# Patient Record
Sex: MALE | Race: WHITE | Employment: OTHER | URBAN - NONMETROPOLITAN AREA
[De-identification: names, ages, dates, MRNs, and addresses within clinical notes are randomized per-mention and may not be internally consistent; named-entity substitution may affect disease eponyms.]

---

## 2017-01-03 ENCOUNTER — OFFICE VISIT (OUTPATIENT)
Dept: CARDIOLOGY | Age: 67
End: 2017-01-03
Payer: MEDICARE

## 2017-01-03 VITALS
SYSTOLIC BLOOD PRESSURE: 154 MMHG | WEIGHT: 266 LBS | HEART RATE: 76 BPM | HEIGHT: 70 IN | BODY MASS INDEX: 38.08 KG/M2 | DIASTOLIC BLOOD PRESSURE: 74 MMHG

## 2017-01-03 DIAGNOSIS — I10 ESSENTIAL HYPERTENSION: ICD-10-CM

## 2017-01-03 DIAGNOSIS — E78.00 HYPERCHOLESTEREMIA: ICD-10-CM

## 2017-01-03 DIAGNOSIS — R07.89 OTHER CHEST PAIN: ICD-10-CM

## 2017-01-03 DIAGNOSIS — I25.10 CORONARY ARTERY DISEASE INVOLVING NATIVE CORONARY ARTERY OF NATIVE HEART WITHOUT ANGINA PECTORIS: Primary | ICD-10-CM

## 2017-01-03 PROCEDURE — 99212 OFFICE O/P EST SF 10 MIN: CPT | Performed by: INTERNAL MEDICINE

## 2017-01-03 RX ORDER — ATORVASTATIN CALCIUM 20 MG/1
20 TABLET, FILM COATED ORAL DAILY
COMMUNITY
Start: 2016-11-10 | End: 2018-03-08 | Stop reason: SDUPTHER

## 2017-01-12 ENCOUNTER — HOSPITAL ENCOUNTER (OUTPATIENT)
Dept: NUCLEAR MEDICINE | Age: 67
Discharge: HOME | End: 2017-01-12
Payer: MEDICARE

## 2017-01-12 ENCOUNTER — HOSPITAL ENCOUNTER (OUTPATIENT)
Dept: NON INVASIVE DIAGNOSTICS | Age: 67
Discharge: HOME | End: 2017-01-12
Payer: MEDICARE

## 2017-01-12 DIAGNOSIS — I25.10 CORONARY ARTERY DISEASE INVOLVING NATIVE CORONARY ARTERY OF NATIVE HEART WITHOUT ANGINA PECTORIS: ICD-10-CM

## 2017-01-12 DIAGNOSIS — I10 ESSENTIAL HYPERTENSION: ICD-10-CM

## 2017-01-12 DIAGNOSIS — R07.89 OTHER CHEST PAIN: ICD-10-CM

## 2017-01-12 DIAGNOSIS — E78.00 HYPERCHOLESTEREMIA: ICD-10-CM

## 2017-01-12 DIAGNOSIS — I25.10 ATHEROSCLEROSIS OF NATIVE CORONARY ARTERY OF NATIVE HEART WITHOUT ANGINA PECTORIS: ICD-10-CM

## 2017-01-12 PROCEDURE — 78452 HT MUSCLE IMAGE SPECT MULT: CPT

## 2017-01-12 PROCEDURE — 93017 CV STRESS TEST TRACING ONLY: CPT

## 2017-01-12 PROCEDURE — 93018 CV STRESS TEST I&R ONLY: CPT | Performed by: INTERNAL MEDICINE

## 2017-01-12 PROCEDURE — 78452 HT MUSCLE IMAGE SPECT MULT: CPT | Performed by: INTERNAL MEDICINE

## 2017-01-12 PROCEDURE — 6360000002 HC RX W HCPCS: Performed by: INTERNAL MEDICINE

## 2017-01-12 PROCEDURE — 93016 CV STRESS TEST SUPVJ ONLY: CPT | Performed by: INTERNAL MEDICINE

## 2017-01-12 PROCEDURE — A9500 TC99M SESTAMIBI: HCPCS | Performed by: INTERNAL MEDICINE

## 2017-01-12 PROCEDURE — 3430000000 HC RX DIAGNOSTIC RADIOPHARMACEUTICAL: Performed by: INTERNAL MEDICINE

## 2017-01-12 RX ADMIN — TETRAKIS(2-METHOXYISOBUTYLISOCYANIDE)COPPER(I) TETRAFLUOROBORATE 10 MILLICURIE: 1 INJECTION, POWDER, LYOPHILIZED, FOR SOLUTION INTRAVENOUS at 12:18

## 2017-01-12 RX ADMIN — TETRAKIS(2-METHOXYISOBUTYLISOCYANIDE)COPPER(I) TETRAFLUOROBORATE 30 MILLICURIE: 1 INJECTION, POWDER, LYOPHILIZED, FOR SOLUTION INTRAVENOUS at 12:18

## 2017-01-12 RX ADMIN — REGADENOSON 0.4 MG: 0.08 INJECTION, SOLUTION INTRAVENOUS at 12:18

## 2017-01-17 ENCOUNTER — OFFICE VISIT (OUTPATIENT)
Dept: CARDIOLOGY | Age: 67
End: 2017-01-17
Payer: MEDICARE

## 2017-01-17 VITALS
HEIGHT: 70 IN | DIASTOLIC BLOOD PRESSURE: 66 MMHG | WEIGHT: 262 LBS | BODY MASS INDEX: 37.51 KG/M2 | RESPIRATION RATE: 18 BRPM | SYSTOLIC BLOOD PRESSURE: 124 MMHG | HEART RATE: 80 BPM

## 2017-01-17 DIAGNOSIS — I10 ESSENTIAL HYPERTENSION: Primary | ICD-10-CM

## 2017-01-17 DIAGNOSIS — E78.00 HYPERCHOLESTEREMIA: ICD-10-CM

## 2017-01-17 DIAGNOSIS — I25.10 CORONARY ARTERY DISEASE INVOLVING NATIVE CORONARY ARTERY OF NATIVE HEART WITHOUT ANGINA PECTORIS: ICD-10-CM

## 2017-01-17 PROCEDURE — 3017F COLORECTAL CA SCREEN DOC REV: CPT | Performed by: INTERNAL MEDICINE

## 2017-01-17 PROCEDURE — G8484 FLU IMMUNIZE NO ADMIN: HCPCS | Performed by: INTERNAL MEDICINE

## 2017-01-17 PROCEDURE — G8419 CALC BMI OUT NRM PARAM NOF/U: HCPCS | Performed by: INTERNAL MEDICINE

## 2017-01-17 PROCEDURE — 99212 OFFICE O/P EST SF 10 MIN: CPT | Performed by: INTERNAL MEDICINE

## 2017-01-17 PROCEDURE — 1123F ACP DISCUSS/DSCN MKR DOCD: CPT | Performed by: INTERNAL MEDICINE

## 2017-01-17 PROCEDURE — 4040F PNEUMOC VAC/ADMIN/RCVD: CPT | Performed by: INTERNAL MEDICINE

## 2017-01-17 PROCEDURE — 1036F TOBACCO NON-USER: CPT | Performed by: INTERNAL MEDICINE

## 2017-01-17 PROCEDURE — G8427 DOCREV CUR MEDS BY ELIG CLIN: HCPCS | Performed by: INTERNAL MEDICINE

## 2017-01-17 PROCEDURE — G8598 ASA/ANTIPLAT THER USED: HCPCS | Performed by: INTERNAL MEDICINE

## 2017-01-17 RX ORDER — HYDROCODONE BITARTRATE AND ACETAMINOPHEN 7.5; 325 MG/1; MG/1
TABLET ORAL
COMMUNITY
Start: 2016-12-09 | End: 2017-08-01

## 2017-08-01 ENCOUNTER — OFFICE VISIT (OUTPATIENT)
Dept: CARDIOLOGY | Age: 67
End: 2017-08-01
Payer: MEDICARE

## 2017-08-01 VITALS
HEIGHT: 70 IN | SYSTOLIC BLOOD PRESSURE: 120 MMHG | DIASTOLIC BLOOD PRESSURE: 62 MMHG | HEART RATE: 72 BPM | BODY MASS INDEX: 37.65 KG/M2 | WEIGHT: 263 LBS

## 2017-08-01 DIAGNOSIS — I10 ESSENTIAL HYPERTENSION: Primary | ICD-10-CM

## 2017-08-01 DIAGNOSIS — I25.10 CORONARY ARTERY DISEASE INVOLVING NATIVE CORONARY ARTERY OF NATIVE HEART WITHOUT ANGINA PECTORIS: ICD-10-CM

## 2017-08-01 DIAGNOSIS — E78.00 HYPERCHOLESTEREMIA: ICD-10-CM

## 2017-08-01 PROCEDURE — 1036F TOBACCO NON-USER: CPT | Performed by: INTERNAL MEDICINE

## 2017-08-01 PROCEDURE — 3017F COLORECTAL CA SCREEN DOC REV: CPT | Performed by: INTERNAL MEDICINE

## 2017-08-01 PROCEDURE — 99212 OFFICE O/P EST SF 10 MIN: CPT | Performed by: INTERNAL MEDICINE

## 2017-08-01 PROCEDURE — G8598 ASA/ANTIPLAT THER USED: HCPCS | Performed by: INTERNAL MEDICINE

## 2017-08-01 PROCEDURE — G8427 DOCREV CUR MEDS BY ELIG CLIN: HCPCS | Performed by: INTERNAL MEDICINE

## 2017-08-01 PROCEDURE — 1123F ACP DISCUSS/DSCN MKR DOCD: CPT | Performed by: INTERNAL MEDICINE

## 2017-08-01 PROCEDURE — 4040F PNEUMOC VAC/ADMIN/RCVD: CPT | Performed by: INTERNAL MEDICINE

## 2017-08-01 PROCEDURE — G8417 CALC BMI ABV UP PARAM F/U: HCPCS | Performed by: INTERNAL MEDICINE

## 2018-02-01 ENCOUNTER — OFFICE VISIT (OUTPATIENT)
Dept: CARDIOLOGY | Age: 68
End: 2018-02-01
Payer: MEDICARE

## 2018-02-01 VITALS
DIASTOLIC BLOOD PRESSURE: 60 MMHG | WEIGHT: 263 LBS | BODY MASS INDEX: 37.65 KG/M2 | HEART RATE: 60 BPM | HEIGHT: 70 IN | SYSTOLIC BLOOD PRESSURE: 134 MMHG

## 2018-02-01 DIAGNOSIS — I10 ESSENTIAL HYPERTENSION: ICD-10-CM

## 2018-02-01 DIAGNOSIS — E66.09 CLASS 2 OBESITY DUE TO EXCESS CALORIES WITHOUT SERIOUS COMORBIDITY WITH BODY MASS INDEX (BMI) OF 37.0 TO 37.9 IN ADULT: ICD-10-CM

## 2018-02-01 DIAGNOSIS — I25.10 CORONARY ARTERY DISEASE INVOLVING NATIVE CORONARY ARTERY OF NATIVE HEART WITHOUT ANGINA PECTORIS: Primary | ICD-10-CM

## 2018-02-01 DIAGNOSIS — E78.00 HYPERCHOLESTEREMIA: ICD-10-CM

## 2018-02-01 PROBLEM — E66.812 CLASS 2 OBESITY DUE TO EXCESS CALORIES IN ADULT: Status: ACTIVE | Noted: 2018-02-01

## 2018-02-01 PROCEDURE — G8417 CALC BMI ABV UP PARAM F/U: HCPCS | Performed by: CLINICAL NURSE SPECIALIST

## 2018-02-01 PROCEDURE — 93000 ELECTROCARDIOGRAM COMPLETE: CPT | Performed by: CLINICAL NURSE SPECIALIST

## 2018-02-01 PROCEDURE — G8484 FLU IMMUNIZE NO ADMIN: HCPCS | Performed by: CLINICAL NURSE SPECIALIST

## 2018-02-01 PROCEDURE — G8427 DOCREV CUR MEDS BY ELIG CLIN: HCPCS | Performed by: CLINICAL NURSE SPECIALIST

## 2018-02-01 PROCEDURE — 4040F PNEUMOC VAC/ADMIN/RCVD: CPT | Performed by: CLINICAL NURSE SPECIALIST

## 2018-02-01 PROCEDURE — G8598 ASA/ANTIPLAT THER USED: HCPCS | Performed by: CLINICAL NURSE SPECIALIST

## 2018-02-01 PROCEDURE — 3017F COLORECTAL CA SCREEN DOC REV: CPT | Performed by: CLINICAL NURSE SPECIALIST

## 2018-02-01 PROCEDURE — 99213 OFFICE O/P EST LOW 20 MIN: CPT | Performed by: CLINICAL NURSE SPECIALIST

## 2018-02-01 PROCEDURE — 1036F TOBACCO NON-USER: CPT | Performed by: CLINICAL NURSE SPECIALIST

## 2018-02-01 PROCEDURE — 1123F ACP DISCUSS/DSCN MKR DOCD: CPT | Performed by: CLINICAL NURSE SPECIALIST

## 2018-02-01 ASSESSMENT — ENCOUNTER SYMPTOMS
VOMITING: 0
COUGH: 0
SHORTNESS OF BREATH: 0
NAUSEA: 0
HEARTBURN: 0
BLOOD IN STOOL: 0
BLURRED VISION: 0
ORTHOPNEA: 0

## 2018-03-08 ENCOUNTER — OFFICE VISIT (OUTPATIENT)
Dept: PRIMARY CARE CLINIC | Age: 68
End: 2018-03-08
Payer: MEDICARE

## 2018-03-08 VITALS
HEIGHT: 70 IN | OXYGEN SATURATION: 93 % | BODY MASS INDEX: 38.08 KG/M2 | DIASTOLIC BLOOD PRESSURE: 64 MMHG | RESPIRATION RATE: 18 BRPM | TEMPERATURE: 98.4 F | SYSTOLIC BLOOD PRESSURE: 138 MMHG | WEIGHT: 266 LBS | HEART RATE: 65 BPM

## 2018-03-08 DIAGNOSIS — R73.9 ELEVATED BLOOD SUGAR LEVEL: ICD-10-CM

## 2018-03-08 DIAGNOSIS — Z11.59 ENCOUNTER FOR HEPATITIS C SCREENING TEST FOR LOW RISK PATIENT: ICD-10-CM

## 2018-03-08 DIAGNOSIS — K21.9 GASTROESOPHAGEAL REFLUX DISEASE WITHOUT ESOPHAGITIS: ICD-10-CM

## 2018-03-08 DIAGNOSIS — E66.09 CLASS 2 OBESITY DUE TO EXCESS CALORIES WITHOUT SERIOUS COMORBIDITY WITH BODY MASS INDEX (BMI) OF 37.0 TO 37.9 IN ADULT: ICD-10-CM

## 2018-03-08 DIAGNOSIS — R00.1 BRADYCARDIA: ICD-10-CM

## 2018-03-08 DIAGNOSIS — E78.00 HYPERCHOLESTEREMIA: Primary | ICD-10-CM

## 2018-03-08 DIAGNOSIS — E79.0 ELEVATED BLOOD URIC ACID LEVEL: ICD-10-CM

## 2018-03-08 DIAGNOSIS — R74.8 ELEVATED CREATINE KINASE LEVEL: ICD-10-CM

## 2018-03-08 DIAGNOSIS — R79.9 ABNORMAL FINDING OF BLOOD CHEMISTRY: ICD-10-CM

## 2018-03-08 DIAGNOSIS — I25.10 CORONARY ARTERY DISEASE INVOLVING NATIVE CORONARY ARTERY OF NATIVE HEART WITHOUT ANGINA PECTORIS: ICD-10-CM

## 2018-03-08 DIAGNOSIS — I10 ESSENTIAL HYPERTENSION: ICD-10-CM

## 2018-03-08 PROCEDURE — G8484 FLU IMMUNIZE NO ADMIN: HCPCS | Performed by: NURSE PRACTITIONER

## 2018-03-08 PROCEDURE — 1036F TOBACCO NON-USER: CPT | Performed by: NURSE PRACTITIONER

## 2018-03-08 PROCEDURE — 4040F PNEUMOC VAC/ADMIN/RCVD: CPT | Performed by: NURSE PRACTITIONER

## 2018-03-08 PROCEDURE — G8598 ASA/ANTIPLAT THER USED: HCPCS | Performed by: NURSE PRACTITIONER

## 2018-03-08 PROCEDURE — G8427 DOCREV CUR MEDS BY ELIG CLIN: HCPCS | Performed by: NURSE PRACTITIONER

## 2018-03-08 PROCEDURE — 3017F COLORECTAL CA SCREEN DOC REV: CPT | Performed by: NURSE PRACTITIONER

## 2018-03-08 PROCEDURE — G8417 CALC BMI ABV UP PARAM F/U: HCPCS | Performed by: NURSE PRACTITIONER

## 2018-03-08 PROCEDURE — 99215 OFFICE O/P EST HI 40 MIN: CPT | Performed by: NURSE PRACTITIONER

## 2018-03-08 PROCEDURE — 99402 PREV MED CNSL INDIV APPRX 30: CPT | Performed by: NURSE PRACTITIONER

## 2018-03-08 PROCEDURE — 1123F ACP DISCUSS/DSCN MKR DOCD: CPT | Performed by: NURSE PRACTITIONER

## 2018-03-08 RX ORDER — GLUCOSAMINE HCL 500 MG
4000 TABLET ORAL
COMMUNITY
End: 2019-04-23

## 2018-03-08 RX ORDER — NEBIVOLOL HYDROCHLORIDE 10 MG/1
10 TABLET ORAL DAILY
Qty: 30 TABLET | Refills: 2 | Status: SHIPPED | OUTPATIENT
Start: 2018-03-08 | End: 2018-07-11 | Stop reason: SDUPTHER

## 2018-03-08 RX ORDER — ATORVASTATIN CALCIUM 20 MG/1
20 TABLET, FILM COATED ORAL DAILY
Qty: 30 TABLET | Refills: 2 | Status: SHIPPED | OUTPATIENT
Start: 2018-03-08 | End: 2018-07-11 | Stop reason: SDUPTHER

## 2018-03-08 RX ORDER — LOSARTAN POTASSIUM AND HYDROCHLOROTHIAZIDE 25; 100 MG/1; MG/1
1 TABLET ORAL DAILY
Qty: 30 TABLET | Refills: 2 | Status: SHIPPED | OUTPATIENT
Start: 2018-03-08 | End: 2018-07-11 | Stop reason: SDUPTHER

## 2018-03-08 RX ORDER — LOSARTAN POTASSIUM AND HYDROCHLOROTHIAZIDE 25; 100 MG/1; MG/1
TABLET ORAL
COMMUNITY
Start: 2018-02-14 | End: 2018-03-08 | Stop reason: SDUPTHER

## 2018-03-08 ASSESSMENT — ENCOUNTER SYMPTOMS
NAUSEA: 0
SORE THROAT: 0
WHEEZING: 0
VOICE CHANGE: 0
VOMITING: 0
ABDOMINAL PAIN: 0
BLOOD IN STOOL: 0
TROUBLE SWALLOWING: 0
SHORTNESS OF BREATH: 0
CHEST TIGHTNESS: 0
COUGH: 0
DIARRHEA: 0
EYE REDNESS: 0
CONSTIPATION: 0
RHINORRHEA: 0

## 2018-03-08 NOTE — PROGRESS NOTES
[facility]  [address]  [address] [facility] [address]  Dept: [phone]  Dept Fax: [phone]  Loc: [phone]        Ramandeep Morton is a 76 y.o. male who presents today for his medical conditions/ complaints as noted below. Ramandeep Morton is c/o No chief complaint on file. No chief complaint on file. HPI:     HPI    Pt here to establish care. Pt had Bypass per Dr. Nai Andrews 10 years ago. He is currently on Cozar, bystolic, lipitor and ASA 81mg bid. Pt also takes Fish oil OTC 1200mg daily. Pt labs reviewed which shows elevated creat and declining GFR. Pt uric acid level is elevated and his glucose has also been elevated. Pt has labs with him today. Pt states that went off of meloxicam which improved his kidney function. He states that he has updated labs which show improvement in his kidneys. Patient reports that they have been compliant with taking medications as directed. Past Medical History:   Diagnosis Date    CAD (coronary artery disease), native coronary artery     Class 2 obesity due to excess calories in adult 2/1/2018    GERD (gastroesophageal reflux disease)     Hypercholesteremia     Hypertension        Past Surgical History:   Procedure Laterality Date    CARDIAC CATHETERIZATION  12/31/07, JDT    Left heart cath    CARDIAC CATHETERIZATION  12/31/07, JDT    Left heart cath    CARDIOVASCULAR STRESS TEST  06/11/2009, JDT    Stress Echo    CARDIOVASCULAR STRESS TEST  12/31/07, JDT    Stress Echo    CORONARY ARTERY BYPASS GRAFT      X4 utilizing the left MOLINA to the LAD, right MOLINA to the second diagonal, and vein grafts to the obtuse marinal and PLOM.  CORONARY ARTERY BYPASS GRAFT  01/02/08, Sundeep Stevenson CABG placing the LIMA to the LAD, MIGUELANGEL to the second diagonal branch and SVBG to the OMB and PMB.        Social History     Social History    Marital status:      Spouse name: N/A    Number of children: N/A    Years of education: N/A     Social History Main Topics    Smoking status: Former Smoker    Smokeless tobacco: Former User    Alcohol use Yes      Comment: Moderate use.  Drug use: Unknown    Sexual activity: Not Asked     Other Topics Concern    None     Social History Narrative    None       Family History   Problem Relation Age of Onset   Frida Allen Cancer Mother      lung    Coronary Art Dis Father     Stroke Father     High Blood Pressure Brother     Coronary Art Dis Paternal Grandmother     Coronary Art Dis Paternal Grandfather        Current Outpatient Prescriptions   Medication Sig Dispense Refill    Cholecalciferol (VITAMIN D3) 3000 units TABS Take by mouth      zoster vaccine live, PF, (ZOSTAVAX) 55066 UNT/0.65ML injection Inject 0.65 mLs into the skin once for 1 dose 0.65 mL 0    losartan-hydrochlorothiazide (HYZAAR) 100-25 MG per tablet Take 1 tablet by mouth daily 30 tablet 2    atorvastatin (LIPITOR) 20 MG tablet Take 1 tablet by mouth daily 30 tablet 2    BYSTOLIC 10 MG tablet Take 1 tablet by mouth daily 30 tablet 2    aspirin 81 MG tablet Take 81 mg by mouth 2 times daily.  fish oil-omega-3 fatty acids 1000 MG capsule Take 2 g by mouth daily. No current facility-administered medications for this visit. No Known Allergies    Lab Review   No visits with results within 2 Month(s) from this visit. Latest known visit with results is:   No results found for any previous visit. Subjective:   Review of Systems   Constitutional: Negative for activity change, appetite change, fatigue, fever and unexpected weight change. HENT: Negative for congestion, ear pain, nosebleeds, rhinorrhea, sore throat, trouble swallowing and voice change. Eyes: Negative for redness and visual disturbance. Respiratory: Negative for cough, chest tightness, shortness of breath and wheezing. Cardiovascular: Negative for chest pain, palpitations and leg swelling. We will PA bystolic for you. We will call you with labs. Sign up for my chart today. Patient/family given educational materials - see patient instructions. Discussed use, benefit, and side effects of prescribed medications. All patient/family questions answered and voiced understanding. Instructed to continue current medications, diet and exercise. Pt/family agreed with treatment plan. Follow up as directed and sooner if needed. Patient/ family instructed that is symptoms worsen or persist they are to contact office or report to nearest ER. They voice understanding and agreement with this plan.      Electronically signed by MISSY Rodas on 3/8/2018 at 3:40 PM

## 2018-03-08 NOTE — PATIENT INSTRUCTIONS
Have labs drawn fasting. We will PA bystolic for you. We will call you with labs. Sign up for my chart today.

## 2018-03-09 DIAGNOSIS — R79.9 ABNORMAL FINDING OF BLOOD CHEMISTRY: ICD-10-CM

## 2018-03-09 DIAGNOSIS — E79.0 ELEVATED BLOOD URIC ACID LEVEL: ICD-10-CM

## 2018-03-09 DIAGNOSIS — I10 ESSENTIAL HYPERTENSION: ICD-10-CM

## 2018-03-09 DIAGNOSIS — Z11.59 ENCOUNTER FOR HEPATITIS C SCREENING TEST FOR LOW RISK PATIENT: ICD-10-CM

## 2018-03-09 DIAGNOSIS — E78.00 HYPERCHOLESTEREMIA: ICD-10-CM

## 2018-03-09 LAB
ALBUMIN SERPL-MCNC: 4.5 G/DL (ref 3.5–5.2)
ALP BLD-CCNC: 69 U/L (ref 40–130)
ALT SERPL-CCNC: 19 U/L (ref 5–41)
ANION GAP SERPL CALCULATED.3IONS-SCNC: 13 MMOL/L (ref 7–19)
AST SERPL-CCNC: 19 U/L (ref 5–40)
BASOPHILS ABSOLUTE: 0.1 K/UL (ref 0–0.2)
BASOPHILS RELATIVE PERCENT: 0.8 % (ref 0–1)
BILIRUB SERPL-MCNC: 0.7 MG/DL (ref 0.2–1.2)
BUN BLDV-MCNC: 23 MG/DL (ref 8–23)
CALCIUM SERPL-MCNC: 10.7 MG/DL (ref 8.8–10.2)
CHLORIDE BLD-SCNC: 100 MMOL/L (ref 98–111)
CHOLESTEROL, TOTAL: 141 MG/DL (ref 160–199)
CO2: 31 MMOL/L (ref 22–29)
CREAT SERPL-MCNC: 1.3 MG/DL (ref 0.5–1.2)
EOSINOPHILS ABSOLUTE: 0.4 K/UL (ref 0–0.6)
EOSINOPHILS RELATIVE PERCENT: 4.9 % (ref 0–5)
GFR NON-AFRICAN AMERICAN: 55
GLUCOSE BLD-MCNC: 126 MG/DL (ref 74–109)
HAV IGM SER IA-ACNC: NORMAL
HBA1C MFR BLD: 7.1 %
HCT VFR BLD CALC: 45.4 % (ref 42–52)
HDLC SERPL-MCNC: 46 MG/DL (ref 55–121)
HEMOGLOBIN: 14.4 G/DL (ref 14–18)
HEPATITIS B CORE IGM ANTIBODY: NORMAL
HEPATITIS B SURFACE ANTIGEN INTERPRETATION: NORMAL
HEPATITIS C ANTIBODY INTERPRETATION: NORMAL
LDL CHOLESTEROL CALCULATED: 77 MG/DL
LYMPHOCYTES ABSOLUTE: 1.6 K/UL (ref 1.1–4.5)
LYMPHOCYTES RELATIVE PERCENT: 21 % (ref 20–40)
MCH RBC QN AUTO: 28.1 PG (ref 27–31)
MCHC RBC AUTO-ENTMCNC: 31.7 G/DL (ref 33–37)
MCV RBC AUTO: 88.7 FL (ref 80–94)
MONOCYTES ABSOLUTE: 0.7 K/UL (ref 0–0.9)
MONOCYTES RELATIVE PERCENT: 10 % (ref 0–10)
NEUTROPHILS ABSOLUTE: 4.7 K/UL (ref 1.5–7.5)
NEUTROPHILS RELATIVE PERCENT: 63 % (ref 50–65)
PDW BLD-RTO: 12.6 % (ref 11.5–14.5)
PLATELET # BLD: 196 K/UL (ref 130–400)
PMV BLD AUTO: 11.3 FL (ref 9.4–12.4)
POTASSIUM SERPL-SCNC: 4 MMOL/L (ref 3.5–5)
RBC # BLD: 5.12 M/UL (ref 4.7–6.1)
SODIUM BLD-SCNC: 144 MMOL/L (ref 136–145)
TOTAL PROTEIN: 8.1 G/DL (ref 6.6–8.7)
TRIGL SERPL-MCNC: 92 MG/DL (ref 0–149)
URIC ACID, SERUM: 9.5 MG/DL (ref 3.4–7)
WBC # BLD: 7.4 K/UL (ref 4.8–10.8)

## 2018-03-20 ENCOUNTER — TELEPHONE (OUTPATIENT)
Dept: PRIMARY CARE CLINIC | Age: 68
End: 2018-03-20

## 2018-04-11 ENCOUNTER — OFFICE VISIT (OUTPATIENT)
Dept: PRIMARY CARE CLINIC | Age: 68
End: 2018-04-11
Payer: MEDICARE

## 2018-04-11 VITALS
TEMPERATURE: 97.8 F | BODY MASS INDEX: 37.51 KG/M2 | WEIGHT: 262 LBS | HEART RATE: 56 BPM | SYSTOLIC BLOOD PRESSURE: 144 MMHG | RESPIRATION RATE: 18 BRPM | DIASTOLIC BLOOD PRESSURE: 78 MMHG | HEIGHT: 70 IN | OXYGEN SATURATION: 95 %

## 2018-04-11 DIAGNOSIS — R09.89 DECREASED PEDAL PULSES: ICD-10-CM

## 2018-04-11 DIAGNOSIS — R94.4 DECREASED GFR: ICD-10-CM

## 2018-04-11 DIAGNOSIS — R74.8 INCREASED CREATINE KINASE LEVEL: ICD-10-CM

## 2018-04-11 DIAGNOSIS — E83.52 CALCIUM BLOOD INCREASED: ICD-10-CM

## 2018-04-11 DIAGNOSIS — M10.9 GOUT, UNSPECIFIED CAUSE, UNSPECIFIED CHRONICITY, UNSPECIFIED SITE: ICD-10-CM

## 2018-04-11 DIAGNOSIS — E79.0 ELEVATED BLOOD URIC ACID LEVEL: ICD-10-CM

## 2018-04-11 DIAGNOSIS — E11.8 TYPE 2 DIABETES MELLITUS WITH COMPLICATION, WITHOUT LONG-TERM CURRENT USE OF INSULIN (HCC): Primary | ICD-10-CM

## 2018-04-11 PROCEDURE — G8598 ASA/ANTIPLAT THER USED: HCPCS | Performed by: NURSE PRACTITIONER

## 2018-04-11 PROCEDURE — 3017F COLORECTAL CA SCREEN DOC REV: CPT | Performed by: NURSE PRACTITIONER

## 2018-04-11 PROCEDURE — 3045F PR MOST RECENT HEMOGLOBIN A1C LEVEL 7.0-9.0%: CPT | Performed by: NURSE PRACTITIONER

## 2018-04-11 PROCEDURE — 1123F ACP DISCUSS/DSCN MKR DOCD: CPT | Performed by: NURSE PRACTITIONER

## 2018-04-11 PROCEDURE — G8417 CALC BMI ABV UP PARAM F/U: HCPCS | Performed by: NURSE PRACTITIONER

## 2018-04-11 PROCEDURE — 1036F TOBACCO NON-USER: CPT | Performed by: NURSE PRACTITIONER

## 2018-04-11 PROCEDURE — 99214 OFFICE O/P EST MOD 30 MIN: CPT | Performed by: NURSE PRACTITIONER

## 2018-04-11 PROCEDURE — G8427 DOCREV CUR MEDS BY ELIG CLIN: HCPCS | Performed by: NURSE PRACTITIONER

## 2018-04-11 PROCEDURE — 4040F PNEUMOC VAC/ADMIN/RCVD: CPT | Performed by: NURSE PRACTITIONER

## 2018-04-11 PROCEDURE — 99497 ADVNCD CARE PLAN 30 MIN: CPT | Performed by: NURSE PRACTITIONER

## 2018-04-11 PROCEDURE — 2022F DILAT RTA XM EVC RTNOPTHY: CPT | Performed by: NURSE PRACTITIONER

## 2018-04-11 RX ORDER — METFORMIN HYDROCHLORIDE 500 MG/1
TABLET, EXTENDED RELEASE ORAL
Qty: 30 TABLET | Refills: 2 | Status: SHIPPED | OUTPATIENT
Start: 2018-04-11 | End: 2018-07-11 | Stop reason: SDUPTHER

## 2018-04-23 PROBLEM — R94.4 DECREASED GFR: Status: ACTIVE | Noted: 2018-04-23

## 2018-04-23 PROBLEM — M10.9 GOUT: Status: ACTIVE | Noted: 2018-04-23

## 2018-04-23 PROBLEM — R09.89 DECREASED PEDAL PULSES: Status: ACTIVE | Noted: 2018-04-23

## 2018-04-23 PROBLEM — R74.8 INCREASED CREATINE KINASE LEVEL: Status: ACTIVE | Noted: 2018-04-23

## 2018-04-23 PROBLEM — E79.0 ELEVATED BLOOD URIC ACID LEVEL: Status: ACTIVE | Noted: 2018-04-23

## 2018-04-23 ASSESSMENT — ENCOUNTER SYMPTOMS
NAUSEA: 0
ABDOMINAL PAIN: 0
EYE REDNESS: 0
SHORTNESS OF BREATH: 0
CONSTIPATION: 0
VOMITING: 0
VOICE CHANGE: 0
RHINORRHEA: 0
BLOOD IN STOOL: 0
DIARRHEA: 0
COUGH: 0
TROUBLE SWALLOWING: 0
WHEEZING: 0
SORE THROAT: 0
CHEST TIGHTNESS: 0

## 2018-05-11 ENCOUNTER — OFFICE VISIT (OUTPATIENT)
Dept: PRIMARY CARE CLINIC | Age: 68
End: 2018-05-11
Payer: MEDICARE

## 2018-05-11 VITALS
SYSTOLIC BLOOD PRESSURE: 128 MMHG | DIASTOLIC BLOOD PRESSURE: 66 MMHG | OXYGEN SATURATION: 98 % | HEIGHT: 70 IN | TEMPERATURE: 97.8 F | RESPIRATION RATE: 18 BRPM | BODY MASS INDEX: 35.79 KG/M2 | WEIGHT: 250 LBS | HEART RATE: 78 BPM

## 2018-05-11 DIAGNOSIS — E83.52 HYPERCALCEMIA: ICD-10-CM

## 2018-05-11 DIAGNOSIS — N18.9 CHRONIC KIDNEY DISEASE, UNSPECIFIED CKD STAGE: ICD-10-CM

## 2018-05-11 DIAGNOSIS — E11.8 TYPE 2 DIABETES MELLITUS WITH COMPLICATION, WITHOUT LONG-TERM CURRENT USE OF INSULIN (HCC): Primary | ICD-10-CM

## 2018-05-11 DIAGNOSIS — E79.0 ELEVATED BLOOD URIC ACID LEVEL: ICD-10-CM

## 2018-05-11 DIAGNOSIS — Z23 NEED FOR VACCINATION: ICD-10-CM

## 2018-05-11 DIAGNOSIS — Z71.9 COUNSELING, UNSPECIFIED: ICD-10-CM

## 2018-05-11 LAB
ALBUMIN SERPL-MCNC: 4.8 G/DL (ref 3.5–5.2)
ALP BLD-CCNC: 57 U/L (ref 40–130)
ALT SERPL-CCNC: 23 U/L (ref 5–41)
ANION GAP SERPL CALCULATED.3IONS-SCNC: 18 MMOL/L (ref 7–19)
AST SERPL-CCNC: 26 U/L (ref 5–40)
BILIRUB SERPL-MCNC: 0.9 MG/DL (ref 0.2–1.2)
BUN BLDV-MCNC: 26 MG/DL (ref 8–23)
CALCIUM SERPL-MCNC: 10.8 MG/DL (ref 8.8–10.2)
CHLORIDE BLD-SCNC: 101 MMOL/L (ref 98–111)
CO2: 25 MMOL/L (ref 22–29)
CREAT SERPL-MCNC: 1.3 MG/DL (ref 0.5–1.2)
GFR NON-AFRICAN AMERICAN: 55
GLUCOSE BLD-MCNC: 103 MG/DL (ref 74–109)
HBA1C MFR BLD: 6.4 %
PARATHYROID HORMONE INTACT: 23.3 PG/ML (ref 15–65)
POTASSIUM SERPL-SCNC: 4.1 MMOL/L (ref 3.5–5)
SODIUM BLD-SCNC: 144 MMOL/L (ref 136–145)
TOTAL PROTEIN: 8.1 G/DL (ref 6.6–8.7)

## 2018-05-11 PROCEDURE — G8417 CALC BMI ABV UP PARAM F/U: HCPCS | Performed by: NURSE PRACTITIONER

## 2018-05-11 PROCEDURE — 2022F DILAT RTA XM EVC RTNOPTHY: CPT | Performed by: NURSE PRACTITIONER

## 2018-05-11 PROCEDURE — 83036 HEMOGLOBIN GLYCOSYLATED A1C: CPT | Performed by: NURSE PRACTITIONER

## 2018-05-11 PROCEDURE — 3017F COLORECTAL CA SCREEN DOC REV: CPT | Performed by: NURSE PRACTITIONER

## 2018-05-11 PROCEDURE — 99214 OFFICE O/P EST MOD 30 MIN: CPT | Performed by: NURSE PRACTITIONER

## 2018-05-11 PROCEDURE — 4040F PNEUMOC VAC/ADMIN/RCVD: CPT | Performed by: NURSE PRACTITIONER

## 2018-05-11 PROCEDURE — 90670 PCV13 VACCINE IM: CPT | Performed by: NURSE PRACTITIONER

## 2018-05-11 PROCEDURE — 99401 PREV MED CNSL INDIV APPRX 15: CPT | Performed by: NURSE PRACTITIONER

## 2018-05-11 PROCEDURE — G8598 ASA/ANTIPLAT THER USED: HCPCS | Performed by: NURSE PRACTITIONER

## 2018-05-11 PROCEDURE — G8427 DOCREV CUR MEDS BY ELIG CLIN: HCPCS | Performed by: NURSE PRACTITIONER

## 2018-05-11 PROCEDURE — 3044F HG A1C LEVEL LT 7.0%: CPT | Performed by: NURSE PRACTITIONER

## 2018-05-11 PROCEDURE — 1123F ACP DISCUSS/DSCN MKR DOCD: CPT | Performed by: NURSE PRACTITIONER

## 2018-05-11 PROCEDURE — 1036F TOBACCO NON-USER: CPT | Performed by: NURSE PRACTITIONER

## 2018-05-11 PROCEDURE — G0009 ADMIN PNEUMOCOCCAL VACCINE: HCPCS | Performed by: NURSE PRACTITIONER

## 2018-05-11 RX ORDER — ALLOPURINOL 100 MG/1
100 TABLET ORAL DAILY
Qty: 30 TABLET | Refills: 2 | Status: SHIPPED | OUTPATIENT
Start: 2018-05-11 | End: 2018-07-11 | Stop reason: SDUPTHER

## 2018-05-11 ASSESSMENT — ENCOUNTER SYMPTOMS
VOICE CHANGE: 0
CHEST TIGHTNESS: 0
BLOOD IN STOOL: 0
SHORTNESS OF BREATH: 0
NAUSEA: 0
TROUBLE SWALLOWING: 0
WHEEZING: 0
DIARRHEA: 0
EYE REDNESS: 0
ABDOMINAL PAIN: 0
RHINORRHEA: 0
CONSTIPATION: 0
COUGH: 0
SORE THROAT: 0
VOMITING: 0

## 2018-05-16 DIAGNOSIS — R09.89 DECREASED PULSES IN FEET: Primary | ICD-10-CM

## 2018-05-16 DIAGNOSIS — I73.9 CLAUDICATION (HCC): ICD-10-CM

## 2018-05-17 ENCOUNTER — TELEPHONE (OUTPATIENT)
Dept: PRIMARY CARE CLINIC | Age: 68
End: 2018-05-17

## 2018-05-17 LAB
VITAMIN D2 AND D3, TOTAL: 59.5 NG/ML (ref 30–80)
VITAMIN D2, 25 HYDROXY: <1 NG/ML
VITAMIN D3,25 HYDROXY: 59.5 NG/ML

## 2018-05-24 ENCOUNTER — APPOINTMENT (OUTPATIENT)
Dept: NON INVASIVE DIAGNOSTICS | Age: 68
End: 2018-05-24
Payer: MEDICARE

## 2018-05-24 ENCOUNTER — HOSPITAL ENCOUNTER (OUTPATIENT)
Dept: NON INVASIVE DIAGNOSTICS | Age: 68
Discharge: HOME | End: 2018-05-24
Payer: MEDICARE

## 2018-05-24 DIAGNOSIS — I73.9 CLAUDICATION (HCC): ICD-10-CM

## 2018-05-24 DIAGNOSIS — R09.89 DECREASED PULSES IN FEET: ICD-10-CM

## 2018-05-24 PROCEDURE — 93923 UPR/LXTR ART STDY 3+ LVLS: CPT

## 2018-06-05 ENCOUNTER — TELEPHONE (OUTPATIENT)
Dept: PRIMARY CARE CLINIC | Age: 68
End: 2018-06-05

## 2018-07-11 ENCOUNTER — OFFICE VISIT (OUTPATIENT)
Dept: PRIMARY CARE CLINIC | Age: 68
End: 2018-07-11
Payer: MEDICARE

## 2018-07-11 VITALS
HEART RATE: 78 BPM | SYSTOLIC BLOOD PRESSURE: 104 MMHG | HEIGHT: 70 IN | TEMPERATURE: 97.8 F | BODY MASS INDEX: 34.22 KG/M2 | DIASTOLIC BLOOD PRESSURE: 64 MMHG | OXYGEN SATURATION: 98 % | WEIGHT: 239 LBS | RESPIRATION RATE: 18 BRPM

## 2018-07-11 DIAGNOSIS — E79.0 ELEVATED BLOOD URIC ACID LEVEL: ICD-10-CM

## 2018-07-11 DIAGNOSIS — I10 ESSENTIAL HYPERTENSION: ICD-10-CM

## 2018-07-11 DIAGNOSIS — Z12.11 ENCOUNTER FOR SCREENING COLONOSCOPY FOR NON-HIGH-RISK PATIENT: ICD-10-CM

## 2018-07-11 DIAGNOSIS — E78.00 HYPERCHOLESTEREMIA: ICD-10-CM

## 2018-07-11 DIAGNOSIS — E11.8 TYPE 2 DIABETES MELLITUS WITH COMPLICATION, WITHOUT LONG-TERM CURRENT USE OF INSULIN (HCC): Primary | ICD-10-CM

## 2018-07-11 PROCEDURE — G8427 DOCREV CUR MEDS BY ELIG CLIN: HCPCS | Performed by: NURSE PRACTITIONER

## 2018-07-11 PROCEDURE — G8417 CALC BMI ABV UP PARAM F/U: HCPCS | Performed by: NURSE PRACTITIONER

## 2018-07-11 PROCEDURE — 99214 OFFICE O/P EST MOD 30 MIN: CPT | Performed by: NURSE PRACTITIONER

## 2018-07-11 PROCEDURE — 3017F COLORECTAL CA SCREEN DOC REV: CPT | Performed by: NURSE PRACTITIONER

## 2018-07-11 PROCEDURE — G8598 ASA/ANTIPLAT THER USED: HCPCS | Performed by: NURSE PRACTITIONER

## 2018-07-11 PROCEDURE — 2022F DILAT RTA XM EVC RTNOPTHY: CPT | Performed by: NURSE PRACTITIONER

## 2018-07-11 PROCEDURE — 1101F PT FALLS ASSESS-DOCD LE1/YR: CPT | Performed by: NURSE PRACTITIONER

## 2018-07-11 PROCEDURE — 3044F HG A1C LEVEL LT 7.0%: CPT | Performed by: NURSE PRACTITIONER

## 2018-07-11 PROCEDURE — 1036F TOBACCO NON-USER: CPT | Performed by: NURSE PRACTITIONER

## 2018-07-11 PROCEDURE — 4040F PNEUMOC VAC/ADMIN/RCVD: CPT | Performed by: NURSE PRACTITIONER

## 2018-07-11 PROCEDURE — 1123F ACP DISCUSS/DSCN MKR DOCD: CPT | Performed by: NURSE PRACTITIONER

## 2018-07-11 RX ORDER — ATORVASTATIN CALCIUM 20 MG/1
20 TABLET, FILM COATED ORAL DAILY
Qty: 90 TABLET | Refills: 2 | Status: SHIPPED | OUTPATIENT
Start: 2018-07-11 | End: 2019-03-05 | Stop reason: SDUPTHER

## 2018-07-11 RX ORDER — NEBIVOLOL HYDROCHLORIDE 10 MG/1
10 TABLET ORAL DAILY
Qty: 30 TABLET | Refills: 2 | Status: SHIPPED | OUTPATIENT
Start: 2018-07-11 | End: 2018-11-30 | Stop reason: ALTCHOICE

## 2018-07-11 RX ORDER — ALLOPURINOL 100 MG/1
100 TABLET ORAL DAILY
Qty: 30 TABLET | Refills: 2 | Status: SHIPPED | OUTPATIENT
Start: 2018-07-11 | End: 2018-11-30

## 2018-07-11 RX ORDER — LOSARTAN POTASSIUM AND HYDROCHLOROTHIAZIDE 25; 100 MG/1; MG/1
1 TABLET ORAL DAILY
Qty: 90 TABLET | Refills: 2 | Status: SHIPPED | OUTPATIENT
Start: 2018-07-11 | End: 2018-10-05 | Stop reason: DRUGHIGH

## 2018-07-11 RX ORDER — METFORMIN HYDROCHLORIDE 500 MG/1
TABLET, EXTENDED RELEASE ORAL
Qty: 30 TABLET | Refills: 2 | Status: SHIPPED | OUTPATIENT
Start: 2018-07-11 | End: 2018-10-05 | Stop reason: SDUPTHER

## 2018-07-11 ASSESSMENT — ENCOUNTER SYMPTOMS
VOICE CHANGE: 0
CHEST TIGHTNESS: 0
ABDOMINAL PAIN: 0
VOMITING: 0
CONSTIPATION: 0
RHINORRHEA: 0
TROUBLE SWALLOWING: 0
NAUSEA: 0
SORE THROAT: 0
EYE REDNESS: 0
WHEEZING: 0
SHORTNESS OF BREATH: 0
DIARRHEA: 0
BLOOD IN STOOL: 0
COUGH: 0

## 2018-07-11 NOTE — PROGRESS NOTES
[facility]  [address] [facility]  [address] [facility] [address]  Dept: [phone]  Dept Fax: [phone]  Loc: [phone]        Zana Miller is a 76 y.o. male who presents today for his medical conditions/ complaints as noted below. Zana Miller is c/o 1 Month Follow-Up (medication refills )        Chief Complaint   Patient presents with    1 Month Follow-Up     medication refills        HPI:     HPI    Patient here for one-month follow-up for diabetes, hypertension, hyperlipidemia, and gout. Patient states that he is feeling better than he has a long time. Patient's weight has reduced from 266-239. Patient's blood pressure is also well controlled at this visit. Patient states that he is tolerating all of his medications and wishes to continue same. Patient reports that they have been compliant with taking medications as directed. Past Medical History:   Diagnosis Date    CAD (coronary artery disease), native coronary artery     Class 2 obesity due to excess calories in adult 2/1/2018    GERD (gastroesophageal reflux disease)     Hypercholesteremia     Hypertension        Past Surgical History:   Procedure Laterality Date    CARDIAC CATHETERIZATION  12/31/07, JDT    Left heart cath    CARDIAC CATHETERIZATION  12/31/07, JDT    Left heart cath    CARDIOVASCULAR STRESS TEST  06/11/2009, JDT    Stress Echo    CARDIOVASCULAR STRESS TEST  12/31/07, JDT    Stress Echo    CORONARY ARTERY BYPASS GRAFT      X4 utilizing the left MOLINA to the LAD, right MOLINA to the second diagonal, and vein grafts to the obtuse marinal and PLOM.  CORONARY ARTERY BYPASS GRAFT  01/02/08, Colt Gallagher CABG placing the LIMA to the LAD, MIGUELANGEL to the second diagonal branch and SVBG to the OMB and PMB.        Social History     Social History    Marital status:      Spouse name: N/A    Number of children: N/A    Years of education: N/A     Social History Main Topics    Smoking status: Former Smoker    Smokeless tobacco: Former User    Alcohol use Yes      Comment: Moderate use.  Drug use: Unknown    Sexual activity: Not Asked     Other Topics Concern    None     Social History Narrative    None       Family History   Problem Relation Age of Onset   [address] [facility] Cancer Mother         lung    Coronary Art Dis Father     Stroke Father     High Blood Pressure Brother     Coronary Art Dis Paternal Grandmother     Coronary Art Dis Paternal Grandfather        Current Outpatient Prescriptions   Medication Sig Dispense Refill    losartan-hydrochlorothiazide (HYZAAR) 100-25 MG per tablet Take 1 tablet by mouth daily 90 tablet 2    BYSTOLIC 10 MG tablet Take 1 tablet by mouth daily 30 tablet 2    atorvastatin (LIPITOR) 20 MG tablet Take 1 tablet by mouth daily 90 tablet 2    metFORMIN (GLUCOPHAGE XR) 500 MG extended release tablet Take with evening meal. 30 tablet 2    allopurinol (ZYLOPRIM) 100 MG tablet Take 1 tablet by mouth daily 30 tablet 2    Cholecalciferol (VITAMIN D3) 3000 units TABS Take by mouth      aspirin 81 MG tablet Take 81 mg by mouth 2 times daily.  fish oil-omega-3 fatty acids 1000 MG capsule Take 2 g by mouth daily. No current facility-administered medications for this visit. No Known Allergies    Lab Review not applicable    Subjective:   Review of Systems   Constitutional: Negative for activity change, appetite change, fatigue, fever and unexpected weight change. HENT: Negative for congestion, ear pain, nosebleeds, rhinorrhea, sore throat, trouble swallowing and voice change. Eyes: Negative for redness and visual disturbance. Respiratory: Negative for cough, chest tightness, shortness of breath and wheezing. Cardiovascular: Negative for chest pain, palpitations and leg swelling. Gastrointestinal: Negative for abdominal pain, blood in stool, constipation, diarrhea, nausea and vomiting.    Endocrine: Negative for polydipsia, polyphagia and polyuria. Genitourinary: Negative for dysuria, frequency and urgency. Musculoskeletal: Negative for myalgias. Skin: Negative for rash and wound. Neurological: Negative for dizziness, speech difficulty, light-headedness and headaches. Psychiatric/Behavioral: Negative for agitation, confusion, self-injury and suicidal ideas. The patient is not nervous/anxious. Objective:     Physical Exam   Constitutional: He is oriented to person, place, and time. He appears well-developed and well-nourished. No distress. HENT:   Head: Normocephalic and atraumatic. Right Ear: External ear normal.   Left Ear: External ear normal.   Nose: Nose normal.   Mouth/Throat: Oropharynx is clear and moist. No oropharyngeal exudate. Eyes: Conjunctivae are normal. Pupils are equal, round, and reactive to light. Right eye exhibits no discharge. Left eye exhibits no discharge. Neck: Normal range of motion. Neck supple. Cardiovascular: Normal rate, regular rhythm, normal heart sounds and intact distal pulses. No murmur heard. Pulmonary/Chest: Effort normal and breath sounds normal. No stridor. No respiratory distress. He has no wheezes. He has no rales. He exhibits no tenderness. Right breast exhibits no inverted nipple, no mass, no nipple discharge, no skin change and no tenderness. Left breast exhibits no inverted nipple, no mass, no nipple discharge, no skin change and no tenderness. Abdominal: Soft. Bowel sounds are normal. He exhibits no distension. There is no tenderness. Musculoskeletal: Normal range of motion. He exhibits no edema, tenderness or deformity. Neurological: He is alert and oriented to person, place, and time. He has normal reflexes. No cranial nerve deficit. Coordination normal.   Skin: Skin is warm and dry. No rash noted. He is not diaphoretic. No erythema. Psychiatric: He has a normal mood and affect.  His behavior is normal. Thought content normal. Nursing note and vitals reviewed. /64   Pulse 78   Temp 97.8 °F (36.6 °C) (Temporal)   Resp 18   Ht 5' 10\" (1.778 m)   Wt 239 lb (108.4 kg)   SpO2 98%   BMI 34.29 kg/m²      Vitals 7/11/2018 5/11/2018 4/11/2018 1/0/8610   SYSTOLIC 169 728 021 084   DIASTOLIC 64 66 78 64   Site       Position       Cuff Size       Pulse 78 78 56 65   Temp 97.8 97.8 97.8 98.4   Resp 18 18 18 18   Weight 239 lb 250 lb 262 lb 266 lb   Height 5' 10\" 5' 10\" 5' 10\" 5' 10\"   BMI (wt*703/ht~2) 34.29 kg/m2 35.87 kg/m2 37.59 kg/m2 38.16 kg/m2       Assessment:      Diagnosis Orders   1. Type 2 diabetes mellitus with complication, without long-term current use of insulin ([facility])  metFORMIN (GLUCOPHAGE XR) 500 MG extended release tablet    Hemoglobin A1C    Microalbumin / Creatinine Urine Ratio    TSH without Reflex    Urinalysis   2. Essential hypertension  losartan-hydrochlorothiazide (HYZAAR) 100-25 MG per tablet    BYSTOLIC 10 MG tablet    CBC Auto Differential    Comprehensive Metabolic Panel   3. Hypercholesteremia  atorvastatin (LIPITOR) 20 MG tablet   4. Elevated blood uric acid level  allopurinol (ZYLOPRIM) 100 MG tablet   5. Encounter for screening colonoscopy for non-high-risk patient  [facility]- MELODY Hart     No results found for this visit on 07/11/18. Plan:     1. Type 2 diabetes mellitus with complication, without long-term current use of insulin ([address]    2. Essential hypertension    3. Hypercholesteremia    4. Elevated blood uric acid level    5. Encounter for screening colonoscopy for non-high-risk patient      Return in about 3 months (around 10/11/2018) for 30 min appointment, medication recheck.   Orders Placed This Encounter   Procedures    CBC Auto Differential     Standing Status:   Future     Standing Expiration Date:   1/11/2019    Comprehensive Metabolic Panel     Standing Status:   Future     Standing Expiration Date:   1/11/2019    Hemoglobin A1C     Standing Status: Future     Standing Expiration Date:   1/11/2019    Microalbumin / Creatinine Urine Ratio     Standing Status:   Future     Standing Expiration Date:   1/11/2019    TSH without Reflex     Standing Status:   Future     Standing Expiration Date:   1/11/2019    Urinalysis     Standing Status:   Future     Standing Expiration Date:   1/11/2019   [facility]- MELODY Ceja     Referral Priority:   Routine     Referral Type:   Eval and Treat     Referral Reason:   Specialty Services Required     Referred to Provider:   MISSY Arenas     Requested Specialty:   Gastroenterology     Number of Visits Requested:   1     Orders Placed This Encounter   Medications    losartan-hydrochlorothiazide (HYZAAR) 100-25 MG per tablet     Sig: Take 1 tablet by mouth daily     Dispense:  90 tablet     Refill:  2    BYSTOLIC 10 MG tablet     Sig: Take 1 tablet by mouth daily     Dispense:  30 tablet     Refill:  2    atorvastatin (LIPITOR) 20 MG tablet     Sig: Take 1 tablet by mouth daily     Dispense:  90 tablet     Refill:  2    metFORMIN (GLUCOPHAGE XR) 500 MG extended release tablet     Sig: Take with evening meal.     Dispense:  30 tablet     Refill:  2    allopurinol (ZYLOPRIM) 100 MG tablet     Sig: Take 1 tablet by mouth daily     Dispense:  30 tablet     Refill:  2       Patient Instructions   Continue same meds. Follow up in 3 months. Have labs before next appointment. At least one week before. Patient/family given educational materials - see patient instructions. Discussed use, benefit, and side effects of prescribed medications. All patient/family questions answered and voiced understanding. Instructed to continue current medications, diet and exercise. Pt/family agreed with treatment plan. Follow up as directed and sooner if needed. Patient/ family instructed that is symptoms worsen or persist they are to contact office or report to nearest ER.   They voice

## 2018-07-30 ENCOUNTER — OFFICE VISIT (OUTPATIENT)
Dept: URGENT CARE | Age: 68
End: 2018-07-30
Payer: MEDICARE

## 2018-07-30 VITALS
HEIGHT: 71 IN | HEART RATE: 54 BPM | TEMPERATURE: 98 F | SYSTOLIC BLOOD PRESSURE: 138 MMHG | DIASTOLIC BLOOD PRESSURE: 71 MMHG | RESPIRATION RATE: 18 BRPM | BODY MASS INDEX: 33.32 KG/M2 | WEIGHT: 238 LBS | OXYGEN SATURATION: 97 %
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
## 2018-07-30 PROCEDURE — 1036F TOBACCO NON-USER: CPT | Performed by: PHYSICIAN ASSISTANT

## 2018-07-30 PROCEDURE — 1123F ACP DISCUSS/DSCN MKR DOCD: CPT | Performed by: PHYSICIAN ASSISTANT

## 2018-07-30 PROCEDURE — G8598 ASA/ANTIPLAT THER USED: HCPCS | Performed by: PHYSICIAN ASSISTANT

## 2018-07-30 PROCEDURE — 99213 OFFICE O/P EST LOW 20 MIN: CPT | Performed by: PHYSICIAN ASSISTANT

## 2018-07-30 PROCEDURE — 4040F PNEUMOC VAC/ADMIN/RCVD: CPT | Performed by: PHYSICIAN ASSISTANT

## 2018-07-30 PROCEDURE — 1101F PT FALLS ASSESS-DOCD LE1/YR: CPT | Performed by: PHYSICIAN ASSISTANT

## 2018-07-30 PROCEDURE — G8427 DOCREV CUR MEDS BY ELIG CLIN: HCPCS | Performed by: PHYSICIAN ASSISTANT

## 2018-07-30 PROCEDURE — 3017F COLORECTAL CA SCREEN DOC REV: CPT | Performed by: PHYSICIAN ASSISTANT

## 2018-07-30 PROCEDURE — G8417 CALC BMI ABV UP PARAM F/U: HCPCS | Performed by: PHYSICIAN ASSISTANT

## 2018-07-30 RX ORDER — SULFAMETHOXAZOLE AND TRIMETHOPRIM 800; 160 MG/1; MG/1
1 TABLET ORAL 2 TIMES DAILY
Qty: 20 TABLET | Refills: 0 | Status: SHIPPED | OUTPATIENT
Start: 2018-07-30 | End: 2018-08-09

## 2018-07-30 NOTE — PATIENT INSTRUCTIONS
yourself epinephrine and are feeling better. Symptoms can come back. When should you call for help? Call 911 anytime you think you may need emergency care. For example, call if:    · You have symptoms of a severe allergic reaction. These may include:  ¨ Sudden raised, red areas (hives) all over your body. ¨ Swelling of the throat, mouth, lips, or tongue. ¨ Trouble breathing. ¨ Passing out (losing consciousness). Or you may feel very lightheaded or suddenly feel weak, confused, or restless.    Call your doctor now or seek immediate medical care if:    · You have symptoms of an allergic reaction not right at the sting or bite, such as:  ¨ A rash or small area of hives (raised, red areas on the skin). ¨ Itching. ¨ Swelling. ¨ Belly pain, nausea, or vomiting.     · You have a lot of swelling around the site (such as your entire arm or leg is swollen).     · You have signs of infection, such as:  ¨ Increased pain, swelling, redness, or warmth around the sting. ¨ Red streaks leading from the area. ¨ Pus draining from the sting. ¨ A fever.    Watch closely for changes in your health, and be sure to contact your doctor if:    · You do not get better as expected. Where can you learn more? Go to https://[email or web address].SYLOB. org and sign in to your [employer] account. Enter P390 in the [employer] box to learn more about \"Insect Stings and Bites: Care Instructions. \"     If you do not have an account, please click on the \"Sign Up Now\" link. Current as of: November 20, 2017  Content Version: 11.6  © 9542-1451 Healthwise, Incorporated. Care instructions adapted under license by [facility] ([facility]). If you have questions about a medical condition or this instruction, always ask your healthcare professional. [address] any warranty or liability for your use of this information.

## 2018-08-11 ENCOUNTER — HOSPITAL ENCOUNTER (EMERGENCY)
Age: 68
Discharge: HOME | End: 2018-08-11
Payer: MEDICARE

## 2018-08-11 ENCOUNTER — APPOINTMENT (OUTPATIENT)
Dept: CT IMAGING | Age: 68
End: 2018-08-11
Payer: MEDICARE

## 2018-08-11 VITALS
RESPIRATION RATE: 18 BRPM | TEMPERATURE: 98.7 F | WEIGHT: 220 LBS | BODY MASS INDEX: 28.23 KG/M2 | SYSTOLIC BLOOD PRESSURE: 130 MMHG | HEIGHT: 74 IN | OXYGEN SATURATION: 94 % | DIASTOLIC BLOOD PRESSURE: 77 MMHG | HEART RATE: 58 BPM

## 2018-08-11 DIAGNOSIS — K43.9 VENTRAL HERNIA WITHOUT OBSTRUCTION OR GANGRENE: ICD-10-CM

## 2018-08-11 DIAGNOSIS — N28.9 RENAL INSUFFICIENCY: ICD-10-CM

## 2018-08-11 DIAGNOSIS — N28.9 RENAL LESION: ICD-10-CM

## 2018-08-11 DIAGNOSIS — R10.9 FLANK PAIN: Primary | ICD-10-CM

## 2018-08-11 DIAGNOSIS — N20.0 KIDNEY STONE: ICD-10-CM

## 2018-08-11 LAB
ALBUMIN SERPL-MCNC: 4.1 G/DL (ref 3.5–5.2)
ALP BLD-CCNC: 73 U/L (ref 40–130)
ALT SERPL-CCNC: 16 U/L (ref 5–41)
ANION GAP SERPL CALCULATED.3IONS-SCNC: 10 MMOL/L (ref 7–19)
AST SERPL-CCNC: 18 U/L (ref 5–40)
BASOPHILS ABSOLUTE: 0.1 K/UL (ref 0–0.2)
BASOPHILS RELATIVE PERCENT: 1 % (ref 0–1)
BILIRUB SERPL-MCNC: 0.6 MG/DL (ref 0.2–1.2)
BILIRUBIN URINE: NEGATIVE
BLOOD, URINE: NEGATIVE
BUN BLDV-MCNC: 27 MG/DL (ref 8–23)
CALCIUM SERPL-MCNC: 9.5 MG/DL (ref 8.8–10.2)
CHLORIDE BLD-SCNC: 103 MMOL/L (ref 98–111)
CLARITY: CLEAR
CO2: 28 MMOL/L (ref 22–29)
COLOR: YELLOW
CREAT SERPL-MCNC: 1.5 MG/DL (ref 0.5–1.2)
EOSINOPHILS ABSOLUTE: 0.4 K/UL (ref 0–0.6)
EOSINOPHILS RELATIVE PERCENT: 6 % (ref 0–5)
GFR NON-AFRICAN AMERICAN: 46
GLUCOSE BLD-MCNC: 120 MG/DL (ref 74–109)
GLUCOSE URINE: NEGATIVE MG/DL
HCT VFR BLD CALC: 40.9 % (ref 42–52)
HEMOGLOBIN: 13.1 G/DL (ref 14–18)
KETONES, URINE: NEGATIVE MG/DL
LEUKOCYTE ESTERASE, URINE: NEGATIVE
LYMPHOCYTES ABSOLUTE: 1.3 K/UL (ref 1.1–4.5)
LYMPHOCYTES RELATIVE PERCENT: 17.6 % (ref 20–40)
MCH RBC QN AUTO: 28.4 PG (ref 27–31)
MCHC RBC AUTO-ENTMCNC: 32 G/DL (ref 33–37)
MCV RBC AUTO: 88.5 FL (ref 80–94)
MONOCYTES ABSOLUTE: 0.7 K/UL (ref 0–0.9)
MONOCYTES RELATIVE PERCENT: 10.1 % (ref 0–10)
NEUTROPHILS ABSOLUTE: 4.6 K/UL (ref 1.5–7.5)
NEUTROPHILS RELATIVE PERCENT: 64.9 % (ref 50–65)
NITRITE, URINE: NEGATIVE
PDW BLD-RTO: 13.5 % (ref 11.5–14.5)
PH UA: 7
PLATELET # BLD: 169 K/UL (ref 130–400)
PMV BLD AUTO: 10.7 FL (ref 9.4–12.4)
POTASSIUM SERPL-SCNC: 4 MMOL/L (ref 3.5–5)
PROTEIN UA: NEGATIVE MG/DL
RBC # BLD: 4.62 M/UL (ref 4.7–6.1)
SODIUM BLD-SCNC: 141 MMOL/L (ref 136–145)
SPECIFIC GRAVITY UA: 1.01
TOTAL PROTEIN: 7.3 G/DL (ref 6.6–8.7)
URINE REFLEX TO CULTURE: NORMAL
UROBILINOGEN, URINE: 1 E.U./DL
WBC # BLD: 7.1 K/UL (ref 4.8–10.8)

## 2018-08-11 PROCEDURE — 2580000003 HC RX 258: Performed by: NURSE PRACTITIONER

## 2018-08-11 PROCEDURE — 80053 COMPREHEN METABOLIC PANEL: CPT

## 2018-08-11 PROCEDURE — 81003 URINALYSIS AUTO W/O SCOPE: CPT

## 2018-08-11 PROCEDURE — 74150 CT ABDOMEN W/O CONTRAST: CPT

## 2018-08-11 PROCEDURE — 99285 EMERGENCY DEPT VISIT HI MDM: CPT | Performed by: NURSE PRACTITIONER

## 2018-08-11 PROCEDURE — 36415 COLL VENOUS BLD VENIPUNCTURE: CPT

## 2018-08-11 PROCEDURE — 85025 COMPLETE CBC W/AUTO DIFF WBC: CPT

## 2018-08-11 PROCEDURE — 99284 EMERGENCY DEPT VISIT MOD MDM: CPT

## 2018-08-11 RX ORDER — HYDROCODONE BITARTRATE AND ACETAMINOPHEN 5; 325 MG/1; MG/1
1 TABLET ORAL EVERY 6 HOURS PRN
Qty: 10 TABLET | Refills: 0 | Status: SHIPPED | OUTPATIENT
Start: 2018-08-11 | End: 2018-08-14

## 2018-08-11 RX ORDER — 0.9 % SODIUM CHLORIDE 0.9 %
1000 INTRAVENOUS SOLUTION INTRAVENOUS ONCE
Status: COMPLETED | OUTPATIENT
Start: 2018-08-11 | End: 2018-08-11

## 2018-08-11 RX ORDER — MORPHINE SULFATE 1 MG/ML
4 INJECTION, SOLUTION EPIDURAL; INTRATHECAL; INTRAVENOUS ONCE
Status: DISCONTINUED | OUTPATIENT
Start: 2018-08-11 | End: 2018-08-11 | Stop reason: HOSPADM

## 2018-08-11 RX ADMIN — SODIUM CHLORIDE 1000 ML: 9 INJECTION, SOLUTION INTRAVENOUS at 11:00

## 2018-08-11 ASSESSMENT — ENCOUNTER SYMPTOMS
RESPIRATORY NEGATIVE: 1
ABDOMINAL PAIN: 0
BACK PAIN: 1

## 2018-08-11 ASSESSMENT — PAIN DESCRIPTION - LOCATION: LOCATION: FLANK

## 2018-08-11 ASSESSMENT — PAIN SCALES - GENERAL: PAINLEVEL_OUTOF10: 6

## 2018-08-11 NOTE — ED PROVIDER NOTES
[facility]  eMERGENCY dEPARTMENT eNCOUnter      Pt Name: Rip Clark  MRN: [id number]  Birthdate 1950  Date of evaluation: 8/11/2018  Provider: Ghulam Parikh, [address]       Chief Complaint   Patient presents with    Flank Pain         HISTORY OF PRESENT ILLNESS   (Location/Symptom, Timing/Onset, Context/Setting, Quality, Duration, Modifying Factors, Severity)  Note limiting factors. Rip Clark is a 76 y.o. male who presents to the emergency department for evaluation of flank pain. Pt tells me that he has had right flank/back pain over past week. He gives history of previous pain experienced with urinary tract stone. He denies fever as well as vomiting. He has had no cough or shortness of breath. He denies previous abdominal surgery. [facility]    Nursing Notes were reviewed. REVIEW OF SYSTEMS    (2-9 systems for level 4, 10 or more for level 5)     Review of Systems   Constitutional: Negative. Respiratory: Negative. Cardiovascular: Negative. Gastrointestinal: Negative for abdominal pain. Genitourinary: Positive for flank pain. Negative for dysuria. Musculoskeletal: Positive for back pain. A complete review of systems was performed and is negative except as noted above in the HPI.        PAST MEDICAL HISTORY     Past Medical History:   Diagnosis Date    CAD (coronary artery disease), native coronary artery     Class 2 obesity due to excess calories in adult 2/1/2018    GERD (gastroesophageal reflux disease)     Hypercholesteremia     Hypertension          SURGICAL HISTORY       Past Surgical History:   Procedure Laterality Date    CARDIAC CATHETERIZATION  12/31/07, JDT    Left heart cath    CARDIAC CATHETERIZATION  12/31/07, JDT    Left heart cath    CARDIOVASCULAR STRESS TEST  06/11/2009, JDT    Stress Echo    CARDIOVASCULAR STRESS TEST  12/31/07, JDT    Stress Echo    CORONARY ARTERY BYPASS GRAFT      X4 utilizing the left MOLINA to the LAD, right MOLINA to the second diagonal, and vein grafts to the obtuse marinal and PLOM.  CORONARY ARTERY BYPASS GRAFT  01/02/08, Bebo Mc CABG placing the LIMA to the LAD, MIGUELANGEL to the second diagonal branch and SVBG to the OMB and PMB. CURRENT MEDICATIONS       Previous Medications    ALLOPURINOL (ZYLOPRIM) 100 MG TABLET    Take 1 tablet by mouth daily    ASPIRIN 81 MG TABLET    Take 81 mg by mouth 2 times daily. ATORVASTATIN (LIPITOR) 20 MG TABLET    Take 1 tablet by mouth daily    BYSTOLIC 10 MG TABLET    Take 1 tablet by mouth daily    CHOLECALCIFEROL (VITAMIN D3) 3000 UNITS TABS    Take by mouth    FISH OIL-OMEGA-3 FATTY ACIDS 1000 MG CAPSULE    Take 2 g by mouth daily. LOSARTAN-HYDROCHLOROTHIAZIDE (HYZAAR) 100-25 MG PER TABLET    Take 1 tablet by mouth daily    METFORMIN (GLUCOPHAGE XR) 500 MG EXTENDED RELEASE TABLET    Take with evening meal.       ALLERGIES     Patient has no known allergies. FAMILY HISTORY       Family History   Problem Relation Age of Onset    Cancer Mother         lung    Coronary Art Dis Father     Stroke Father     High Blood Pressure Brother     Coronary Art Dis Paternal Grandmother     Coronary Art Dis Paternal Grandfather           SOCIAL HISTORY       Social History     Social History    Marital status:      Spouse name: N/A    Number of children: N/A    Years of education: N/A     Social History Main Topics    Smoking status: Former Smoker    Smokeless tobacco: Former User    Alcohol use Yes      Comment: Moderate use.     Drug use: Unknown    Sexual activity: Not on file     Other Topics Concern    Not on file     Social History Narrative    No narrative on file       SCREENINGS             PHYSICAL EXAM    (up to 7 for level 4, 8 or more for level 5)     ED Triage Vitals   BP Temp Temp src Pulse Resp SpO2 Height Weight   -- -- -- -- -- -- -- --     Vitals:    08/11/18 1004   BP: 134/79   Pulse: 56   Resp: 18   Temp: 98.7 °F (37.1 °C)   SpO2: 94% Weight: 220 lb (99.8 kg)   Height: 6' 2\" (1.88 m)         Physical Exam   Constitutional: He is oriented to person, place, and time. He appears well-nourished. HENT:   Head: Normocephalic. Right Ear: External ear normal.   Left Ear: External ear normal.   Mouth/Throat: Oropharynx is clear and moist.   Eyes: Conjunctivae and EOM are normal. Pupils are equal, round, and reactive to light. Neck: Normal range of motion. Cardiovascular: Normal rate, regular rhythm, normal heart sounds and intact distal pulses. Pulmonary/Chest: Effort normal and breath sounds normal.   Abdominal: Soft. Bowel sounds are normal.   Musculoskeletal: Normal range of motion. No direct ttp thoracic/lumbar spine   Neurological: He is alert and oriented to person, place, and time. Skin: Skin is warm and dry. DIAGNOSTIC RESULTS     EKG: All EKG's are interpreted by the Emergency Department Physician who either signs or Co-signs this chart in the absence of a cardiologist.        RADIOLOGY:   Non-plain film images such as CT, Ultrasound and MRI are read by the radiologist. Plain radiographic images are visualized and preliminarily interpreted by the emergency physician with the below findings:        Interpretation per the Radiologist below, if available at the time of this note:    CT KIDNEY WO CONTRAST   Final Result   Impression:   1. Nonobstructive punctate right renal stone measuring 1-2 mm in size. 2. Mild stranding of the central mesenteric root, which is nonspecific   but can be seen with mesenteritis/mesenteric panniculitis. 3. Atherosclerotic disease. 4. Bilateral renal lesions, incompletely characterized on this exam,   for which nonemergent renal ultrasound is recommended for further   characterization. 5. Several ventral abdominal wall defects containing fat. Small   fat-containing umbilical hernia. 6. Colonic diverticulosis without evidence of diverticulitis.    Signed by Dr Radha Muhammad on 8/11/2018 daily     aspirin 81 MG tablet     atorvastatin 20 MG tablet  Commonly known as:  LIPITOR  Take 1 tablet by mouth daily     BYSTOLIC 10 MG tablet  Generic drug:  nebivolol  Take 1 tablet by mouth daily     fish oil-omega-3 fatty acids 1000 MG capsule     losartan-hydrochlorothiazide 100-25 MG per tablet  Commonly known as:  HYZAAR  Take 1 tablet by mouth daily     metFORMIN 500 MG extended release tablet  Commonly known as:  GLUCOPHAGE XR  Take with evening meal.     Vitamin D3 3000 units Tabs           Where to Get Your Medications      You can get these medications from any pharmacy    Bring a paper prescription for each of these medications  · HYDROcodone-acetaminophen 5-325 MG per tablet         (Please note that portions of this note were completed with a voice recognition program.  Efforts were made to edit the dictations but occasionally words are mis-transcribed.)              Ghulam Parikh, APRN  08/11/18 8735

## 2018-08-14 ENCOUNTER — TELEPHONE (OUTPATIENT)
Dept: GASTROENTEROLOGY | Age: 68
End: 2018-08-14

## 2018-08-14 NOTE — TELEPHONE ENCOUNTER
This NP has been referred here by ETIENNE Rivers for open access Colonoscopy/Hx polyps, he is now scheduled with Sherin Newman at [place] on 10-4-18 @ 8:30 am arrival time.  Thank you for the referral. / [email or web address]

## 2018-09-13 ENCOUNTER — OFFICE VISIT (OUTPATIENT)
Dept: CARDIOLOGY | Age: 68
End: 2018-09-13
Payer: MEDICARE

## 2018-09-13 VITALS
BODY MASS INDEX: 33.04 KG/M2 | WEIGHT: 236 LBS | HEIGHT: 71 IN | HEART RATE: 62 BPM | SYSTOLIC BLOOD PRESSURE: 126 MMHG | DIASTOLIC BLOOD PRESSURE: 76 MMHG

## 2018-09-13 DIAGNOSIS — E11.8 TYPE 2 DIABETES MELLITUS WITH COMPLICATION, WITHOUT LONG-TERM CURRENT USE OF INSULIN (HCC): ICD-10-CM

## 2018-09-13 DIAGNOSIS — I25.10 CORONARY ARTERY DISEASE INVOLVING NATIVE CORONARY ARTERY OF NATIVE HEART WITHOUT ANGINA PECTORIS: Primary | ICD-10-CM

## 2018-09-13 DIAGNOSIS — I10 ESSENTIAL HYPERTENSION: ICD-10-CM

## 2018-09-13 DIAGNOSIS — E78.00 HYPERCHOLESTEREMIA: ICD-10-CM

## 2018-09-13 PROCEDURE — 4040F PNEUMOC VAC/ADMIN/RCVD: CPT | Performed by: CLINICAL NURSE SPECIALIST

## 2018-09-13 PROCEDURE — 99213 OFFICE O/P EST LOW 20 MIN: CPT | Performed by: CLINICAL NURSE SPECIALIST

## 2018-09-13 PROCEDURE — 1123F ACP DISCUSS/DSCN MKR DOCD: CPT | Performed by: CLINICAL NURSE SPECIALIST

## 2018-09-13 PROCEDURE — G8598 ASA/ANTIPLAT THER USED: HCPCS | Performed by: CLINICAL NURSE SPECIALIST

## 2018-09-13 PROCEDURE — 1101F PT FALLS ASSESS-DOCD LE1/YR: CPT | Performed by: CLINICAL NURSE SPECIALIST

## 2018-09-13 PROCEDURE — 1036F TOBACCO NON-USER: CPT | Performed by: CLINICAL NURSE SPECIALIST

## 2018-09-13 PROCEDURE — 3017F COLORECTAL CA SCREEN DOC REV: CPT | Performed by: CLINICAL NURSE SPECIALIST

## 2018-09-13 PROCEDURE — G8427 DOCREV CUR MEDS BY ELIG CLIN: HCPCS | Performed by: CLINICAL NURSE SPECIALIST

## 2018-09-13 PROCEDURE — 2022F DILAT RTA XM EVC RTNOPTHY: CPT | Performed by: CLINICAL NURSE SPECIALIST

## 2018-09-13 PROCEDURE — 3044F HG A1C LEVEL LT 7.0%: CPT | Performed by: CLINICAL NURSE SPECIALIST

## 2018-09-13 PROCEDURE — G8417 CALC BMI ABV UP PARAM F/U: HCPCS | Performed by: CLINICAL NURSE SPECIALIST

## 2018-09-13 RX ORDER — LOSARTAN POTASSIUM 100 MG/1
100 TABLET ORAL DAILY
COMMUNITY
End: 2018-10-05

## 2018-09-13 ASSESSMENT — ENCOUNTER SYMPTOMS
BLOOD IN STOOL: 0
NAUSEA: 0
COUGH: 0
ORTHOPNEA: 0
VOMITING: 0
SHORTNESS OF BREATH: 0
HEARTBURN: 0

## 2018-09-13 NOTE — PATIENT INSTRUCTIONS
Followup With Dr. Scooter May in 6mo  Refer to Dr. Cedric Howard for PCP  Weight loss encouraged  Keep diabetes under control to help prevent further heart disease. Keep Hemoglobin A1C less than 7%. Call with any questions or concerns  Follow up with MISSY Obrien for non cardiac problems  Report any new problems  Cardiovascular Fitness-Exercise as tolerated. Strive for 15 minutes of exercise most days of the week. Cardiac / Healthy Diet  Continue current medications as directed  Continue plan of treatment  It is always recommended that you bring your medications bottles with you to each visit - this is for your safety!

## 2018-09-13 NOTE — PROGRESS NOTES
[facility], [address] [place] [address], [address] [address]  Phone: [phone]  Fax: [phone]    OFFICE VISIT:  2018    Cuba Connell - : 1950    Reason For Visit:  Kb Delong is a 76 y.o. male who is here for follow-up for CAD    HPI  Patient is here for follow-up for CAD. He denies any cardiac symptoms such as chest pain, unusual dyspnea, orthopnea, PND, edema, or palpitations. He continues to work and is very active on his job with lots of walking and tolerates this well     MISSY Hodges is PCP.   Cuba Connell [facility] the [facility]    Patient Active Problem List    Diagnosis Date Noted    Chronic kidney disease 2018    Type 2 diabetes mellitus with complication, without long-term current use of insulin ([address] 2018    Hypercalcemia 2018    Gout 2018    Increased creatine kinase level 2018    Decreased GFR 2018    Decreased pedal pulses 2018    Elevated blood uric acid level 2018    Class 2 obesity due to excess calories in adult 2018    Hypercholesteremia     Hypertension     CAD (coronary artery disease), native coronary artery     GERD (gastroesophageal reflux disease)      Past Medical History:   Diagnosis Date    CAD (coronary artery disease), native coronary artery     Class 2 obesity due to excess calories in adult 2018    GERD (gastroesophageal reflux disease)     Hypercholesteremia     Hypertension      Past Surgical History:   Procedure Laterality Date    CARDIAC CATHETERIZATION  07, JDT    Left heart cath    CARDIAC CATHETERIZATION  07, JDT    Left heart cath    CARDIOVASCULAR STRESS TEST  2009, JDT    Stress Echo    CARDIOVASCULAR STRESS TEST  07, JDT    Stress Echo    CORONARY ARTERY BYPASS GRAFT      X4 utilizing the left MOLINA to the LAD, right MOLINA to the second diagonal, and vein grafts to the obtuse marinal and PLOM.    CORONARY ARTERY BYPASS GRAFT  01/02/08, Alisha Villalba CABG placing the LIMA to the LAD, MIGUELANGEL to the second diagonal branch and SVBG to the OMB and PMB. Family History   Problem Relation Age of Onset    Cancer Mother         lung    Coronary Art Dis Father     Stroke Father     High Blood Pressure Brother     Coronary Art Dis Paternal Grandmother     Coronary Art Dis Paternal Grandfather      Social History   Substance Use Topics    Smoking status: Former Smoker    Smokeless tobacco: Former User    Alcohol use Yes      Comment: Moderate use. Current Outpatient Prescriptions   Medication Sig Dispense Refill    losartan (COZAAR) 100 MG tablet Take 100 mg by mouth daily      losartan-hydrochlorothiazide (HYZAAR) 100-25 MG per tablet Take 1 tablet by mouth daily 90 tablet 2    BYSTOLIC 10 MG tablet Take 1 tablet by mouth daily 30 tablet 2    atorvastatin (LIPITOR) 20 MG tablet Take 1 tablet by mouth daily 90 tablet 2    metFORMIN (GLUCOPHAGE XR) 500 MG extended release tablet Take with evening meal. 30 tablet 2    allopurinol (ZYLOPRIM) 100 MG tablet Take 1 tablet by mouth daily 30 tablet 2    Cholecalciferol (VITAMIN D3) 3000 units TABS Take by mouth      aspirin 81 MG tablet Take 81 mg by mouth 2 times daily.  fish oil-omega-3 fatty acids 1000 MG capsule Take 2 g by mouth daily. No current facility-administered medications for this visit. Allergies: Patient has no known allergies. Review of Systems  Review of Systems   Constitutional: Positive for malaise/fatigue. Negative for chills and fever. HENT: Negative for nosebleeds. Respiratory: Negative for cough and shortness of breath. Cardiovascular: Negative for chest pain, palpitations, orthopnea, leg swelling and PND. Gastrointestinal: Negative for blood in stool, heartburn, nausea and vomiting. Musculoskeletal: Negative for falls and myalgias. Skin: Negative for rash.    Neurological: Negative for sensory change, speech change and focal weakness. Endo/Heme/Allergies: Does not bruise/bleed easily. Psychiatric/Behavioral: Negative for depression. The patient is not nervous/anxious. Objective  Vital Signs - /76   Pulse 62   Ht 5' 11\" (1.803 m)   Wt 236 lb (107 kg)   BMI 32.92 kg/m²      Cholesterol, Total   Date Value Ref Range Status   03/09/2018 141 (L) 160 - 199 mg/dL Final     Comment:     <160 MG/DL=OPTIMAL    160-199 MG/DL= DESIRABLE    200-239 MG/DL=BORDERLINE-INCREASED RISK OF ATHEROSCLEROTIC  CARDIOVASCULAR DISEASE    > OR = 240 MG/DL-ASSOCIATED WITH AN INCREASED RISK OF  ATHROSCLEROTIC CARDIOVASCULAR DISEASE       Triglycerides   Date Value Ref Range Status   03/09/2018 92 0 - 149 mg/dL Final     HDL   Date Value Ref Range Status   03/09/2018 46 (L) 55 - 121 mg/dL Final     Comment:     VALUES>60 MG/DL ARE ASSOCIATED WITH A DECREASED RISK OF  ATHEROSCLEROTIC CARDIOVASCULAR DISEASE       LDL Calculated   Date Value Ref Range Status   03/09/2018 77 <100 mg/dL Final     Comment:     <100 MG/DL=OPITIMAL    100-129 MG/DL=DESIRABLE    130-159 MG/DL BORDERLINE=INCREASED RISK OF ATHEROSCLEROTIC  CARDIOVASCULAR DISEASE    > OR = 160 MG/DL=ASSOCIATED WITH AN INCREASE RISK OF  ATHEROSCLEROTIC CARDIOVASCULAR DISEASE       AST   Date Value Ref Range Status   08/11/2018 18 5 - 40 U/L Final     ALT   Date Value Ref Range Status   08/11/2018 16 5 - 41 U/L Final     Physical Exam   Constitutional: He is oriented to person, place, and time. He appears well-developed and well-nourished. No distress. HENT:   Head: Normocephalic and atraumatic. Eyes: Pupils are equal, round, and reactive to light. Right eye exhibits no discharge. Left eye exhibits no discharge. Neck: No JVD present. No tracheal deviation present. Cardiovascular: Normal rate, regular rhythm, normal heart sounds and intact distal pulses. Exam reveals no gallop and no friction rub. No murmur heard.   No carotid bruit

## 2018-10-02 ENCOUNTER — ANESTHESIA EVENT (OUTPATIENT)
Dept: OPERATING ROOM | Age: 68
End: 2018-10-02

## 2018-10-04 ENCOUNTER — HOSPITAL ENCOUNTER (OUTPATIENT)
Age: 68
Setting detail: OUTPATIENT SURGERY
Discharge: HOME | End: 2018-10-04
Attending: INTERNAL MEDICINE | Admitting: INTERNAL MEDICINE
Payer: MEDICARE

## 2018-10-04 ENCOUNTER — ANESTHESIA (OUTPATIENT)
Dept: OPERATING ROOM | Age: 68
End: 2018-10-04

## 2018-10-04 VITALS
RESPIRATION RATE: 16 BRPM | BODY MASS INDEX: 32.64 KG/M2 | HEART RATE: 52 BPM | WEIGHT: 228 LBS | OXYGEN SATURATION: 96 % | DIASTOLIC BLOOD PRESSURE: 61 MMHG | HEIGHT: 70 IN | SYSTOLIC BLOOD PRESSURE: 106 MMHG

## 2018-10-04 VITALS — OXYGEN SATURATION: 96 % | SYSTOLIC BLOOD PRESSURE: 115 MMHG | DIASTOLIC BLOOD PRESSURE: 70 MMHG

## 2018-10-04 PROCEDURE — G8907 PT DOC NO EVENTS ON DISCHARG: HCPCS

## 2018-10-04 PROCEDURE — G0121 COLON CA SCRN NOT HI RSK IND: HCPCS | Performed by: INTERNAL MEDICINE

## 2018-10-04 PROCEDURE — G0121 COLON CA SCRN NOT HI RSK IND: HCPCS

## 2018-10-04 PROCEDURE — G8918 PT W/O PREOP ORDER IV AB PRO: HCPCS

## 2018-10-04 RX ORDER — SODIUM CHLORIDE, SODIUM LACTATE, POTASSIUM CHLORIDE, CALCIUM CHLORIDE 600; 310; 30; 20 MG/100ML; MG/100ML; MG/100ML; MG/100ML
INJECTION, SOLUTION INTRAVENOUS CONTINUOUS
Status: DISCONTINUED | OUTPATIENT
Start: 2018-10-04 | End: 2018-10-04 | Stop reason: HOSPADM

## 2018-10-04 RX ORDER — LIDOCAINE HYDROCHLORIDE 10 MG/ML
INJECTION, SOLUTION INFILTRATION; PERINEURAL PRN
Status: DISCONTINUED | OUTPATIENT
Start: 2018-10-04 | End: 2018-10-04 | Stop reason: SDUPTHER

## 2018-10-04 RX ORDER — SODIUM CHLORIDE 9 MG/ML
INJECTION, SOLUTION INTRAVENOUS CONTINUOUS
Status: DISCONTINUED | OUTPATIENT
Start: 2018-10-04 | End: 2018-10-04 | Stop reason: HOSPADM

## 2018-10-04 RX ORDER — PROPOFOL 10 MG/ML
INJECTION, EMULSION INTRAVENOUS PRN
Status: DISCONTINUED | OUTPATIENT
Start: 2018-10-04 | End: 2018-10-04 | Stop reason: SDUPTHER

## 2018-10-04 RX ORDER — LIDOCAINE HYDROCHLORIDE 10 MG/ML
1 INJECTION, SOLUTION EPIDURAL; INFILTRATION; INTRACAUDAL; PERINEURAL
Status: DISCONTINUED | OUTPATIENT
Start: 2018-10-04 | End: 2018-10-04 | Stop reason: HOSPADM

## 2018-10-04 RX ORDER — SODIUM CHLORIDE 9 MG/ML
INJECTION, SOLUTION INTRAVENOUS CONTINUOUS PRN
Status: DISCONTINUED | OUTPATIENT
Start: 2018-10-04 | End: 2018-10-04 | Stop reason: SDUPTHER

## 2018-10-04 RX ADMIN — PROPOFOL 200 MG: 10 INJECTION, EMULSION INTRAVENOUS at 09:31

## 2018-10-04 RX ADMIN — SODIUM CHLORIDE: 9 INJECTION, SOLUTION INTRAVENOUS at 09:27

## 2018-10-04 RX ADMIN — SODIUM CHLORIDE: 9 INJECTION, SOLUTION INTRAVENOUS at 09:11

## 2018-10-04 RX ADMIN — LIDOCAINE HYDROCHLORIDE 50 MG: 10 INJECTION, SOLUTION INFILTRATION; PERINEURAL at 09:31

## 2018-10-04 NOTE — OP NOTE
Patient: Carol Ann Fill: 1950  Med Rec#: [id number] Acc#: [id number]   Primary Care Provider MISSY Gold    Date of Procedure:  10/4/2018    Endoscopist: Leighann Euceda MD    Referring Provider: MISSY Gold,     Operation Performed: Colonoscopy     Indications: screening    Anesthesia:  Sedation was administered by anesthesia who monitored the patient during the procedure. I met with Karl Pollock prior to procedure. We discussed the procedure itself, and I have discussed the risks of endoscopy (including-- but not limited to-- pain, discomfort, bleeding potentially requiring second endoscopic procedure and/or blood transfusion, organ perforation requiring operative repair, damage to organs near the colon, infection, aspiration, cardiopulmonary/allergic reaction), benefits, indications to endoscopy. Additionally, we discussed options other than colonoscopy. The patient expressed understanding. All questions answered. The patient decided to proceed with the procedure. Signed informed consent was placed on the chart. Blood Loss: minimal    Withdrawal time: 6 minutes  Bowel Prep: adequate     Complications: no immediate complications    DESCRIPTION OF PROCEDURE:     A time out was performed. After written informed consent was obtained, the patient was placed in the left lateral position. The perianal area was inspected, and a digital rectal exam was performed. A rectal exam was performed: normal tone, no palpable lesions. At this point, a forward viewing Olympus colonoscope was inserted into the anus and carefully advanced to the cecum. The cecum was identified by the ileocecal valve and the appendiceal orifice. The colonoscope was then slowly withdrawn with careful inspection of the mucosa in a linear and circumferential fashion. The scope was retroflexed in the rectum. Suction was utilized during the procedure to remove as much air as possible from the bowel.  The

## 2018-10-04 NOTE — ANESTHESIA PRE PROCEDURE
Department of Anesthesiology  Preprocedure Note       Name:  Karl Pollock   Age:  76 y.o.  :  1950                                          MRN:  [id number]         Date:  10/4/2018      Surgeon: Hilton Murphy):  Rasta Weaver MD    Procedure: Procedure(s):  COLONOSCOPY    Medications prior to admission:   Prior to Admission medications    Medication Sig Start Date End Date Taking? Authorizing Provider   losartan (COZAAR) 100 MG tablet Take 100 mg by mouth daily   Yes Historical Provider, MD   losartan-hydrochlorothiazide (HYZAAR) 100-25 MG per tablet Take 1 tablet by mouth daily 18  MISSY Gold   BYSTOLIC 10 MG tablet Take 1 tablet by mouth daily 18  MISSY Gold   atorvastatin (LIPITOR) 20 MG tablet Take 1 tablet by mouth daily 18  MISSY Gold   metFORMIN (GLUCOPHAGE XR) 500 MG extended release tablet Take with evening meal. 18   MISSY Gold   allopurinol (ZYLOPRIM) 100 MG tablet Take 1 tablet by mouth daily 18  MISSY Gold   Cholecalciferol (VITAMIN D3) 3000 units TABS Take by mouth    Historical Provider, MD   aspirin 81 MG tablet Take 81 mg by mouth 2 times daily. Historical Provider, MD   fish oil-omega-3 fatty acids 1000 MG capsule Take 2 g by mouth daily. Historical Provider, MD       Current medications:    No current facility-administered medications for this encounter.         Allergies:  No Known Allergies    Problem List:    Patient Active Problem List   Diagnosis Code    Hypercholesteremia E78.00    Hypertension I5    CAD (coronary artery disease), native coronary artery I25.10    GERD (gastroesophageal reflux disease) K21.9    Class 2 obesity due to excess calories in adult E66.09    Gout M10.9    Increased creatine kinase level R74.8    Decreased GFR R94.4    Decreased pedal pulses R09.89    Elevated blood uric acid level E79.0    Chronic kidney disease N18.9    Type 2 diabetes mellitus with complication, without long-term current use of insulin (HCC) E11.8    Hypercalcemia E83.52       Past Medical History:        Diagnosis Date    CAD (coronary artery disease), native coronary artery     Class 2 obesity due to excess calories in adult 2/1/2018    GERD (gastroesophageal reflux disease)     Hypercholesteremia     Hypertension        Past Surgical History:        Procedure Laterality Date    CARDIAC CATHETERIZATION  12/31/07, JDT    Left heart cath    CARDIAC CATHETERIZATION  12/31/07, JDT    Left heart cath    CARDIOVASCULAR STRESS TEST  06/11/2009, JDT    Stress Echo    CARDIOVASCULAR STRESS TEST  12/31/07, JDT    Stress Echo    CORONARY ARTERY BYPASS GRAFT      X4 utilizing the left MOLINA to the LAD, right MOLINA to the second diagonal, and vein grafts to the obtuse marinal and PLOM.  CORONARY ARTERY BYPASS GRAFT  01/02/08, Samir Dys CABG placing the LIMA to the LAD, MIGUELANGEL to the second diagonal branch and SVBG to the OMB and PMB. Social History:    Social History   Substance Use Topics    Smoking status: Former Smoker    Smokeless tobacco: Former User    Alcohol use Yes      Comment: Moderate use.                                 Counseling given: Not Answered      Vital Signs (Current):   Vitals:    10/04/18 0845 10/04/18 0846   BP:  136/75   Pulse: 59    Resp: 16    SpO2: 95%    Weight:  228 lb (103.4 kg)   Height:  5' 10\" (1.778 m)                                              BP Readings from Last 3 Encounters:   10/04/18 136/75   09/13/18 126/76   08/11/18 130/77       NPO Status: Time of last liquid consumption: 2300                        Time of last solid consumption: 2300                        Date of last liquid consumption: 10/03/18                        Date of last solid food consumption: 10/02/18    BMI:   Wt Readings from Last 3 Encounters:   10/04/18 228 lb (103.4 kg)   09/13/18 236 lb (107 kg)   08/11/18 220 lb

## 2018-10-05 ENCOUNTER — OFFICE VISIT (OUTPATIENT)
Dept: PRIMARY CARE CLINIC | Age: 68
End: 2018-10-05
Payer: MEDICARE

## 2018-10-05 VITALS
TEMPERATURE: 98.1 F | WEIGHT: 226.4 LBS | SYSTOLIC BLOOD PRESSURE: 142 MMHG | OXYGEN SATURATION: 98 % | BODY MASS INDEX: 32.41 KG/M2 | HEART RATE: 51 BPM | HEIGHT: 70 IN | DIASTOLIC BLOOD PRESSURE: 78 MMHG

## 2018-10-05 DIAGNOSIS — I10 ESSENTIAL HYPERTENSION: ICD-10-CM

## 2018-10-05 DIAGNOSIS — Z23 NEED FOR PROPHYLACTIC VACCINATION AND INOCULATION AGAINST VARICELLA: ICD-10-CM

## 2018-10-05 DIAGNOSIS — R94.4 DECREASED GFR: ICD-10-CM

## 2018-10-05 DIAGNOSIS — E11.8 TYPE 2 DIABETES MELLITUS WITH COMPLICATION, WITHOUT LONG-TERM CURRENT USE OF INSULIN (HCC): ICD-10-CM

## 2018-10-05 DIAGNOSIS — E79.0 ELEVATED BLOOD URIC ACID LEVEL: ICD-10-CM

## 2018-10-05 DIAGNOSIS — R74.8 INCREASED CREATINE KINASE LEVEL: ICD-10-CM

## 2018-10-05 DIAGNOSIS — Z23 NEED FOR PROPHYLACTIC VACCINATION AGAINST DIPHTHERIA-TETANUS-PERTUSSIS (DTP): ICD-10-CM

## 2018-10-05 DIAGNOSIS — E83.52 HYPERCALCEMIA: ICD-10-CM

## 2018-10-05 DIAGNOSIS — M10.9 GOUT, UNSPECIFIED CAUSE, UNSPECIFIED CHRONICITY, UNSPECIFIED SITE: ICD-10-CM

## 2018-10-05 DIAGNOSIS — N28.9 KIDNEY LESION, NATIVE, BILATERAL: ICD-10-CM

## 2018-10-05 DIAGNOSIS — N18.30 CHRONIC KIDNEY DISEASE, STAGE III (MODERATE) (HCC): ICD-10-CM

## 2018-10-05 DIAGNOSIS — E78.00 HYPERCHOLESTEREMIA: ICD-10-CM

## 2018-10-05 DIAGNOSIS — N20.0 RENAL STONE: Primary | ICD-10-CM

## 2018-10-05 LAB
ALBUMIN SERPL-MCNC: 4.7 G/DL (ref 3.5–5.2)
ALP BLD-CCNC: 78 U/L (ref 40–130)
ALT SERPL-CCNC: 13 U/L (ref 5–41)
ANION GAP SERPL CALCULATED.3IONS-SCNC: 10 MMOL/L (ref 7–19)
AST SERPL-CCNC: 17 U/L (ref 5–40)
BASOPHILS ABSOLUTE: 0.1 K/UL (ref 0–0.2)
BASOPHILS RELATIVE PERCENT: 0.9 % (ref 0–1)
BILIRUB SERPL-MCNC: 0.5 MG/DL (ref 0.2–1.2)
BUN BLDV-MCNC: 26 MG/DL (ref 8–23)
CALCIUM SERPL-MCNC: 9.7 MG/DL (ref 8.8–10.2)
CHLORIDE BLD-SCNC: 101 MMOL/L (ref 98–111)
CHOLESTEROL, TOTAL: 128 MG/DL (ref 160–199)
CO2: 30 MMOL/L (ref 22–29)
CREAT SERPL-MCNC: 1.2 MG/DL (ref 0.5–1.2)
CREATININE URINE: 105 MG/DL (ref 4.2–622)
EOSINOPHILS ABSOLUTE: 0.3 K/UL (ref 0–0.6)
EOSINOPHILS RELATIVE PERCENT: 4.4 % (ref 0–5)
GFR NON-AFRICAN AMERICAN: >60
GLUCOSE BLD-MCNC: 106 MG/DL (ref 74–109)
HBA1C MFR BLD: 6 % (ref 4–6)
HCT VFR BLD CALC: 44.1 % (ref 42–52)
HDLC SERPL-MCNC: 46 MG/DL (ref 55–121)
HEMOGLOBIN: 14.1 G/DL (ref 14–18)
LDL CHOLESTEROL CALCULATED: 72 MG/DL
LYMPHOCYTES ABSOLUTE: 1.2 K/UL (ref 1.1–4.5)
LYMPHOCYTES RELATIVE PERCENT: 17.6 % (ref 20–40)
MCH RBC QN AUTO: 28.5 PG (ref 27–31)
MCHC RBC AUTO-ENTMCNC: 32 G/DL (ref 33–37)
MCV RBC AUTO: 89.3 FL (ref 80–94)
MICROALBUMIN UR-MCNC: <1.2 MG/DL (ref 0–19)
MICROALBUMIN/CREAT UR-RTO: NORMAL MG/G
MONOCYTES ABSOLUTE: 0.7 K/UL (ref 0–0.9)
MONOCYTES RELATIVE PERCENT: 11.2 % (ref 0–10)
NEUTROPHILS ABSOLUTE: 4.3 K/UL (ref 1.5–7.5)
NEUTROPHILS RELATIVE PERCENT: 65.6 % (ref 50–65)
PDW BLD-RTO: 12.9 % (ref 11.5–14.5)
PLATELET # BLD: 199 K/UL (ref 130–400)
PMV BLD AUTO: 11.3 FL (ref 9.4–12.4)
POTASSIUM SERPL-SCNC: 4 MMOL/L (ref 3.5–5)
RBC # BLD: 4.94 M/UL (ref 4.7–6.1)
SODIUM BLD-SCNC: 141 MMOL/L (ref 136–145)
TOTAL PROTEIN: 8 G/DL (ref 6.6–8.7)
TRIGL SERPL-MCNC: 51 MG/DL (ref 0–149)
TSH SERPL DL<=0.05 MIU/L-ACNC: 0.82 UIU/ML (ref 0.27–4.2)
URIC ACID, SERUM: 7.5 MG/DL (ref 3.4–7)
VITAMIN B-12: 767 PG/ML (ref 211–946)
WBC # BLD: 6.5 K/UL (ref 4.8–10.8)

## 2018-10-05 PROCEDURE — G8484 FLU IMMUNIZE NO ADMIN: HCPCS | Performed by: NURSE PRACTITIONER

## 2018-10-05 PROCEDURE — 1036F TOBACCO NON-USER: CPT | Performed by: NURSE PRACTITIONER

## 2018-10-05 PROCEDURE — 2022F DILAT RTA XM EVC RTNOPTHY: CPT | Performed by: NURSE PRACTITIONER

## 2018-10-05 PROCEDURE — 3044F HG A1C LEVEL LT 7.0%: CPT | Performed by: NURSE PRACTITIONER

## 2018-10-05 PROCEDURE — 99214 OFFICE O/P EST MOD 30 MIN: CPT | Performed by: NURSE PRACTITIONER

## 2018-10-05 PROCEDURE — G8598 ASA/ANTIPLAT THER USED: HCPCS | Performed by: NURSE PRACTITIONER

## 2018-10-05 PROCEDURE — 1123F ACP DISCUSS/DSCN MKR DOCD: CPT | Performed by: NURSE PRACTITIONER

## 2018-10-05 PROCEDURE — 1101F PT FALLS ASSESS-DOCD LE1/YR: CPT | Performed by: NURSE PRACTITIONER

## 2018-10-05 PROCEDURE — 3017F COLORECTAL CA SCREEN DOC REV: CPT | Performed by: NURSE PRACTITIONER

## 2018-10-05 PROCEDURE — 4040F PNEUMOC VAC/ADMIN/RCVD: CPT | Performed by: NURSE PRACTITIONER

## 2018-10-05 PROCEDURE — 99497 ADVNCD CARE PLAN 30 MIN: CPT | Performed by: NURSE PRACTITIONER

## 2018-10-05 PROCEDURE — G8427 DOCREV CUR MEDS BY ELIG CLIN: HCPCS | Performed by: NURSE PRACTITIONER

## 2018-10-05 PROCEDURE — G8417 CALC BMI ABV UP PARAM F/U: HCPCS | Performed by: NURSE PRACTITIONER

## 2018-10-05 RX ORDER — LOSARTAN POTASSIUM 50 MG/1
50 TABLET ORAL EVERY EVENING
Qty: 30 TABLET | Refills: 1 | Status: SHIPPED | OUTPATIENT
Start: 2018-10-05 | End: 2018-11-30 | Stop reason: ALTCHOICE

## 2018-10-05 RX ORDER — LOSARTAN POTASSIUM AND HYDROCHLOROTHIAZIDE 25; 100 MG/1; MG/1
0.5 TABLET ORAL DAILY
Qty: 45 TABLET | Refills: 0 | Status: SHIPPED
Start: 2018-10-05 | End: 2018-11-30 | Stop reason: ALTCHOICE

## 2018-10-05 RX ORDER — METFORMIN HYDROCHLORIDE 500 MG/1
TABLET, EXTENDED RELEASE ORAL
Qty: 30 TABLET | Refills: 2 | Status: SHIPPED | OUTPATIENT
Start: 2018-10-05 | End: 2019-01-12 | Stop reason: SDUPTHER

## 2018-10-05 ASSESSMENT — PATIENT HEALTH QUESTIONNAIRE - PHQ9
2. FEELING DOWN, DEPRESSED OR HOPELESS: 0
1. LITTLE INTEREST OR PLEASURE IN DOING THINGS: 0
SUM OF ALL RESPONSES TO PHQ QUESTIONS 1-9: 0
SUM OF ALL RESPONSES TO PHQ QUESTIONS 1-9: 0
SUM OF ALL RESPONSES TO PHQ9 QUESTIONS 1 & 2: 0

## 2018-10-08 ASSESSMENT — ENCOUNTER SYMPTOMS
TROUBLE SWALLOWING: 0
ABDOMINAL PAIN: 0
RHINORRHEA: 0
CHEST TIGHTNESS: 0
CONSTIPATION: 0
BLOOD IN STOOL: 0
WHEEZING: 0
EYE REDNESS: 0
VOICE CHANGE: 0
VOMITING: 0
NAUSEA: 0
COUGH: 0
SORE THROAT: 0
SHORTNESS OF BREATH: 0
DIARRHEA: 0

## 2018-10-09 ENCOUNTER — HOSPITAL ENCOUNTER (OUTPATIENT)
Dept: ULTRASOUND IMAGING | Age: 68
Discharge: HOME | End: 2018-10-09
Payer: MEDICARE

## 2018-10-09 DIAGNOSIS — N28.9 KIDNEY LESION, NATIVE, BILATERAL: ICD-10-CM

## 2018-10-09 PROCEDURE — 76770 US EXAM ABDO BACK WALL COMP: CPT

## 2018-10-10 LAB
VITAMIN D2 AND D3, TOTAL: 42.1 NG/ML (ref 30–80)
VITAMIN D2, 25 HYDROXY: <1 NG/ML
VITAMIN D3,25 HYDROXY: 42.1 NG/ML

## 2018-10-15 DIAGNOSIS — N28.89 RENAL MASS: Primary | ICD-10-CM

## 2018-10-17 ENCOUNTER — HOSPITAL ENCOUNTER (OUTPATIENT)
Dept: CT IMAGING | Age: 68
Discharge: HOME | End: 2018-10-17
Payer: MEDICARE

## 2018-10-17 ENCOUNTER — TELEPHONE (OUTPATIENT)
Dept: PRIMARY CARE CLINIC | Age: 68
End: 2018-10-17

## 2018-10-17 DIAGNOSIS — N28.89 RENAL MASS: ICD-10-CM

## 2018-10-17 PROCEDURE — 6360000004 HC RX CONTRAST MEDICATION: Performed by: NURSE PRACTITIONER

## 2018-10-17 PROCEDURE — 74178 CT ABD&PLV WO CNTR FLWD CNTR: CPT

## 2018-10-17 RX ADMIN — IOPAMIDOL 75 ML: 755 INJECTION, SOLUTION INTRAVENOUS at 13:44

## 2018-10-18 DIAGNOSIS — N28.89 MASS OF RIGHT KIDNEY: Primary | ICD-10-CM

## 2018-10-19 ENCOUNTER — OFFICE VISIT (OUTPATIENT)
Dept: UROLOGY | Age: 68
End: 2018-10-19
Payer: MEDICARE

## 2018-10-19 VITALS — WEIGHT: 237 LBS | HEIGHT: 71 IN | TEMPERATURE: 97.4 F | BODY MASS INDEX: 33.18 KG/M2

## 2018-10-19 DIAGNOSIS — N28.89 RIGHT RENAL MASS: Primary | ICD-10-CM

## 2018-10-19 LAB
BILIRUBIN, POC: 0
BLOOD URINE, POC: NORMAL
CLARITY, POC: CLEAR
COLOR, POC: YELLOW
GLUCOSE URINE, POC: NORMAL
KETONES, POC: NORMAL
LEUKOCYTE EST, POC: NORMAL
NITRITE, POC: NORMAL
PH, POC: 6
PROTEIN, POC: NORMAL
SPECIFIC GRAVITY, POC: 1.01
UROBILINOGEN, POC: 0.2

## 2018-10-19 PROCEDURE — 3017F COLORECTAL CA SCREEN DOC REV: CPT | Performed by: UROLOGY

## 2018-10-19 PROCEDURE — G8598 ASA/ANTIPLAT THER USED: HCPCS | Performed by: UROLOGY

## 2018-10-19 PROCEDURE — G8427 DOCREV CUR MEDS BY ELIG CLIN: HCPCS | Performed by: UROLOGY

## 2018-10-19 PROCEDURE — 1101F PT FALLS ASSESS-DOCD LE1/YR: CPT | Performed by: UROLOGY

## 2018-10-19 PROCEDURE — 1036F TOBACCO NON-USER: CPT | Performed by: UROLOGY

## 2018-10-19 PROCEDURE — 99204 OFFICE O/P NEW MOD 45 MIN: CPT | Performed by: UROLOGY

## 2018-10-19 PROCEDURE — 1123F ACP DISCUSS/DSCN MKR DOCD: CPT | Performed by: UROLOGY

## 2018-10-19 PROCEDURE — 4040F PNEUMOC VAC/ADMIN/RCVD: CPT | Performed by: UROLOGY

## 2018-10-19 PROCEDURE — G8484 FLU IMMUNIZE NO ADMIN: HCPCS | Performed by: UROLOGY

## 2018-10-19 PROCEDURE — 81003 URINALYSIS AUTO W/O SCOPE: CPT | Performed by: UROLOGY

## 2018-10-19 PROCEDURE — G8417 CALC BMI ABV UP PARAM F/U: HCPCS | Performed by: UROLOGY

## 2018-10-19 ASSESSMENT — ENCOUNTER SYMPTOMS
SORE THROAT: 0
NAUSEA: 0
SHORTNESS OF BREATH: 0
EYE DISCHARGE: 0
EYE ITCHING: 0
WHEEZING: 0
BACK PAIN: 1

## 2018-10-19 NOTE — PROGRESS NOTES
Farrah Vital is a 76 y.o. male who presents today   Chief Complaint   Patient presents with    New Patient     I was referred here by Nato Daniel for a right renal mass with ct done     Renal mass:  The patient is here for evaluation of Right Renal Mass(es) that was diagnosed 2  month(s). The mass is not solid. The mass is  cystic. The mass is not fat containing. The mass is not simple. The mass is  complex,  6.6 cm in size appears slightly hyperdense compared to a simple cyst without internal enhancement but there is mural nodularity that may be enhancing. This is based on CT scan. This was done 10/17/18. A renal ultrasound on 10/09/18 suggest a 6cm heterogeneous and solid-appearing right lower pole mass. . The mass does enhance with contrast on imaging studies of the mural nodularity. The mass can be described as Bosniak Class 3. The Patient has has not had a previous biopsy showing this mass is   Malignant . The patient does not have  associated symptoms of flank pain,  The patient  does not have gross hematuria. The patient  does not have weight loss, and  does not have  bone pain. The  Patient has not any other symptoms. Past Medical History:   Diagnosis Date    CAD (coronary artery disease), native coronary artery     Class 2 obesity due to excess calories in adult 2/1/2018    GERD (gastroesophageal reflux disease)     Hypercholesteremia     Hypertension        Past Surgical History:   Procedure Laterality Date    CARDIAC CATHETERIZATION  12/31/07, JDT    Left heart cath    CARDIAC CATHETERIZATION  12/31/07, JDT    Left heart cath    CARDIOVASCULAR STRESS TEST  06/11/2009, JDT    Stress Echo    CARDIOVASCULAR STRESS TEST  12/31/07, JDT    Stress Echo    COLONOSCOPY  10/04/2018    Dr KASSY Solorio-Diverticular disease, 10 yr recall    CORONARY ARTERY BYPASS GRAFT      X4 utilizing the left MOLINA to the LAD, right MOLINA to the second diagonal, and vein grafts to the obtuse marinal and PLOM.

## 2018-10-23 DIAGNOSIS — N28.89 RIGHT RENAL MASS: Primary | ICD-10-CM

## 2018-10-24 ENCOUNTER — TELEPHONE (OUTPATIENT)
Dept: UROLOGY | Age: 68
End: 2018-10-24

## 2018-11-18 ENCOUNTER — APPOINTMENT (OUTPATIENT)
Dept: GENERAL RADIOLOGY | Age: 68
End: 2018-11-18
Payer: MEDICARE

## 2018-11-18 ENCOUNTER — HOSPITAL ENCOUNTER (EMERGENCY)
Age: 68
Discharge: HOME | End: 2018-11-18
Payer: MEDICARE

## 2018-11-18 VITALS
TEMPERATURE: 97.8 F | RESPIRATION RATE: 20 BRPM | SYSTOLIC BLOOD PRESSURE: 160 MMHG | HEART RATE: 59 BPM | OXYGEN SATURATION: 99 % | DIASTOLIC BLOOD PRESSURE: 77 MMHG

## 2018-11-18 DIAGNOSIS — S63.501A SPRAIN OF RIGHT WRIST, INITIAL ENCOUNTER: Primary | ICD-10-CM

## 2018-11-18 PROCEDURE — 73110 X-RAY EXAM OF WRIST: CPT

## 2018-11-18 PROCEDURE — 99283 EMERGENCY DEPT VISIT LOW MDM: CPT | Performed by: NURSE PRACTITIONER

## 2018-11-18 PROCEDURE — 99283 EMERGENCY DEPT VISIT LOW MDM: CPT

## 2018-11-18 ASSESSMENT — PAIN SCALES - GENERAL: PAINLEVEL_OUTOF10: 2

## 2018-11-18 ASSESSMENT — PAIN DESCRIPTION - PAIN TYPE: TYPE: ACUTE PAIN

## 2018-11-19 NOTE — ED PROVIDER NOTES
[facility]  eMERGENCY dEPARTMENT eNCOUnter      Pt Name: Hunter Tran  MRN: [id number]  Birthdate 1950  Date of evaluation: 11/18/2018  Provider: MISSY David    CHIEF COMPLAINT       Chief Complaint   Patient presents with    Hand Injury     right hand, tripped and fell this afternoon         HISTORY OF PRESENT ILLNESS   (Location/Symptom, Timing/Onset,Context/Setting, Quality, Duration, Modifying Factors, Severity)  Note limiting factors. Hunter Tran is a 76 y.o. male who presents to the emergency department with r wrist pain after he fell and he reached out his hand behind him to catch himself    The history is provided by the patient. Wrist Problem   Location:  Wrist  Wrist location:  R wrist  Injury: yes    Time since incident:  3 hours  Mechanism of injury: fall    Fall:     Fall occurred:  Walking    Height of fall:  3    Impact surface:  Hard floor    Point of impact:  Outstretched arms    Entrapped after fall: no    Pain details:     Quality:  Aching    Severity:  Mild    Onset quality:  Sudden    Duration:  3 hours    Timing:  Constant  Handedness:  Right-handed  Ineffective treatments:  Ice      NursingNotes were reviewed. REVIEW OF SYSTEMS    (2-9 systems for level 4, 10 or more for level 5)     Review of Systems   Musculoskeletal: Positive for arthralgias. Except as noted above the remainder of the review of systems was reviewed and negative.        PAST MEDICAL HISTORY     Past Medical History:   Diagnosis Date    CAD (coronary artery disease), native coronary artery     Class 2 obesity due to excess calories in adult 2/1/2018    GERD (gastroesophageal reflux disease)     Hypercholesteremia     Hypertension          SURGICALHISTORY       Past Surgical History:   Procedure Laterality Date    CARDIAC CATHETERIZATION  12/31/07, JDT    Left heart cath    CARDIAC CATHETERIZATION  12/31/07, JDT    Left heart cath    CARDIOVASCULAR STRESS TEST  06/11/2009, JDT Father     High Blood Pressure Brother     Coronary Art Dis Paternal Grandmother     Coronary Art Dis Paternal Grandfather           SOCIAL HISTORY       Social History     Social History    Marital status:      Spouse name: N/A    Number of children: N/A    Years of education: N/A     Social History Main Topics    Smoking status: Former Smoker     Packs/day: 2.00     Years: 32.00     Start date: 1966     Quit date: 10/5/1998    Smokeless tobacco: Former User    Alcohol use Yes      Comment: Moderate use.  Drug use: Unknown    Sexual activity: Not Asked     Other Topics Concern    None     Social History Narrative    None       SCREENINGS      @FLOW([id number])@      PHYSICAL EXAM    (up to 7 for level 4, 8 or more for level 5)     ED Triage Vitals [11/18/18 1832]   BP Temp Temp src Pulse Resp SpO2 Height Weight   (!) 160/77 97.8 °F (36.6 °C) -- 59 20 99 % -- --       Physical Exam   Constitutional: He appears well-developed and well-nourished. HENT:   Head: Normocephalic and atraumatic. Eyes: Right eye exhibits no discharge. Left eye exhibits no discharge. No scleral icterus. Neck: Normal range of motion. Neck supple. Cardiovascular: Normal rate. Pulmonary/Chest: No respiratory distress. Musculoskeletal:        Right wrist: He exhibits tenderness and swelling. He exhibits normal range of motion, no effusion, no crepitus, no deformity and no laceration. Neurological: He is alert. Psychiatric: He has a normal mood and affect. His behavior is normal.   Nursing note and vitals reviewed.       DIAGNOSTIC RESULTS     EKG: All EKG's are interpreted by the Emergency Department Physician who either signs or Co-signsthis chart in the absence of a cardiologist.        RADIOLOGY:   Non-plain filmimages such as CT, Ultrasound and MRI are read by the radiologist. Plain radiographic images are visualized and preliminarily interpreted by the emergency physician with the below findings:      Interpretation per the Radiologist below, if available at the time of this note:    XR WRIST RIGHT (MIN 3 VIEWS)   Final Result   . 1. No evidence of acute displaced fracture. 2. Soft tissue swelling over the dorsum of the wrist.   3. Arthritic changes of the wrist.   Signed by Dr Venice Billings on 11/18/2018 7:07 PM            ED BEDSIDEULTRASOUND:   Performed by ED Physician -none    LABS:  Labs Reviewed - No data to display    All other labs were within normal range or not returned as of this dictation. EMERGENCY DEPARTMENT COURSE and DIFFERENTIALDIAGNOSIS/MDM:   Vitals:    Vitals:    11/18/18 1832   BP: (!) 160/77   Pulse: 59   Resp: 20   Temp: 97.8 °F (36.6 °C)   SpO2: 99%           MDM      CONSULTS:  None    PROCEDURES:  Unless otherwise noted below, none     Procedures    FINAL IMPRESSION      1.  Sprain of right wrist, initial encounter        DISPOSITION/PLAN   DISPOSITION Decision To Discharge 11/18/2018 07:19:02 PM      PATIENT REFERRED TO:  [address]  [place] [place] [phone]      As needed      DISCHARGE MEDICATIONS:  Discharge Medication List as of 11/18/2018  7:19 PM             (Please note that portions of this note were completed with a voice recognitionprogram.  Efforts were made to edit the dictations but occasionally words are mis-transcribed.)    MISSY David (electronically signed)         MISSY David  11/18/18 7837

## 2018-11-30 ENCOUNTER — OFFICE VISIT (OUTPATIENT)
Dept: PRIMARY CARE CLINIC | Age: 68
End: 2018-11-30
Payer: MEDICARE

## 2018-11-30 ENCOUNTER — HOSPITAL ENCOUNTER (OUTPATIENT)
Dept: GENERAL RADIOLOGY | Age: 68
Discharge: HOME | End: 2018-11-30
Payer: MEDICARE

## 2018-11-30 VITALS
TEMPERATURE: 97.8 F | BODY MASS INDEX: 32.06 KG/M2 | DIASTOLIC BLOOD PRESSURE: 63 MMHG | SYSTOLIC BLOOD PRESSURE: 131 MMHG | OXYGEN SATURATION: 98 % | HEART RATE: 73 BPM | WEIGHT: 229 LBS | HEIGHT: 71 IN

## 2018-11-30 DIAGNOSIS — M25.531 RIGHT WRIST PAIN: ICD-10-CM

## 2018-11-30 DIAGNOSIS — N28.89 RENAL MASS: ICD-10-CM

## 2018-11-30 DIAGNOSIS — M25.531 RIGHT WRIST PAIN: Primary | ICD-10-CM

## 2018-11-30 DIAGNOSIS — I10 HYPERTENSION, ESSENTIAL, BENIGN: ICD-10-CM

## 2018-11-30 PROCEDURE — 73110 X-RAY EXAM OF WRIST: CPT

## 2018-11-30 PROCEDURE — 1036F TOBACCO NON-USER: CPT | Performed by: NURSE PRACTITIONER

## 2018-11-30 PROCEDURE — 4040F PNEUMOC VAC/ADMIN/RCVD: CPT | Performed by: NURSE PRACTITIONER

## 2018-11-30 PROCEDURE — 99214 OFFICE O/P EST MOD 30 MIN: CPT | Performed by: NURSE PRACTITIONER

## 2018-11-30 PROCEDURE — G8598 ASA/ANTIPLAT THER USED: HCPCS | Performed by: NURSE PRACTITIONER

## 2018-11-30 PROCEDURE — 3017F COLORECTAL CA SCREEN DOC REV: CPT | Performed by: NURSE PRACTITIONER

## 2018-11-30 PROCEDURE — G8427 DOCREV CUR MEDS BY ELIG CLIN: HCPCS | Performed by: NURSE PRACTITIONER

## 2018-11-30 PROCEDURE — 1123F ACP DISCUSS/DSCN MKR DOCD: CPT | Performed by: NURSE PRACTITIONER

## 2018-11-30 PROCEDURE — G8417 CALC BMI ABV UP PARAM F/U: HCPCS | Performed by: NURSE PRACTITIONER

## 2018-11-30 PROCEDURE — G8484 FLU IMMUNIZE NO ADMIN: HCPCS | Performed by: NURSE PRACTITIONER

## 2018-11-30 PROCEDURE — 1101F PT FALLS ASSESS-DOCD LE1/YR: CPT | Performed by: NURSE PRACTITIONER

## 2018-11-30 RX ORDER — NEBIVOLOL HYDROCHLORIDE 20 MG/1
20 TABLET ORAL EVERY EVENING
Qty: 30 TABLET | Refills: 2 | Status: SHIPPED | OUTPATIENT
Start: 2018-11-30 | End: 2019-01-09 | Stop reason: ALTCHOICE

## 2018-11-30 RX ORDER — NEBIVOLOL HYDROCHLORIDE 20 MG/1
20 TABLET ORAL DAILY
COMMUNITY
Start: 2018-11-19 | End: 2018-11-30 | Stop reason: SDUPTHER

## 2018-11-30 RX ORDER — NIFEDIPINE 30 MG/1
30 TABLET, EXTENDED RELEASE ORAL EVERY EVENING
Qty: 30 TABLET | Refills: 2 | Status: SHIPPED | OUTPATIENT
Start: 2018-11-30 | End: 2019-03-05 | Stop reason: SDUPTHER

## 2018-11-30 NOTE — PROGRESS NOTES
[facility] PRIMARY CARE  [facility] [facility]  [id number]  [place] [address]  Dept: [phone]  Dept Fax: [phone]  Loc: [phone]        Amy Núñez is a 76 y.o. male who presents today for his medical conditions/ complaints as noted below. Amy Núñez is c/o Hypertension        Chief Complaint   Patient presents with    Hypertension       HPI:     HPI      Pt blood pressure elevated recently. Wife states that pt has been stressed. 150/80. Pt has been to [facility]. Will have MRI on 18th in [address] [place] [address] per Dr. Danielle Rodriguez. Pt states that he is starting to get pain in his kidney in his back. Pt has not decided if he will proceed with the partial nephrectomy or if he will proceed with the full nephrectomy. Patient reports that they have been compliant with taking medications as directed. Past Medical History:   Diagnosis Date    CAD (coronary artery disease), native coronary artery     Class 2 obesity due to excess calories in adult 2/1/2018    GERD (gastroesophageal reflux disease)     Hypercholesteremia     Hypertension        Past Surgical History:   Procedure Laterality Date    CARDIAC CATHETERIZATION  12/31/07, JDT    Left heart cath    CARDIAC CATHETERIZATION  12/31/07, JDT    Left heart cath    CARDIOVASCULAR STRESS TEST  06/11/2009, JDT    Stress Echo    CARDIOVASCULAR STRESS TEST  12/31/07, JDT    Stress Echo    COLONOSCOPY  10/04/2018    Dr KASSY Solorio-Diverticular disease, 10 yr recall    CORONARY ARTERY BYPASS GRAFT      X4 utilizing the left MOLINA to the LAD, right MOLINA to the second diagonal, and vein grafts to the obtuse marinal and PLOM.  CORONARY ARTERY BYPASS GRAFT  01/02/08, Kade Daugherty CABG placing the LIMA to the LAD, MIGUELANGEL to the second diagonal branch and SVBG to the OMB and PMB.     MD COLONOSCOPY FLX DX W/COLLJ SPEC WHEN PFRMD N/A 10/4/2018    COLONOSCOPY performed by Billy Luna MD at [address] [place] [address] Social History    Marital status:      Spouse name: N/A    Number of children: N/A    Years of education: N/A     Social History Main Topics    Smoking status: Former Smoker     Packs/day: 2.00     Years: 32.00     Start date: 1966     Quit date: 10/5/1998    Smokeless tobacco: Former User    Alcohol use Yes      Comment: Moderate use.  Drug use: Unknown    Sexual activity: Not Asked     Other Topics Concern    None     Social History Narrative    None       Family History   Problem Relation Age of Onset   Mara Bio Cancer Mother         lung    Coronary Art Dis Father     Stroke Father     High Blood Pressure Brother     Coronary Art Dis Paternal Grandmother     Coronary Art Dis Paternal Grandfather        Current Outpatient Prescriptions   Medication Sig Dispense Refill    Cyanocobalamin (VITAMIN B 12 PO) Take 5,000 mcg by mouth daily      BYSTOLIC 20 MG TABS tablet Take 1 tablet by mouth every evening 30 tablet 2    NIFEdipine (PROCARDIA XL) 30 MG extended release tablet Take 1 tablet by mouth every evening 30 tablet 2    allopurinol (ZYLOPRIM) 100 MG tablet TAKE 1 TABLET BY MOUTH DAILY 30 tablet 11    metFORMIN (GLUCOPHAGE-XR) 500 MG extended release tablet TAKE ONE TABLET BY MOUTH WITH EVENING MEAL 30 tablet 2    atorvastatin (LIPITOR) 20 MG tablet Take 1 tablet by mouth daily 90 tablet 2    Cholecalciferol (VITAMIN D3) 3000 units TABS Take by mouth      aspirin 81 MG tablet Take 81 mg by mouth every evening       fish oil-omega-3 fatty acids 1000 MG capsule Take 2 g by mouth daily.  zoster recombinant adjuvanted vaccine (SHINGRIX) 50 MCG SUSR injection 50 MCG IM then repeat 2-6 months. 0.5 mL 1     No current facility-administered medications for this visit.         No Known Allergies    Lab Review   Office Visit on 10/19/2018   Component Date Value    Color, UA 10/19/2018 yellow     Clarity, UA 10/19/2018 clear     Glucose, UA POC 10/19/2018 neg     Bilirubin, UA

## 2018-12-02 DIAGNOSIS — I99.8 VASCULAR CALCIFICATION: ICD-10-CM

## 2018-12-02 DIAGNOSIS — S63.509A SPRAIN OF WRIST, UNSPECIFIED LATERALITY, INITIAL ENCOUNTER: ICD-10-CM

## 2018-12-02 DIAGNOSIS — M19.90 ARTHRITIS: Primary | ICD-10-CM

## 2018-12-03 NOTE — PROGRESS NOTES
Results are abnormal.There is no fracture dislocation of the right wrist. Carpal arcs are  maintained. Radiocarpal joint is intact. Extensive vascular  calcifications are seen and this is sometimes associated with RA. You need labs to rule out rheumatory arthritis. I will order a wrist brace and I will also order for you to have additional labs.

## 2018-12-09 ASSESSMENT — ENCOUNTER SYMPTOMS
CHEST TIGHTNESS: 0
CONSTIPATION: 0
COUGH: 0
ABDOMINAL PAIN: 0
SHORTNESS OF BREATH: 0
VOICE CHANGE: 0
BLOOD IN STOOL: 0
WHEEZING: 0
SORE THROAT: 0
VOMITING: 0
NAUSEA: 0
DIARRHEA: 0
TROUBLE SWALLOWING: 0
EYE REDNESS: 0
RHINORRHEA: 0

## 2018-12-11 ENCOUNTER — TELEPHONE (OUTPATIENT)
Dept: PRIMARY CARE CLINIC | Age: 68
End: 2018-12-11

## 2018-12-13 DIAGNOSIS — R58 BLEEDING: Primary | ICD-10-CM
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
## 2018-12-14 ENCOUNTER — HOSPITAL ENCOUNTER (OUTPATIENT)
Dept: GENERAL RADIOLOGY | Age: 68
Discharge: HOME | End: 2018-12-14
Payer: MEDICARE

## 2018-12-14 ENCOUNTER — TELEPHONE (OUTPATIENT)
Dept: PRIMARY CARE CLINIC | Age: 68
End: 2018-12-14

## 2018-12-14 DIAGNOSIS — R58 BLEEDING: ICD-10-CM
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
## 2018-12-14 DIAGNOSIS — M19.90 ARTHRITIS: ICD-10-CM

## 2018-12-14 DIAGNOSIS — I99.8 VASCULAR CALCIFICATION: ICD-10-CM

## 2018-12-14 LAB
RHEUMATOID FACTOR: <10 IU/ML
SEDIMENTATION RATE, ERYTHROCYTE: 20 MM/HR (ref 0–15)

## 2018-12-14 PROCEDURE — 72220 X-RAY EXAM SACRUM TAILBONE: CPT

## 2018-12-14 NOTE — TELEPHONE ENCOUNTER
XR SACRUM COCCYX (MIN 2 VIEWS)     Notes recorded by MISSY Ham on 12/14/2018 at 2:22 PM CST  Normal Results. Contacted pt and informed of the above results. Pt verbalized understanding of all.  PP, LPN

## 2018-12-16 LAB — ANA IGG, ELISA: NORMAL

## 2018-12-18 ENCOUNTER — TELEPHONE (OUTPATIENT)
Dept: PRIMARY CARE CLINIC | Age: 68
End: 2018-12-18

## 2019-01-09 ENCOUNTER — OFFICE VISIT (OUTPATIENT)
Dept: PRIMARY CARE CLINIC | Age: 69
End: 2019-01-09
Payer: MEDICARE

## 2019-01-09 VITALS
WEIGHT: 235 LBS | HEART RATE: 48 BPM | SYSTOLIC BLOOD PRESSURE: 149 MMHG | BODY MASS INDEX: 32.9 KG/M2 | HEIGHT: 71 IN | TEMPERATURE: 97.2 F | DIASTOLIC BLOOD PRESSURE: 78 MMHG | OXYGEN SATURATION: 97 %

## 2019-01-09 DIAGNOSIS — I10 HYPERTENSION, ESSENTIAL, BENIGN: ICD-10-CM

## 2019-01-09 DIAGNOSIS — K21.9 GASTROESOPHAGEAL REFLUX DISEASE WITHOUT ESOPHAGITIS: ICD-10-CM

## 2019-01-09 DIAGNOSIS — E66.09 CLASS 2 OBESITY DUE TO EXCESS CALORIES WITHOUT SERIOUS COMORBIDITY WITH BODY MASS INDEX (BMI) OF 37.0 TO 37.9 IN ADULT: ICD-10-CM

## 2019-01-09 DIAGNOSIS — M10.9 GOUT, UNSPECIFIED CAUSE, UNSPECIFIED CHRONICITY, UNSPECIFIED SITE: ICD-10-CM

## 2019-01-09 DIAGNOSIS — N28.89 RENAL MASS: Primary | ICD-10-CM

## 2019-01-09 DIAGNOSIS — E78.00 HYPERCHOLESTEREMIA: ICD-10-CM

## 2019-01-09 DIAGNOSIS — R00.1 BRADYCARDIA: ICD-10-CM

## 2019-01-09 DIAGNOSIS — M19.90 ARTHRITIS: ICD-10-CM

## 2019-01-09 DIAGNOSIS — E11.8 TYPE 2 DIABETES MELLITUS WITH COMPLICATION, WITHOUT LONG-TERM CURRENT USE OF INSULIN (HCC): ICD-10-CM

## 2019-01-09 PROCEDURE — 99214 OFFICE O/P EST MOD 30 MIN: CPT | Performed by: NURSE PRACTITIONER

## 2019-01-09 PROCEDURE — 4040F PNEUMOC VAC/ADMIN/RCVD: CPT | Performed by: NURSE PRACTITIONER

## 2019-01-09 PROCEDURE — G8598 ASA/ANTIPLAT THER USED: HCPCS | Performed by: NURSE PRACTITIONER

## 2019-01-09 PROCEDURE — 1036F TOBACCO NON-USER: CPT | Performed by: NURSE PRACTITIONER

## 2019-01-09 PROCEDURE — G8427 DOCREV CUR MEDS BY ELIG CLIN: HCPCS | Performed by: NURSE PRACTITIONER

## 2019-01-09 PROCEDURE — 99497 ADVNCD CARE PLAN 30 MIN: CPT | Performed by: NURSE PRACTITIONER

## 2019-01-09 PROCEDURE — G8417 CALC BMI ABV UP PARAM F/U: HCPCS | Performed by: NURSE PRACTITIONER

## 2019-01-09 PROCEDURE — 2022F DILAT RTA XM EVC RTNOPTHY: CPT | Performed by: NURSE PRACTITIONER

## 2019-01-09 PROCEDURE — 3017F COLORECTAL CA SCREEN DOC REV: CPT | Performed by: NURSE PRACTITIONER

## 2019-01-09 PROCEDURE — 3046F HEMOGLOBIN A1C LEVEL >9.0%: CPT | Performed by: NURSE PRACTITIONER

## 2019-01-09 PROCEDURE — G8484 FLU IMMUNIZE NO ADMIN: HCPCS | Performed by: NURSE PRACTITIONER

## 2019-01-09 PROCEDURE — 1123F ACP DISCUSS/DSCN MKR DOCD: CPT | Performed by: NURSE PRACTITIONER

## 2019-01-09 PROCEDURE — 1101F PT FALLS ASSESS-DOCD LE1/YR: CPT | Performed by: NURSE PRACTITIONER

## 2019-01-09 RX ORDER — NEBIVOLOL 10 MG/1
10 TABLET ORAL EVERY EVENING
Qty: 30 TABLET | Refills: 2 | Status: SHIPPED | OUTPATIENT
Start: 2019-01-09 | End: 2019-03-05 | Stop reason: SDUPTHER

## 2019-01-09 ASSESSMENT — ENCOUNTER SYMPTOMS
WHEEZING: 0
DIARRHEA: 0
ABDOMINAL PAIN: 0
VOMITING: 0
CONSTIPATION: 0
VOICE CHANGE: 0
SHORTNESS OF BREATH: 0
EYE REDNESS: 0
BLOOD IN STOOL: 0
COUGH: 0
CHEST TIGHTNESS: 0
SORE THROAT: 0
NAUSEA: 0
RHINORRHEA: 0
TROUBLE SWALLOWING: 0

## 2019-01-12 DIAGNOSIS — E11.8 TYPE 2 DIABETES MELLITUS WITH COMPLICATION, WITHOUT LONG-TERM CURRENT USE OF INSULIN (HCC): ICD-10-CM

## 2019-01-13 PROBLEM — N28.89 RENAL MASS: Status: RESOLVED | Noted: 2018-10-15 | Resolved: 2019-01-13

## 2019-01-13 PROBLEM — I10 HYPERTENSION, ESSENTIAL, BENIGN: Status: ACTIVE | Noted: 2019-01-13

## 2019-01-13 PROBLEM — N28.9 KIDNEY LESION, NATIVE, BILATERAL: Status: RESOLVED | Noted: 2018-10-05 | Resolved: 2019-01-13

## 2019-01-13 PROBLEM — R00.1 BRADYCARDIA: Status: ACTIVE | Noted: 2019-01-13

## 2019-01-13 PROBLEM — S63.509A SPRAIN OF WRIST: Status: RESOLVED | Noted: 2018-12-02 | Resolved: 2019-01-13

## 2019-01-13 RX ORDER — METFORMIN HYDROCHLORIDE 500 MG/1
TABLET, EXTENDED RELEASE ORAL
Qty: 30 TABLET | Refills: 2 | Status: SHIPPED | OUTPATIENT
Start: 2019-01-13 | End: 2019-03-05 | Stop reason: SDUPTHER

## 2019-01-25 RX ORDER — LOSARTAN POTASSIUM 50 MG/1
50 TABLET ORAL EVERY EVENING
Qty: 90 TABLET | Refills: 3 | Status: SHIPPED | OUTPATIENT
Start: 2019-01-25 | End: 2019-03-05

## 2019-02-06 ENCOUNTER — HOSPITAL ENCOUNTER (OUTPATIENT)
Dept: CT IMAGING | Age: 69
Discharge: HOME | End: 2019-02-06
Payer: MEDICARE

## 2019-02-06 DIAGNOSIS — C64.9 RENAL CELL CARCINOMA, UNSPECIFIED LATERALITY (HCC): ICD-10-CM

## 2019-02-06 PROCEDURE — 71250 CT THORAX DX C-: CPT

## 2019-03-05 ENCOUNTER — OFFICE VISIT (OUTPATIENT)
Dept: PRIMARY CARE CLINIC | Age: 69
End: 2019-03-05
Payer: MEDICARE

## 2019-03-05 VITALS
OXYGEN SATURATION: 98 % | WEIGHT: 232.2 LBS | HEART RATE: 65 BPM | BODY MASS INDEX: 32.51 KG/M2 | HEIGHT: 71 IN | RESPIRATION RATE: 14 BRPM | SYSTOLIC BLOOD PRESSURE: 146 MMHG | TEMPERATURE: 97.7 F | DIASTOLIC BLOOD PRESSURE: 82 MMHG

## 2019-03-05 DIAGNOSIS — Z90.5 S/P NEPHRECTOMY: Primary | ICD-10-CM

## 2019-03-05 DIAGNOSIS — M10.9 GOUT, UNSPECIFIED CAUSE, UNSPECIFIED CHRONICITY, UNSPECIFIED SITE: ICD-10-CM

## 2019-03-05 DIAGNOSIS — N28.89 RENAL MASS: ICD-10-CM

## 2019-03-05 DIAGNOSIS — I10 HYPERTENSION, ESSENTIAL, BENIGN: ICD-10-CM

## 2019-03-05 DIAGNOSIS — E79.0 ELEVATED BLOOD URIC ACID LEVEL: ICD-10-CM

## 2019-03-05 DIAGNOSIS — E11.8 TYPE 2 DIABETES MELLITUS WITH COMPLICATION, WITHOUT LONG-TERM CURRENT USE OF INSULIN (HCC): ICD-10-CM

## 2019-03-05 DIAGNOSIS — E78.00 HYPERCHOLESTEREMIA: ICD-10-CM

## 2019-03-05 PROCEDURE — 1123F ACP DISCUSS/DSCN MKR DOCD: CPT | Performed by: NURSE PRACTITIONER

## 2019-03-05 PROCEDURE — 3017F COLORECTAL CA SCREEN DOC REV: CPT | Performed by: NURSE PRACTITIONER

## 2019-03-05 PROCEDURE — 1036F TOBACCO NON-USER: CPT | Performed by: NURSE PRACTITIONER

## 2019-03-05 PROCEDURE — 99214 OFFICE O/P EST MOD 30 MIN: CPT | Performed by: NURSE PRACTITIONER

## 2019-03-05 PROCEDURE — 4040F PNEUMOC VAC/ADMIN/RCVD: CPT | Performed by: NURSE PRACTITIONER

## 2019-03-05 PROCEDURE — G8598 ASA/ANTIPLAT THER USED: HCPCS | Performed by: NURSE PRACTITIONER

## 2019-03-05 PROCEDURE — G8427 DOCREV CUR MEDS BY ELIG CLIN: HCPCS | Performed by: NURSE PRACTITIONER

## 2019-03-05 PROCEDURE — 2022F DILAT RTA XM EVC RTNOPTHY: CPT | Performed by: NURSE PRACTITIONER

## 2019-03-05 PROCEDURE — 1111F DSCHRG MED/CURRENT MED MERGE: CPT | Performed by: NURSE PRACTITIONER

## 2019-03-05 PROCEDURE — 1101F PT FALLS ASSESS-DOCD LE1/YR: CPT | Performed by: NURSE PRACTITIONER

## 2019-03-05 PROCEDURE — G8484 FLU IMMUNIZE NO ADMIN: HCPCS | Performed by: NURSE PRACTITIONER

## 2019-03-05 PROCEDURE — G8417 CALC BMI ABV UP PARAM F/U: HCPCS | Performed by: NURSE PRACTITIONER

## 2019-03-05 PROCEDURE — 3046F HEMOGLOBIN A1C LEVEL >9.0%: CPT | Performed by: NURSE PRACTITIONER

## 2019-03-05 RX ORDER — ATORVASTATIN CALCIUM 20 MG/1
20 TABLET, FILM COATED ORAL DAILY
Qty: 90 TABLET | Refills: 2 | Status: SHIPPED | OUTPATIENT
Start: 2019-03-05 | End: 2019-05-16 | Stop reason: SDUPTHER

## 2019-03-05 RX ORDER — METFORMIN HYDROCHLORIDE 500 MG/1
500 TABLET, EXTENDED RELEASE ORAL
Qty: 30 TABLET | Refills: 2 | Status: SHIPPED | OUTPATIENT
Start: 2019-03-05 | End: 2019-05-16 | Stop reason: SDUPTHER

## 2019-03-05 RX ORDER — NEBIVOLOL 10 MG/1
10 TABLET ORAL EVERY EVENING
Qty: 30 TABLET | Refills: 2 | Status: SHIPPED | OUTPATIENT
Start: 2019-03-05 | End: 2019-05-16 | Stop reason: SDUPTHER

## 2019-03-05 RX ORDER — NIFEDIPINE 60 MG/1
60 TABLET, EXTENDED RELEASE ORAL EVERY EVENING
Qty: 30 TABLET | Refills: 2 | Status: SHIPPED | OUTPATIENT
Start: 2019-03-05 | End: 2019-05-16 | Stop reason: SDUPTHER

## 2019-03-05 RX ORDER — ALLOPURINOL 100 MG/1
100 TABLET ORAL DAILY
Qty: 30 TABLET | Refills: 11 | Status: SHIPPED | OUTPATIENT
Start: 2019-03-05 | End: 2019-05-16 | Stop reason: SDUPTHER

## 2019-03-05 ASSESSMENT — PATIENT HEALTH QUESTIONNAIRE - PHQ9
1. LITTLE INTEREST OR PLEASURE IN DOING THINGS: 0
SUM OF ALL RESPONSES TO PHQ QUESTIONS 1-9: 0
SUM OF ALL RESPONSES TO PHQ9 QUESTIONS 1 & 2: 0
SUM OF ALL RESPONSES TO PHQ QUESTIONS 1-9: 0
2. FEELING DOWN, DEPRESSED OR HOPELESS: 0

## 2019-03-15 DIAGNOSIS — I10 HYPERTENSION, ESSENTIAL, BENIGN: ICD-10-CM

## 2019-03-15 DIAGNOSIS — Z90.5 S/P NEPHRECTOMY: ICD-10-CM

## 2019-03-15 DIAGNOSIS — E79.0 ELEVATED BLOOD URIC ACID LEVEL: ICD-10-CM

## 2019-03-15 DIAGNOSIS — E11.8 TYPE 2 DIABETES MELLITUS WITH COMPLICATION, WITHOUT LONG-TERM CURRENT USE OF INSULIN (HCC): ICD-10-CM

## 2019-03-15 LAB
ALBUMIN SERPL-MCNC: 4.5 G/DL (ref 3.5–5.2)
ALP BLD-CCNC: 78 U/L (ref 40–130)
ALT SERPL-CCNC: 14 U/L (ref 5–41)
ANION GAP SERPL CALCULATED.3IONS-SCNC: 14 MMOL/L (ref 7–19)
AST SERPL-CCNC: 14 U/L (ref 5–40)
BASOPHILS ABSOLUTE: 0.1 K/UL (ref 0–0.2)
BASOPHILS RELATIVE PERCENT: 1.3 % (ref 0–1)
BILIRUB SERPL-MCNC: 0.3 MG/DL (ref 0.2–1.2)
BILIRUBIN URINE: NEGATIVE
BLOOD, URINE: NEGATIVE
BUN BLDV-MCNC: 32 MG/DL (ref 8–23)
CALCIUM SERPL-MCNC: 9.5 MG/DL (ref 8.8–10.2)
CHLORIDE BLD-SCNC: 106 MMOL/L (ref 98–111)
CHOLESTEROL, TOTAL: 127 MG/DL (ref 160–199)
CLARITY: CLEAR
CO2: 25 MMOL/L (ref 22–29)
COLOR: YELLOW
CREAT SERPL-MCNC: 2.1 MG/DL (ref 0.5–1.2)
EOSINOPHILS ABSOLUTE: 0.6 K/UL (ref 0–0.6)
EOSINOPHILS RELATIVE PERCENT: 9 % (ref 0–5)
GFR NON-AFRICAN AMERICAN: 31
GLUCOSE BLD-MCNC: 123 MG/DL (ref 74–109)
GLUCOSE URINE: NEGATIVE MG/DL
HBA1C MFR BLD: 6.1 % (ref 4–6)
HCT VFR BLD CALC: 41.9 % (ref 42–52)
HDLC SERPL-MCNC: 50 MG/DL (ref 55–121)
HEMOGLOBIN: 12.9 G/DL (ref 14–18)
KETONES, URINE: NEGATIVE MG/DL
LDL CHOLESTEROL CALCULATED: 67 MG/DL
LEUKOCYTE ESTERASE, URINE: NEGATIVE
LYMPHOCYTES ABSOLUTE: 1.1 K/UL (ref 1.1–4.5)
LYMPHOCYTES RELATIVE PERCENT: 18.1 % (ref 20–40)
MCH RBC QN AUTO: 27.7 PG (ref 27–31)
MCHC RBC AUTO-ENTMCNC: 30.8 G/DL (ref 33–37)
MCV RBC AUTO: 89.9 FL (ref 80–94)
MONOCYTES ABSOLUTE: 0.6 K/UL (ref 0–0.9)
MONOCYTES RELATIVE PERCENT: 10.5 % (ref 0–10)
NEUTROPHILS ABSOLUTE: 3.7 K/UL (ref 1.5–7.5)
NEUTROPHILS RELATIVE PERCENT: 60.8 % (ref 50–65)
NITRITE, URINE: NEGATIVE
PDW BLD-RTO: 13.2 % (ref 11.5–14.5)
PH UA: 5.5 (ref 5–8)
PLATELET # BLD: 198 K/UL (ref 130–400)
PMV BLD AUTO: 11 FL (ref 9.4–12.4)
POTASSIUM SERPL-SCNC: 4.8 MMOL/L (ref 3.5–5)
PROTEIN UA: NEGATIVE MG/DL
RBC # BLD: 4.66 M/UL (ref 4.7–6.1)
SODIUM BLD-SCNC: 145 MMOL/L (ref 136–145)
SPECIFIC GRAVITY UA: 1.02 (ref 1–1.03)
TOTAL PROTEIN: 7.8 G/DL (ref 6.6–8.7)
TRIGL SERPL-MCNC: 52 MG/DL (ref 0–149)
URIC ACID, SERUM: 6.8 MG/DL (ref 3.4–7)
UROBILINOGEN, URINE: 0.2 E.U./DL
WBC # BLD: 6.1 K/UL (ref 4.8–10.8)

## 2019-04-16 ENCOUNTER — OFFICE VISIT (OUTPATIENT)
Dept: PRIMARY CARE CLINIC | Age: 69
End: 2019-04-16
Payer: MEDICARE

## 2019-04-16 VITALS
BODY MASS INDEX: 32.48 KG/M2 | HEIGHT: 71 IN | SYSTOLIC BLOOD PRESSURE: 132 MMHG | OXYGEN SATURATION: 99 % | TEMPERATURE: 98.2 F | HEART RATE: 54 BPM | DIASTOLIC BLOOD PRESSURE: 76 MMHG | WEIGHT: 232 LBS

## 2019-04-16 DIAGNOSIS — R94.4 DECREASED GFR: ICD-10-CM

## 2019-04-16 DIAGNOSIS — E11.8 TYPE 2 DIABETES MELLITUS WITH COMPLICATION, WITHOUT LONG-TERM CURRENT USE OF INSULIN (HCC): ICD-10-CM

## 2019-04-16 DIAGNOSIS — M10.9 GOUT, UNSPECIFIED CAUSE, UNSPECIFIED CHRONICITY, UNSPECIFIED SITE: ICD-10-CM

## 2019-04-16 DIAGNOSIS — I10 HYPERTENSION, ESSENTIAL, BENIGN: Primary | ICD-10-CM

## 2019-04-16 DIAGNOSIS — I25.10 CORONARY ARTERY DISEASE INVOLVING NATIVE CORONARY ARTERY OF NATIVE HEART WITHOUT ANGINA PECTORIS: ICD-10-CM

## 2019-04-16 DIAGNOSIS — E78.00 HYPERCHOLESTEREMIA: ICD-10-CM

## 2019-04-16 PROCEDURE — 3044F HG A1C LEVEL LT 7.0%: CPT | Performed by: NURSE PRACTITIONER

## 2019-04-16 PROCEDURE — 3017F COLORECTAL CA SCREEN DOC REV: CPT | Performed by: NURSE PRACTITIONER

## 2019-04-16 PROCEDURE — 99214 OFFICE O/P EST MOD 30 MIN: CPT | Performed by: NURSE PRACTITIONER

## 2019-04-16 PROCEDURE — 2022F DILAT RTA XM EVC RTNOPTHY: CPT | Performed by: NURSE PRACTITIONER

## 2019-04-16 PROCEDURE — G8417 CALC BMI ABV UP PARAM F/U: HCPCS | Performed by: NURSE PRACTITIONER

## 2019-04-16 PROCEDURE — G8427 DOCREV CUR MEDS BY ELIG CLIN: HCPCS | Performed by: NURSE PRACTITIONER

## 2019-04-16 PROCEDURE — G8598 ASA/ANTIPLAT THER USED: HCPCS | Performed by: NURSE PRACTITIONER

## 2019-04-16 PROCEDURE — 4040F PNEUMOC VAC/ADMIN/RCVD: CPT | Performed by: NURSE PRACTITIONER

## 2019-04-16 PROCEDURE — 1123F ACP DISCUSS/DSCN MKR DOCD: CPT | Performed by: NURSE PRACTITIONER

## 2019-04-16 PROCEDURE — 1036F TOBACCO NON-USER: CPT | Performed by: NURSE PRACTITIONER

## 2019-04-16 RX ORDER — LISINOPRIL 10 MG/1
10 TABLET ORAL 2 TIMES DAILY
Qty: 60 TABLET | Refills: 1 | Status: SHIPPED | OUTPATIENT
Start: 2019-04-16 | End: 2019-05-16 | Stop reason: SDUPTHER

## 2019-04-16 ASSESSMENT — ENCOUNTER SYMPTOMS
EYE REDNESS: 0
RHINORRHEA: 0
NAUSEA: 0
WHEEZING: 0
VOICE CHANGE: 0
DIARRHEA: 0
COUGH: 0
ABDOMINAL PAIN: 0
VOMITING: 0
CONSTIPATION: 0
BLOOD IN STOOL: 0
CHEST TIGHTNESS: 0
TROUBLE SWALLOWING: 0
SHORTNESS OF BREATH: 0
SORE THROAT: 0

## 2019-04-16 NOTE — PROGRESS NOTES
[facility]  [address] [place] [address]  [id number]  [place] [id number]  Dept: [phone]  Dept Fax: [phone]  Loc: [phone]        Emilia Gutierrez is a 71 y.o. male who presents today for his medical conditions/ complaints as noted below. Emilia Gutierrez is c/o 1 Month Follow-Up (nephrectomy, renal mass, htn, hypercholesterolemia, gout); Medication Check (cont metformin, begin procardia, bystolic qhs); and Discuss Labs        Chief Complaint   Patient presents with    1 Month Follow-Up     nephrectomy, renal mass, htn, hypercholesterolemia, gout    Medication Check     cont metformin, begin procardia, bystolic qhs    Discuss Labs       HPI:     HPI    1 Month Follow-Up     HTN:Pt states that his blood pressure has been elevated outside of the office. Pt currently on  Procardia 25UK QHS, and bystolic 18XD. Pt will follow up with Dr. Gabrielle Vallejo May 2nd for s/p nephrectomy r/t Nephrectomy due to renal mass, htn. 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 Will follow up with Dr. Gabrielle Vallejo May 2nd. Dr. Vinh Slater follow up 3 months. Impression   Unremarkable CT scan of the chest. No evidence of a   thoracic neoplastic process/metastatic disease. Old healed granulomas and scarring. Incompletely evaluated low-density mass in the lower pole of the right   kidney and smaller low density nodules in the left kidney. A 2 mm nonobstructing calculus right kidney. Signed by Dr Jose C Mcclure on 2/6/2019 9:27 AM       Hypercholesterolemia: Stable on lipitor. Wishes to continue same. Gout: Pt stable on allopurinol. Pt wishes to continue same. Discuss Labs : Creat improving per labs. Patient reports that they have been compliant with taking medications as directed.      Past Medical History:   Diagnosis Date    CAD (coronary artery disease), native coronary artery     Class 2 obesity due to excess calories in adult 2/1/2018    GERD (gastroesophageal reflux disease)     Hypercholesteremia     Hypertension        Past Surgical History:   Procedure Laterality Date    CARDIAC CATHETERIZATION  07, JDT    Left heart cath    CARDIAC CATHETERIZATION  07, JDT    Left heart cath    CARDIOVASCULAR STRESS TEST  2009, JDT    Stress Echo    CARDIOVASCULAR STRESS TEST  07, JDT    Stress Echo    COLONOSCOPY  10/04/2018    Dr KASSY Solorio-Diverticular disease, 10 yr recall    CORONARY ARTERY BYPASS GRAFT      X4 utilizing the left MOLINA to the LAD, right MOLINA to the second diagonal, and vein grafts to the obtuse marinal and PLOM.  CORONARY ARTERY BYPASS GRAFT  08, Arya Rivera CABG placing the LIMA to the LAD, MIGUELANGEL to the second diagonal branch and SVBG to the OMB and PMB.     KIDNEY SURGERY      Right Kidney removed    VT COLONOSCOPY FLX DX W/COLLJ SPEC WHEN PFRMD N/A 10/4/2018    COLONOSCOPY performed by Yenny Edmondson MD at [place] [address] History     Socioeconomic History    Marital status:      Spouse name: None    Number of children: None    Years of education: None    Highest education level: None   Occupational History    None   Social Needs    Financial resource strain: None    Food insecurity:     Worry: None     Inability: None    Transportation needs:     Medical: None     Non-medical: None   Tobacco Use    Smoking status: Former Smoker     Packs/day: 2.00     Years: 32.00     Pack years: 64.00     Start date:      Last attempt to quit: 10/5/1998     Years since quittin.5    Smokeless tobacco: Former User   Substance and Sexual Activity    Alcohol use: Yes     Comment: Very Little    Drug use: Never    Sexual activity: None   Lifestyle    Physical activity:     Days per week: None     Minutes per session: None    Stress: None   Relationships    Social connections:     Talks on phone: None     Gets together: None     Attends Tenriism service: None     Active member of club or organization: None     Attends meetings of clubs or organizations: None     Relationship status: None    Intimate partner violence:     Fear of current or ex partner: None     Emotionally abused: None     Physically abused: None     Forced sexual activity: None   Other Topics Concern    None   Social History Narrative    None       Family History   Problem Relation Age of Onset    Cancer Mother         lung    Coronary Art Dis Father     Stroke Father     High Blood Pressure Brother     Coronary Art Dis Paternal Grandmother     Coronary Art Dis Paternal Grandfather        Current Outpatient Medications   Medication Sig Dispense Refill    lisinopril (PRINIVIL;ZESTRIL) 10 MG tablet Take 1 tablet by mouth 2 times daily 60 tablet 1    NIFEdipine (PROCARDIA XL) 60 MG extended release tablet Take 1 tablet by mouth every evening 30 tablet 2    nebivolol (BYSTOLIC) 10 MG tablet Take 1 tablet by mouth every evening 30 tablet 2    metFORMIN (GLUCOPHAGE-XR) 500 MG extended release tablet Take 1 tablet by mouth daily (with breakfast) 30 tablet 2    atorvastatin (LIPITOR) 20 MG tablet Take 1 tablet by mouth daily 90 tablet 2    allopurinol (ZYLOPRIM) 100 MG tablet Take 1 tablet by mouth daily 30 tablet 11    Cyanocobalamin (VITAMIN B 12 PO) Take 5,000 mcg by mouth daily      Cholecalciferol (VITAMIN D3) 3000 units TABS Take 4,000 Units by mouth       fish oil-omega-3 fatty acids 1000 MG capsule Take 2 g by mouth daily. No current facility-administered medications for this visit.         No Known Allergies    Lab Review   Orders Only on 03/15/2019   Component Date Value    Uric Acid, Serum 03/15/2019 6.8     Color, UA 03/15/2019 YELLOW     Clarity, UA 03/15/2019 Clear     Glucose, Ur 03/15/2019 Negative     Bilirubin Urine 03/15/2019 Negative     Ketones, Urine 03/15/2019 Negative     Specific Gravity, UA 03/15/2019 1.018     Blood, Urine 03/15/2019 Negative     pH, UA 03/15/2019 5.5     Protein, UA 03/15/2019 Negative     Urobilinogen, Urine 03/15/2019 0.2     Nitrite, Urine 03/15/2019 Negative     Leukocyte Esterase, Urine 03/15/2019 Negative     Cholesterol, Total 03/15/2019 127*    Triglycerides 03/15/2019 52     HDL 03/15/2019 50*    LDL Calculated 03/15/2019 67     Hemoglobin A1C 03/15/2019 6.1*    Sodium 03/15/2019 145     Potassium 03/15/2019 4.8     Chloride 03/15/2019 106     CO2 03/15/2019 25     Anion Gap 03/15/2019 14     Glucose 03/15/2019 123*    BUN 03/15/2019 32*    CREATININE 03/15/2019 2.1*    GFR Non- 03/15/2019 31*    Calcium 03/15/2019 9.5     Total Protein 03/15/2019 7.8     Alb 03/15/2019 4.5     Total Bilirubin 03/15/2019 0.3     Alkaline Phosphatase 03/15/2019 78     ALT 03/15/2019 14     AST 03/15/2019 14     WBC 03/15/2019 6.1     RBC 03/15/2019 4.66*    Hemoglobin 03/15/2019 12.9*    Hematocrit 03/15/2019 41.9*    MCV 03/15/2019 89.9     MCH 03/15/2019 27.7     MCHC 03/15/2019 30.8*    RDW 03/15/2019 13.2     Platelets 39/26/8752 198     MPV 03/15/2019 11.0     Neutrophils % 03/15/2019 60.8     Lymphocytes % 03/15/2019 18.1*    Monocytes % 03/15/2019 10.5*    Eosinophils % 03/15/2019 9.0*    Basophils % 03/15/2019 1.3*    Neutrophils # 03/15/2019 3.7     Lymphocytes # 03/15/2019 1.1     Monocytes # 03/15/2019 0.60     Eosinophils # 03/15/2019 0.60     Basophils # 03/15/2019 9.17      notapplicable    Subjective:   Review of Systems   Constitutional: Negative for activity change, appetite change, fatigue, fever and unexpected weight change. HENT: Negative for congestion, ear pain, nosebleeds, rhinorrhea, sore throat, trouble swallowing and voice change. Eyes: Negative for redness and visual disturbance. Respiratory: Negative for cough, chest tightness, shortness of breath and wheezing. Cardiovascular: Negative for chest pain, palpitations and leg swelling.    Gastrointestinal: Negative for abdominal pain, blood in stool, constipation, diarrhea, nausea and vomiting. Endocrine: Negative for polydipsia, polyphagia and polyuria. Genitourinary: Negative for dysuria, frequency and urgency. Musculoskeletal: Negative for arthralgias and myalgias. Skin: Negative for rash and wound. Neurological: Negative for dizziness, speech difficulty, light-headedness and headaches. Psychiatric/Behavioral: Negative for agitation, confusion, self-injury and suicidal ideas. The patient is not nervous/anxious. Objective:     Physical Exam   Constitutional: He is oriented to person, place, and time. He appears well-developed and well-nourished. No distress. HENT:   Head: Normocephalic and atraumatic. Right Ear: External ear normal.   Left Ear: External ear normal.   Nose: Nose normal.   Mouth/Throat: Oropharynx is clear and moist. No oropharyngeal exudate. Eyes: Pupils are equal, round, and reactive to light. Conjunctivae are normal. Right eye exhibits no discharge. Left eye exhibits no discharge. Neck: Normal range of motion. Neck supple. Cardiovascular: Regular rhythm, normal heart sounds and intact distal pulses. Bradycardia present. No murmur heard. Pulmonary/Chest: Effort normal and breath sounds normal. No stridor. No respiratory distress. He has no wheezes. He has no rales. He exhibits no tenderness. Right breast exhibits no inverted nipple, no mass, no nipple discharge, no skin change and no tenderness. Left breast exhibits no inverted nipple, no mass, no nipple discharge, no skin change and no tenderness. Abdominal: Soft. Bowel sounds are normal. He exhibits no distension. There is no tenderness. Musculoskeletal: Normal range of motion. He exhibits no edema, tenderness or deformity. Neurological: He is alert and oriented to person, place, and time. He has normal reflexes. No cranial nerve deficit. Coordination normal.   Skin: Skin is warm and dry. No rash noted. He is not diaphoretic. No erythema.    Psychiatric: understanding. Instructed to continue current medications, diet and exercise. Pt/family agreed with treatment plan. Follow up as directed and sooner if needed. Patient/ family instructed that is symptoms worsen or persist they are to contact office or report to nearest ER. They voice understanding and agreement with this plan.      Electronically signed by MISSY Ruiz on 4/16/2019 at 9:37 AM

## 2019-04-23 ENCOUNTER — OFFICE VISIT (OUTPATIENT)
Dept: CARDIOLOGY | Age: 69
End: 2019-04-23
Payer: MEDICARE

## 2019-04-23 VITALS
WEIGHT: 234 LBS | HEART RATE: 64 BPM | BODY MASS INDEX: 32.76 KG/M2 | DIASTOLIC BLOOD PRESSURE: 70 MMHG | HEIGHT: 71 IN | SYSTOLIC BLOOD PRESSURE: 124 MMHG

## 2019-04-23 DIAGNOSIS — N18.9 CHRONIC KIDNEY DISEASE, UNSPECIFIED CKD STAGE: ICD-10-CM

## 2019-04-23 DIAGNOSIS — E11.8 TYPE 2 DIABETES MELLITUS WITH COMPLICATION, WITHOUT LONG-TERM CURRENT USE OF INSULIN (HCC): ICD-10-CM

## 2019-04-23 DIAGNOSIS — I25.10 CORONARY ARTERY DISEASE INVOLVING NATIVE CORONARY ARTERY OF NATIVE HEART WITHOUT ANGINA PECTORIS: Primary | ICD-10-CM

## 2019-04-23 DIAGNOSIS — E78.00 HYPERCHOLESTEREMIA: ICD-10-CM

## 2019-04-23 DIAGNOSIS — I10 HYPERTENSION, ESSENTIAL, BENIGN: ICD-10-CM

## 2019-04-23 PROCEDURE — 3044F HG A1C LEVEL LT 7.0%: CPT | Performed by: CLINICAL NURSE SPECIALIST

## 2019-04-23 PROCEDURE — 1036F TOBACCO NON-USER: CPT | Performed by: CLINICAL NURSE SPECIALIST

## 2019-04-23 PROCEDURE — 4040F PNEUMOC VAC/ADMIN/RCVD: CPT | Performed by: CLINICAL NURSE SPECIALIST

## 2019-04-23 PROCEDURE — G8417 CALC BMI ABV UP PARAM F/U: HCPCS | Performed by: CLINICAL NURSE SPECIALIST

## 2019-04-23 PROCEDURE — 1123F ACP DISCUSS/DSCN MKR DOCD: CPT | Performed by: CLINICAL NURSE SPECIALIST

## 2019-04-23 PROCEDURE — 99213 OFFICE O/P EST LOW 20 MIN: CPT | Performed by: CLINICAL NURSE SPECIALIST

## 2019-04-23 PROCEDURE — 3017F COLORECTAL CA SCREEN DOC REV: CPT | Performed by: CLINICAL NURSE SPECIALIST

## 2019-04-23 PROCEDURE — 2022F DILAT RTA XM EVC RTNOPTHY: CPT | Performed by: CLINICAL NURSE SPECIALIST

## 2019-04-23 PROCEDURE — G8427 DOCREV CUR MEDS BY ELIG CLIN: HCPCS | Performed by: CLINICAL NURSE SPECIALIST

## 2019-04-23 PROCEDURE — G8598 ASA/ANTIPLAT THER USED: HCPCS | Performed by: CLINICAL NURSE SPECIALIST

## 2019-04-23 ASSESSMENT — ENCOUNTER SYMPTOMS
BLOOD IN STOOL: 0
BLURRED VISION: 0
ORTHOPNEA: 0
HEARTBURN: 0
VOMITING: 0
SHORTNESS OF BREATH: 0
COUGH: 0
NAUSEA: 0

## 2019-04-23 NOTE — PROGRESS NOTES
[facility], [address], [address] [address]  Phone: [phone]  Fax: [phone]    OFFICE VISIT:  2019    Robjanuary Darya - : 1950    Reason For Visit:  Anika Cabrera is a 71 y.o. male who is here for follow-up for CAD    HPI  Patient is here for follow-up for CAD, hypertension, hyperlipidemia, diabetes, CKD. Patient had a nephrectomy due to a renal mass in November at [place]. He denies any cardiac symptoms such as chest pain, unusual dyspnea, orthopnea, PND, edema, or palpitations. He continues to work and is very active on his job with lots of walking and tolerates this well. Is PCP recently added lisinopril for elevated blood pressure which seems to be improving     MISSY Vieira is PCP.   Jada Lackey [facility] the [facility]:    Patient Active Problem List    Diagnosis Date Noted    Hypertension, essential, benign 2019    Bradycardia 2019    Vascular calcification 2018    Arthritis 2018    Chronic kidney disease 2018    Type 2 diabetes mellitus with complication, without long-term current use of insulin (HCC) 2018    Hypercalcemia 2018    Gout 2018    Increased creatine kinase level 2018    Decreased GFR 2018    Decreased pedal pulses 2018    Elevated blood uric acid level 2018    Class 2 obesity due to excess calories in adult 2018    Hypercholesteremia     Hypertension     CAD (coronary artery disease), native coronary artery     GERD (gastroesophageal reflux disease)      Past Medical History:   Diagnosis Date    CAD (coronary artery disease), native coronary artery     Class 2 obesity due to excess calories in adult 2018    GERD (gastroesophageal reflux disease)     Hypercholesteremia     Hypertension      Past Surgical History:   Procedure Laterality Date    CARDIAC CATHETERIZATION  07, MICHAEL    Left cranial nerve deficit. Skin: Skin is warm and dry. No rash noted. Psychiatric: He has a normal mood and affect. His behavior is normal. Judgment normal.   Nursing note and vitals reviewed. Data:    Lab Results   Component Value Date    CHOL 127 (L) 03/15/2019    TRIG 52 03/15/2019    HDL 50 (L) 03/15/2019    LDLCALC 67 03/15/2019     Lab Results   Component Value Date    ALT 14 03/15/2019    AST 14 03/15/2019       Assessment:     Diagnosis Orders   1. Coronary artery disease involving native coronary artery of native heart without angina pectoris     2. Hypertension, essential, benign     3. Hypercholesteremia     4. Type 2 diabetes mellitus with complication, without long-term current use of insulin ([facility] [address]     5. Chronic kidney disease, unspecified CKD stage       Patient is taking medications as prescribed    CAD-stable without angina    Hypertension-well controlled    Hypercholesterolemia-on statin therapy. Last LDL 67    Diabetes-last A1c 6.1    CKD-status post nephrectomy last year. Patient will be seeing Dr. Demetra Ramirez next week. His last creatinine was 2.1    Stable cardiovascular status. No evidence of overt heart failure, angina or dysrhythmia. Plan    Followup With MISSY 6mo  Stay well hydrated for your kidneys  Keep diabetes under control to help prevent further heart disease. Keep Hemoglobin A1C less than 7%. Call with any questions or concerns  Follow up with MISSY Hernandez for non cardiac problems  Report any new problems  Cardiovascular Fitness-Exercise as tolerated. Strive for 15 minutes of exercise most days of the week. Cardiac / Healthy Diet  Continue current medications as directed  Continue plan of treatment  It is always recommended that you bring your medications bottles with you to each visit - this is for your safety!        MISSY Goodwin

## 2019-04-30 DIAGNOSIS — I10 HYPERTENSION, ESSENTIAL, BENIGN: ICD-10-CM

## 2019-04-30 DIAGNOSIS — E11.8 TYPE 2 DIABETES MELLITUS WITH COMPLICATION, WITHOUT LONG-TERM CURRENT USE OF INSULIN (HCC): ICD-10-CM

## 2019-04-30 LAB
ALBUMIN SERPL-MCNC: 4.4 G/DL (ref 3.5–5.2)
ALBUMIN SERPL-MCNC: 4.5 G/DL (ref 3.5–5.2)
ALP BLD-CCNC: 73 U/L (ref 40–130)
ALT SERPL-CCNC: 10 U/L (ref 5–41)
ANION GAP SERPL CALCULATED.3IONS-SCNC: 18 MMOL/L (ref 7–19)
ANION GAP SERPL CALCULATED.3IONS-SCNC: 19 MMOL/L (ref 7–19)
AST SERPL-CCNC: 14 U/L (ref 5–40)
BASOPHILS ABSOLUTE: 0.1 K/UL (ref 0–0.2)
BASOPHILS RELATIVE PERCENT: 1.1 % (ref 0–1)
BILIRUB SERPL-MCNC: 0.6 MG/DL (ref 0.2–1.2)
BILIRUBIN URINE: NEGATIVE
BLOOD, URINE: NEGATIVE
BUN BLDV-MCNC: 27 MG/DL (ref 8–23)
BUN BLDV-MCNC: 28 MG/DL (ref 8–23)
CALCIUM SERPL-MCNC: 9.7 MG/DL (ref 8.8–10.2)
CALCIUM SERPL-MCNC: 9.9 MG/DL (ref 8.8–10.2)
CHLORIDE BLD-SCNC: 105 MMOL/L (ref 98–111)
CHLORIDE BLD-SCNC: 106 MMOL/L (ref 98–111)
CLARITY: CLEAR
CO2: 22 MMOL/L (ref 22–29)
CO2: 23 MMOL/L (ref 22–29)
COLOR: YELLOW
CREAT SERPL-MCNC: 1.9 MG/DL (ref 0.5–1.2)
CREAT SERPL-MCNC: 1.9 MG/DL (ref 0.5–1.2)
CREATININE URINE: 23.3 MG/DL (ref 4.2–622)
EOSINOPHILS ABSOLUTE: 0.3 K/UL (ref 0–0.6)
EOSINOPHILS RELATIVE PERCENT: 6.1 % (ref 0–5)
GFR NON-AFRICAN AMERICAN: 35
GFR NON-AFRICAN AMERICAN: 35
GLUCOSE BLD-MCNC: 100 MG/DL (ref 74–109)
GLUCOSE BLD-MCNC: 99 MG/DL (ref 74–109)
GLUCOSE URINE: NEGATIVE MG/DL
HCT VFR BLD CALC: 39.1 % (ref 42–52)
HEMOGLOBIN: 12.3 G/DL (ref 14–18)
KETONES, URINE: NEGATIVE MG/DL
LEUKOCYTE ESTERASE, URINE: NEGATIVE
LYMPHOCYTES ABSOLUTE: 1.1 K/UL (ref 1.1–4.5)
LYMPHOCYTES RELATIVE PERCENT: 19.5 % (ref 20–40)
MAGNESIUM: 2.2 MG/DL (ref 1.6–2.4)
MCH RBC QN AUTO: 28.4 PG (ref 27–31)
MCHC RBC AUTO-ENTMCNC: 31.5 G/DL (ref 33–37)
MCV RBC AUTO: 90.3 FL (ref 80–94)
MONOCYTES ABSOLUTE: 0.6 K/UL (ref 0–0.9)
MONOCYTES RELATIVE PERCENT: 11 % (ref 0–10)
NEUTROPHILS ABSOLUTE: 3.4 K/UL (ref 1.5–7.5)
NEUTROPHILS RELATIVE PERCENT: 62.1 % (ref 50–65)
NITRITE, URINE: NEGATIVE
PARATHYROID HORMONE INTACT: 49.3 PG/ML (ref 15–65)
PDW BLD-RTO: 13.8 % (ref 11.5–14.5)
PH UA: 6.5 (ref 5–8)
PHOSPHORUS: 3.2 MG/DL (ref 2.5–4.5)
PLATELET # BLD: 150 K/UL (ref 130–400)
PMV BLD AUTO: 11.8 FL (ref 9.4–12.4)
POTASSIUM SERPL-SCNC: 4.8 MMOL/L (ref 3.5–5)
POTASSIUM SERPL-SCNC: 4.8 MMOL/L (ref 3.5–5)
PROTEIN PROTEIN: <4 MG/DL (ref 15–45)
PROTEIN UA: NEGATIVE MG/DL
RBC # BLD: 4.33 M/UL (ref 4.7–6.1)
SODIUM BLD-SCNC: 146 MMOL/L (ref 136–145)
SODIUM BLD-SCNC: 147 MMOL/L (ref 136–145)
SPECIFIC GRAVITY UA: 1 (ref 1–1.03)
TOTAL PROTEIN: 7.7 G/DL (ref 6.6–8.7)
URIC ACID, SERUM: 7.5 MG/DL (ref 3.4–7)
UROBILINOGEN, URINE: 0.2 E.U./DL
WBC # BLD: 5.5 K/UL (ref 4.8–10.8)

## 2019-05-02 ENCOUNTER — TELEPHONE (OUTPATIENT)
Dept: PRIMARY CARE CLINIC | Age: 69
End: 2019-05-02

## 2019-05-02 NOTE — TELEPHONE ENCOUNTER
Result Notes for Comprehensive Metabolic Panel     Notes recorded by MISSY Vieira on 5/1/2019 at 6:35 PM CDT  Results are abnormal. Creat is improving!  GFR is improving! [employer] Contacted and informed pt of results above. Pt verbalized understanding.  PP, LPN

## 2019-05-16 ENCOUNTER — OFFICE VISIT (OUTPATIENT)
Dept: PRIMARY CARE CLINIC | Age: 69
End: 2019-05-16
Payer: MEDICARE

## 2019-05-16 VITALS
HEART RATE: 61 BPM | BODY MASS INDEX: 33.46 KG/M2 | DIASTOLIC BLOOD PRESSURE: 82 MMHG | TEMPERATURE: 97.1 F | WEIGHT: 239 LBS | SYSTOLIC BLOOD PRESSURE: 126 MMHG | OXYGEN SATURATION: 98 % | HEIGHT: 71 IN

## 2019-05-16 DIAGNOSIS — E11.8 TYPE 2 DIABETES MELLITUS WITH COMPLICATION, WITHOUT LONG-TERM CURRENT USE OF INSULIN (HCC): ICD-10-CM

## 2019-05-16 DIAGNOSIS — E79.0 ELEVATED BLOOD URIC ACID LEVEL: ICD-10-CM

## 2019-05-16 DIAGNOSIS — N28.89 RENAL MASS: ICD-10-CM

## 2019-05-16 DIAGNOSIS — Z90.5 S/P NEPHRECTOMY: ICD-10-CM

## 2019-05-16 DIAGNOSIS — Z12.5 ENCOUNTER FOR SCREENING FOR MALIGNANT NEOPLASM OF PROSTATE: ICD-10-CM

## 2019-05-16 DIAGNOSIS — R53.83 OTHER FATIGUE: ICD-10-CM

## 2019-05-16 DIAGNOSIS — M19.90 ARTHRITIS: ICD-10-CM

## 2019-05-16 DIAGNOSIS — I10 HYPERTENSION, ESSENTIAL, BENIGN: Primary | ICD-10-CM

## 2019-05-16 DIAGNOSIS — R94.4 DECREASED GFR: ICD-10-CM

## 2019-05-16 DIAGNOSIS — M10.9 GOUT, UNSPECIFIED CAUSE, UNSPECIFIED CHRONICITY, UNSPECIFIED SITE: ICD-10-CM

## 2019-05-16 DIAGNOSIS — I25.10 CORONARY ARTERY DISEASE INVOLVING NATIVE CORONARY ARTERY OF NATIVE HEART WITHOUT ANGINA PECTORIS: ICD-10-CM

## 2019-05-16 DIAGNOSIS — N18.30 CHRONIC KIDNEY DISEASE, STAGE III (MODERATE) (HCC): ICD-10-CM

## 2019-05-16 DIAGNOSIS — E78.00 HYPERCHOLESTEREMIA: ICD-10-CM

## 2019-05-16 PROCEDURE — 3044F HG A1C LEVEL LT 7.0%: CPT | Performed by: NURSE PRACTITIONER

## 2019-05-16 PROCEDURE — 3017F COLORECTAL CA SCREEN DOC REV: CPT | Performed by: NURSE PRACTITIONER

## 2019-05-16 PROCEDURE — 1123F ACP DISCUSS/DSCN MKR DOCD: CPT | Performed by: NURSE PRACTITIONER

## 2019-05-16 PROCEDURE — 99214 OFFICE O/P EST MOD 30 MIN: CPT | Performed by: NURSE PRACTITIONER

## 2019-05-16 PROCEDURE — 1036F TOBACCO NON-USER: CPT | Performed by: NURSE PRACTITIONER

## 2019-05-16 PROCEDURE — G8598 ASA/ANTIPLAT THER USED: HCPCS | Performed by: NURSE PRACTITIONER

## 2019-05-16 PROCEDURE — 2022F DILAT RTA XM EVC RTNOPTHY: CPT | Performed by: NURSE PRACTITIONER

## 2019-05-16 PROCEDURE — G8417 CALC BMI ABV UP PARAM F/U: HCPCS | Performed by: NURSE PRACTITIONER

## 2019-05-16 PROCEDURE — 4040F PNEUMOC VAC/ADMIN/RCVD: CPT | Performed by: NURSE PRACTITIONER

## 2019-05-16 PROCEDURE — G8427 DOCREV CUR MEDS BY ELIG CLIN: HCPCS | Performed by: NURSE PRACTITIONER

## 2019-05-16 RX ORDER — ALLOPURINOL 100 MG/1
100 TABLET ORAL 2 TIMES DAILY
Qty: 30 TABLET | Refills: 2 | Status: SHIPPED | OUTPATIENT
Start: 2019-05-16 | End: 2020-05-20

## 2019-05-16 RX ORDER — ATORVASTATIN CALCIUM 20 MG/1
20 TABLET, FILM COATED ORAL DAILY
Qty: 90 TABLET | Refills: 2 | Status: SHIPPED | OUTPATIENT
Start: 2019-05-16 | End: 2020-01-22 | Stop reason: SDUPTHER

## 2019-05-16 RX ORDER — METFORMIN HYDROCHLORIDE 500 MG/1
500 TABLET, EXTENDED RELEASE ORAL
Qty: 30 TABLET | Refills: 2 | Status: SHIPPED | OUTPATIENT
Start: 2019-05-16 | End: 2019-10-22 | Stop reason: ALTCHOICE

## 2019-05-16 RX ORDER — LISINOPRIL 10 MG/1
10 TABLET ORAL 2 TIMES DAILY
Qty: 60 TABLET | Refills: 2 | Status: SHIPPED | OUTPATIENT
Start: 2019-05-16 | End: 2019-08-22 | Stop reason: SDUPTHER

## 2019-05-16 RX ORDER — NEBIVOLOL 10 MG/1
10 TABLET ORAL EVERY EVENING
Qty: 30 TABLET | Refills: 2 | Status: SHIPPED | OUTPATIENT
Start: 2019-05-16 | End: 2020-01-22 | Stop reason: SDUPTHER

## 2019-05-16 RX ORDER — NIFEDIPINE 60 MG/1
60 TABLET, EXTENDED RELEASE ORAL EVERY EVENING
Qty: 30 TABLET | Refills: 2 | Status: SHIPPED | OUTPATIENT
Start: 2019-05-16 | End: 2019-08-22 | Stop reason: SDUPTHER

## 2019-05-16 NOTE — PROGRESS NOTES
[facility] PRIMARY CARE  [address] [place] [address]  [id number]  [id number]  Dept: [phone]  Dept Fax: [phone]  Loc: [phone]        Luis Arrieta is a 71 y.o. male who presents today for his medical conditions/ complaints as noted below. Luis Arrieta is c/o Hypertension (1 month follow up medication recheck)        Chief Complaint   Patient presents with    Hypertension     1 month follow up medication recheck       HPI:     HPI    Vitals 5/16/2019 1/61/3623   SYSTOLIC 571 090   DIASTOLIC 82 70   Pulse 61 64   Temp 97.1    Resp     SpO2 98    Weight 239 lb 234 lb       Uric acid level elevated per Dr. Abraham López check and increased allopurinol to twice per day. Component      Latest Ref Rng & Units 4/30/2019 3/15/2019          10:24 AM  8:04 AM   Sodium      136 - 145 mmol/L 146 (H) 145   Potassium      3.5 - 5.0 mmol/L 4.8 4.8   Chloride      98 - 111 mmol/L 105 106   CO2      22 - 29 mmol/L 22 25   Anion Gap      7 - 19 mmol/L 19 14   Glucose      74 - 109 mg/dL 100 123 (H)   BUN      8 - 23 mg/dL 28 (H) 32 (H)   Creatinine      0.5 - 1.2 mg/dL 1.9 (H) 2.1 (H)   GFR Non-      >60 35 (A) 31 (A)   Calcium      8.8 - 10.2 mg/dL 9.7 9.5   Total Protein      6.6 - 8.7 g/dL 7.7 7.8   Albumin      3.5 - 5.2 g/dL 4.5 4.5   Bilirubin      0.2 - 1.2 mg/dL 0.6 0.3   Alk Phos      40 - 130 U/L 73 78   ALT      5 - 41 U/L 10 14   AST      5 - 40 U/L 14 14       Component      Latest Ref Rng & Units 3/15/2019 10/5/2018 5/11/2018           8:04 AM  9:55 AM 10:30 AM   Hemoglobin A1C      4.0 - 6.0 % 6.1 (H) 6.0 6.4       Patient reports that they have been compliant with taking medications as directed.      Past Medical History:   Diagnosis Date    CAD (coronary artery disease), native coronary artery     Class 2 obesity due to excess calories in adult 2/1/2018    GERD (gastroesophageal reflux disease)     Hypercholesteremia     Hypertension        Past Surgical History:   Procedure Laterality Date    CARDIAC CATHETERIZATION  07, [facility]    Left heart cath    CARDIAC CATHETERIZATION  07, [facility]    Left heart cath    CARDIOVASCULAR STRESS TEST  2009, [facility]    Stress Echo    CARDIOVASCULAR STRESS TEST  07, [facility]    Stress Echo    COLONOSCOPY  10/04/2018    Dr KASSY Solorio-Diverticular disease, 10 yr recall    CORONARY ARTERY BYPASS GRAFT      X4 utilizing the left MOLINA to the LAD, right MOLINA to the second diagonal, and vein grafts to the obtuse marinal and PLOM.  CORONARY ARTERY BYPASS GRAFT  08, [facility] CABG placing the LIMA to the LAD, MIGUELANGEL to the second diagonal branch and SVBG to the OMB and PMB.     KIDNEY REMOVAL Right     KIDNEY SURGERY      Right Kidney removed    [place] COLONOSCOPY FLX DX W/COLLJ SPEC WHEN PFRMD N/A 10/4/2018    COLONOSCOPY performed by Jovanni Jones MD at [place] [address] History     Socioeconomic History    Marital status:      Spouse name: None    Number of children: None    Years of education: None    Highest education level: None   Occupational History    None   Social Needs    Financial resource strain: None    Food insecurity:     Worry: None     Inability: None    Transportation needs:     Medical: None     Non-medical: None   Tobacco Use    Smoking status: Former Smoker     Packs/day: 2.00     Years: 32.00     Pack years: 64.00     Start date:      Last attempt to quit: 10/5/1998     Years since quittin.6    Smokeless tobacco: Former User   Substance and Sexual Activity    Alcohol use: Yes     Comment: Very Little    Drug use: Never    Sexual activity: None   Lifestyle    Physical activity:     Days per week: None     Minutes per session: None    Stress: None   Relationships    Social connections:     Talks on phone: None     Gets together: None     Attends Taoist service: None     Active member of club or organization: None     Attends meetings of clubs or 04/30/2019 Negative    Orders Only on 04/30/2019   Component Date Value    Uric Acid, Serum 04/30/2019 7.5*   Orders Only on 04/30/2019   Component Date Value    Magnesium 04/30/2019 2.2    Orders Only on 04/30/2019   Component Date Value    Sodium 04/30/2019 147*    Potassium 04/30/2019 4.8     Chloride 04/30/2019 106     CO2 04/30/2019 23     Anion Gap 04/30/2019 18     Glucose 04/30/2019 99     BUN 04/30/2019 27*    CREATININE 04/30/2019 1.9*    GFR Non- 04/30/2019 35*    Calcium 04/30/2019 9.9     Phosphorus 04/30/2019 3.2     Alb 04/30/2019 4.4    Orders Only on 04/30/2019   Component Date Value    PTH 04/30/2019 49.3    Orders Only on 04/30/2019   Component Date Value    WBC 04/30/2019 5.5     RBC 04/30/2019 4.33*    Hemoglobin 04/30/2019 12.3*    Hematocrit 04/30/2019 39.1*    MCV 04/30/2019 90.3     MCH 04/30/2019 28.4     MCHC 04/30/2019 31.5*    RDW 04/30/2019 13.8     Platelets 55/42/7165 150     MPV 04/30/2019 11.8     Neutrophils % 04/30/2019 62.1     Lymphocytes % 04/30/2019 19.5*    Monocytes % 04/30/2019 11.0*    Eosinophils % 04/30/2019 6.1*    Basophils % 04/30/2019 1.1*    Neutrophils # 04/30/2019 3.4     Lymphocytes # 04/30/2019 1.1     Monocytes # 04/30/2019 0.60     Eosinophils # 04/30/2019 0.30     Basophils # 04/30/2019 0.10    Orders Only on 04/30/2019   Component Date Value    Sodium 04/30/2019 146*    Potassium 04/30/2019 4.8     Chloride 04/30/2019 105     CO2 04/30/2019 22     Anion Gap 04/30/2019 19     Glucose 04/30/2019 100     BUN 04/30/2019 28*    CREATININE 04/30/2019 1.9*    GFR Non- 04/30/2019 35*    Calcium 04/30/2019 9.7     Total Protein 04/30/2019 7.7     Alb 04/30/2019 4.5     Total Bilirubin 04/30/2019 0.6     Alkaline Phosphatase 04/30/2019 73     ALT 04/30/2019 10     AST 42/41/0358 14      notapplicable    Subjective:   Review of Systems   Constitutional: Negative for activity change, appetite change, fatigue, fever and unexpected weight change. HENT: Negative for congestion, ear pain, nosebleeds, rhinorrhea, sore throat, trouble swallowing and voice change. Eyes: Negative for redness and visual disturbance. Respiratory: Negative for cough, chest tightness, shortness of breath and wheezing. Cardiovascular: Negative for chest pain, palpitations and leg swelling. Gastrointestinal: Negative for abdominal pain, blood in stool, constipation, diarrhea, nausea and vomiting. Endocrine: Negative for polydipsia, polyphagia and polyuria. Genitourinary: Negative for dysuria, frequency and urgency. Decreased sex drive   Musculoskeletal: Negative for arthralgias and myalgias. Skin: Negative for rash and wound. Neurological: Negative for dizziness, speech difficulty, light-headedness and headaches. Psychiatric/Behavioral: Negative for agitation, confusion, self-injury and suicidal ideas. The patient is not nervous/anxious. Objective:     Physical Exam   Constitutional: He is oriented to person, place, and time. He appears well-developed and well-nourished. No distress. HENT:   Head: Normocephalic and atraumatic. Right Ear: External ear normal.   Left Ear: External ear normal.   Nose: Nose normal.   Mouth/Throat: Oropharynx is clear and moist. No oropharyngeal exudate. Eyes: Pupils are equal, round, and reactive to light. Conjunctivae are normal. Right eye exhibits no discharge. Left eye exhibits no discharge. Neck: Normal range of motion. Neck supple. Cardiovascular: Regular rhythm, normal heart sounds and intact distal pulses. Bradycardia present. No murmur heard. Pulmonary/Chest: Effort normal and breath sounds normal. No stridor. No respiratory distress. He has no wheezes. He has no rales. He exhibits no tenderness. Right breast exhibits no inverted nipple, no mass, no nipple discharge, no skin change and no tenderness.  Left breast exhibits no inverted nipple, no mass, no nipple discharge, no skin change and no tenderness. Abdominal: Soft. Bowel sounds are normal. He exhibits no distension. There is no tenderness. Musculoskeletal: Normal range of motion. He exhibits no edema, tenderness or deformity. Neurological: He is alert and oriented to person, place, and time. He has normal reflexes. No cranial nerve deficit. Coordination normal.   Skin: Skin is warm and dry. No rash noted. He is not diaphoretic. No erythema. Psychiatric: He has a normal mood and affect. His behavior is normal. Thought content normal.   Nursing note and vitals reviewed. /82   Pulse 61   Temp 97.1 °F (36.2 °C) (Temporal)   Ht 5' 11\" (1.803 m)   Wt 239 lb (108.4 kg)   SpO2 98%   BMI 33.33 kg/m²     Assessment:      Diagnosis Orders   1. Hypertension, essential, benign  NIFEdipine (PROCARDIA XL) 60 MG extended release tablet    nebivolol (BYSTOLIC) 10 MG tablet    atorvastatin (LIPITOR) 20 MG tablet    lisinopril (PRINIVIL;ZESTRIL) 10 MG tablet    CBC Auto Differential    Comprehensive Metabolic Panel    Urinalysis   2. Type 2 diabetes mellitus with complication, without long-term current use of insulin (HCC)  metFORMIN (GLUCOPHAGE-XR) 500 MG extended release tablet    lisinopril (PRINIVIL;ZESTRIL) 10 MG tablet    Hemoglobin A1C    Lipid Panel    Urinalysis   3. Gout, unspecified cause, unspecified chronicity, unspecified site  allopurinol (ZYLOPRIM) 100 MG tablet   4. S/p nephrectomy     5. Decreased GFR  lisinopril (PRINIVIL;ZESTRIL) 10 MG tablet   6. Hypercholesteremia  atorvastatin (LIPITOR) 20 MG tablet   7. Coronary artery disease involving native coronary artery of native heart without angina pectoris  atorvastatin (LIPITOR) 20 MG tablet   8. Renal mass  NIFEdipine (PROCARDIA XL) 60 MG extended release tablet   9. Elevated blood uric acid level  allopurinol (ZYLOPRIM) 100 MG tablet   10.  Other fatigue  Vitamin B12    Testosterone    Psa screening    TSH without Reflex    Vitamin D 25 Hydroxy   11. Arthritis  C-Reactive Protein    Vitamin D 25 Hydroxy   12. Encounter for screening for malignant neoplasm of prostate   Psa screening   13. Chronic kidney disease, stage III (moderate) (HCC)   Vitamin D 25 Hydroxy     No results found for this visit on 05/16/19. Plan:     1. Hypertension, essential, benign    2. Type 2 diabetes mellitus with complication, without long-term current use of insulin ([facility] [address]    3. Gout, unspecified cause, unspecified chronicity, unspecified site    4. S/p nephrectomy    5. Decreased GFR    6. Hypercholesteremia    7. Coronary artery disease involving native coronary artery of native heart without angina pectoris    8. Renal mass    9. Elevated blood uric acid level    10. Other fatigue    11. Arthritis    12. Encounter for screening for malignant neoplasm of prostate     13. Chronic kidney disease, stage III (moderate) (HCC)       Return in about 3 months (around 8/16/2019).   Orders Placed This Encounter   Procedures    CBC Auto Differential     Standing Status:   Future     Standing Expiration Date:   5/15/2020    Comprehensive Metabolic Panel     Standing Status:   Future     Standing Expiration Date:   5/15/2020    Hemoglobin A1C     Standing Status:   Future     Standing Expiration Date:   5/15/2020    Lipid Panel     Standing Status:   Future     Standing Expiration Date:   5/15/2020     Order Specific Question:   Is Patient Fasting?/# of Hours     Answer:   fasting    Urinalysis     Standing Status:   Future     Standing Expiration Date:   5/15/2020    Vitamin B12     Standing Status:   Future     Standing Expiration Date:   5/15/2020    Testosterone     Standing Status:   Future     Standing Expiration Date:   6/19/2020    Psa screening     Standing Status:   Future     Standing Expiration Date:   6/19/2020    C-Reactive Protein     Standing Status:   Future     Standing Expiration Date:   5/15/2020    TSH without Reflex Standing Status:   Future     Standing Expiration Date:   5/15/2020    Vitamin D 25 Hydroxy     Standing Status:   Future     Standing Expiration Date:   5/16/2020     Orders Placed This Encounter   Medications    NIFEdipine (PROCARDIA XL) 60 MG extended release tablet     Sig: Take 1 tablet by mouth every evening     Dispense:  30 tablet     Refill:  2    nebivolol (BYSTOLIC) 10 MG tablet     Sig: Take 1 tablet by mouth every evening     Dispense:  30 tablet     Refill:  2    metFORMIN (GLUCOPHAGE-XR) 500 MG extended release tablet     Sig: Take 1 tablet by mouth daily (with breakfast)     Dispense:  30 tablet     Refill:  2    atorvastatin (LIPITOR) 20 MG tablet     Sig: Take 1 tablet by mouth daily     Dispense:  90 tablet     Refill:  2    allopurinol (ZYLOPRIM) 100 MG tablet     Sig: Take 1 tablet by mouth 2 times daily     Dispense:  30 tablet     Refill:  2    lisinopril (PRINIVIL;ZESTRIL) 10 MG tablet     Sig: Take 1 tablet by mouth 2 times daily     Dispense:  60 tablet     Refill:  2       Patient Instructions     Have testosterone checked at your convince    Have labs checked for DM and kidney before return in 3 months. ASA 81 EC at bedtime. Please consider SNRI therapy for chronic aches and pains, and fatigue. Patient Education        Gout: Care Instructions  Your Care Instructions    Gout is a form of arthritis caused by a buildup of uric acid crystals in a joint. It causes sudden attacks of pain, swelling, redness, and stiffness, usually in one joint, especially the big toe. Gout usually comes on without a cause. But it can be brought on by drinking alcohol (especially beer) or eating seafood and red meat. Taking certain medicines, such as diuretics or aspirin, also can bring on an attack of gout. Taking your medicines as prescribed and following up with your doctor regularly can help you avoid gout attacks in the future. Follow-up care is a key part of your treatment and safety. Be sure to make and go to all appointments, and call your doctor if you are having problems. It's also a good idea to know your test results and keep a list of the medicines you take. How can you care for yourself at home? · If the joint is swollen, put ice or a cold pack on the area for 10 to 20 minutes at a time. Put a thin cloth between the ice and your skin. · Prop up the sore limb on a pillow when you ice it or anytime you sit or lie down during the next 3 days. Try to keep it above the level of your heart. This will help reduce swelling. · Rest sore joints. Avoid activities that put weight or strain on the joints for a few days. Take short rest breaks from your regular activities during the day. · Take your medicines exactly as prescribed. Call your doctor if you think you are having a problem with your medicine. · Take pain medicines exactly as directed. ? If the doctor gave you a prescription medicine for pain, take it as prescribed. ? If you are not taking a prescription pain medicine, ask your doctor if you can take an over-the-counter medicine. · Eat less seafood and red meat. · Check with your doctor before drinking alcohol. · Losing weight, if you are overweight, may help reduce attacks of gout. But do not go on a [employer]. \" Losing a lot of weight in a short amount of time can cause a gout attack. When should you call for help? Call your doctor now or seek immediate medical care if:    · You have a fever.     · The joint is so painful you cannot use it.     · You have sudden, unexplained swelling, redness, warmth, or severe pain in one or more joints.    Watch closely for changes in your health, and be sure to contact your doctor if:    · You have joint pain.     · Your symptoms get worse or are not improving after 2 or 3 days. Where can you learn more? Go to https://chclaudiaeb.[employer]. org and sign in to your [employer] account.  Enter I575 in the [employer] box to learn more about \"Gout: Care Instructions. \"     If you do not have an account, please click on the \"Sign Up Now\" link. Current as of: Maisha 10, 2018  Content Version: 12.0  © 9289-1121 [employer]. Care instructions adapted under license by [facility] ([facility]). If you have questions about a medical condition or this instruction, always ask your healthcare professional. [address] any warranty or liability for your use of this information. Patient Education        Purine-Restricted Diet: Care Instructions  Your Care Instructions    Purines are substances that are found in some foods. Your body turns purines into uric acid. High levels of uric acid can cause gout, which is a form of arthritis that causes pain and inflammation in joints. You may be able to help control the amount of uric acid in your body by limiting high-purine foods in your diet. Follow-up care is a key part of your treatment and safety. Be sure to make and go to all appointments, and call your doctor if you are having problems. It's also a good idea to know your test results and keep a list of the medicines you take. How can you care for yourself at home? · Plan your meals and snacks around foods that are low in purines and are safe for you to eat. These foods include:  ? Green vegetables and tomatoes. ? Fruits. ? Whole-grain breads, rice, and cereals. ? Eggs, peanut butter, and nuts. ? Low-fat milk, cheese, and other milk products. ? Popcorn. ? Gelatin desserts, chocolate, cocoa, and cakes and sweets, in small amounts. · You can eat certain foods that are medium-high in purines, but eat them only once in a while. These foods include:  ? Legumes, such as dried beans and dried peas. You can have 1 cup cooked legumes each day. ? Asparagus, cauliflower, spinach, mushrooms, and green peas. ? Fish and seafood (other than very high-purine seafood). ? Oatmeal, wheat bran, and wheat germ.   · Limit very high-purine foods, including:  ? Organ meats, such as liver, kidneys, sweetbreads, and brains. ? Meats, including maxwell, beef, pork, and lamb. ? Game meats and any other meats in large amounts. ? Anchovies, sardines, herring, mackerel, and scallops. ? Gravy. ? Beer. Where can you learn more? Go to https://NanoradiopeAlgEvolveeb.[employer]. org and sign in to your [employer] account. Enter F448 in the [facility] box to learn more about \"Purine-Restricted Diet: Care Instructions. \"     If you do not have an account, please click on the \"Sign Up Now\" link. Current as of: November 7, 2018  Content Version: 12.0  © 9357-1409 [employer], [employer]. Care instructions adapted under license by [facility] ([facility]). If you have questions about a medical condition or this instruction, always ask your healthcare professional. [place] [address] any warranty or liability for your use of this information. Patient/family given educational materials - see patient instructions. Discussed use, benefit, and side effects of prescribed medications. All patient/family questions answered and voiced understanding. Instructed to continue current medications, diet and exercise. Pt/family agreed with treatment plan. Follow up as directed and sooner if needed. Patient/ family instructed that is symptoms worsen or persist they are to contact office or report to nearest ER. They voice understanding and agreement with this plan.      Electronically signed by MISSY Alvarez on 5/20/2019 at 9:03 AM

## 2019-05-16 NOTE — PATIENT INSTRUCTIONS
Have testosterone checked at your convince    Have labs checked for DM and kidney before return in 3 months. ASA 81 EC at bedtime. Please consider SNRI therapy for chronic aches and pains, and fatigue. Patient Education        Gout: Care Instructions  Your Care Instructions    Gout is a form of arthritis caused by a buildup of uric acid crystals in a joint. It causes sudden attacks of pain, swelling, redness, and stiffness, usually in one joint, especially the big toe. Gout usually comes on without a cause. But it can be brought on by drinking alcohol (especially beer) or eating seafood and red meat. Taking certain medicines, such as diuretics or aspirin, also can bring on an attack of gout. Taking your medicines as prescribed and following up with your doctor regularly can help you avoid gout attacks in the future. Follow-up care is a key part of your treatment and safety. Be sure to make and go to all appointments, and call your doctor if you are having problems. It's also a good idea to know your test results and keep a list of the medicines you take. How can you care for yourself at home? · If the joint is swollen, put ice or a cold pack on the area for 10 to 20 minutes at a time. Put a thin cloth between the ice and your skin. · Prop up the sore limb on a pillow when you ice it or anytime you sit or lie down during the next 3 days. Try to keep it above the level of your heart. This will help reduce swelling. · Rest sore joints. Avoid activities that put weight or strain on the joints for a few days. Take short rest breaks from your regular activities during the day. · Take your medicines exactly as prescribed. Call your doctor if you think you are having a problem with your medicine. · Take pain medicines exactly as directed. ? If the doctor gave you a prescription medicine for pain, take it as prescribed.   ? If you are not taking a prescription pain medicine, ask your doctor if you can take an over-the-counter medicine. · Eat less seafood and red meat. · Check with your doctor before drinking alcohol. · Losing weight, if you are overweight, may help reduce attacks of gout. But do not go on a [employer]. \" Losing a lot of weight in a short amount of time can cause a gout attack. When should you call for help? Call your doctor now or seek immediate medical care if:    · You have a fever.     · The joint is so painful you cannot use it.     · You have sudden, unexplained swelling, redness, warmth, or severe pain in one or more joints.    Watch closely for changes in your health, and be sure to contact your doctor if:    · You have joint pain.     · Your symptoms get worse or are not improving after 2 or 3 days. Where can you learn more? Go to https://General BloodpeReadWorksdominiceb.Skigit. org and sign in to your [employer] account. Enter Q148 in the Aqu[employer] Access box to learn more about \"Gout: Care Instructions. \"     If you do not have an account, please click on the \"Sign Up Now\" link. Current as of: Maisha 10, 2018  Content Version: 12.0  © 8046-3967 [employer]. Care instructions adapted under license by [facility] ([facility]). If you have questions about a medical condition or this instruction, always ask your healthcare professional. [address] any warranty or liability for your use of this information. Patient Education        Purine-Restricted Diet: Care Instructions  Your Care Instructions    Purines are substances that are found in some foods. Your body turns purines into uric acid. High levels of uric acid can cause gout, which is a form of arthritis that causes pain and inflammation in joints. You may be able to help control the amount of uric acid in your body by limiting high-purine foods in your diet. Follow-up care is a key part of your treatment and safety.  Be sure to make and go to all appointments, and call your doctor if you are having problems. It's also a good idea to know your test results and keep a list of the medicines you take. How can you care for yourself at home? · Plan your meals and snacks around foods that are low in purines and are safe for you to eat. These foods include:  ? Green vegetables and tomatoes. ? Fruits. ? Whole-grain breads, rice, and cereals. ? Eggs, peanut butter, and nuts. ? Low-fat milk, cheese, and other milk products. ? Popcorn. ? Gelatin desserts, chocolate, cocoa, and cakes and sweets, in small amounts. · You can eat certain foods that are medium-high in purines, but eat them only once in a while. These foods include:  ? Legumes, such as dried beans and dried peas. You can have 1 cup cooked legumes each day. ? Asparagus, cauliflower, spinach, mushrooms, and green peas. ? Fish and seafood (other than very high-purine seafood). ? Oatmeal, wheat bran, and wheat germ. · Limit very high-purine foods, including:  ? Organ meats, such as liver, kidneys, sweetbreads, and brains. ? Meats, including maxwell, beef, pork, and lamb. ? Game meats and any other meats in large amounts. ? Anchovies, sardines, herring, mackerel, and scallops. ? Gravy. ? Beer. Where can you learn more? Go to https://Practice Ignition.Exavio. org and sign in to your [employer] account. Enter F448 in the [facility] box to learn more about \"Purine-Restricted Diet: Care Instructions. \"     If you do not have an account, please click on the \"Sign Up Now\" link. Current as of: November 7, 2018  Content Version: 12.0  © 6098-0853 [employer]. Care instructions adapted under license by [facility] ([facility]). If you have questions about a medical condition or this instruction, always ask your healthcare professional. [place] [address] any warranty or liability for your use of this information.

## 2019-05-20 PROBLEM — N18.30 CHRONIC KIDNEY DISEASE, STAGE III (MODERATE) (HCC): Status: ACTIVE | Noted: 2018-05-11

## 2019-05-20 ASSESSMENT — ENCOUNTER SYMPTOMS
SHORTNESS OF BREATH: 0
EYE REDNESS: 0
CHEST TIGHTNESS: 0
ABDOMINAL PAIN: 0
TROUBLE SWALLOWING: 0
NAUSEA: 0
WHEEZING: 0
VOMITING: 0
VOICE CHANGE: 0
CONSTIPATION: 0
COUGH: 0
RHINORRHEA: 0
BLOOD IN STOOL: 0
SORE THROAT: 0
DIARRHEA: 0

## 2019-05-29 DIAGNOSIS — Z12.5 ENCOUNTER FOR SCREENING FOR MALIGNANT NEOPLASM OF PROSTATE: ICD-10-CM

## 2019-05-29 DIAGNOSIS — R53.83 OTHER FATIGUE: ICD-10-CM

## 2019-05-29 DIAGNOSIS — N18.30 CHRONIC KIDNEY DISEASE, STAGE III (MODERATE) (HCC): ICD-10-CM

## 2019-05-29 DIAGNOSIS — M19.90 ARTHRITIS: ICD-10-CM

## 2019-05-29 LAB
PROSTATE SPECIFIC ANTIGEN: 1.17 NG/ML (ref 0–4)
TESTOSTERONE TOTAL: 328.7 NG/DL (ref 193–740)
VITAMIN D 25-HYDROXY: 48.5 NG/ML

## 2019-06-03 ENCOUNTER — HOSPITAL ENCOUNTER (OUTPATIENT)
Dept: CT IMAGING | Age: 69
Discharge: HOME | End: 2019-06-03
Payer: MEDICARE

## 2019-06-03 DIAGNOSIS — C64.9 MALIGNANT NEOPLASM OF KIDNEY EXCLUDING RENAL PELVIS, UNSPECIFIED LATERALITY (HCC): ICD-10-CM

## 2019-06-03 DIAGNOSIS — C64.9 MALIGNANT NEOPLASM OF KIDNEY, UNSPECIFIED LATERALITY (HCC): ICD-10-CM

## 2019-06-03 PROCEDURE — 71250 CT THORAX DX C-: CPT

## 2019-06-10 ENCOUNTER — HOSPITAL ENCOUNTER (OUTPATIENT)
Dept: INFUSION THERAPY | Age: 69
Discharge: HOME | End: 2019-06-10
Payer: MEDICARE

## 2019-06-10 PROCEDURE — 85025 COMPLETE CBC W/AUTO DIFF WBC: CPT

## 2019-06-13 ENCOUNTER — HOSPITAL ENCOUNTER (OUTPATIENT)
Dept: CT IMAGING | Age: 69
Discharge: HOME | End: 2019-06-13
Payer: MEDICARE

## 2019-06-13 DIAGNOSIS — C64.9 MALIGNANT NEOPLASM OF KIDNEY, UNSPECIFIED LATERALITY (HCC): ICD-10-CM

## 2019-06-13 PROCEDURE — 74176 CT ABD & PELVIS W/O CONTRAST: CPT

## 2019-08-22 ENCOUNTER — OFFICE VISIT (OUTPATIENT)
Dept: PRIMARY CARE CLINIC | Age: 69
End: 2019-08-22
Payer: MEDICARE

## 2019-08-22 VITALS
HEIGHT: 70 IN | HEART RATE: 51 BPM | BODY MASS INDEX: 34.07 KG/M2 | TEMPERATURE: 97.4 F | SYSTOLIC BLOOD PRESSURE: 128 MMHG | DIASTOLIC BLOOD PRESSURE: 78 MMHG | OXYGEN SATURATION: 98 % | WEIGHT: 238 LBS

## 2019-08-22 DIAGNOSIS — M19.90 ARTHRITIS: ICD-10-CM

## 2019-08-22 DIAGNOSIS — E78.00 HYPERCHOLESTEREMIA: ICD-10-CM

## 2019-08-22 DIAGNOSIS — I10 HYPERTENSION, ESSENTIAL, BENIGN: ICD-10-CM

## 2019-08-22 DIAGNOSIS — M10.9 GOUT, UNSPECIFIED CAUSE, UNSPECIFIED CHRONICITY, UNSPECIFIED SITE: ICD-10-CM

## 2019-08-22 DIAGNOSIS — E66.09 CLASS 2 OBESITY DUE TO EXCESS CALORIES WITHOUT SERIOUS COMORBIDITY WITH BODY MASS INDEX (BMI) OF 37.0 TO 37.9 IN ADULT: ICD-10-CM

## 2019-08-22 DIAGNOSIS — Z23 NEED FOR PROPHYLACTIC VACCINATION AGAINST STREPTOCOCCUS PNEUMONIAE (PNEUMOCOCCUS): ICD-10-CM

## 2019-08-22 DIAGNOSIS — I25.10 CORONARY ARTERY DISEASE INVOLVING NATIVE CORONARY ARTERY OF NATIVE HEART WITHOUT ANGINA PECTORIS: ICD-10-CM

## 2019-08-22 DIAGNOSIS — R53.83 OTHER FATIGUE: ICD-10-CM

## 2019-08-22 DIAGNOSIS — K21.9 GASTROESOPHAGEAL REFLUX DISEASE WITHOUT ESOPHAGITIS: ICD-10-CM

## 2019-08-22 DIAGNOSIS — R94.4 DECREASED GFR: ICD-10-CM

## 2019-08-22 DIAGNOSIS — E11.8 TYPE 2 DIABETES MELLITUS WITH COMPLICATION, WITHOUT LONG-TERM CURRENT USE OF INSULIN (HCC): ICD-10-CM

## 2019-08-22 DIAGNOSIS — R74.8 INCREASED CREATINE KINASE LEVEL: ICD-10-CM

## 2019-08-22 DIAGNOSIS — E11.8 TYPE 2 DIABETES MELLITUS WITH COMPLICATION, WITHOUT LONG-TERM CURRENT USE OF INSULIN (HCC): Primary | ICD-10-CM

## 2019-08-22 DIAGNOSIS — I10 ESSENTIAL HYPERTENSION: ICD-10-CM

## 2019-08-22 DIAGNOSIS — N28.89 RENAL MASS: ICD-10-CM

## 2019-08-22 LAB
ALBUMIN SERPL-MCNC: 5.1 G/DL (ref 3.5–5.2)
ALP BLD-CCNC: 85 U/L (ref 40–130)
ALT SERPL-CCNC: 12 U/L (ref 5–41)
ANION GAP SERPL CALCULATED.3IONS-SCNC: 16 MMOL/L (ref 7–19)
AST SERPL-CCNC: 16 U/L (ref 5–40)
BASOPHILS ABSOLUTE: 0.1 K/UL (ref 0–0.2)
BASOPHILS RELATIVE PERCENT: 0.9 % (ref 0–1)
BILIRUB SERPL-MCNC: 0.3 MG/DL (ref 0.2–1.2)
BILIRUBIN URINE: NEGATIVE
BLOOD, URINE: NEGATIVE
BUN BLDV-MCNC: 38 MG/DL (ref 8–23)
C-REACTIVE PROTEIN: 0.12 MG/DL (ref 0–0.5)
CALCIUM SERPL-MCNC: 9.9 MG/DL (ref 8.8–10.2)
CHLORIDE BLD-SCNC: 107 MMOL/L (ref 98–111)
CHOLESTEROL, TOTAL: 118 MG/DL (ref 160–199)
CLARITY: CLEAR
CO2: 22 MMOL/L (ref 22–29)
COLOR: YELLOW
CREAT SERPL-MCNC: 2 MG/DL (ref 0.5–1.2)
EOSINOPHILS ABSOLUTE: 0.4 K/UL (ref 0–0.6)
EOSINOPHILS RELATIVE PERCENT: 6.5 % (ref 0–5)
GFR NON-AFRICAN AMERICAN: 33
GLUCOSE BLD-MCNC: 118 MG/DL (ref 74–109)
GLUCOSE URINE: NEGATIVE MG/DL
HBA1C MFR BLD: 5.7 % (ref 4–6)
HCT VFR BLD CALC: 43.1 % (ref 42–52)
HDLC SERPL-MCNC: 47 MG/DL (ref 55–121)
HEMOGLOBIN: 14 G/DL (ref 14–18)
IMMATURE GRANULOCYTES #: 0 K/UL
KETONES, URINE: NEGATIVE MG/DL
LDL CHOLESTEROL CALCULATED: 58 MG/DL
LEUKOCYTE ESTERASE, URINE: NEGATIVE
LYMPHOCYTES ABSOLUTE: 1.2 K/UL (ref 1.1–4.5)
LYMPHOCYTES RELATIVE PERCENT: 18.2 % (ref 20–40)
MCH RBC QN AUTO: 29.6 PG (ref 27–31)
MCHC RBC AUTO-ENTMCNC: 32.5 G/DL (ref 33–37)
MCV RBC AUTO: 91.1 FL (ref 80–94)
MONOCYTES ABSOLUTE: 0.6 K/UL (ref 0–0.9)
MONOCYTES RELATIVE PERCENT: 9 % (ref 0–10)
NEUTROPHILS ABSOLUTE: 4.4 K/UL (ref 1.5–7.5)
NEUTROPHILS RELATIVE PERCENT: 65.1 % (ref 50–65)
NITRITE, URINE: NEGATIVE
PDW BLD-RTO: 13.2 % (ref 11.5–14.5)
PH UA: 6 (ref 5–8)
PLATELET # BLD: 163 K/UL (ref 130–400)
PMV BLD AUTO: 11.2 FL (ref 9.4–12.4)
POTASSIUM SERPL-SCNC: 4.5 MMOL/L (ref 3.5–5)
PROTEIN UA: NEGATIVE MG/DL
RBC # BLD: 4.73 M/UL (ref 4.7–6.1)
SODIUM BLD-SCNC: 145 MMOL/L (ref 136–145)
SPECIFIC GRAVITY UA: 1.01 (ref 1–1.03)
TOTAL PROTEIN: 8.7 G/DL (ref 6.6–8.7)
TRIGL SERPL-MCNC: 65 MG/DL (ref 0–149)
TSH SERPL DL<=0.05 MIU/L-ACNC: 1.04 UIU/ML (ref 0.27–4.2)
UROBILINOGEN, URINE: 0.2 E.U./DL
VITAMIN B-12: 764 PG/ML (ref 211–946)
WBC # BLD: 6.8 K/UL (ref 4.8–10.8)

## 2019-08-22 PROCEDURE — 1123F ACP DISCUSS/DSCN MKR DOCD: CPT | Performed by: NURSE PRACTITIONER

## 2019-08-22 PROCEDURE — 4040F PNEUMOC VAC/ADMIN/RCVD: CPT | Performed by: NURSE PRACTITIONER

## 2019-08-22 PROCEDURE — 1036F TOBACCO NON-USER: CPT | Performed by: NURSE PRACTITIONER

## 2019-08-22 PROCEDURE — G8417 CALC BMI ABV UP PARAM F/U: HCPCS | Performed by: NURSE PRACTITIONER

## 2019-08-22 PROCEDURE — 3017F COLORECTAL CA SCREEN DOC REV: CPT | Performed by: NURSE PRACTITIONER

## 2019-08-22 PROCEDURE — 3044F HG A1C LEVEL LT 7.0%: CPT | Performed by: NURSE PRACTITIONER

## 2019-08-22 PROCEDURE — G8427 DOCREV CUR MEDS BY ELIG CLIN: HCPCS | Performed by: NURSE PRACTITIONER

## 2019-08-22 PROCEDURE — 2022F DILAT RTA XM EVC RTNOPTHY: CPT | Performed by: NURSE PRACTITIONER

## 2019-08-22 PROCEDURE — 99214 OFFICE O/P EST MOD 30 MIN: CPT | Performed by: NURSE PRACTITIONER

## 2019-08-22 PROCEDURE — G8598 ASA/ANTIPLAT THER USED: HCPCS | Performed by: NURSE PRACTITIONER

## 2019-08-22 RX ORDER — LISINOPRIL 10 MG/1
10 TABLET ORAL 2 TIMES DAILY
Qty: 60 TABLET | Refills: 5 | Status: SHIPPED | OUTPATIENT
Start: 2019-08-22 | End: 2020-02-28

## 2019-08-22 RX ORDER — CHOLECALCIFEROL (VITAMIN D3) 125 MCG
2000 CAPSULE ORAL 2 TIMES DAILY
COMMUNITY
End: 2021-10-06

## 2019-08-22 RX ORDER — NIFEDIPINE 60 MG/1
60 TABLET, EXTENDED RELEASE ORAL DAILY
Qty: 30 TABLET | Refills: 5 | Status: SHIPPED | OUTPATIENT
Start: 2019-08-22 | End: 2020-02-28

## 2019-08-22 ASSESSMENT — ENCOUNTER SYMPTOMS
TROUBLE SWALLOWING: 0
BLOOD IN STOOL: 0
CHEST TIGHTNESS: 0
NAUSEA: 0
SORE THROAT: 0
COUGH: 0
SHORTNESS OF BREATH: 0
RHINORRHEA: 0
WHEEZING: 0
ABDOMINAL PAIN: 0
VOMITING: 0
DIARRHEA: 0
VOICE CHANGE: 0
EYE REDNESS: 0
CONSTIPATION: 0

## 2019-08-22 NOTE — PROGRESS NOTES
reflux disease)     Hypercholesteremia     Hypertension        Past Surgical History:   Procedure Laterality Date    CARDIAC CATHETERIZATION  07, JDT    Left heart cath    CARDIAC CATHETERIZATION  07, JDT    Left heart cath    CARDIOVASCULAR STRESS TEST  2009, JDT    Stress Echo    CARDIOVASCULAR STRESS TEST  07, JDT    Stress Echo    COLONOSCOPY  10/04/2018    Dr KASSY Solorio-Diverticular disease, 10 yr recall    CORONARY ARTERY BYPASS GRAFT      X4 utilizing the left MOLINA to the LAD, right MOLINA to the second diagonal, and vein grafts to the obtuse marinal and PLOM.  CORONARY ARTERY BYPASS GRAFT  08, Nicole Halon CABG placing the LIMA to the LAD, MIGUELANGEL to the second diagonal branch and SVBG to the OMB and PMB.     KIDNEY REMOVAL Right     KIDNEY SURGERY      Right Kidney removed    [place] COLONOSCOPY FLX DX W/COLLJ SPEC WHEN PFRMD N/A 10/4/2018    COLONOSCOPY performed by Jason Castro MD at [place] [address] History     Socioeconomic History    Marital status:      Spouse name: None    Number of children: None    Years of education: None    Highest education level: None   Occupational History    None   Social Needs    Financial resource strain: None    Food insecurity:     Worry: None     Inability: None    Transportation needs:     Medical: None     Non-medical: None   Tobacco Use    Smoking status: Former Smoker     Packs/day: 2.00     Years: 32.00     Pack years: 64.00     Start date:      Last attempt to quit: 10/5/1998     Years since quittin.8    Smokeless tobacco: Former User   Substance and Sexual Activity    Alcohol use: Yes     Comment: Very Little    Drug use: Never    Sexual activity: None   Lifestyle    Physical activity:     Days per week: None     Minutes per session: None    Stress: None   Relationships    Social connections:     Talks on phone: None     Gets together: None     Attends Rastafari service: None     Active member of club or organization: None     Attends meetings of clubs or organizations: None     Relationship status: None    Intimate partner violence:     Fear of current or ex partner: None     Emotionally abused: None     Physically abused: None     Forced sexual activity: None   Other Topics Concern    None   Social History Narrative    None       Family History   Problem Relation Age of Onset    Cancer Mother         lung    Coronary Art Dis Father     Stroke Father     High Blood Pressure Brother     Coronary Art Dis Paternal Grandmother     Coronary Art Dis Paternal Grandfather        Current Outpatient Medications   Medication Sig Dispense Refill    Cholecalciferol (VITAMIN D3) 2000 units TABS Take 4,000 Units by mouth daily       lisinopril (PRINIVIL;ZESTRIL) 10 MG tablet Take 1 tablet by mouth 2 times daily 60 tablet 5    NIFEdipine (PROCARDIA XL) 60 MG extended release tablet Take 1 tablet by mouth daily 30 tablet 5    nebivolol (BYSTOLIC) 10 MG tablet Take 1 tablet by mouth every evening 30 tablet 2    metFORMIN (GLUCOPHAGE-XR) 500 MG extended release tablet Take 1 tablet by mouth daily (with breakfast) 30 tablet 2    atorvastatin (LIPITOR) 20 MG tablet Take 1 tablet by mouth daily 90 tablet 2    allopurinol (ZYLOPRIM) 100 MG tablet Take 1 tablet by mouth 2 times daily 30 tablet 2     No current facility-administered medications for this visit. No Known Allergies    Lab Review   No visits with results within 2 Month(s) from this visit. Latest known visit with results is:   Orders Only on 05/29/2019   Component Date Value    Vit D, 25-Hydroxy 05/29/2019 48.5     PSA 05/29/2019 1.17     Testosterone 05/29/2019 078.0      notapplicable    Subjective:   Review of Systems   Constitutional: Negative for activity change, appetite change, fatigue, fever and unexpected weight change.    HENT: Negative for congestion, ear pain, nosebleeds, rhinorrhea, sore throat, trouble swallowing and

## 2019-10-22 ENCOUNTER — OFFICE VISIT (OUTPATIENT)
Dept: CARDIOLOGY | Age: 69
End: 2019-10-22
Payer: MEDICARE

## 2019-10-22 VITALS
DIASTOLIC BLOOD PRESSURE: 76 MMHG | HEIGHT: 70 IN | WEIGHT: 241 LBS | BODY MASS INDEX: 34.5 KG/M2 | SYSTOLIC BLOOD PRESSURE: 138 MMHG | HEART RATE: 64 BPM

## 2019-10-22 DIAGNOSIS — E78.00 HYPERCHOLESTEREMIA: ICD-10-CM

## 2019-10-22 DIAGNOSIS — N18.30 CHRONIC KIDNEY DISEASE, STAGE III (MODERATE) (HCC): ICD-10-CM

## 2019-10-22 DIAGNOSIS — E11.8 TYPE 2 DIABETES MELLITUS WITH COMPLICATION, WITHOUT LONG-TERM CURRENT USE OF INSULIN (HCC): ICD-10-CM

## 2019-10-22 DIAGNOSIS — I10 ESSENTIAL HYPERTENSION: ICD-10-CM

## 2019-10-22 DIAGNOSIS — I25.10 CORONARY ARTERY DISEASE INVOLVING NATIVE CORONARY ARTERY OF NATIVE HEART WITHOUT ANGINA PECTORIS: Primary | ICD-10-CM

## 2019-10-22 PROCEDURE — 3044F HG A1C LEVEL LT 7.0%: CPT | Performed by: CLINICAL NURSE SPECIALIST

## 2019-10-22 PROCEDURE — G8598 ASA/ANTIPLAT THER USED: HCPCS | Performed by: CLINICAL NURSE SPECIALIST

## 2019-10-22 PROCEDURE — G8427 DOCREV CUR MEDS BY ELIG CLIN: HCPCS | Performed by: CLINICAL NURSE SPECIALIST

## 2019-10-22 PROCEDURE — 99213 OFFICE O/P EST LOW 20 MIN: CPT | Performed by: CLINICAL NURSE SPECIALIST

## 2019-10-22 PROCEDURE — G8417 CALC BMI ABV UP PARAM F/U: HCPCS | Performed by: CLINICAL NURSE SPECIALIST

## 2019-10-22 PROCEDURE — 93000 ELECTROCARDIOGRAM COMPLETE: CPT | Performed by: CLINICAL NURSE SPECIALIST

## 2019-10-22 PROCEDURE — 1036F TOBACCO NON-USER: CPT | Performed by: CLINICAL NURSE SPECIALIST

## 2019-10-22 PROCEDURE — G8484 FLU IMMUNIZE NO ADMIN: HCPCS | Performed by: CLINICAL NURSE SPECIALIST

## 2019-10-22 PROCEDURE — 1123F ACP DISCUSS/DSCN MKR DOCD: CPT | Performed by: CLINICAL NURSE SPECIALIST

## 2019-10-22 PROCEDURE — 2022F DILAT RTA XM EVC RTNOPTHY: CPT | Performed by: CLINICAL NURSE SPECIALIST

## 2019-10-22 PROCEDURE — 3017F COLORECTAL CA SCREEN DOC REV: CPT | Performed by: CLINICAL NURSE SPECIALIST

## 2019-10-22 PROCEDURE — 4040F PNEUMOC VAC/ADMIN/RCVD: CPT | Performed by: CLINICAL NURSE SPECIALIST

## 2019-10-22 RX ORDER — NITROGLYCERIN 0.4 MG/1
0.4 TABLET SUBLINGUAL EVERY 5 MIN PRN
Qty: 25 TABLET | Refills: 3 | Status: SHIPPED | OUTPATIENT
Start: 2019-10-22 | End: 2020-01-22 | Stop reason: SDUPTHER

## 2019-10-22 ASSESSMENT — ENCOUNTER SYMPTOMS
VOMITING: 0
HEARTBURN: 0
COUGH: 0
NAUSEA: 0
BLOOD IN STOOL: 0

## 2019-10-30 LAB
ALBUMIN SERPL-MCNC: 4.5 G/DL (ref 3.5–5.2)
ALP BLD-CCNC: 90 U/L (ref 40–130)
ALT SERPL-CCNC: 15 U/L (ref 5–41)
ANION GAP SERPL CALCULATED.3IONS-SCNC: 14 MMOL/L (ref 7–19)
AST SERPL-CCNC: 17 U/L (ref 5–40)
BASOPHILS ABSOLUTE: 0.1 K/UL (ref 0–0.2)
BASOPHILS RELATIVE PERCENT: 0.9 % (ref 0–1)
BILIRUB SERPL-MCNC: 0.3 MG/DL (ref 0.2–1.2)
BILIRUBIN URINE: NEGATIVE
BLOOD, URINE: NEGATIVE
BUN BLDV-MCNC: 28 MG/DL (ref 8–23)
CALCIUM SERPL-MCNC: 9.6 MG/DL (ref 8.8–10.2)
CHLORIDE BLD-SCNC: 106 MMOL/L (ref 98–111)
CLARITY: CLEAR
CO2: 26 MMOL/L (ref 22–29)
COLOR: YELLOW
CREAT SERPL-MCNC: 2 MG/DL (ref 0.5–1.2)
CREATININE URINE: 22.4 MG/DL (ref 4.2–622)
EOSINOPHILS ABSOLUTE: 0.6 K/UL (ref 0–0.6)
EOSINOPHILS RELATIVE PERCENT: 8.6 % (ref 0–5)
GFR NON-AFRICAN AMERICAN: 33
GLUCOSE BLD-MCNC: 133 MG/DL (ref 74–109)
GLUCOSE URINE: NEGATIVE MG/DL
HCT VFR BLD CALC: 41 % (ref 42–52)
HEMOGLOBIN: 13.1 G/DL (ref 14–18)
IMMATURE GRANULOCYTES #: 0 K/UL
KETONES, URINE: NEGATIVE MG/DL
LEUKOCYTE ESTERASE, URINE: NEGATIVE
LYMPHOCYTES ABSOLUTE: 1.2 K/UL (ref 1.1–4.5)
LYMPHOCYTES RELATIVE PERCENT: 19 % (ref 20–40)
MAGNESIUM: 2 MG/DL (ref 1.6–2.4)
MCH RBC QN AUTO: 29.6 PG (ref 27–31)
MCHC RBC AUTO-ENTMCNC: 32 G/DL (ref 33–37)
MCV RBC AUTO: 92.6 FL (ref 80–94)
MONOCYTES ABSOLUTE: 0.6 K/UL (ref 0–0.9)
MONOCYTES RELATIVE PERCENT: 9.4 % (ref 0–10)
NEUTROPHILS ABSOLUTE: 4 K/UL (ref 1.5–7.5)
NEUTROPHILS RELATIVE PERCENT: 61.8 % (ref 50–65)
NITRITE, URINE: NEGATIVE
PARATHYROID HORMONE INTACT: 68 PG/ML (ref 15–65)
PDW BLD-RTO: 12.6 % (ref 11.5–14.5)
PH UA: 6.5 (ref 5–8)
PHOSPHORUS: 3.8 MG/DL (ref 2.5–4.5)
PLATELET # BLD: 167 K/UL (ref 130–400)
PMV BLD AUTO: 11.5 FL (ref 9.4–12.4)
POTASSIUM SERPL-SCNC: 4.1 MMOL/L (ref 3.5–5)
PROTEIN PROTEIN: <4 MG/DL (ref 15–45)
PROTEIN UA: NEGATIVE MG/DL
RBC # BLD: 4.43 M/UL (ref 4.7–6.1)
SODIUM BLD-SCNC: 146 MMOL/L (ref 136–145)
SPECIFIC GRAVITY UA: 1.01 (ref 1–1.03)
TOTAL PROTEIN: 8 G/DL (ref 6.6–8.7)
URIC ACID, SERUM: 5.6 MG/DL (ref 3.4–7)
UROBILINOGEN, URINE: 0.2 E.U./DL
VITAMIN D 25-HYDROXY: 66.1 NG/ML
WBC # BLD: 6.5 K/UL (ref 4.8–10.8)

## 2019-11-22 ENCOUNTER — OFFICE VISIT (OUTPATIENT)
Dept: PRIMARY CARE CLINIC | Age: 69
End: 2019-11-22
Payer: MEDICARE

## 2019-11-22 ENCOUNTER — HOSPITAL ENCOUNTER (OUTPATIENT)
Dept: GENERAL RADIOLOGY | Age: 69
Discharge: HOME | End: 2019-11-22
Payer: MEDICARE

## 2019-11-22 VITALS
BODY MASS INDEX: 33.93 KG/M2 | SYSTOLIC BLOOD PRESSURE: 128 MMHG | HEIGHT: 70 IN | TEMPERATURE: 98.1 F | HEART RATE: 68 BPM | OXYGEN SATURATION: 100 % | DIASTOLIC BLOOD PRESSURE: 76 MMHG | WEIGHT: 237 LBS

## 2019-11-22 VITALS
OXYGEN SATURATION: 100 % | HEIGHT: 70 IN | SYSTOLIC BLOOD PRESSURE: 128 MMHG | BODY MASS INDEX: 33.93 KG/M2 | WEIGHT: 237 LBS | HEART RATE: 68 BPM | DIASTOLIC BLOOD PRESSURE: 76 MMHG | TEMPERATURE: 98.1 F

## 2019-11-22 DIAGNOSIS — Z13.6 SCREENING FOR CARDIOVASCULAR CONDITION: ICD-10-CM

## 2019-11-22 DIAGNOSIS — Z00.00 ROUTINE GENERAL MEDICAL EXAMINATION AT A HEALTH CARE FACILITY: Primary | ICD-10-CM

## 2019-11-22 DIAGNOSIS — Z23 NEED FOR IMMUNIZATION AGAINST INFLUENZA: ICD-10-CM

## 2019-11-22 DIAGNOSIS — E66.9 CLASS 1 OBESITY WITH SERIOUS COMORBIDITY AND BODY MASS INDEX (BMI) OF 34.0 TO 34.9 IN ADULT, UNSPECIFIED OBESITY TYPE: ICD-10-CM

## 2019-11-22 DIAGNOSIS — K59.00 CONSTIPATION, UNSPECIFIED CONSTIPATION TYPE: ICD-10-CM

## 2019-11-22 DIAGNOSIS — M54.42 ACUTE BILATERAL LOW BACK PAIN WITH BILATERAL SCIATICA: ICD-10-CM

## 2019-11-22 DIAGNOSIS — M54.41 ACUTE BILATERAL LOW BACK PAIN WITH BILATERAL SCIATICA: ICD-10-CM

## 2019-11-22 DIAGNOSIS — Z90.5 S/P NEPHRECTOMY: ICD-10-CM

## 2019-11-22 DIAGNOSIS — N18.30 CHRONIC KIDNEY DISEASE, STAGE III (MODERATE) (HCC): ICD-10-CM

## 2019-11-22 DIAGNOSIS — I10 HYPERTENSION, ESSENTIAL, BENIGN: ICD-10-CM

## 2019-11-22 DIAGNOSIS — E11.8 TYPE 2 DIABETES MELLITUS WITH COMPLICATION, WITHOUT LONG-TERM CURRENT USE OF INSULIN (HCC): Primary | ICD-10-CM

## 2019-11-22 DIAGNOSIS — R13.10 DYSPHAGIA, UNSPECIFIED TYPE: ICD-10-CM

## 2019-11-22 DIAGNOSIS — Z23 NEED FOR PROPHYLACTIC VACCINATION AGAINST STREPTOCOCCUS PNEUMONIAE (PNEUMOCOCCUS): ICD-10-CM

## 2019-11-22 LAB — HBA1C MFR BLD: 6.1 %

## 2019-11-22 PROCEDURE — G0447 BEHAVIOR COUNSEL OBESITY 15M: HCPCS | Performed by: NURSE PRACTITIONER

## 2019-11-22 PROCEDURE — 4040F PNEUMOC VAC/ADMIN/RCVD: CPT | Performed by: NURSE PRACTITIONER

## 2019-11-22 PROCEDURE — G8427 DOCREV CUR MEDS BY ELIG CLIN: HCPCS | Performed by: NURSE PRACTITIONER

## 2019-11-22 PROCEDURE — G0009 ADMIN PNEUMOCOCCAL VACCINE: HCPCS | Performed by: NURSE PRACTITIONER

## 2019-11-22 PROCEDURE — G0439 PPPS, SUBSEQ VISIT: HCPCS | Performed by: NURSE PRACTITIONER

## 2019-11-22 PROCEDURE — G8598 ASA/ANTIPLAT THER USED: HCPCS | Performed by: NURSE PRACTITIONER

## 2019-11-22 PROCEDURE — 90653 IIV ADJUVANT VACCINE IM: CPT | Performed by: NURSE PRACTITIONER

## 2019-11-22 PROCEDURE — 3044F HG A1C LEVEL LT 7.0%: CPT | Performed by: NURSE PRACTITIONER

## 2019-11-22 PROCEDURE — 83036 HEMOGLOBIN GLYCOSYLATED A1C: CPT | Performed by: NURSE PRACTITIONER

## 2019-11-22 PROCEDURE — 1123F ACP DISCUSS/DSCN MKR DOCD: CPT | Performed by: NURSE PRACTITIONER

## 2019-11-22 PROCEDURE — 72100 X-RAY EXAM L-S SPINE 2/3 VWS: CPT

## 2019-11-22 PROCEDURE — G0446 INTENS BEHAVE THER CARDIO DX: HCPCS | Performed by: NURSE PRACTITIONER

## 2019-11-22 PROCEDURE — G0008 ADMIN INFLUENZA VIRUS VAC: HCPCS | Performed by: NURSE PRACTITIONER

## 2019-11-22 PROCEDURE — G8482 FLU IMMUNIZE ORDER/ADMIN: HCPCS | Performed by: NURSE PRACTITIONER

## 2019-11-22 PROCEDURE — 1036F TOBACCO NON-USER: CPT | Performed by: NURSE PRACTITIONER

## 2019-11-22 PROCEDURE — 99214 OFFICE O/P EST MOD 30 MIN: CPT | Performed by: NURSE PRACTITIONER

## 2019-11-22 PROCEDURE — 2022F DILAT RTA XM EVC RTNOPTHY: CPT | Performed by: NURSE PRACTITIONER

## 2019-11-22 PROCEDURE — 90732 PPSV23 VACC 2 YRS+ SUBQ/IM: CPT | Performed by: NURSE PRACTITIONER

## 2019-11-22 PROCEDURE — G8417 CALC BMI ABV UP PARAM F/U: HCPCS | Performed by: NURSE PRACTITIONER

## 2019-11-22 PROCEDURE — 3017F COLORECTAL CA SCREEN DOC REV: CPT | Performed by: NURSE PRACTITIONER

## 2019-11-22 RX ORDER — DOCUSATE SODIUM 100 MG/1
100 CAPSULE, LIQUID FILLED ORAL 2 TIMES DAILY
Qty: 60 CAPSULE | Refills: 0 | Status: SHIPPED | OUTPATIENT
Start: 2019-11-22 | End: 2020-08-18 | Stop reason: SDUPTHER

## 2019-11-22 ASSESSMENT — LIFESTYLE VARIABLES
HOW OFTEN DURING THE LAST YEAR HAVE YOU FAILED TO DO WHAT WAS NORMALLY EXPECTED FROM YOU BECAUSE OF DRINKING: 0
HOW OFTEN DO YOU HAVE A DRINK CONTAINING ALCOHOL: 2
HOW OFTEN DURING THE LAST YEAR HAVE YOU NEEDED AN ALCOHOLIC DRINK FIRST THING IN THE MORNING TO GET YOURSELF GOING AFTER A NIGHT OF HEAVY DRINKING: 0
AUDIT-C TOTAL SCORE: 2
HOW OFTEN DO YOU HAVE SIX OR MORE DRINKS ON ONE OCCASION: 0
HOW OFTEN DURING THE LAST YEAR HAVE YOU HAD A FEELING OF GUILT OR REMORSE AFTER DRINKING: 0
HAS A RELATIVE, FRIEND, DOCTOR, OR ANOTHER HEALTH PROFESSIONAL EXPRESSED CONCERN ABOUT YOUR DRINKING OR SUGGESTED YOU CUT DOWN: 0
AUDIT TOTAL SCORE: 2
HOW MANY STANDARD DRINKS CONTAINING ALCOHOL DO YOU HAVE ON A TYPICAL DAY: 0
HOW OFTEN DURING THE LAST YEAR HAVE YOU BEEN UNABLE TO REMEMBER WHAT HAPPENED THE NIGHT BEFORE BECAUSE YOU HAD BEEN DRINKING: 0
HAVE YOU OR SOMEONE ELSE BEEN INJURED AS A RESULT OF YOUR DRINKING: 0
HOW OFTEN DURING THE LAST YEAR HAVE YOU FOUND THAT YOU WERE NOT ABLE TO STOP DRINKING ONCE YOU HAD STARTED: 0

## 2019-11-22 ASSESSMENT — ENCOUNTER SYMPTOMS
EYE REDNESS: 0
ABDOMINAL PAIN: 0
SHORTNESS OF BREATH: 0
TROUBLE SWALLOWING: 0
BLOOD IN STOOL: 0
SORE THROAT: 0
WHEEZING: 0
VOICE CHANGE: 0
RHINORRHEA: 0
VOMITING: 0
DIARRHEA: 0
CHEST TIGHTNESS: 0
COUGH: 0
NAUSEA: 0

## 2019-11-22 ASSESSMENT — PATIENT HEALTH QUESTIONNAIRE - PHQ9
SUM OF ALL RESPONSES TO PHQ QUESTIONS 1-9: 0
SUM OF ALL RESPONSES TO PHQ QUESTIONS 1-9: 0

## 2019-11-26 PROBLEM — Z23 NEED FOR IMMUNIZATION AGAINST INFLUENZA: Status: ACTIVE | Noted: 2019-11-26

## 2019-11-26 PROBLEM — M54.42 ACUTE BILATERAL LOW BACK PAIN WITH BILATERAL SCIATICA: Status: ACTIVE | Noted: 2019-11-26

## 2019-11-26 PROBLEM — K59.00 CONSTIPATION: Status: ACTIVE | Noted: 2019-11-26

## 2019-11-26 PROBLEM — Z90.5 S/P NEPHRECTOMY: Status: ACTIVE | Noted: 2019-11-26

## 2019-11-26 PROBLEM — Z00.00 ROUTINE GENERAL MEDICAL EXAMINATION AT A HEALTH CARE FACILITY: Status: ACTIVE | Noted: 2019-11-26

## 2019-11-26 PROBLEM — Z23 NEED FOR PROPHYLACTIC VACCINATION AGAINST STREPTOCOCCUS PNEUMONIAE (PNEUMOCOCCUS): Status: ACTIVE | Noted: 2019-11-26

## 2019-11-26 PROBLEM — M54.41 ACUTE BILATERAL LOW BACK PAIN WITH BILATERAL SCIATICA: Status: ACTIVE | Noted: 2019-11-26

## 2019-11-26 PROBLEM — E66.9 CLASS 1 OBESITY WITH SERIOUS COMORBIDITY AND BODY MASS INDEX (BMI) OF 34.0 TO 34.9 IN ADULT: Status: ACTIVE | Noted: 2018-02-01

## 2019-11-26 PROBLEM — E66.811 CLASS 1 OBESITY WITH SERIOUS COMORBIDITY AND BODY MASS INDEX (BMI) OF 34.0 TO 34.9 IN ADULT: Status: ACTIVE | Noted: 2018-02-01

## 2019-11-26 PROBLEM — R13.10 DYSPHAGIA: Status: ACTIVE | Noted: 2019-11-26

## 2019-11-26 PROBLEM — Z13.6 SCREENING FOR CARDIOVASCULAR CONDITION: Status: ACTIVE | Noted: 2019-11-26

## 2019-11-26 ASSESSMENT — ENCOUNTER SYMPTOMS
BACK PAIN: 1
CONSTIPATION: 1

## 2019-11-27 ENCOUNTER — TELEPHONE (OUTPATIENT)
Dept: PRIMARY CARE CLINIC | Age: 69
End: 2019-11-27

## 2019-12-09 VITALS
HEART RATE: 63 BPM | BODY MASS INDEX: 33.32 KG/M2 | WEIGHT: 238 LBS | DIASTOLIC BLOOD PRESSURE: 70 MMHG | SYSTOLIC BLOOD PRESSURE: 118 MMHG | HEIGHT: 71 IN

## 2019-12-09 DIAGNOSIS — C64.1 MALIGNANT NEOPLASM OF RIGHT KIDNEY, EXCEPT RENAL PELVIS (HCC): ICD-10-CM

## 2019-12-11 ENCOUNTER — HOSPITAL ENCOUNTER (OUTPATIENT)
Dept: INFUSION THERAPY | Age: 69
Discharge: HOME | End: 2019-12-11
Payer: MEDICARE

## 2019-12-11 ENCOUNTER — OFFICE VISIT (OUTPATIENT)
Dept: HEMATOLOGY | Age: 69
End: 2019-12-11
Payer: MEDICARE

## 2019-12-11 VITALS
SYSTOLIC BLOOD PRESSURE: 158 MMHG | WEIGHT: 229.2 LBS | HEART RATE: 75 BPM | HEIGHT: 70 IN | OXYGEN SATURATION: 96 % | BODY MASS INDEX: 32.81 KG/M2 | DIASTOLIC BLOOD PRESSURE: 82 MMHG

## 2019-12-11 DIAGNOSIS — Z71.89 CARE PLAN DISCUSSED WITH PATIENT: ICD-10-CM

## 2019-12-11 DIAGNOSIS — Z00.00 HEALTHCARE MAINTENANCE: ICD-10-CM

## 2019-12-11 DIAGNOSIS — C64.1 MALIGNANT NEOPLASM OF RIGHT KIDNEY, EXCEPT RENAL PELVIS (HCC): Primary | ICD-10-CM

## 2019-12-11 DIAGNOSIS — C64.1 MALIGNANT NEOPLASM OF RIGHT KIDNEY, EXCEPT RENAL PELVIS (HCC): ICD-10-CM

## 2019-12-11 DIAGNOSIS — N18.30 CHRONIC KIDNEY DISEASE (CKD), STAGE III (MODERATE) (HCC): ICD-10-CM

## 2019-12-11 DIAGNOSIS — Z08 ENCOUNTER FOR FOLLOW-UP SURVEILLANCE OF KIDNEY CANCER: ICD-10-CM

## 2019-12-11 DIAGNOSIS — Z85.528 ENCOUNTER FOR FOLLOW-UP SURVEILLANCE OF KIDNEY CANCER: ICD-10-CM

## 2019-12-11 PROCEDURE — 1036F TOBACCO NON-USER: CPT | Performed by: INTERNAL MEDICINE

## 2019-12-11 PROCEDURE — 4040F PNEUMOC VAC/ADMIN/RCVD: CPT | Performed by: INTERNAL MEDICINE

## 2019-12-11 PROCEDURE — 99211 OFF/OP EST MAY X REQ PHY/QHP: CPT

## 2019-12-11 PROCEDURE — 1123F ACP DISCUSS/DSCN MKR DOCD: CPT | Performed by: INTERNAL MEDICINE

## 2019-12-11 PROCEDURE — G8598 ASA/ANTIPLAT THER USED: HCPCS | Performed by: INTERNAL MEDICINE

## 2019-12-11 PROCEDURE — G8482 FLU IMMUNIZE ORDER/ADMIN: HCPCS | Performed by: INTERNAL MEDICINE

## 2019-12-11 PROCEDURE — G8427 DOCREV CUR MEDS BY ELIG CLIN: HCPCS | Performed by: INTERNAL MEDICINE

## 2019-12-11 PROCEDURE — 99213 OFFICE O/P EST LOW 20 MIN: CPT | Performed by: INTERNAL MEDICINE

## 2019-12-11 PROCEDURE — G8417 CALC BMI ABV UP PARAM F/U: HCPCS | Performed by: INTERNAL MEDICINE

## 2019-12-11 PROCEDURE — 3017F COLORECTAL CA SCREEN DOC REV: CPT | Performed by: INTERNAL MEDICINE

## 2019-12-11 PROCEDURE — 85025 COMPLETE CBC W/AUTO DIFF WBC: CPT

## 2019-12-11 RX ORDER — AMOXICILLIN 500 MG
1000 CAPSULE ORAL 2 TIMES DAILY
COMMUNITY
End: 2022-09-23

## 2019-12-12 ENCOUNTER — HOSPITAL ENCOUNTER (OUTPATIENT)
Dept: CT IMAGING | Age: 69
Discharge: HOME | End: 2019-12-12
Payer: MEDICARE

## 2019-12-12 DIAGNOSIS — C64.1 MALIGNANT NEOPLASM OF RIGHT KIDNEY, EXCEPT RENAL PELVIS (HCC): ICD-10-CM

## 2019-12-12 PROCEDURE — 74176 CT ABD & PELVIS W/O CONTRAST: CPT

## 2019-12-26 PROBLEM — Z13.6 SCREENING FOR CARDIOVASCULAR CONDITION: Status: RESOLVED | Noted: 2019-11-26 | Resolved: 2019-12-26

## 2020-01-02 ENCOUNTER — TELEPHONE (OUTPATIENT)
Dept: PRIMARY CARE CLINIC | Age: 70
End: 2020-01-02

## 2020-01-21 DIAGNOSIS — E11.8 TYPE 2 DIABETES MELLITUS WITH COMPLICATION, WITHOUT LONG-TERM CURRENT USE OF INSULIN (HCC): ICD-10-CM

## 2020-01-21 DIAGNOSIS — I10 HYPERTENSION, ESSENTIAL, BENIGN: ICD-10-CM

## 2020-01-21 DIAGNOSIS — Z90.5 S/P NEPHRECTOMY: ICD-10-CM

## 2020-01-21 DIAGNOSIS — N18.30 CHRONIC KIDNEY DISEASE, STAGE III (MODERATE) (HCC): ICD-10-CM

## 2020-01-21 LAB
ALBUMIN SERPL-MCNC: 4.6 G/DL (ref 3.5–5.2)
ALP BLD-CCNC: 79 U/L (ref 40–130)
ALT SERPL-CCNC: 14 U/L (ref 5–41)
ANION GAP SERPL CALCULATED.3IONS-SCNC: 15 MMOL/L (ref 7–19)
AST SERPL-CCNC: 17 U/L (ref 5–40)
BASOPHILS ABSOLUTE: 0.1 K/UL (ref 0–0.2)
BASOPHILS RELATIVE PERCENT: 0.8 % (ref 0–1)
BILIRUB SERPL-MCNC: 0.5 MG/DL (ref 0.2–1.2)
BILIRUBIN URINE: NEGATIVE
BLOOD, URINE: NEGATIVE
BUN BLDV-MCNC: 21 MG/DL (ref 8–23)
CALCIUM SERPL-MCNC: 9.7 MG/DL (ref 8.8–10.2)
CHLORIDE BLD-SCNC: 106 MMOL/L (ref 98–111)
CHOLESTEROL, TOTAL: 111 MG/DL (ref 160–199)
CLARITY: CLEAR
CO2: 24 MMOL/L (ref 22–29)
COLOR: YELLOW
CREAT SERPL-MCNC: 1.8 MG/DL (ref 0.5–1.2)
CREATININE URINE: 132.5 MG/DL (ref 4.2–622)
EOSINOPHILS ABSOLUTE: 0.5 K/UL (ref 0–0.6)
EOSINOPHILS RELATIVE PERCENT: 6.3 % (ref 0–5)
GFR NON-AFRICAN AMERICAN: 37
GLUCOSE BLD-MCNC: 102 MG/DL (ref 74–109)
GLUCOSE URINE: NEGATIVE MG/DL
HBA1C MFR BLD: 5.6 % (ref 4–6)
HCT VFR BLD CALC: 42.8 % (ref 42–52)
HDLC SERPL-MCNC: 45 MG/DL (ref 55–121)
HEMOGLOBIN: 13.4 G/DL (ref 14–18)
IMMATURE GRANULOCYTES #: 0 K/UL
KETONES, URINE: NEGATIVE MG/DL
LDL CHOLESTEROL CALCULATED: 49 MG/DL
LEUKOCYTE ESTERASE, URINE: NEGATIVE
LYMPHOCYTES ABSOLUTE: 1.3 K/UL (ref 1.1–4.5)
LYMPHOCYTES RELATIVE PERCENT: 15.1 % (ref 20–40)
MCH RBC QN AUTO: 29.6 PG (ref 27–31)
MCHC RBC AUTO-ENTMCNC: 31.3 G/DL (ref 33–37)
MCV RBC AUTO: 94.5 FL (ref 80–94)
MICROALBUMIN UR-MCNC: 1.4 MG/DL (ref 0–19)
MICROALBUMIN/CREAT UR-RTO: 10.6 MG/G
MONOCYTES ABSOLUTE: 0.9 K/UL (ref 0–0.9)
MONOCYTES RELATIVE PERCENT: 10.3 % (ref 0–10)
NEUTROPHILS ABSOLUTE: 5.6 K/UL (ref 1.5–7.5)
NEUTROPHILS RELATIVE PERCENT: 67.1 % (ref 50–65)
NITRITE, URINE: NEGATIVE
PDW BLD-RTO: 13.1 % (ref 11.5–14.5)
PH UA: 6 (ref 5–8)
PLATELET # BLD: 174 K/UL (ref 130–400)
PMV BLD AUTO: 11.4 FL (ref 9.4–12.4)
POTASSIUM SERPL-SCNC: 4.6 MMOL/L (ref 3.5–5)
PROTEIN UA: NEGATIVE MG/DL
RBC # BLD: 4.53 M/UL (ref 4.7–6.1)
SODIUM BLD-SCNC: 145 MMOL/L (ref 136–145)
SPECIFIC GRAVITY UA: 1.02 (ref 1–1.03)
TOTAL PROTEIN: 7.8 G/DL (ref 6.6–8.7)
TRIGL SERPL-MCNC: 86 MG/DL (ref 0–149)
TSH SERPL DL<=0.05 MIU/L-ACNC: 1.26 UIU/ML (ref 0.27–4.2)
UROBILINOGEN, URINE: 0.2 E.U./DL
VITAMIN D 25-HYDROXY: 77.4 NG/ML
WBC # BLD: 8.3 K/UL (ref 4.8–10.8)

## 2020-01-22 ENCOUNTER — OFFICE VISIT (OUTPATIENT)
Dept: PRIMARY CARE CLINIC | Age: 70
End: 2020-01-22
Payer: MEDICARE

## 2020-01-22 VITALS
OXYGEN SATURATION: 98 % | HEIGHT: 70 IN | DIASTOLIC BLOOD PRESSURE: 82 MMHG | SYSTOLIC BLOOD PRESSURE: 134 MMHG | WEIGHT: 229 LBS | BODY MASS INDEX: 32.78 KG/M2 | TEMPERATURE: 97.3 F | HEART RATE: 83 BPM

## 2020-01-22 PROCEDURE — 3044F HG A1C LEVEL LT 7.0%: CPT | Performed by: NURSE PRACTITIONER

## 2020-01-22 PROCEDURE — 1036F TOBACCO NON-USER: CPT | Performed by: NURSE PRACTITIONER

## 2020-01-22 PROCEDURE — 3017F COLORECTAL CA SCREEN DOC REV: CPT | Performed by: NURSE PRACTITIONER

## 2020-01-22 PROCEDURE — G8427 DOCREV CUR MEDS BY ELIG CLIN: HCPCS | Performed by: NURSE PRACTITIONER

## 2020-01-22 PROCEDURE — 2022F DILAT RTA XM EVC RTNOPTHY: CPT | Performed by: NURSE PRACTITIONER

## 2020-01-22 PROCEDURE — 99214 OFFICE O/P EST MOD 30 MIN: CPT | Performed by: NURSE PRACTITIONER

## 2020-01-22 PROCEDURE — G8482 FLU IMMUNIZE ORDER/ADMIN: HCPCS | Performed by: NURSE PRACTITIONER

## 2020-01-22 PROCEDURE — G8417 CALC BMI ABV UP PARAM F/U: HCPCS | Performed by: NURSE PRACTITIONER

## 2020-01-22 PROCEDURE — 4040F PNEUMOC VAC/ADMIN/RCVD: CPT | Performed by: NURSE PRACTITIONER

## 2020-01-22 PROCEDURE — 1123F ACP DISCUSS/DSCN MKR DOCD: CPT | Performed by: NURSE PRACTITIONER

## 2020-01-22 RX ORDER — NITROGLYCERIN 0.4 MG/1
0.4 TABLET SUBLINGUAL EVERY 5 MIN PRN
Qty: 25 TABLET | Refills: 3 | Status: SHIPPED | OUTPATIENT
Start: 2020-01-22 | End: 2022-02-02 | Stop reason: SDUPTHER

## 2020-01-22 RX ORDER — NEBIVOLOL 10 MG/1
10 TABLET ORAL EVERY EVENING
Qty: 30 TABLET | Refills: 2 | Status: SHIPPED | OUTPATIENT
Start: 2020-01-22 | End: 2020-04-19

## 2020-01-22 RX ORDER — ATORVASTATIN CALCIUM 20 MG/1
20 TABLET, FILM COATED ORAL DAILY
Qty: 90 TABLET | Refills: 2 | Status: SHIPPED | OUTPATIENT
Start: 2020-01-22 | End: 2020-11-25

## 2020-01-22 RX ORDER — FAMOTIDINE 10 MG
10 TABLET ORAL 2 TIMES DAILY
Qty: 60 TABLET | Refills: 1 | Status: SHIPPED | OUTPATIENT
Start: 2020-01-22 | End: 2020-08-18 | Stop reason: SDUPTHER

## 2020-01-22 ASSESSMENT — ENCOUNTER SYMPTOMS
EYE REDNESS: 0
CHEST TIGHTNESS: 0
WHEEZING: 0
SHORTNESS OF BREATH: 0
VOMITING: 0
BLOOD IN STOOL: 0
RHINORRHEA: 0
ABDOMINAL PAIN: 0
TROUBLE SWALLOWING: 0
COUGH: 0
SORE THROAT: 0
VOICE CHANGE: 0
BACK PAIN: 1

## 2020-01-22 ASSESSMENT — PATIENT HEALTH QUESTIONNAIRE - PHQ9
1. LITTLE INTEREST OR PLEASURE IN DOING THINGS: 0
SUM OF ALL RESPONSES TO PHQ9 QUESTIONS 1 & 2: 0
SUM OF ALL RESPONSES TO PHQ QUESTIONS 1-9: 0
2. FEELING DOWN, DEPRESSED OR HOPELESS: 0
SUM OF ALL RESPONSES TO PHQ QUESTIONS 1-9: 0

## 2020-01-22 NOTE — PROGRESS NOTES
not exercise. Dr. Boom Elizalde 3/11/2019:   · 3/11/2019-recommended surveillance follow-up as per NCCN guidelines. Recommended CT chest annually and CT abdomen every 6 months x3 years. Ct chest WO Contrast 6/13/19   4 mm nodule left upper lobe on image 15 is   completely stable compared to February 6, 2019. Otherwise this exam is   unremarkable.        Patient reports that they have been compliant with taking medications as directed. Past Medical History:   Diagnosis Date    Acute bilateral low back pain with bilateral sciatica 11/26/2019    Body mass index (bmi) 33.0-33.9, adult     CAD (coronary artery disease), native coronary artery     Class 2 obesity due to excess calories in adult 2/1/2018    GERD (gastroesophageal reflux disease)     Hypercholesteremia     Hypertension     Malignant neoplasm of right kidney, except renal pelvis (HCC)     Malignant neoplasm of right kidney, except renal pelvis ([address]        Past Surgical History:   Procedure Laterality Date    CARDIAC CATHETERIZATION  12/31/07, JDT    Left heart cath    CARDIAC CATHETERIZATION  12/31/07, JDT    Left heart cath    CARDIOVASCULAR STRESS TEST  06/11/2009, JDT    Stress Echo    CARDIOVASCULAR STRESS TEST  12/31/07, JDT    Stress Echo    CORONARY ARTERY BYPASS GRAFT      X4 utilizing the left MOLINA to the LAD, right MOLINA to the second diagonal, and vein grafts to the obtuse marinal and PLOM.  CORONARY ARTERY BYPASS GRAFT  01/02/08, Jesus Model CABG placing the LIMA to the LAD, MIGUELANGEL to the second diagonal branch and SVBG to the OMB and PMB.     KIDNEY REMOVAL Right 02/2019    KIDNEY SURGERY      Right Kidney removed    [place] COLONOSCOPY FLX DX W/COLLJ SPEC WHEN PFRMD N/A 10/4/2018    Dr KASSY Solorio-Diverticular disease, 10 yr recall       Social History     Socioeconomic History    Marital status:      Spouse name: None    Number of children: None    Years of education: None    Highest education level: None mouth 2 times daily       Cyanocobalamin 5000 MCG CAPS Take 5,000 mcg by mouth every morning       aspirin 81 MG tablet Take 81 mg by mouth nightly       Cholecalciferol (VITAMIN D3) 2000 units TABS Take 2,000 Units by mouth 2 times daily       lisinopril (PRINIVIL;ZESTRIL) 10 MG tablet Take 1 tablet by mouth 2 times daily 60 tablet 5    NIFEdipine (PROCARDIA XL) 60 MG extended release tablet Take 1 tablet by mouth daily (Patient taking differently: Take 60 mg by mouth every morning ) 30 tablet 5    allopurinol (ZYLOPRIM) 100 MG tablet Take 1 tablet by mouth 2 times daily 30 tablet 2     No current facility-administered medications for this visit. No Known Allergies    Lab Review not applicable  notapplicable    Subjective:   Review of Systems   Constitutional: Negative for activity change, appetite change, fatigue, fever and unexpected weight change. HENT: Negative for congestion, ear pain, nosebleeds, rhinorrhea, sore throat, trouble swallowing and voice change. Eyes: Negative for redness and visual disturbance. Respiratory: Negative for cough, chest tightness, shortness of breath and wheezing. Cardiovascular: Negative for chest pain, palpitations and leg swelling. Gastrointestinal: Positive for diarrhea (on and off) and nausea. Negative for abdominal pain, blood in stool, constipation and vomiting. Endocrine: Negative for polydipsia, polyphagia and polyuria. Genitourinary: Negative for dysuria, frequency and urgency. Decreased sex drive   Musculoskeletal: Positive for arthralgias and back pain (right hip pain). Negative for myalgias. Skin: Negative for rash and wound. Neurological: Negative for dizziness, speech difficulty, light-headedness and headaches. Psychiatric/Behavioral: Negative for agitation, confusion, self-injury and suicidal ideas. The patient is not nervous/anxious. Objective:         Physical Exam  Vitals signs and nursing note reviewed. MISSY Ibarra on 1/23/2020 at 2:46 PM    This dictation was generated by voice recognition computer software. Although all attempts are made to edit the dictation for accuracy, there may be errors in the transcription that are not intended.

## 2020-01-22 NOTE — PATIENT INSTRUCTIONS
Component      Latest Ref Rng & Units 1/21/2020 10/30/2019 8/22/2019           8:53 AM  1:16 PM  9:32 AM   Sodium      136 - 145 mmol/L 145 146 (H) 145   Potassium      3.5 - 5.0 mmol/L 4.6 4.1 4.5   Chloride      98 - 111 mmol/L 106 106 107   CO2      22 - 29 mmol/L 24 26 22   Anion Gap      7 - 19 mmol/L 15 14 16   Glucose      74 - 109 mg/dL 102 133 (H) 118 (H)   BUN      8 - 23 mg/dL 21 28 (H) 38 (H)   Creatinine      0.5 - 1.2 mg/dL 1.8 (H) 2.0 (H) 2.0 (H)   GFR Non-      >60 37 (A) 33 (A) 33 (A)   Calcium      8.8 - 10.2 mg/dL 9.7 9.6 9.9   Total Protein      6.6 - 8.7 g/dL 7.8 8.0 8.7   Albumin      3.5 - 5.2 g/dL 4.6 4.5 5.1   Bilirubin      0.2 - 1.2 mg/dL 0.5 0.3 0.3   Alk Phos      40 - 130 U/L 79 90 85   ALT      5 - 41 U/L 14 15 12   AST      5 - 40 U/L 17 17 16     Component      Latest Ref Rng & Units 1/21/2020 11/22/2019           8:53 AM  9:14 AM   Hemoglobin A1C      4.0 - 6.0 % 5.6 6.1     Begin pepcid 10 mg twice daily.

## 2020-01-23 PROBLEM — R91.1 PULMONARY NODULE: Status: ACTIVE | Noted: 2020-01-23

## 2020-01-23 ASSESSMENT — ENCOUNTER SYMPTOMS
CONSTIPATION: 0
DIARRHEA: 1
NAUSEA: 1

## 2020-02-14 ENCOUNTER — OFFICE VISIT (OUTPATIENT)
Dept: CARDIOLOGY | Age: 70
End: 2020-02-14
Payer: MEDICARE

## 2020-02-14 VITALS
WEIGHT: 237 LBS | HEART RATE: 60 BPM | DIASTOLIC BLOOD PRESSURE: 64 MMHG | SYSTOLIC BLOOD PRESSURE: 126 MMHG | OXYGEN SATURATION: 98 % | BODY MASS INDEX: 34.01 KG/M2

## 2020-02-14 PROCEDURE — G8427 DOCREV CUR MEDS BY ELIG CLIN: HCPCS | Performed by: NURSE PRACTITIONER

## 2020-02-14 PROCEDURE — G8482 FLU IMMUNIZE ORDER/ADMIN: HCPCS | Performed by: NURSE PRACTITIONER

## 2020-02-14 PROCEDURE — G8417 CALC BMI ABV UP PARAM F/U: HCPCS | Performed by: NURSE PRACTITIONER

## 2020-02-14 PROCEDURE — 1036F TOBACCO NON-USER: CPT | Performed by: NURSE PRACTITIONER

## 2020-02-14 PROCEDURE — 1123F ACP DISCUSS/DSCN MKR DOCD: CPT | Performed by: NURSE PRACTITIONER

## 2020-02-14 PROCEDURE — 3017F COLORECTAL CA SCREEN DOC REV: CPT | Performed by: NURSE PRACTITIONER

## 2020-02-14 PROCEDURE — 4040F PNEUMOC VAC/ADMIN/RCVD: CPT | Performed by: NURSE PRACTITIONER

## 2020-02-14 PROCEDURE — 93000 ELECTROCARDIOGRAM COMPLETE: CPT | Performed by: NURSE PRACTITIONER

## 2020-02-14 PROCEDURE — 99214 OFFICE O/P EST MOD 30 MIN: CPT | Performed by: NURSE PRACTITIONER

## 2020-02-14 NOTE — PROGRESS NOTES
[facility] Cardiology   Established Patient Office Visit   [facility]. [address]  [phone]        OFFICE VISIT:  2020    Kira Godwin - : 1950    Reason For Visit:  Moe Daley is a 79 y.o. male who is here for Follow-up; Hypertension; and Hyperlipidemia    1. Essential hypertension    2. Coronary artery disease involving native coronary artery of native heart without angina pectoris    3. Hyperlipidemia, unspecified hyperlipidemia type      Patient with a history of hypertension, coronary artery disease, hyperlipidemia, diabetes, chronic kidney disease, and history of nephrectomy. He is a patient of Dr. Cecily Oliveira. Patient presents to clinic today with complaint of chest discomfort. Patient states this started about 6 weeks ago. He is noting chest discomfort after he begins to walk within 2 minutes of the walk. As well as with going up steps. Pain is located in the center of his chest with occasional radiation to his arms. Chest pain is relieved with rest.  He has noted lately shortness of breath with activity as well. There is no diaphoresis or nausea. Bypass surgery about 12 years ago.     Subjective    Kira Godwin is a 79 y.o. male with the following history as recorded in [facility]:    Patient Active Problem List    Diagnosis Date Noted    Pulmonary nodule 2020    Body mass index (bmi) 33.0-33.9, adult     Body mass index (bmi) 34.0-34.9, adult  2019    Dysphagia 2019    Constipation 2019    S/p nephrectomy 2019    Acute bilateral low back pain with bilateral sciatica 2019    Need for prophylactic vaccination against Streptococcus pneumoniae (pneumococcus) 2019    Need for immunization against influenza 2019    Hypertension, essential, benign 2019    Bradycardia 2019    Vascular calcification 2018    Arthritis 2018    Chronic kidney disease, stage III (moderate) ([address] 05/11/2018    Type 2 diabetes mellitus with complication, without long-term current use of insulin (HCC) 05/11/2018    Hypercalcemia 05/11/2018    Gout 04/23/2018    Increased creatine kinase level 04/23/2018    Decreased GFR 04/23/2018    Decreased pedal pulses 04/23/2018    Elevated blood uric acid level 04/23/2018    Class 1 obesity with serious comorbidity and body mass index (BMI) of 34.0 to 34.9 in adult 02/01/2018    Hypercholesteremia     Hypertension     CAD (coronary artery disease), native coronary artery     GERD (gastroesophageal reflux disease)      Current Outpatient Medications   Medication Sig Dispense Refill    nebivolol (BYSTOLIC) 10 MG tablet Take 1 tablet by mouth every evening 30 tablet 2    atorvastatin (LIPITOR) 20 MG tablet Take 1 tablet by mouth daily 90 tablet 2    nitroGLYCERIN (NITROSTAT) 0.4 MG SL tablet Place 1 tablet under the tongue every 5 minutes as needed for Chest pain 25 tablet 3    famotidine (PEPCID) 10 MG tablet Take 1 tablet by mouth 2 times daily 60 tablet 1    Omega-3 Fatty Acids (FISH OIL) 1200 MG CAPS Take 1,200 mg by mouth 2 times daily       Cyanocobalamin 5000 MCG CAPS Take 5,000 mcg by mouth every morning       aspirin 81 MG tablet Take 81 mg by mouth nightly       Cholecalciferol (VITAMIN D3) 2000 units TABS Take 2,000 Units by mouth 2 times daily       lisinopril (PRINIVIL;ZESTRIL) 10 MG tablet Take 1 tablet by mouth 2 times daily 60 tablet 5    NIFEdipine (PROCARDIA XL) 60 MG extended release tablet Take 1 tablet by mouth daily (Patient taking differently: Take 60 mg by mouth every morning ) 30 tablet 5    allopurinol (ZYLOPRIM) 100 MG tablet Take 1 tablet by mouth 2 times daily 30 tablet 2     No current facility-administered medications for this visit. Allergies: Patient has no known allergies.   Past Medical History:   Diagnosis Date    Acute bilateral low back pain with bilateral sciatica 11/26/2019    Body mass index (bmi) 33.0-33.9, adult     CAD (coronary artery disease), native coronary artery     Class 2 obesity due to excess calories in adult 2018    GERD (gastroesophageal reflux disease)     Hypercholesteremia     Hypertension     Malignant neoplasm of right kidney, except renal pelvis (HCC)     Malignant neoplasm of right kidney, except renal pelvis [facility])      Past Surgical History:   Procedure Laterality Date    CARDIAC CATHETERIZATION  07, JDT    Left heart cath    CARDIAC CATHETERIZATION  07, JDT    Left heart cath    CARDIOVASCULAR STRESS TEST  2009, JDT    Stress Echo    CARDIOVASCULAR STRESS TEST  07, JDT    Stress Echo    CORONARY ARTERY BYPASS GRAFT      X4 utilizing the left MOLINA to the LAD, right MOLINA to the second diagonal, and vein grafts to the obtuse marinal and PLOM.  CORONARY ARTERY BYPASS GRAFT  08, Kaylah Freeman CABG placing the LIMA to the LAD, MIGUELANGEL to the second diagonal branch and SVBG to the OMB and PMB.     KIDNEY REMOVAL Right 2019    KIDNEY SURGERY      Right Kidney removed    [place] COLONOSCOPY FLX DX W/COLLJ SPEC WHEN PFRMD N/A 10/4/2018    Dr KASSY Solorio-Diverticular disease, 8 yr recall     Family History   Problem Relation Age of Onset   Sera [employer] Cancer Mother         lung    Coronary Art Dis Father     Stroke Father     Hypertension Father     High Blood Pressure Brother     Coronary Art Dis Paternal Grandmother     Coronary Art Dis Paternal Grandfather      Social History     Tobacco Use    Smoking status: Former Smoker     Packs/day: 2.00     Years: 32.00     Pack years: 64.00     Start date:      Last attempt to quit: 10/5/1998     Years since quittin.3    Smokeless tobacco: Former User   Substance Use Topics    Alcohol use: Yes     Comment: Very Little          Review of Systems:    General:      Complaint / Symptom Yes / No / Description if Yes       Fatigue No   Weight gain N/A   Insomnia N/A       Respiratory:        Complaint / Symptom Yes / No / Description if Yes       Cough No   Horseness N/A       Cardiovascular:    Complaint / Symptom Yes / No / Description if Yes       Chest Pain Yes: Exertional   Shortness of Air / Orthopnea Yes: Exertional   Presyncope / Syncope No   Palpitations No         Objective:    /64   Pulse 60   Wt 237 lb (107.5 kg)   SpO2 98%   BMI 34.01 kg/m²     GENERAL - well developed and well nourished, conversant  HEENT -  PERRLA, Hearing appears normal, gentleman lids are normal.  External inspection of ears and nose appear normal.  NECK - no thyromegaly, no JVD, trachea is in the midline  CARDIOVASCULAR - PMI is in the mid line clavicular position, Normal S1 and S2 with nho systolic murmur. No S3 or S4    PULMONARY - no respiratory distress. No wheezes or rales. Lungs are clear to ausculation, normal respiratory effort. ABDOMEN  - soft, non tender, no rebound  MUSCULOSKELETAL  - range of motion of the upper and lower extermites appears normal and equal and is without pain   EXTREMITIES - no significant edema   NEUROLOGIC - gait and station are normal  SKIN - turgor is normal, can warm and dry. PSYCHIATRIC - normal mood and affect, alert and orientated x 3,      ASSESSMENT:    ALL THE CARDIOLOGY PROBLEMS ARE LISTED ABOVE; HOWEVER, THE FOLLOWING SPECIFIC CARDIAC PROBLEMS / CONDITIONS WERE ADDRESSED AND TREATED DURING THE OFFICE VISIT TODAY:                                                                                            MEDICAL DECISION MAKING             Cardiac Specific Problem / Diagnosis  Discussion and Data Reviewed Diagnostic Procedures Ordered Management Options Selected           1. Chest pain  Initial Encounter   Review and summation of old records:    EKG in the office showing sinus bradycardia with a right bundle branch block. This is unchanged from previous EKG. Will order Chasidy Dobson.  Yes Continue current medications:     Yes           2.   CAD/CABG  Shows no change   Bypass surgery approximate 12 years ago. Patient is on statin, ACE, beta-blocker, aspirin. Will order Sandria Aus. Yes Continue current medications:    Yes           3. Hypertension Well Controlled  lead pressure in the office today 126/64. O2 sat 98%. No Continue current medications:       Yes           4. Hyperlipidemia Stable  managed by primary care provider. Patient is on Lipitor 20 mg daily. Fish oil twice daily. No Continue current medications:       Yes     Orders Placed This Encounter   Procedures    NM MYOCARDIAL SPECT REST EXERCISE OR RX     Standing Status:   Future     Standing Expiration Date:   2/14/2021     Order Specific Question:   Reason for Exam?     Answer:   Chest pain     Order Specific Question:   Procedure Type     Answer:   Rx     Order Specific Question:   Reason for exam:     Answer:   chest pain    EKG 12 lead     Order Specific Question:   Reason for Exam?     Answer: Other     No orders of the defined types were placed in this encounter. Discussed with patient. Return in about 2 weeks (around 2/28/2020) for results. I greatly appreciate the opportunity to meet Reinaldo Dickey and your confidence in allowing me to participate in his cardiovascular care. MISSY Peterson NP  2/14/2020 9:10 AM                    This dictation was generated by voice recognition computer software. Although all attempts are made to edit dictation for accuracy, there may be errors in the transcription that are not intended.

## 2020-02-19 DIAGNOSIS — I25.10 CORONARY ARTERY DISEASE INVOLVING NATIVE CORONARY ARTERY OF NATIVE HEART WITHOUT ANGINA PECTORIS: ICD-10-CM

## 2020-02-19 DIAGNOSIS — E11.8 TYPE 2 DIABETES MELLITUS WITH COMPLICATION, WITHOUT LONG-TERM CURRENT USE OF INSULIN (HCC): ICD-10-CM

## 2020-02-19 LAB
ALBUMIN SERPL-MCNC: 4.5 G/DL (ref 3.5–5.2)
ALP BLD-CCNC: 71 U/L (ref 40–130)
ALT SERPL-CCNC: 16 U/L (ref 5–41)
ANION GAP SERPL CALCULATED.3IONS-SCNC: 11 MMOL/L (ref 7–19)
AST SERPL-CCNC: 16 U/L (ref 5–40)
BILIRUB SERPL-MCNC: 0.5 MG/DL (ref 0.2–1.2)
BUN BLDV-MCNC: 28 MG/DL (ref 8–23)
CALCIUM SERPL-MCNC: 9.4 MG/DL (ref 8.8–10.2)
CHLORIDE BLD-SCNC: 105 MMOL/L (ref 98–111)
CO2: 27 MMOL/L (ref 22–29)
CREAT SERPL-MCNC: 1.8 MG/DL (ref 0.5–1.2)
GFR NON-AFRICAN AMERICAN: 37
GLUCOSE BLD-MCNC: 117 MG/DL (ref 74–109)
POTASSIUM SERPL-SCNC: 4.7 MMOL/L (ref 3.5–5)
SODIUM BLD-SCNC: 143 MMOL/L (ref 136–145)
TOTAL PROTEIN: 7.7 G/DL (ref 6.6–8.7)

## 2020-02-24 ENCOUNTER — HOSPITAL ENCOUNTER (OUTPATIENT)
Dept: NUCLEAR MEDICINE | Age: 70
Discharge: HOME | End: 2020-02-26
Payer: MEDICARE

## 2020-02-24 PROCEDURE — 3430000000 HC RX DIAGNOSTIC RADIOPHARMACEUTICAL: Performed by: NURSE PRACTITIONER

## 2020-02-24 PROCEDURE — 6360000002 HC RX W HCPCS: Performed by: INTERNAL MEDICINE

## 2020-02-24 PROCEDURE — A9500 TC99M SESTAMIBI: HCPCS | Performed by: NURSE PRACTITIONER

## 2020-02-24 PROCEDURE — 93017 CV STRESS TEST TRACING ONLY: CPT

## 2020-02-24 RX ADMIN — TETRAKIS(2-METHOXYISOBUTYLISOCYANIDE)COPPER(I) TETRAFLUOROBORATE 10 MILLICURIE: 1 INJECTION, POWDER, LYOPHILIZED, FOR SOLUTION INTRAVENOUS at 12:22

## 2020-02-24 RX ADMIN — REGADENOSON 0.4 MG: 0.08 INJECTION, SOLUTION INTRAVENOUS at 10:57

## 2020-02-24 RX ADMIN — TETRAKIS(2-METHOXYISOBUTYLISOCYANIDE)COPPER(I) TETRAFLUOROBORATE 30 MILLICURIE: 1 INJECTION, POWDER, LYOPHILIZED, FOR SOLUTION INTRAVENOUS at 12:22

## 2020-02-25 ENCOUNTER — OFFICE VISIT (OUTPATIENT)
Dept: PRIMARY CARE CLINIC | Age: 70
End: 2020-02-25
Payer: MEDICARE

## 2020-02-25 VITALS
DIASTOLIC BLOOD PRESSURE: 74 MMHG | BODY MASS INDEX: 34.07 KG/M2 | HEART RATE: 57 BPM | OXYGEN SATURATION: 99 % | HEIGHT: 70 IN | WEIGHT: 238 LBS | TEMPERATURE: 98.6 F | SYSTOLIC BLOOD PRESSURE: 126 MMHG

## 2020-02-25 PROCEDURE — 3044F HG A1C LEVEL LT 7.0%: CPT | Performed by: NURSE PRACTITIONER

## 2020-02-25 PROCEDURE — G8417 CALC BMI ABV UP PARAM F/U: HCPCS | Performed by: NURSE PRACTITIONER

## 2020-02-25 PROCEDURE — 99214 OFFICE O/P EST MOD 30 MIN: CPT | Performed by: NURSE PRACTITIONER

## 2020-02-25 PROCEDURE — 3017F COLORECTAL CA SCREEN DOC REV: CPT | Performed by: NURSE PRACTITIONER

## 2020-02-25 PROCEDURE — 4040F PNEUMOC VAC/ADMIN/RCVD: CPT | Performed by: NURSE PRACTITIONER

## 2020-02-25 PROCEDURE — 2022F DILAT RTA XM EVC RTNOPTHY: CPT | Performed by: NURSE PRACTITIONER

## 2020-02-25 PROCEDURE — G8427 DOCREV CUR MEDS BY ELIG CLIN: HCPCS | Performed by: NURSE PRACTITIONER

## 2020-02-25 PROCEDURE — G8482 FLU IMMUNIZE ORDER/ADMIN: HCPCS | Performed by: NURSE PRACTITIONER

## 2020-02-25 PROCEDURE — 20610 DRAIN/INJ JOINT/BURSA W/O US: CPT | Performed by: NURSE PRACTITIONER

## 2020-02-25 PROCEDURE — 1123F ACP DISCUSS/DSCN MKR DOCD: CPT | Performed by: NURSE PRACTITIONER

## 2020-02-25 PROCEDURE — 1036F TOBACCO NON-USER: CPT | Performed by: NURSE PRACTITIONER

## 2020-02-25 RX ORDER — PERMETHRIN 50 MG/G
CREAM TOPICAL
Qty: 1 G | Refills: 1 | Status: SHIPPED | OUTPATIENT
Start: 2020-02-25 | End: 2020-05-28 | Stop reason: ALTCHOICE

## 2020-02-25 RX ORDER — METHYLPREDNISOLONE ACETATE 40 MG/ML
40 INJECTION, SUSPENSION INTRA-ARTICULAR; INTRALESIONAL; INTRAMUSCULAR; SOFT TISSUE ONCE
Status: COMPLETED | OUTPATIENT
Start: 2020-02-25 | End: 2020-02-25

## 2020-02-25 RX ORDER — DEXAMETHASONE SODIUM PHOSPHATE 10 MG/ML
4 INJECTION INTRAMUSCULAR; INTRAVENOUS ONCE
Status: COMPLETED | OUTPATIENT
Start: 2020-02-25 | End: 2020-02-25

## 2020-02-25 RX ORDER — LIDOCAINE HYDROCHLORIDE 10 MG/ML
1 INJECTION, SOLUTION INFILTRATION; PERINEURAL ONCE
Status: COMPLETED | OUTPATIENT
Start: 2020-02-25 | End: 2020-02-26

## 2020-02-25 RX ORDER — BUPIVACAINE HYDROCHLORIDE 5 MG/ML
2 INJECTION, SOLUTION PERINEURAL ONCE
Status: COMPLETED | OUTPATIENT
Start: 2020-02-25 | End: 2020-02-25

## 2020-02-25 RX ADMIN — METHYLPREDNISOLONE ACETATE 40 MG: 40 INJECTION, SUSPENSION INTRA-ARTICULAR; INTRALESIONAL; INTRAMUSCULAR; SOFT TISSUE at 09:51

## 2020-02-25 RX ADMIN — LIDOCAINE HYDROCHLORIDE 1 ML: 10 INJECTION, SOLUTION INFILTRATION; PERINEURAL at 09:50

## 2020-02-25 RX ADMIN — DEXAMETHASONE SODIUM PHOSPHATE 4 MG: 10 INJECTION INTRAMUSCULAR; INTRAVENOUS at 09:51

## 2020-02-25 RX ADMIN — BUPIVACAINE HYDROCHLORIDE 10 MG: 5 INJECTION, SOLUTION PERINEURAL at 09:49

## 2020-02-25 ASSESSMENT — ENCOUNTER SYMPTOMS
DIARRHEA: 1
BLOOD IN STOOL: 0
RHINORRHEA: 0
COUGH: 0
TROUBLE SWALLOWING: 0
ABDOMINAL PAIN: 0
VOICE CHANGE: 0
BACK PAIN: 1
CONSTIPATION: 0
CHEST TIGHTNESS: 0
VOMITING: 0
SHORTNESS OF BREATH: 0
NAUSEA: 1
WHEEZING: 0
EYE REDNESS: 0
SORE THROAT: 0

## 2020-02-25 NOTE — PATIENT INSTRUCTIONS
Apply permetherin cream from head to toe at bedtime, Wash off in the morning. Repeat in one week. If you are still struggling with sciatic I will call in medrol dose pack. Kidney and lisinopril ? Improvement in Creat and GFR. I will let you know about Saba Scan     Patient Education        Sciatica: Exercises  Introduction  Here are some examples of typical rehabilitation exercises for your condition. Start each exercise slowly. Ease off the exercise if you start to have pain. Your doctor or physical therapist will tell you when you can start these exercises and which ones will work best for you. When you are not being active, find a comfortable position for rest. Some people are comfortable on the floor or a medium-firm bed with a small pillow under their head and another under their knees. Some people prefer to lie on their side with a pillow between their knees. Don't stay in one position for too long. Take short walks (10 to 20 minutes) every 2 to 3 hours. Avoid slopes, hills, and stairs until you feel better. Walk only distances you can manage without pain, especially leg pain. How to do the exercises  Back stretches   1. Get down on your hands and knees on the floor. 2. Relax your head and allow it to droop. Round your back up toward the ceiling until you feel a nice stretch in your upper, middle, and lower back. Hold this stretch for as long as it feels comfortable, or about 15 to 30 seconds. 3. Return to the starting position with a flat back while you are on your hands and knees. 4. Let your back sway by pressing your stomach toward the floor. Lift your buttocks toward the ceiling. 5. Hold this position for 15 to 30 seconds. 6. Repeat 2 to 4 times. Follow-up care is a key part of your treatment and safety. Be sure to make and go to all appointments, and call your doctor if you are having problems.  It's also a good idea to know your test results and keep a list of the medicines you

## 2020-02-25 NOTE — PROGRESS NOTES
to the LAD, MIGUELANGEL to the second diagonal branch and SVBG to the OMB and PMB.     KIDNEY REMOVAL Right 2019    KIDNEY SURGERY      Right Kidney removed    [place] COLONOSCOPY FLX DX W/COLLJ SPEC WHEN PFRMD N/A 10/4/2018    Dr KASSY Solorio-Diverticular disease, 10 yr recall       Social History     Socioeconomic History    Marital status:      Spouse name: None    Number of children: None    Years of education: None    Highest education level: None   Occupational History    None   Social Needs    Financial resource strain: None    Food insecurity:     Worry: None     Inability: None    Transportation needs:     Medical: None     Non-medical: None   Tobacco Use    Smoking status: Former Smoker     Packs/day: 2.00     Years: 32.00     Pack years: 64.00     Start date:      Last attempt to quit: 10/5/1998     Years since quittin.4    Smokeless tobacco: Former User   Substance and Sexual Activity    Alcohol use: Yes     Comment: Very Little    Drug use: Never    Sexual activity: None   Lifestyle    Physical activity:     Days per week: None     Minutes per session: None    Stress: None   Relationships    Social connections:     Talks on phone: None     Gets together: None     Attends [employer] service: None     Active member of club or organization: None     Attends meetings of clubs or organizations: None     Relationship status: None    Intimate partner violence:     Fear of current or ex partner: None     Emotionally abused: None     Physically abused: None     Forced sexual activity: None   Other Topics Concern    None   Social History Narrative    None       Family History   Problem Relation Age of Onset    Cancer Mother         lung    Coronary Art Dis Father     Stroke Father     Hypertension Father     High Blood Pressure Brother     Coronary Art Dis Paternal Grandmother     Coronary Art Dis Paternal Grandfather        Current Outpatient Medications   Medication Sig Dispense chest tightness, shortness of breath and wheezing. Cardiovascular: Negative for chest pain, palpitations and leg swelling. Gastrointestinal: Positive for diarrhea (on and off) and nausea. Negative for abdominal pain, blood in stool, constipation and vomiting. Endocrine: Negative for polydipsia, polyphagia and polyuria. Genitourinary: Negative for dysuria, frequency and urgency. Decreased sex drive   Musculoskeletal: Positive for arthralgias and back pain (right hip pain). Negative for myalgias. Skin: Positive for rash. Negative for wound. Neurological: Negative for dizziness, speech difficulty, light-headedness and headaches. Psychiatric/Behavioral: Negative for agitation, confusion, self-injury and suicidal ideas. The patient is not nervous/anxious. Objective:         Physical Exam  Vitals signs and nursing note reviewed. Constitutional:       General: He is not in acute distress. Appearance: He is well-developed. He is not diaphoretic. HENT:      Head: Normocephalic and atraumatic. Right Ear: External ear normal.      Left Ear: External ear normal.      Nose: Nose normal.      Mouth/Throat:      Pharynx: No oropharyngeal exudate. Eyes:      General:         Right eye: No discharge. Left eye: No discharge. Conjunctiva/sclera: Conjunctivae normal.      Pupils: Pupils are equal, round, and reactive to light. Neck:      Musculoskeletal: Normal range of motion and neck supple. Cardiovascular:      Rate and Rhythm: Normal rate and regular rhythm. Heart sounds: Normal heart sounds. No murmur. Pulmonary:      Effort: Pulmonary effort is normal. No respiratory distress. Breath sounds: Normal breath sounds. No stridor. No wheezing or rales. Chest:      Chest wall: No tenderness. Breasts:         Right: No inverted nipple, mass, nipple discharge, skin change or tenderness.          Left: No inverted nipple, mass, nipple discharge, skin change or tenderness. Abdominal:      General: Bowel sounds are normal. There is no distension. Palpations: Abdomen is soft. Tenderness: There is no abdominal tenderness. Musculoskeletal: Normal range of motion. General: No tenderness or deformity. Skin:     General: Skin is warm and dry. Findings: No erythema or rash. Neurological:      Mental Status: He is alert and oriented to person, place, and time. Cranial Nerves: No cranial nerve deficit. Coordination: Coordination normal.      Deep Tendon Reflexes: Reflexes are normal and symmetric. Psychiatric:         Behavior: Behavior normal.         Thought Content: Thought content normal.       /74   Pulse 57   Temp 98.6 °F (37 °C)   Ht 5' 10\" (1.778 m)   Wt 238 lb (108 kg)   SpO2 99%   BMI 34.15 kg/m²     SI JOINT INJECTION  Patient was given verbal informed consent and agreed verbally and with signed consent to the risks and benefits of procedure. Risks discussed included bleeding, infection, damage to structures, and potentially other yet unlikely unforeseen consequences of those risks. An impression was made with a pen for injection site right sacroilac joint. The area was cleaned w/ betadine and alcohol swab. It was then sprayed w/ ethyl chloride and injected w/ a 25 gauge 1.5\" needle into the the tissue between the sacrum and ilium  It was aspirated and no bloody return into syringe. The medicine was administered w/o issue. 3.0 cc of 1% lidocaine w/o epinephrine, marcaine 3ml, and 1.0 cc of 40 mg/mL depo, 4 dexamethasone was administered. A bandage was applied directly thereafter. All bleeding stopped. EBL - 0 cc. Pt was instructed on basic cleansing and rest of affected area. Pt noted some relief prior to leaving the office. Assessment:      Diagnosis Orders   1. Scabies  permethrin (ELIMITE) 5 % cream   2.  Acute right-sided low back pain with right-sided sciatica  lidocaine 1 % injection 1 mL you take. Where can you learn more? Go to [email or web address] org and sign in to your [facility] account. Enter F101 in the [employer] box to learn more about \"Sciatica: Exercises. \"     If you do not have an account, please click on the \"Sign Up Now\" link. Current as of: June 26, 2019  Content Version: 12.3  © 8413-8548 [employer]. Care instructions adapted under license by [facility] ([facility]). If you have questions about a medical condition or this instruction, always ask your healthcare professional. [place] [address] any warranty or liability for your use of this information. Patient Education        Sciatica: Care Instructions  Your Care Instructions    Sciatica (say \"jmq-AU-hg-kuh\") is an irritation of one of the sciatic nerves, which come from the spinal cord in the lower back. The sciatic nerves and their branches extend down through the buttock to the foot. Sciatica can develop when an injured disc in the back presses against a spinal nerve root. Its main symptom is pain, numbness, or weakness that is often worse in the leg or foot than in the back. Sciatica often will improve and go away with time. Early treatment usually includes medicines and exercises to relieve pain. Follow-up care is a key part of your treatment and safety. Be sure to make and go to all appointments, and call your doctor if you are having problems. It's also a good idea to know your test results and keep a list of the medicines you take. How can you care for yourself at home? · Take pain medicines exactly as directed. ? If the doctor gave you a prescription medicine for pain, take it as prescribed. ? If you are not taking a prescription pain medicine, ask your doctor if you can take an over-the-counter medicine. · Use heat or ice to relieve pain. ? To apply heat, put a warm water bottle, heating pad set on low, or warm cloth on your back.  Do not go to sleep with a that is symptoms worsen or persist they are to contact office or report to nearest ER. They voice understanding and agreement with this plan.   signed by MISSY Clements on 2/25/2020 at 2:46 PM    This dictation was generated by voice recognition computer software. Although all attempts are made to edit the dictation for accuracy, there may be errors in the transcription that are not intended.

## 2020-02-26 LAB
LV EF: 44 %
LVEF MODALITY: NORMAL

## 2020-03-06 ENCOUNTER — APPOINTMENT (OUTPATIENT)
Dept: GENERAL RADIOLOGY | Age: 70
End: 2020-03-06
Attending: INTERNAL MEDICINE
Payer: MEDICARE

## 2020-03-06 ENCOUNTER — HOSPITAL ENCOUNTER (OUTPATIENT)
Dept: CARDIAC CATH/INVASIVE PROCEDURES | Age: 70
Discharge: HOME | End: 2020-03-06
Attending: INTERNAL MEDICINE | Admitting: INTERNAL MEDICINE
Payer: MEDICARE

## 2020-03-06 VITALS
SYSTOLIC BLOOD PRESSURE: 156 MMHG | HEIGHT: 70 IN | HEART RATE: 60 BPM | BODY MASS INDEX: 34.07 KG/M2 | RESPIRATION RATE: 17 BRPM | DIASTOLIC BLOOD PRESSURE: 58 MMHG | WEIGHT: 238 LBS | TEMPERATURE: 98.6 F | OXYGEN SATURATION: 98 %

## 2020-03-06 LAB
ANION GAP SERPL CALCULATED.3IONS-SCNC: 11 MMOL/L (ref 7–19)
BUN BLDV-MCNC: 28 MG/DL (ref 8–23)
CALCIUM SERPL-MCNC: 9 MG/DL (ref 8.8–10.2)
CHLORIDE BLD-SCNC: 109 MMOL/L (ref 98–111)
CO2: 26 MMOL/L (ref 22–29)
CREAT SERPL-MCNC: 1.9 MG/DL (ref 0.5–1.2)
GFR NON-AFRICAN AMERICAN: 35
GLUCOSE BLD-MCNC: 111 MG/DL (ref 74–109)
HCT VFR BLD CALC: 39.7 % (ref 42–52)
HEMOGLOBIN: 12.6 G/DL (ref 14–18)
MCH RBC QN AUTO: 30.1 PG (ref 27–31)
MCHC RBC AUTO-ENTMCNC: 31.7 G/DL (ref 33–37)
MCV RBC AUTO: 95 FL (ref 80–94)
PDW BLD-RTO: 13.2 % (ref 11.5–14.5)
PLATELET # BLD: 173 K/UL (ref 130–400)
PMV BLD AUTO: 11.2 FL (ref 9.4–12.4)
POTASSIUM SERPL-SCNC: 4.3 MMOL/L (ref 3.5–5)
RBC # BLD: 4.18 M/UL (ref 4.7–6.1)
SODIUM BLD-SCNC: 146 MMOL/L (ref 136–145)
WBC # BLD: 8.6 K/UL (ref 4.8–10.8)

## 2020-03-06 PROCEDURE — 36415 COLL VENOUS BLD VENIPUNCTURE: CPT

## 2020-03-06 PROCEDURE — 2580000003 HC RX 258: Performed by: INTERNAL MEDICINE

## 2020-03-06 PROCEDURE — 80048 BASIC METABOLIC PNL TOTAL CA: CPT

## 2020-03-06 PROCEDURE — C1760 CLOSURE DEV, VASC: HCPCS

## 2020-03-06 PROCEDURE — 99152 MOD SED SAME PHYS/QHP 5/>YRS: CPT | Performed by: INTERNAL MEDICINE

## 2020-03-06 PROCEDURE — 85027 COMPLETE CBC AUTOMATED: CPT

## 2020-03-06 PROCEDURE — 93459 L HRT ART/GRFT ANGIO: CPT | Performed by: INTERNAL MEDICINE

## 2020-03-06 PROCEDURE — 6360000002 HC RX W HCPCS

## 2020-03-06 PROCEDURE — 99024 POSTOP FOLLOW-UP VISIT: CPT | Performed by: INTERNAL MEDICINE

## 2020-03-06 PROCEDURE — 6360000004 HC RX CONTRAST MEDICATION: Performed by: INTERNAL MEDICINE

## 2020-03-06 PROCEDURE — C1894 INTRO/SHEATH, NON-LASER: HCPCS

## 2020-03-06 PROCEDURE — 71046 X-RAY EXAM CHEST 2 VIEWS: CPT

## 2020-03-06 PROCEDURE — 2709999900 HC NON-CHARGEABLE SUPPLY

## 2020-03-06 RX ORDER — SODIUM CHLORIDE 0.9 % (FLUSH) 0.9 %
10 SYRINGE (ML) INJECTION PRN
Status: CANCELLED | OUTPATIENT
Start: 2020-03-06

## 2020-03-06 RX ORDER — SODIUM CHLORIDE 0.9 % (FLUSH) 0.9 %
10 SYRINGE (ML) INJECTION EVERY 12 HOURS SCHEDULED
Status: CANCELLED | OUTPATIENT
Start: 2020-03-06

## 2020-03-06 RX ORDER — IODIXANOL 320 MG/ML
250 INJECTION, SOLUTION INTRAVASCULAR
Status: COMPLETED | OUTPATIENT
Start: 2020-03-06 | End: 2020-03-06

## 2020-03-06 RX ORDER — SODIUM CHLORIDE 9 MG/ML
INJECTION, SOLUTION INTRAVENOUS CONTINUOUS
Status: DISCONTINUED | OUTPATIENT
Start: 2020-03-06 | End: 2020-03-06 | Stop reason: HOSPADM

## 2020-03-06 RX ADMIN — IODIXANOL 156 ML: 320 INJECTION, SOLUTION INTRAVASCULAR at 11:34

## 2020-03-06 RX ADMIN — SODIUM CHLORIDE: 9 INJECTION, SOLUTION INTRAVENOUS at 09:21

## 2020-03-06 NOTE — DISCHARGE SUMMARY
Hypertension; and Hyperlipidemia     1. Essential hypertension    2. Coronary artery disease involving native coronary artery of native heart without angina pectoris    3. Hyperlipidemia, unspecified hyperlipidemia type       Patient with a history of hypertension, coronary artery disease, hyperlipidemia, diabetes, chronic kidney disease, and history of nephrectomy.     He is a patient of Dr. Greta Santacruz.     Patient presents to clinic today with complaint of chest discomfort.  Patient states this started about 6 weeks ago. [facility] is noting chest discomfort after he begins to walk within 2 minutes of the walk.  As well as with going up steps. Jonathan Altamirano is located in the center of his chest with occasional radiation to his arms.  Chest pain is relieved with rest. [facility] has noted lately shortness of breath with activity as well.  There is no diaphoresis or nausea.  Bypass surgery about 12 years ago. \"     Additionally,  It was noted by the same observer at that time:    \"  1.   Chest pain  Initial Encounter    Review and summation of old records:     EKG in the office showing sinus bradycardia with a right bundle branch block.  This is unchanged from previous EKG.     Will order Abernathy Kalata. Yes Continue current medications:      Yes                 2.   CAD/CABG  Shows no change    Bypass surgery approximate 12 years ago. Ruben Hernandez is on statin, ACE, beta-blocker, aspirin.     Will order Abernathy Kalata. Yes Continue current medications:     Yes     \"    With these symptoms, the patient underwent the following diagnostic endevors with the following results:    2/26/2020  lexiscan Positive for inferior lateral and apical myocardial ischemia, EF 44%, 6% ischemic myocardium on stress, intermediate risk findings, AUC indication 16, AUC score 7, (MD Chantell)     He was electively admitted for cardiac catheterization.        Hospital Course:     After admission, the patient underwent cardiac catheterization according to the following criteria: achieved, the patient is hemodynamically stable and comfortable. Please remind the patient that driving is absolutely prohibited for the next day (24 hours) after this procedure. Additionally, caution should be exercised to avoid heights, swimming, tub baths, open flames, or heavy machinery.         Sarah Gaines MD

## 2020-03-06 NOTE — H&P
Acute bilateral low back pain with bilateral sciatica 11/26/2019    Need for prophylactic vaccination against Streptococcus pneumoniae (pneumococcus) 11/26/2019    Need for immunization against influenza 11/26/2019    Hypertension, essential, benign 01/13/2019    Bradycardia 01/13/2019    Vascular calcification 12/02/2018    Arthritis 12/02/2018    Chronic kidney disease, stage III (moderate) ([facility] [address] 05/11/2018    Type 2 diabetes mellitus with complication, without long-term current use of insulin (HCC) 05/11/2018    Hypercalcemia 05/11/2018    Gout 04/23/2018    Increased creatine kinase level 04/23/2018    Decreased GFR 04/23/2018    Decreased pedal pulses 04/23/2018    Elevated blood uric acid level 04/23/2018    Class 1 obesity with serious comorbidity and body mass index (BMI) of 34.0 to 34.9 in adult 02/01/2018    Hypercholesteremia      Hypertension      CAD (coronary artery disease), native coronary artery      GERD (gastroesophageal reflux disease)        Current Facility-Administered Medications          Current Outpatient Medications   Medication Sig Dispense Refill    nebivolol (BYSTOLIC) 10 MG tablet Take 1 tablet by mouth every evening 30 tablet 2    atorvastatin (LIPITOR) 20 MG tablet Take 1 tablet by mouth daily 90 tablet 2    nitroGLYCERIN (NITROSTAT) 0.4 MG SL tablet Place 1 tablet under the tongue every 5 minutes as needed for Chest pain 25 tablet 3    famotidine (PEPCID) 10 MG tablet Take 1 tablet by mouth 2 times daily 60 tablet 1    Omega-3 Fatty Acids (FISH OIL) 1200 MG CAPS Take 1,200 mg by mouth 2 times daily         Cyanocobalamin 5000 MCG CAPS Take 5,000 mcg by mouth every morning         aspirin 81 MG tablet Take 81 mg by mouth nightly         Cholecalciferol (VITAMIN D3) 2000 units TABS Take 2,000 Units by mouth 2 times daily         lisinopril (PRINIVIL;ZESTRIL) 10 MG tablet Take 1 tablet by mouth 2 times daily 60 tablet 5    NIFEdipine (PROCARDIA XL) 60 MG  Coronary Art Dis Paternal Grandmother      Coronary Art Dis Paternal Grandfather           Social History            Tobacco Use    Smoking status: Former Smoker       Packs/day: 2.00       Years: 32.00       Pack years: 64.00       Start date:        Last attempt to quit: 10/5/1998       Years since quittin.3    Smokeless tobacco: Former User   Substance Use Topics    Alcohol use: Yes       Comment: Very Little            Review of Systems:     General:      Complaint / Symptom Yes / No / Description if Yes         Fatigue No   Weight gain N/A   Insomnia N/A         Respiratory:         Complaint / Symptom Yes / No / Description if Yes         Cough No   Horseness N/A         Cardiovascular:     Complaint / Symptom Yes / No / Description if Yes         Chest Pain Yes: Exertional   Shortness of Air / Orthopnea Yes: Exertional   Presyncope / Syncope No   Palpitations No            Objective:     /64   Pulse 60   Wt 237 lb (107.5 kg)   SpO2 98%   BMI 34.01 kg/m²      GENERAL - well developed and well nourished, conversant  HEENT -  PERRLA, Hearing appears normal, gentleman lids are normal.  External inspection of ears and nose appear normal.  NECK - no thyromegaly, no JVD, trachea is in the midline  CARDIOVASCULAR - PMI is in the mid line clavicular position, Normal S1 and S2 with nho systolic murmur. No S3 or S4    PULMONARY - no respiratory distress. No wheezes or rales. Lungs are clear to ausculation, normal respiratory effort. ABDOMEN  - soft, non tender, no rebound  MUSCULOSKELETAL  - range of motion of the upper and lower extermites appears normal and equal and is without pain   EXTREMITIES - no significant edema   NEUROLOGIC - gait and station are normal  SKIN - turgor is normal, can warm and dry.   PSYCHIATRIC - normal mood and affect, alert and orientated x 3,        ASSESSMENT:     ALL THE CARDIOLOGY PROBLEMS ARE LISTED ABOVE; HOWEVER, THE FOLLOWING SPECIFIC CARDIAC PROBLEMS / CONDITIONS WERE ADDRESSED AND TREATED DURING THE OFFICE VISIT TODAY:                                                                                              MEDICAL DECISION MAKING                     Cardiac Specific Problem / Diagnosis   Discussion and Data Reviewed Diagnostic Procedures Ordered Management Options Selected                 1. Chest pain  Initial Encounter    Review and summation of old records:     EKG in the office showing sinus bradycardia with a right bundle branch block. This is unchanged from previous EKG.     Will order [place]. Yes Continue current medications:      Yes                 2. CAD/CABG  Shows no change    Bypass surgery approximate 12 years ago. Patient is on statin, ACE, beta-blocker, aspirin.     Will order [place]. Yes Continue current medications:     Yes                 3. Hypertension Well Controlled  lead pressure in the office today 126/64. O2 sat 98%. No Continue current medications:        Yes                 4. Hyperlipidemia Stable  managed by primary care provider. Patient is on Lipitor 20 mg daily. Fish oil twice daily. No Continue current medications:        Yes            Orders Placed This Encounter   Procedures    NM MYOCARDIAL SPECT REST EXERCISE OR RX       Standing Status:   Future       Standing Expiration Date:   2/14/2021       Order Specific Question:   Reason for Exam?       Answer:   Chest pain       Order Specific Question:   Procedure Type       Answer:   Rx       Order Specific Question:   Reason for exam:       Answer:   chest pain    EKG 12 lead       Order Specific Question:   Reason for Exam?       Answer:    Other        Encounter Medications    No orders of the defined types were placed in this encounter.         Discussed with patient.     Return in about 2 weeks (around 2/28/2020) for results.     I greatly appreciate the opportunity to meet Linda Sepulveda and your confidence in allowing me to participate in his cardiovascular care.     MISSY Young - NP  2/14/2020 9:10 AM                      This dictation was generated by voice recognition computer software. Although all attempts are made to edit dictation for accuracy, there may be errors in the transcription that are not intended. Date of the Proposed Procedure:  03/06/20     Proposed Procedure:  Selective left heart and coronary arteriography with selective engagement and injection of the bypass conduits with possible percutaneous coronary interventon, (femoral approach), 03/06/20      :  KILO Soni MD    Indications:  2/26/2020  lexiscan Positive for inferior lateral and apical myocardial ischemia, EF 44%, 6% ischemic myocardium on stress, intermediate risk findings, AUC indication 16, AUC score 7, (MD Chantell)    American Society of Anesthesiologists (ASA) Classification:  III    Plan of Sedation:  Moderate Sedation    Mallampati Classification:  II    I have examined this patient on 03/06/20  in CVI Holding Area # 8 in the presence of Nathaniel Lo RN and find no interval changes since the original History and Physical / Consult as noted written by Nicole LOUIS on 2/14/2020    With the constellation of symptoms and these findings, I recommend cardiac catheterization and possibility of percutaneous coronary intervention. I discussed with him  in detail  the risks, benefits and alternatives to this procedure. The risks mentioned to him include but are not limited to:  vascular complications in ~ 3%, stroke <1%, renal dysfunction <5%, myocardial infarction <1%, coronary dissection <1%, need for emergency open heart surgery <1, and death <1% . He appeared to understand, had no questions, and agreed to proceed with this plan. Additionally, there are risks associated with moderate sedation which includes transient drop in blood pressure and need for assisted ventilation.     This procedure is scheduled for 03/06/20

## 2020-03-06 NOTE — PROCEDURES
Anesthesia: Moderate   Sedation: 1 mg Midazolam (Versed)  0 mcg Fentanyl   Start time: 11:09   Stop time: 11:27   ASA Class: III   Estimated blood loss < 5 ml           Additionally, please review the \"Michelle Hemodynamic Procedure Report\", which is generated electronically through the [employer]. This report includes additional details regarding this procedure including, but not limited to:     1. Patient Data,   2. Admission,   3. Procedure,   4. Hemodynamics,   5. Vital Signs,   6. Medications, including conscious sedation medications given during the procedure (as noted above),   7. Procedure Log,   8. Device Usage,   9. Signature Audit [place], and,   10. Signatures. It should be noted, that I sign the \"Signatures\" line electronically through the \"HPF Def Portals\" tab on my computer. 2% lidocaine was injected above the common femoral artery. Utilizing ultrasound assistance and the Seldinger technique, the common femoral artery was cannulated and bright red pulsatile blood returned. Using fluoroscopy guidance, the J-tip wire was placed in the common femoral artery. A 6-Fr sheath was inserted. Utilizing 6-[place] Makenna catheters, selective coronary arteriography was performed followed by left ventriculography. The bypass conduits were selectively engaged and injected. At this stage, the equipment was removed. A right femoral arteriogram was performed to ensure patency of the vessel so that a vascular access closure device could be deployed. A 6-[place] Mynx vascular access closure device was then inserted. Hemostasis was achieved. A sterile dressing was applied. He was taken to his room in stable and satisfactory condition. The results of the procedure were discussed with the patient's family in there CVI holding room.      Complications:  none      Results:    Hemodynamics:      Site Pressure mmHg        Left ventricular pressure 201/12 mmHg   Left ventricular end-diastolic pressure (LVEDP) 22 mmHg Aortic pressure 188/75 mmHg   Mean aortic pressure 113 mmHg       Angiography:    Coronary Arteries:    Left Main Coronary Artery:  A large vessel which arises from the left sinus of Valsalva. It divides into the left anterior descending coronary artery and the left circumflex. There is a 99% stenosis in the distal portion of this vessel. Left Anterior Coronary Artery:  The LAD is a moderate sized vessel with several diagonal branches. There is mild diffuse disease throughout the entire length of the vessel. The left MOLINA is patent to the LAD and the right MOLINA is patent to the diagonal    Left Circumflex Coronary Artery:  The LCx is a moderate sized vessel with several marginal branches. There is mild diffuse disease throughout the entire length of the vessel. The vein graft to the obtuse marginal is closed at the aorta. Right Coronary Artery:  The RCA is a moderate sized dominant vessel which arises from the right sinus of Valsalva. There is severe diffuse disease, between greater than 80%, throughout the entire length of the vessel. The vein graft to the [facility] is patent. Internal Mammary Artery Angiography    Left internal mammary artery angiography:  The left MOLINA is grafted to the LAD. While mild luminal irregularities are identified, focal stenoses are not seen. Right internal mammary artery angiography:  The right MOLINA is grafted to the diagonal.  While mild luminal irregularities are identified, focal stenoses are not seen. Saphenous vein graft angiography    The patient has two saphenous vein grafts:    1. The saphenous vein graft is grafted to the obtuse marginal.  This vein graft is totally occluded (100%) at its origin from the aorta. 2.  The saphenous vein graft is grafted to the PLOM. While mild diffuse luminal irregularities are identified, focal stenoses are not seen. Left Ventriculogram:  The left ventriculogram is obtained in the right oblique projection. All regional wall segments move appropriately. The estimated visual ejection fraction is > 60%. There is no mitral regurgitation nor is there a pull back gradient seen across the aortic valve. Summary:      1. Successful femoral artery ultrasound  2. Successful femoral artery arteriogram  3. Supervision of the administration of moderate conscious sedation  4. Severe left main coronary artery disease with severe disease in the native RCA  5. Left ventricular function is normal  6. Patent left MOLINA to the LAD with mild distal disease  7. Patent right MOLINA to the diagonal with mild distal disease  8. Closed vein graft to the obtuse marginal  9. Patent vein graft to the PLOM      RECOMMENDATIONS:    1. Risk Factor Modification  2.   On-going Medical Therapy    Electronically signed by Zara Cleveland MD on 3/6/20

## 2020-04-30 LAB
ALBUMIN SERPL-MCNC: 4.5 G/DL (ref 3.5–5.2)
ALP BLD-CCNC: 76 U/L (ref 40–130)
ALT SERPL-CCNC: 16 U/L (ref 5–41)
ANION GAP SERPL CALCULATED.3IONS-SCNC: 16 MMOL/L (ref 7–19)
AST SERPL-CCNC: 16 U/L (ref 5–40)
BASOPHILS ABSOLUTE: 0.1 K/UL (ref 0–0.2)
BASOPHILS RELATIVE PERCENT: 0.8 % (ref 0–1)
BILIRUB SERPL-MCNC: 0.5 MG/DL (ref 0.2–1.2)
BILIRUBIN URINE: NEGATIVE
BLOOD, URINE: NEGATIVE
BUN BLDV-MCNC: 31 MG/DL (ref 8–23)
CALCIUM SERPL-MCNC: 9.5 MG/DL (ref 8.8–10.2)
CHLORIDE BLD-SCNC: 104 MMOL/L (ref 98–111)
CLARITY: CLEAR
CO2: 24 MMOL/L (ref 22–29)
COLOR: YELLOW
CREAT SERPL-MCNC: 2 MG/DL (ref 0.5–1.2)
CREATININE URINE: 138 MG/DL (ref 4.2–622)
EOSINOPHILS ABSOLUTE: 0.7 K/UL (ref 0–0.6)
EOSINOPHILS RELATIVE PERCENT: 9 % (ref 0–5)
GFR NON-AFRICAN AMERICAN: 33
GLUCOSE BLD-MCNC: 135 MG/DL (ref 74–109)
GLUCOSE URINE: NEGATIVE MG/DL
HCT VFR BLD CALC: 41 % (ref 42–52)
HEMOGLOBIN: 13 G/DL (ref 14–18)
IMMATURE GRANULOCYTES #: 0 K/UL
KETONES, URINE: NEGATIVE MG/DL
LEUKOCYTE ESTERASE, URINE: NEGATIVE
LYMPHOCYTES ABSOLUTE: 1.2 K/UL (ref 1.1–4.5)
LYMPHOCYTES RELATIVE PERCENT: 15.5 % (ref 20–40)
MAGNESIUM: 2 MG/DL (ref 1.6–2.4)
MCH RBC QN AUTO: 29.6 PG (ref 27–31)
MCHC RBC AUTO-ENTMCNC: 31.7 G/DL (ref 33–37)
MCV RBC AUTO: 93.4 FL (ref 80–94)
MONOCYTES ABSOLUTE: 0.9 K/UL (ref 0–0.9)
MONOCYTES RELATIVE PERCENT: 11.3 % (ref 0–10)
NEUTROPHILS ABSOLUTE: 4.8 K/UL (ref 1.5–7.5)
NEUTROPHILS RELATIVE PERCENT: 63.1 % (ref 50–65)
NITRITE, URINE: NEGATIVE
PARATHYROID HORMONE INTACT: 50.3 PG/ML (ref 15–65)
PDW BLD-RTO: 13 % (ref 11.5–14.5)
PH UA: 5.5 (ref 5–8)
PHOSPHORUS: 3.8 MG/DL (ref 2.5–4.5)
PLATELET # BLD: 165 K/UL (ref 130–400)
PMV BLD AUTO: 11.9 FL (ref 9.4–12.4)
POTASSIUM SERPL-SCNC: 4.3 MMOL/L (ref 3.5–5)
PROTEIN PROTEIN: 12 MG/DL (ref 15–45)
PROTEIN UA: NEGATIVE MG/DL
RBC # BLD: 4.39 M/UL (ref 4.7–6.1)
SODIUM BLD-SCNC: 144 MMOL/L (ref 136–145)
SPECIFIC GRAVITY UA: 1.02 (ref 1–1.03)
TOTAL PROTEIN: 7.5 G/DL (ref 6.6–8.7)
URIC ACID, SERUM: 6.2 MG/DL (ref 3.4–7)
UROBILINOGEN, URINE: 0.2 E.U./DL
VITAMIN D 25-HYDROXY: 70.7 NG/ML
WBC # BLD: 7.5 K/UL (ref 4.8–10.8)

## 2020-05-20 RX ORDER — ALLOPURINOL 100 MG/1
100 TABLET ORAL 2 TIMES DAILY
Qty: 30 TABLET | Refills: 2 | Status: SHIPPED | OUTPATIENT
Start: 2020-05-20 | End: 2020-07-30

## 2020-05-28 ENCOUNTER — OFFICE VISIT (OUTPATIENT)
Dept: PRIMARY CARE CLINIC | Age: 70
End: 2020-05-28
Payer: MEDICARE

## 2020-05-28 VITALS
WEIGHT: 242 LBS | SYSTOLIC BLOOD PRESSURE: 160 MMHG | DIASTOLIC BLOOD PRESSURE: 74 MMHG | HEIGHT: 70 IN | TEMPERATURE: 99.1 F | HEART RATE: 57 BPM | OXYGEN SATURATION: 98 % | BODY MASS INDEX: 34.65 KG/M2

## 2020-05-28 PROCEDURE — 3017F COLORECTAL CA SCREEN DOC REV: CPT | Performed by: NURSE PRACTITIONER

## 2020-05-28 PROCEDURE — 3044F HG A1C LEVEL LT 7.0%: CPT | Performed by: NURSE PRACTITIONER

## 2020-05-28 PROCEDURE — 1036F TOBACCO NON-USER: CPT | Performed by: NURSE PRACTITIONER

## 2020-05-28 PROCEDURE — G8417 CALC BMI ABV UP PARAM F/U: HCPCS | Performed by: NURSE PRACTITIONER

## 2020-05-28 PROCEDURE — 2022F DILAT RTA XM EVC RTNOPTHY: CPT | Performed by: NURSE PRACTITIONER

## 2020-05-28 PROCEDURE — 4040F PNEUMOC VAC/ADMIN/RCVD: CPT | Performed by: NURSE PRACTITIONER

## 2020-05-28 PROCEDURE — G8427 DOCREV CUR MEDS BY ELIG CLIN: HCPCS | Performed by: NURSE PRACTITIONER

## 2020-05-28 PROCEDURE — 99214 OFFICE O/P EST MOD 30 MIN: CPT | Performed by: NURSE PRACTITIONER

## 2020-05-28 PROCEDURE — 1123F ACP DISCUSS/DSCN MKR DOCD: CPT | Performed by: NURSE PRACTITIONER

## 2020-05-28 RX ORDER — LISINOPRIL 10 MG/1
20 TABLET ORAL 2 TIMES DAILY
Qty: 60 TABLET | Refills: 1
Start: 2020-05-28 | End: 2020-09-17

## 2020-05-28 RX ORDER — ISOSORBIDE MONONITRATE 30 MG/1
30 TABLET, EXTENDED RELEASE ORAL NIGHTLY
Qty: 30 TABLET | Refills: 1 | Status: SHIPPED | OUTPATIENT
Start: 2020-05-28 | End: 2020-07-02 | Stop reason: SDUPTHER

## 2020-05-29 PROBLEM — I25.10 CORONARY ARTERY DISEASE INVOLVING LEFT MAIN CORONARY ARTERY: Status: ACTIVE | Noted: 2020-05-29

## 2020-05-29 PROBLEM — R07.9 CHEST PAIN: Status: ACTIVE | Noted: 2020-05-29

## 2020-05-29 PROBLEM — T82.898A BLOCKAGE OF CORONARY ARTERY BYPASS VEIN GRAFT (HCC): Status: ACTIVE | Noted: 2020-05-29

## 2020-05-29 PROBLEM — R06.00 DYSPNEA: Status: ACTIVE | Noted: 2020-05-29

## 2020-05-29 ASSESSMENT — ENCOUNTER SYMPTOMS
NAUSEA: 0
DIARRHEA: 0
SHORTNESS OF BREATH: 1
VOMITING: 0
CONSTIPATION: 0
TROUBLE SWALLOWING: 0
SORE THROAT: 0
COUGH: 0
WHEEZING: 0
ABDOMINAL PAIN: 0
BLOOD IN STOOL: 0
RHINORRHEA: 0
VOICE CHANGE: 0
CHEST TIGHTNESS: 0
EYE REDNESS: 0

## 2020-06-15 ENCOUNTER — HOSPITAL ENCOUNTER (OUTPATIENT)
Dept: CT IMAGING | Age: 70
Discharge: HOME | End: 2020-06-15
Payer: MEDICARE

## 2020-06-15 PROCEDURE — 71250 CT THORAX DX C-: CPT

## 2020-06-15 PROCEDURE — 74176 CT ABD & PELVIS W/O CONTRAST: CPT

## 2020-06-16 ENCOUNTER — OFFICE VISIT (OUTPATIENT)
Dept: CARDIOTHORACIC SURGERY | Age: 70
End: 2020-06-16
Payer: MEDICARE

## 2020-06-16 VITALS
SYSTOLIC BLOOD PRESSURE: 163 MMHG | WEIGHT: 247 LBS | RESPIRATION RATE: 18 BRPM | BODY MASS INDEX: 35.36 KG/M2 | HEIGHT: 70 IN | HEART RATE: 59 BPM | OXYGEN SATURATION: 96 % | DIASTOLIC BLOOD PRESSURE: 74 MMHG

## 2020-06-16 PROCEDURE — 3017F COLORECTAL CA SCREEN DOC REV: CPT | Performed by: THORACIC SURGERY (CARDIOTHORACIC VASCULAR SURGERY)

## 2020-06-16 PROCEDURE — 1036F TOBACCO NON-USER: CPT | Performed by: THORACIC SURGERY (CARDIOTHORACIC VASCULAR SURGERY)

## 2020-06-16 PROCEDURE — G8417 CALC BMI ABV UP PARAM F/U: HCPCS | Performed by: THORACIC SURGERY (CARDIOTHORACIC VASCULAR SURGERY)

## 2020-06-16 PROCEDURE — G8427 DOCREV CUR MEDS BY ELIG CLIN: HCPCS | Performed by: THORACIC SURGERY (CARDIOTHORACIC VASCULAR SURGERY)

## 2020-06-16 PROCEDURE — 99204 OFFICE O/P NEW MOD 45 MIN: CPT | Performed by: THORACIC SURGERY (CARDIOTHORACIC VASCULAR SURGERY)

## 2020-06-16 PROCEDURE — 4040F PNEUMOC VAC/ADMIN/RCVD: CPT | Performed by: THORACIC SURGERY (CARDIOTHORACIC VASCULAR SURGERY)

## 2020-06-16 PROCEDURE — 1123F ACP DISCUSS/DSCN MKR DOCD: CPT | Performed by: THORACIC SURGERY (CARDIOTHORACIC VASCULAR SURGERY)

## 2020-06-17 NOTE — PROGRESS NOTES
Pito Reina   1950 6/18/2020     Chief Complaint   Patient presents with    Follow-up     Malignant neoplasm of right kidney, except renal pelvis         INTERVAL HISTORY/HISTORY OF PRESENT ILLNESS:  Diagnosis   Papillary renal cell carcinoma, August 2018  High-grade  Left kidney cyst  CKD stage III  Treatment summary  2/21/2019-right radical nephrectomy @ [facility]  Interval history  The patient presents today for a surveillance follow-up visit. He denies any GI symptoms. He denies any respiratory symptoms. He is doing well. He denies any hematuria. Cancer history  Mr Betsy Gutiérrez was referred for a diagnosis of daily mass concerning for RCC. He is currently being seen by Dr. Shad Mariano at [facility] with a planned surgery. He presented initially with right flank pain. 8/11/2018-CT abdomen without contrast showed a 6.6 cm hyperdense right renal lesion. An exophytic left renal lesion noted and measured 1.9 cm. 10/9/2018-ultrasound showed a 6 cm heterogeneous solid appearing lesion at the lower pole of the right kidney. 10/17/2018-CT abdomen and pelvis with contrast showed a 6 x 6 x 6.5 cm cystic mass arising from the lower right renal pole. 3 small cysts of the left kidney, largest measuring 2.1 cm.  12/18/2018-MRI of the abdomen with contrast at [facility] showed a right lower pole renal mass extending to the hilar line measuring 6.7 x 6.4 x 7.2 cm, suspicious for cystic renal cell carcinoma. 12/19/2018-Dr. Shad Mariano recommended surgery scheduled for February 21, 2019.   1/27/2019-she was first seen by me. 2/6/2019-CT chest without contrast was unremarkable, except for granulomatous disease.  2/21/2019-right radical nephrectomy by Dr. Shad Mariano at [facility] revealing a 6.4 cm with multifocal, high-grade (grade 3), papillary renal cell carcinoma with marked central cystic changes and hemorrhage.  Final pathologic staging [id number], stage I.  3/11/2019-recommended surveillance follow-up as per for follow-up. Diagnoses and all orders for this visit:    Malignant neoplasm of right kidney, except renal pelvis [facility])    Pulmonary nodule    Encounter for follow-up surveillance of kidney cancer    Essential hypertension    Anemia due to stage 3 chronic kidney disease (HCC)       Papillary renal cell carcinoma stage I  S/p right nephrectomy at [facility] by Dr. Thao Dominguez on 2/21/2019  As per NCCN guidelines:  H&P every 6 months for 2 years, then annually after 5 years  CT abdomen within 3-12 months of surgery, then at the physician's discretion  CT chest annually up to 3 years ( next February 2020)    Chronic kidney disease-secondary to prior nephrectomy. The patient also has a history of hypertension, diabetes. He was counseled regarding avoiding NSAIDs any street control of his hypertension and diabetes. He follows up with nephrology. Last Cr 2.0    CKD related anemia-hemoglobin 12.7. We will continue to watch. Hypertension-uncontrolled. Follow up with primary care physician    Diabetes-stable follow-up with primary care physician. Mild normocytic anemia-hemoglobin 13.1 next Monday. Health maintenance  - The patient is to follow-up with primary care for further recommendation regarding age appropriate screening, well-being visit, follow-up and treatment of other medical comorbidities. Plan:  CT chest, abdomen June 2021  RTC 6 months        I have seen, examined and reviewed this patient medication list, appropriate labs and imaging studies. I reviewed relevant medical records and others physicians notes. I discussed the plans of care with the patient. I answered all the questions to the patients satisfaction  (Please note that portions of this note were completed with a voice recognition program. Efforts were made to edit the dictations but occasionally words are mis-transcribed.)      Follow Up:     Return in about 6 months (around 12/18/2020) for an appt with Dr. Jefferson Littlejohn.    Data Hamilton Penaloza, am scribing for Belle Ramirez MD. Electronically signed by Angie Valdivia on 6/18/2020 at 11:52 AM.     I, Dr Eulah Simmonds, personally performed the services described in this documentation as scribed by Angie Valdivia MA in my presence and is both accurate and complete. Over 50% of the total visit time of 15 minutes in face to face encounter with the patient, out of which more than 50% of the time was spent in counseling patient or family and coordination of care. Counseling included but was not limited to time spent reviewing labs, imaging studies/ treatment plan and answering questions.

## 2020-06-17 NOTE — PROGRESS NOTES
disease as his mid and distal vessels are \"riddled\" with disease. I reassured him that he does not have any critical occlusive lesions. There is nothing that requires any intervention at this time. Documentation is to continue with medical management he is only on 30 mg of Imdur this can certainly be increased he is also on a beta-blocker this can be increased as well . Another option is to start Ranexa if his symptoms increase. At this time his symptoms of angina have not been lifestyle limiting he has not even taken any nitroglycerin tablets therefore medical management is going to be his best option. I discussed all this at length with the patient he appears to understand why he is not a candidate for further intervention he was relieved that his small vessel disease is present but could be managed with medical treatment.   I will make an effort to be sure that he does have a follow-up appointment to see Dr. Liborio Quintero as his follow-up was disrupted due to the COVID-19 disruption in office schedules    Electronically signed by Adiel Dotson MD on 6/17/2020 at 8:38 AM

## 2020-06-18 ENCOUNTER — OFFICE VISIT (OUTPATIENT)
Dept: HEMATOLOGY | Age: 70
End: 2020-06-18
Payer: MEDICARE

## 2020-06-18 ENCOUNTER — HOSPITAL ENCOUNTER (OUTPATIENT)
Dept: INFUSION THERAPY | Age: 70
Discharge: HOME | End: 2020-06-18
Payer: MEDICARE

## 2020-06-18 VITALS
HEIGHT: 70 IN | BODY MASS INDEX: 35.59 KG/M2 | OXYGEN SATURATION: 95 % | WEIGHT: 248.6 LBS | HEART RATE: 57 BPM | SYSTOLIC BLOOD PRESSURE: 148 MMHG | TEMPERATURE: 99 F | DIASTOLIC BLOOD PRESSURE: 64 MMHG

## 2020-06-18 DIAGNOSIS — C64.1 MALIGNANT NEOPLASM OF RIGHT KIDNEY, EXCEPT RENAL PELVIS (HCC): ICD-10-CM

## 2020-06-18 LAB
BASOPHILS ABSOLUTE: 0.03 K/UL (ref 0.01–0.08)
BASOPHILS RELATIVE PERCENT: 0.4 % (ref 0.1–1.2)
EOSINOPHILS ABSOLUTE: 0.39 K/UL (ref 0.04–0.54)
EOSINOPHILS RELATIVE PERCENT: 5.5 % (ref 0.7–7)
HCT VFR BLD CALC: 40.2 % (ref 40.1–51)
HEMOGLOBIN: 12.7 G/DL (ref 13.7–17.5)
LYMPHOCYTES ABSOLUTE: 1.15 K/UL (ref 1.18–3.74)
LYMPHOCYTES RELATIVE PERCENT: 16.2 % (ref 19.3–53.1)
MCH RBC QN AUTO: 30.2 PG (ref 25.7–32.2)
MCHC RBC AUTO-ENTMCNC: 31.6 G/DL (ref 32.3–36.5)
MCV RBC AUTO: 95.5 FL (ref 79–92.2)
MONOCYTES ABSOLUTE: 0.87 K/UL (ref 0.24–0.82)
MONOCYTES RELATIVE PERCENT: 12.3 % (ref 4.7–12.5)
NEUTROPHILS ABSOLUTE: 4.65 K/UL (ref 1.56–6.13)
NEUTROPHILS RELATIVE PERCENT: 65.6 % (ref 34–71.1)
PDW BLD-RTO: 12.7 % (ref 11.6–14.4)
PLATELET # BLD: 157 K/UL (ref 163–337)
PMV BLD AUTO: 11.3 FL (ref 7.4–10.4)
RBC # BLD: 4.21 M/UL (ref 4.63–6.08)
WBC # BLD: 7.09 K/UL (ref 4.23–9.07)

## 2020-06-18 PROCEDURE — 4040F PNEUMOC VAC/ADMIN/RCVD: CPT | Performed by: INTERNAL MEDICINE

## 2020-06-18 PROCEDURE — 1036F TOBACCO NON-USER: CPT | Performed by: INTERNAL MEDICINE

## 2020-06-18 PROCEDURE — 1123F ACP DISCUSS/DSCN MKR DOCD: CPT | Performed by: INTERNAL MEDICINE

## 2020-06-18 PROCEDURE — 99211 OFF/OP EST MAY X REQ PHY/QHP: CPT

## 2020-06-18 PROCEDURE — 99213 OFFICE O/P EST LOW 20 MIN: CPT | Performed by: INTERNAL MEDICINE

## 2020-06-18 PROCEDURE — G8417 CALC BMI ABV UP PARAM F/U: HCPCS | Performed by: INTERNAL MEDICINE

## 2020-06-18 PROCEDURE — 85025 COMPLETE CBC W/AUTO DIFF WBC: CPT

## 2020-06-18 PROCEDURE — G8427 DOCREV CUR MEDS BY ELIG CLIN: HCPCS | Performed by: INTERNAL MEDICINE

## 2020-06-18 PROCEDURE — 3017F COLORECTAL CA SCREEN DOC REV: CPT | Performed by: INTERNAL MEDICINE

## 2020-06-29 ENCOUNTER — APPOINTMENT (OUTPATIENT)
Dept: CT IMAGING | Age: 70
End: 2020-06-29
Payer: MEDICARE

## 2020-06-29 ENCOUNTER — HOSPITAL ENCOUNTER (EMERGENCY)
Age: 70
Discharge: HOME | End: 2020-06-29
Payer: MEDICARE

## 2020-06-29 VITALS
HEIGHT: 70 IN | WEIGHT: 248 LBS | BODY MASS INDEX: 35.5 KG/M2 | RESPIRATION RATE: 16 BRPM | TEMPERATURE: 99 F | OXYGEN SATURATION: 98 % | SYSTOLIC BLOOD PRESSURE: 151 MMHG | HEART RATE: 79 BPM | DIASTOLIC BLOOD PRESSURE: 77 MMHG

## 2020-06-29 LAB
ALBUMIN SERPL-MCNC: 5.1 G/DL (ref 3.5–5.2)
ALP BLD-CCNC: 79 U/L (ref 40–130)
ALT SERPL-CCNC: 14 U/L (ref 5–41)
ANION GAP SERPL CALCULATED.3IONS-SCNC: 13 MMOL/L (ref 7–19)
AST SERPL-CCNC: 18 U/L (ref 5–40)
BACTERIA: NEGATIVE /HPF
BASOPHILS ABSOLUTE: 0.1 K/UL (ref 0–0.2)
BASOPHILS RELATIVE PERCENT: 0.5 % (ref 0–1)
BILIRUB SERPL-MCNC: 0.4 MG/DL (ref 0.2–1.2)
BILIRUBIN URINE: NEGATIVE
BLOOD, URINE: ABNORMAL
BUN BLDV-MCNC: 28 MG/DL (ref 8–23)
CALCIUM SERPL-MCNC: 9.9 MG/DL (ref 8.8–10.2)
CHLORIDE BLD-SCNC: 104 MMOL/L (ref 98–111)
CLARITY: CLEAR
CO2: 26 MMOL/L (ref 22–29)
COLOR: YELLOW
CREAT SERPL-MCNC: 2 MG/DL (ref 0.5–1.2)
EOSINOPHILS ABSOLUTE: 0.4 K/UL (ref 0–0.6)
EOSINOPHILS RELATIVE PERCENT: 3.6 % (ref 0–5)
EPITHELIAL CELLS, UA: 0 /HPF (ref 0–5)
GFR NON-AFRICAN AMERICAN: 33
GLUCOSE BLD-MCNC: 116 MG/DL (ref 74–109)
GLUCOSE URINE: NEGATIVE MG/DL
HCT VFR BLD CALC: 43.2 % (ref 42–52)
HEMOGLOBIN: 14.5 G/DL (ref 14–18)
HYALINE CASTS: 0 /HPF (ref 0–8)
IMMATURE GRANULOCYTES #: 0 K/UL
KETONES, URINE: NEGATIVE MG/DL
LEUKOCYTE ESTERASE, URINE: NEGATIVE
LIPASE: 64 U/L (ref 13–60)
LYMPHOCYTES ABSOLUTE: 0.9 K/UL (ref 1.1–4.5)
LYMPHOCYTES RELATIVE PERCENT: 9.4 % (ref 20–40)
MCH RBC QN AUTO: 30.3 PG (ref 27–31)
MCHC RBC AUTO-ENTMCNC: 33.6 G/DL (ref 33–37)
MCV RBC AUTO: 90.4 FL (ref 80–94)
MONOCYTES ABSOLUTE: 0.6 K/UL (ref 0–0.9)
MONOCYTES RELATIVE PERCENT: 6.3 % (ref 0–10)
NEUTROPHILS ABSOLUTE: 7.9 K/UL (ref 1.5–7.5)
NEUTROPHILS RELATIVE PERCENT: 79.8 % (ref 50–65)
NITRITE, URINE: NEGATIVE
PDW BLD-RTO: 13 % (ref 11.5–14.5)
PH UA: 6.5 (ref 5–8)
PLATELET # BLD: 175 K/UL (ref 130–400)
PMV BLD AUTO: 11.7 FL (ref 9.4–12.4)
POTASSIUM SERPL-SCNC: 4.5 MMOL/L (ref 3.5–5)
PROTEIN UA: ABNORMAL MG/DL
RBC # BLD: 4.78 M/UL (ref 4.7–6.1)
RBC UA: 2 /HPF (ref 0–4)
SODIUM BLD-SCNC: 143 MMOL/L (ref 136–145)
SPECIFIC GRAVITY UA: 1.01 (ref 1–1.03)
TOTAL PROTEIN: 8.3 G/DL (ref 6.6–8.7)
UROBILINOGEN, URINE: 0.2 E.U./DL
WBC # BLD: 9.9 K/UL (ref 4.8–10.8)
WBC UA: 0 /HPF (ref 0–5)

## 2020-06-29 PROCEDURE — 99284 EMERGENCY DEPT VISIT MOD MDM: CPT

## 2020-06-29 PROCEDURE — 36415 COLL VENOUS BLD VENIPUNCTURE: CPT

## 2020-06-29 PROCEDURE — 6360000002 HC RX W HCPCS: Performed by: NURSE PRACTITIONER

## 2020-06-29 PROCEDURE — 85025 COMPLETE CBC W/AUTO DIFF WBC: CPT

## 2020-06-29 PROCEDURE — 83690 ASSAY OF LIPASE: CPT

## 2020-06-29 PROCEDURE — 80053 COMPREHEN METABOLIC PANEL: CPT

## 2020-06-29 PROCEDURE — 74176 CT ABD & PELVIS W/O CONTRAST: CPT

## 2020-06-29 PROCEDURE — 51798 US URINE CAPACITY MEASURE: CPT

## 2020-06-29 PROCEDURE — 2580000003 HC RX 258: Performed by: NURSE PRACTITIONER

## 2020-06-29 PROCEDURE — 81001 URINALYSIS AUTO W/SCOPE: CPT

## 2020-06-29 RX ORDER — MORPHINE SULFATE 4 MG/ML
4 INJECTION, SOLUTION INTRAMUSCULAR; INTRAVENOUS ONCE
Status: DISCONTINUED | OUTPATIENT
Start: 2020-06-29 | End: 2020-06-29 | Stop reason: HOSPADM

## 2020-06-29 RX ORDER — 0.9 % SODIUM CHLORIDE 0.9 %
1000 INTRAVENOUS SOLUTION INTRAVENOUS ONCE
Status: COMPLETED | OUTPATIENT
Start: 2020-06-29 | End: 2020-06-29

## 2020-06-29 RX ADMIN — SODIUM CHLORIDE 1000 ML: 9 INJECTION, SOLUTION INTRAVENOUS at 15:46

## 2020-06-29 ASSESSMENT — PAIN SCALES - GENERAL: PAINLEVEL_OUTOF10: 7

## 2020-06-29 ASSESSMENT — ENCOUNTER SYMPTOMS
CONSTIPATION: 1
ABDOMINAL PAIN: 1

## 2020-06-29 NOTE — ED PROVIDER NOTES
[facility]  eMERGENCY dEPARTMENT eNCOUnter      Pt Name: Gely Burton  MRN: [id number]  [place] 1950  Date of evaluation: 6/29/2020  Provider: MISSY Ortiz    CHIEF COMPLAINT       Chief Complaint   Patient presents with    Dysuria    Constipation         HISTORY OF PRESENT ILLNESS   (Location/Symptom, Timing/Onset,Context/Setting, Quality, Duration, Modifying Factors, Severity)  Note limiting factors. Yue Colin a 79 y.o. male who presents to the emergency department for evaluation of dysuria and constipation. Pt tells me that he has had problems with constipation today relating that he can't recall his last bowel movement thinks maybe 3 days ago. He relates that he has had lower abdominal pain that began this morning described as constant and moderately severe. He relates that he has drank a half of a bottle of mag citrate today without improvement. He denies to me problems urinating. He tells me that he has had mild burning with urination today. He has had no fevers or vomiting. He denies melena as well as hematochezia. He tells me that he has a colonoscopy 3 years ago having had benign polyps removed per patient. He gives history of nephrectomy for renal cancer. HPI    Nursing Notes were reviewed. REVIEW OF SYSTEMS    (2-9 systems for level 4, 10 or more for level 5)     Review of Systems   Constitutional: Negative. Gastrointestinal: Positive for abdominal pain and constipation. All other systems reviewed and are negative. A complete review of systems was performed and is negative except as noted above in the HPI.        PAST MEDICAL HISTORY     Past Medical History:   Diagnosis Date    Acute bilateral low back pain with bilateral sciatica 11/26/2019    Body mass index (bmi) 33.0-33.9, adult     CAD (coronary artery disease), native coronary artery     Class 2 obesity due to excess calories in adult 2/1/2018    GERD (gastroesophageal reflux disease)     Oriented  Best Motor Response: Obeys commands  Anita Coma Scale Score: 15        PHYSICAL EXAM    (up to 7 for level 4, 8 or more for level 5)     ED Triage Vitals   BP Temp Temp Source Pulse Resp SpO2 Height Weight   06/29/20 1438 06/29/20 1437 06/29/20 1437 06/29/20 1437 06/29/20 1437 06/29/20 1437 06/29/20 1437 06/29/20 1437   (!) 169/86 99 °F (37.2 °C) Oral 71 16 97 % 5' 10\" (1.778 m) 248 lb (112.5 kg)       Physical Exam  Vitals signs reviewed. HENT:      Head: Normocephalic. Right Ear: External ear normal.      Left Ear: External ear normal.   Eyes:      Conjunctiva/sclera: Conjunctivae normal.      Pupils: Pupils are equal, round, and reactive to light. Neck:      Musculoskeletal: Normal range of motion. Cardiovascular:      Rate and Rhythm: Normal rate and regular rhythm. Heart sounds: Normal heart sounds. Pulmonary:      Effort: Pulmonary effort is normal.      Breath sounds: Normal breath sounds. Abdominal:      General: Bowel sounds are normal.      Palpations: Abdomen is soft. Tenderness: There is abdominal tenderness (RLQ/suprapubic ttp). Musculoskeletal: Normal range of motion. Skin:     General: Skin is warm and dry. Neurological:      Mental Status: He is alert and oriented to person, place, and time. DIAGNOSTIC RESULTS     EKG: All EKG's are interpreted by the Emergency Department Physician who either signs or Co-signs this chart in the absence of acardiologist.        RADIOLOGY:   Non-plain film images such as CT, Ultrasound andMRI are read by the radiologist. Plain radiographic images are visualized and preliminarily interpreted by the emergency physician with the below findings:        Interpretation per the Radiologist below, if available at the time of this note:    CT ABDOMEN PELVIS WO CONTRAST Additional Contrast? Oral   Final Result   1. Mild to moderate urinary bladder distention. 2. No acute abnormality.     Signed by Dr Finn Houser on 6/29/2020 4:57 PM            ED BEDSIDE ULTRASOUND:   Performed by ED Physician - none    LABS:  Labs Reviewed   CBC WITH AUTO DIFFERENTIAL - Abnormal; Notable for the following components:       Result Value    Neutrophils % 79.8 (*)     Lymphocytes % 9.4 (*)     Neutrophils Absolute 7.9 (*)     Lymphocytes Absolute 0.9 (*)     All other components within normal limits   COMPREHENSIVE METABOLIC PANEL - Abnormal; Notable for the following components:    Glucose 116 (*)     BUN 28 (*)     CREATININE 2.0 (*)     GFR Non- 33 (*)     All other components within normal limits   LIPASE - Abnormal; Notable for the following components:    Lipase 64 (*)     All other components within normal limits   URINALYSIS - Abnormal; Notable for the following components:    Blood, Urine TRACE (*)     Protein, UA TRACE (*)     All other components within normal limits   MICROSCOPIC URINALYSIS - Abnormal; Notable for the following components:    Bacteria, UA NEGATIVE (*)     All other components within normal limits       All other labs were within normal range or not returned as of this dictation. RE-ASSESSMENT     Pt tells me that he has had bm after taking mag citrate from home. He reports resolution of symptoms and remains in no distress at discharge. EMERGENCY DEPARTMENT COURSE and DIFFERENTIALDIAGNOSIS/MDM:   Vitals:    Vitals:    06/29/20 1437 06/29/20 1438 06/29/20 1514 06/29/20 1755   BP:  (!) 169/86  (!) 151/77   Pulse: 71  71 79   Resp: 16   16   Temp: 99 °F (37.2 °C)      TempSrc: Oral      SpO2: 97%   98%   Weight: 248 lb (112.5 kg)      Height: 5' 10\" (1.778 m)          MDM      CONSULTS:  None    PROCEDURES:  Unless otherwise notedbelow, none     Procedures    FINAL IMPRESSION     1.  Lower abdominal pain          DISPOSITION/PLAN   DISPOSITION Decision To Discharge 06/29/2020 05:43:55 PM      PATIENT REFERRED TO:  Annemarie Lazaro [facility] [address] [facility] [address] [facility] [phone]    Schedule an appointment as soon as possible for a visit in 3 days  As needed      DISCHARGE MEDICATIONS:       Current Discharge Medication List          (Pleasenote that portions of this note were completed with a voice recognition program.  Efforts were made to edit the dictations but occasionally words are mis-transcribed.)              MISSY Mendoza  06/29/20 0235

## 2020-07-02 ENCOUNTER — TELEPHONE (OUTPATIENT)
Dept: PRIMARY CARE CLINIC | Age: 70
End: 2020-07-02

## 2020-07-02 RX ORDER — ISOSORBIDE MONONITRATE 60 MG/1
60 TABLET, EXTENDED RELEASE ORAL NIGHTLY
Qty: 30 TABLET | Refills: 1 | Status: SHIPPED | OUTPATIENT
Start: 2020-07-02 | End: 2020-08-18 | Stop reason: SDUPTHER

## 2020-07-02 NOTE — TELEPHONE ENCOUNTER
Patient was roomed for his 8:15 appointment as soon as he entered the clinic. He was asked to have a seat in the exam room. During the rooming process he continuously stayed out of his chair and came and stood right next the the me, the [address] and looked at the computer screen. After asking him very politely to have a seat wherever he was comfortable he became visibly angry. I explained that we are still trying to practice social distancing as much as possible. When  Asked him a question he spoke very harshly and said that with him wearing a mask and me wearing a mask he could not hear a F%#$ing thing I was saying. I told him he did not have to use that type of language with me. When I took his vitals he commented rudely that I was not social distancing from him. I asked that he please just let me do my job and that I have personal space issues and did not feel comfortable with him standing next to me. When I found out that his provider would be running a bit late I went to the room and asked if he would prefer to reschedule with a virtual visit he said yes. He is scheduled for July 15th at 8:45.  Araceli BULLOCK

## 2020-07-15 ENCOUNTER — VIRTUAL VISIT (OUTPATIENT)
Dept: PRIMARY CARE CLINIC | Age: 70
End: 2020-07-15
Payer: MEDICARE

## 2020-07-15 PROBLEM — E66.01 MORBIDLY OBESE (HCC): Status: ACTIVE | Noted: 2018-02-01

## 2020-07-15 PROCEDURE — 1123F ACP DISCUSS/DSCN MKR DOCD: CPT | Performed by: NURSE PRACTITIONER

## 2020-07-15 PROCEDURE — G8427 DOCREV CUR MEDS BY ELIG CLIN: HCPCS | Performed by: NURSE PRACTITIONER

## 2020-07-15 PROCEDURE — 1036F TOBACCO NON-USER: CPT | Performed by: NURSE PRACTITIONER

## 2020-07-15 PROCEDURE — 4040F PNEUMOC VAC/ADMIN/RCVD: CPT | Performed by: NURSE PRACTITIONER

## 2020-07-15 PROCEDURE — 2022F DILAT RTA XM EVC RTNOPTHY: CPT | Performed by: NURSE PRACTITIONER

## 2020-07-15 PROCEDURE — 3044F HG A1C LEVEL LT 7.0%: CPT | Performed by: NURSE PRACTITIONER

## 2020-07-15 PROCEDURE — G8417 CALC BMI ABV UP PARAM F/U: HCPCS | Performed by: NURSE PRACTITIONER

## 2020-07-15 PROCEDURE — 3017F COLORECTAL CA SCREEN DOC REV: CPT | Performed by: NURSE PRACTITIONER

## 2020-07-15 PROCEDURE — 99214 OFFICE O/P EST MOD 30 MIN: CPT | Performed by: NURSE PRACTITIONER

## 2020-07-15 RX ORDER — SEMAGLUTIDE 1.34 MG/ML
0.25 INJECTION, SOLUTION SUBCUTANEOUS WEEKLY
Qty: 1 PEN | Refills: 0 | Status: SHIPPED | OUTPATIENT
Start: 2020-07-15 | End: 2020-08-14

## 2020-07-15 ASSESSMENT — ENCOUNTER SYMPTOMS
ABDOMINAL PAIN: 0
COUGH: 0
WHEEZING: 0
CONSTIPATION: 0
SHORTNESS OF BREATH: 1
VOICE CHANGE: 0
DIARRHEA: 0
RHINORRHEA: 0
SORE THROAT: 0
BLOOD IN STOOL: 0
TROUBLE SWALLOWING: 0
CHEST TIGHTNESS: 0
EYE REDNESS: 0
NAUSEA: 0
VOMITING: 0

## 2020-07-15 NOTE — PROGRESS NOTES
7/15/2020    TELEHEALTH EVALUATION -- Audio/Visual (During UKEQP-82 public health emergency)    HPI:    Gabo Madsen (:  1950) has requested an audio/video evaluation for the following concern(s):    Hypertension: Patient reports that blood pressure has been running elevated. Patient states that at 7 AM this morning his blood pressure was 150/72 heart rate in the 50s. Patient states that 1 hour after taking his morning medicine at 9:00 AM his blood pressure was 148/76. Patient states his highest blood pressure reading over the last few weeks has been 172/80. Patient states that he did have 1 day  that his blood pressure was 132/79. He notes improvement in his symptoms of shortness of breath and chest pain has not been as frequent with increase of Imdur to 60 mg. Patient has persistently taking lisinopril 10 mg twice daily. Patient is also on Procardia 60 mg daily. DM: Patient's weight has continued to escalate. Patient was noted to have weight of 222. Since COVID-19 quarantine he has increased 26 pounds. Patient believes that this is aiding in his elevated blood pressure. Patient was taken off of metformin due to increasing creatinine and declining GFR. Patient has not been on medication for blood sugar control. Due to patient's elevated creatinine and decline in GFR many agents such as Jardiance would not be of benefit. We discussed glyburide however due to patient's increasing weight I am concerned that this would potentially exacerbate this issue. Patient has previously been on Ozempic and tolerated at the lower dose of 0.5 mg. After increase to 1 mg patient noted that he had horrific constipation. Due to the side effect of this medication being weight loss he is in agreement to restart Ozempic at 0.5 mg weekly and continue on this dose. We will not plan to increase dose due to previous issues of constipation.     Review of Systems   Constitutional: Negative for activity change, appetite change, fatigue, fever and unexpected weight change. HENT: Negative for congestion, ear pain, nosebleeds, rhinorrhea, sore throat, trouble swallowing and voice change. Eyes: Negative for redness and visual disturbance. Respiratory: Positive for shortness of breath. Negative for cough, chest tightness and wheezing. Cardiovascular: Positive for chest pain. Negative for palpitations and leg swelling. Gastrointestinal: Negative for abdominal pain, blood in stool, constipation, diarrhea, nausea and vomiting. Endocrine: Negative for polydipsia, polyphagia and polyuria. Genitourinary: Negative for dysuria, frequency and urgency. Musculoskeletal: Negative for myalgias. Skin: Negative for rash and wound. Neurological: Negative for dizziness, speech difficulty, light-headedness and headaches. Psychiatric/Behavioral: Negative for agitation, confusion, self-injury and suicidal ideas. The patient is not nervous/anxious. Prior to Visit Medications    Medication Sig Taking?  Authorizing Provider   Semaglutide,0.25 or 0.5MG/DOS, (OZEMPIC, 0.25 OR 0.5 MG/DOSE,) 2 MG/1.5ML SOPN Inject 0.25 mg into the skin once a week Yes Zoanne Councilman, APRN   isosorbide mononitrate (IMDUR) 60 MG extended release tablet Take 1 tablet by mouth nightly  Zoanne Councilman, APRN   lisinopril (PRINIVIL;ZESTRIL) 10 MG tablet Take 2 tablets by mouth 2 times daily  Zoanne Councilman, APRN   allopurinol (ZYLOPRIM) 100 MG tablet TAKE 1 TABLET BY MOUTH 2 TIMES DAILY  Zoanne Councilman, APRN   BYSTOLIC 10 MG tablet TAKE 1 TABLET BY MOUTH EVERY EVENING  Zoanne Councilman, APRN   NIFEdipine (PROCARDIA XL) 60 MG extended release tablet Take 1 tablet by mouth every morning  Zoanne Councilman, APRN   atorvastatin (LIPITOR) 20 MG tablet Take 1 tablet by mouth daily  Patient taking differently: Take 20 mg by mouth nightly   Zoanne Councilman, APRN   nitroGLYCERIN (NITROSTAT) 0.4 MG SL tablet Place 1 tablet under the tongue every 5 minutes as needed for Chest pain  MISSY Madrigal   famotidine (PEPCID) 10 MG tablet Take 1 tablet by mouth 2 times daily  Patient taking differently: Take 10 mg by mouth 2 times daily as needed   MISSY Madrigal   Omega-3 Fatty Acids (FISH OIL) 1200 MG CAPS Take 1,200 mg by mouth 2 times daily   Historical Provider, MD   Cyanocobalamin 5000 MCG CAPS Take 5,000 mcg by mouth every morning   Historical Provider, MD   aspirin 81 MG tablet Take 81 mg by mouth nightly   Historical Provider, MD   Cholecalciferol (VITAMIN D3) 2000 units TABS Take 2,000 Units by mouth 2 times daily   Historical Provider, MD       Social History     Tobacco Use    Smoking status: Former Smoker     Packs/day: 2.00     Years: 32.00     Pack years: 64.00     Start date:      Last attempt to quit: 10/5/1998     Years since quittin.7    Smokeless tobacco: Former User   Substance Use Topics    Alcohol use: Yes     Comment: Very Little    Drug use: Never        No Known Allergies,   Past Medical History:   Diagnosis Date    Acute bilateral low back pain with bilateral sciatica 2019    Body mass index (bmi) 33.0-33.9, adult     CAD (coronary artery disease), native coronary artery     Class 2 obesity due to excess calories in adult 2018    GERD (gastroesophageal reflux disease)     Hypercholesteremia     Hypertension     Malignant neoplasm of right kidney, except renal pelvis ([address]     Malignant neoplasm of right kidney, except renal pelvis ([facility]    ,   Past Surgical History:   Procedure Laterality Date    CARDIAC CATHETERIZATION  07, JDT    Left heart cath    CARDIAC CATHETERIZATION  07, DT    Left heart cath    CARDIOVASCULAR STRESS TEST  2009, JDT    Stress Echo    CARDIOVASCULAR STRESS TEST  07, [facility]    Stress Echo    CORONARY ARTERY BYPASS GRAFT      X4 utilizing the left MOLINA to the LAD, right MOLINA to the second diagonal, and vein grafts to the obtuse marinal and PLOM.  CORONARY ARTERY BYPASS GRAFT  08, [place] [facility] CABG placing the LIMA to the LAD, MIGUELANGEL to the second diagonal branch and SVBG to the OMB and PMB.     KIDNEY REMOVAL Right 2019    KIDNEY SURGERY      Right Kidney removed    MA COLONOSCOPY FLX DX W/COLLJ SPEC WHEN PFRMD N/A 10/4/2018    Dr KASSY Solorio-Diverticular disease, 10 yr recall    SKIN BIOPSY     ,   Social History     Tobacco Use    Smoking status: Former Smoker     Packs/day: 2.00     Years: 32.00     Pack years: 64.00     Start date:      Last attempt to quit: 10/5/1998     Years since quittin.7    Smokeless tobacco: Former User   Substance Use Topics    Alcohol use: Yes     Comment: Very Little    Drug use: Never   ,   Family History   Problem Relation Age of Onset    Cancer Mother         lung    Coronary Art Dis Father     Stroke Father     Hypertension Father     High Blood Pressure Brother     Coronary Art Dis Paternal Grandmother     Coronary Art Dis Paternal Grandfather    ,   Immunization History   Administered Date(s) Administered    Influenza Virus Vaccine 11/15/2018    Influenza, High Dose (Fluzone 65 yrs and older) 11/15/2018    Influenza, Triv, inactivated, subunit, adjuvanted, IM (Fluad 65 yrs and older) 2019    Pneumococcal Conjugate 13-valent ([id number]) 2018    Pneumococcal Polysaccharide ([id number]) 2019    Tdap (Boostrix, Adacel) 2018    Zoster Live (Zostavax) 03/15/2018    Zoster Recombinant (Shingrix) 2019   ,   Health Maintenance   Topic Date Due    Diabetic retinal exam  1960    Diabetic foot exam  10/05/2019    Shingles Vaccine (3 of 3) 2060 (Originally 2019)    Flu vaccine (1) 2020    Annual Wellness Visit (AWV)  2020    A1C test (Diabetic or Prediabetic)  2021    Diabetic microalbuminuria test  2021    Lipid screen  2021    Potassium monitoring  2021    Creatinine monitoring  06/29/2021    DTaP/Tdap/Td vaccine (2 - Td) 05/11/2028    Colon cancer screen colonoscopy  10/04/2028    Pneumococcal 65+ years Vaccine  Completed    AAA screen  Completed    Hepatitis C screen  Completed    Hepatitis A vaccine  Aged Out    Hib vaccine  Aged Out    Meningococcal (ACWY) vaccine  Aged Out       PHYSICAL EXAMINATION:  [ INSTRUCTIONS:  \"[x]\" Indicates a positive item  \"[]\" Indicates a negative item  -- DELETE ALL ITEMS NOT EXAMINED]  Vital Signs: (As obtained by patient/caregiver or practitioner observation)    Blood pressure-  Heart rate-    Respiratory rate-    Temperature-  Pulse oximetry-     Constitutional: [x] Appears well-developed and well-nourished [x] No apparent distress      [] Abnormal-   Mental status  [x] Alert and awake  [x] Oriented to person/place/time []Able to follow commands      Eyes:  EOM    [x]  Normal  [] Abnormal-  Sclera  [x]  Normal  [] Abnormal -         Discharge [x]  None visible  [] Abnormal -    HENT:   [x] Normocephalic, atraumatic. [] Abnormal   [x] Mouth/Throat: Mucous membranes are moist.     External Ears [x] Normal  [] Abnormal-     Neck: [x] No visualized mass     Pulmonary/Chest: [x] Respiratory effort normal.  [] No visualized signs of difficulty breathing or respiratory distress        [x] Abnormal-      Musculoskeletal:   [x] Normal gait with no signs of ataxia         [x] Normal range of motion of neck        [] Abnormal-       Neurological:        [x] No Facial Asymmetry (Cranial nerve 7 motor function) (limited exam to video visit)          [x] No gaze palsy        [] Abnormal-         Skin:        [x] No significant exanthematous lesions or discoloration noted on facial skin         [] Abnormal-            Psychiatric:       [x] Normal Affect [] No Hallucinations        [] Abnormal-     Other pertinent observable physical exam findings-     ASSESSMENT/PLAN:  1.  Hypertension, essential, benign  Patient wishes to continue current dosage of pretension medications. Plan to restart Ozempic and we are hopeful that weight reduction will aid in decreasing patient's blood pressure. We have been successful previously with this plan of attack. 2. Coronary artery disease involving left main coronary artery    3. Type 2 diabetes mellitus with complication, without long-term current use of insulin ([facility] [address]      4. Chronic kidney disease, stage 3 ([facility] [address]      5. Morbidly obese ([facility] [address] [facility]      6. H/O unilateral nephrectomy      7. Dyspnea, unspecified type      No follow-ups on file. Lenore Zhao is a 79 y.o. male being evaluated by a Virtual Visit (video visit) encounter to address concerns as mentioned above. A caregiver was present when appropriate. Due to this being a TeleHealth encounter (During IHRQV-11 public health emergency), evaluation of the following organ systems was limited: Vitals/Constitutional/EENT/Resp/CV/GI//MS/Neuro/Skin/Heme-Lymph-Imm. Pursuant to the emergency declaration under the [address] [place] [address] authority and the [employer] Act, this Virtual Visit was conducted with patient's (and/or legal guardian's) consent, to reduce the patient's risk of exposure to COVID-19 and provide necessary medical care. The patient (and/or legal guardian) has also been advised to contact this office for worsening conditions or problems, and seek emergency medical treatment and/or call 911 if deemed necessary. Patient identification was verified at the start of the visit: Yes    Total time spent on this encounter: 30    Services were provided through a video synchronous discussion virtually to substitute for in-person clinic visit. Patient and provider were located at their individual homes. --MISSY Fernando on 7/15/2020 at 10:35 AM    An electronic signature was used to authenticate this note.

## 2020-07-17 ENCOUNTER — TELEPHONE (OUTPATIENT)
Dept: CARDIOLOGY | Age: 70
End: 2020-07-17

## 2020-07-17 NOTE — TELEPHONE ENCOUNTER
LVM: Left vm for pt to contact the office    You have an upcoming appointment with Dr. Sven Marcus on ______. Dr. Sven Marcus is no longer with the organization, so we have rescheduled your appointment with another one of our cardiologists  You appointment is scheduled with  ________________ at __________.          PT is to ONLY be scheduled with a [address]

## 2020-07-30 RX ORDER — ALLOPURINOL 100 MG/1
TABLET ORAL
Qty: 30 TABLET | Refills: 2 | Status: SHIPPED | OUTPATIENT
Start: 2020-07-30 | End: 2020-09-11

## 2020-08-03 ENCOUNTER — OFFICE VISIT (OUTPATIENT)
Dept: CARDIOLOGY | Age: 70
End: 2020-08-03
Payer: MEDICARE

## 2020-08-03 VITALS
HEIGHT: 70 IN | WEIGHT: 244 LBS | DIASTOLIC BLOOD PRESSURE: 84 MMHG | SYSTOLIC BLOOD PRESSURE: 136 MMHG | BODY MASS INDEX: 34.93 KG/M2 | HEART RATE: 60 BPM

## 2020-08-03 PROCEDURE — 99213 OFFICE O/P EST LOW 20 MIN: CPT | Performed by: INTERNAL MEDICINE

## 2020-08-03 NOTE — PROGRESS NOTES
Vascular calcification 12/02/2018    Arthritis 12/02/2018    Chronic kidney disease, stage 3 ([facility] 05/11/2018    Type 2 diabetes mellitus with complication, without long-term current use of insulin (HCC) 05/11/2018    Hypercalcemia 05/11/2018    Gout 04/23/2018    Increased creatine kinase level 04/23/2018    Decreased GFR 04/23/2018    Decreased pedal pulses 04/23/2018    Elevated blood uric acid level 04/23/2018    Morbidly obese ([facility] 02/01/2018    Hypercholesteremia     Hypertension     CAD (coronary artery disease), native coronary artery     GERD (gastroesophageal reflux disease)      Current Outpatient Medications   Medication Sig Dispense Refill    allopurinol (ZYLOPRIM) 100 MG tablet TAKE ONE TABLET BY MOUTH TWO TIMES DAILY 30 tablet 2    isosorbide mononitrate (IMDUR) 60 MG extended release tablet Take 1 tablet by mouth nightly 30 tablet 1    lisinopril (PRINIVIL;ZESTRIL) 10 MG tablet Take 2 tablets by mouth 2 times daily 60 tablet 1    BYSTOLIC 10 MG tablet TAKE 1 TABLET BY MOUTH EVERY EVENING (Patient taking differently: Take 10 mg by mouth daily ) 30 tablet 5    NIFEdipine (PROCARDIA XL) 60 MG extended release tablet Take 1 tablet by mouth every morning 30 tablet 5    atorvastatin (LIPITOR) 20 MG tablet Take 1 tablet by mouth daily (Patient taking differently: Take 20 mg by mouth nightly ) 90 tablet 2    nitroGLYCERIN (NITROSTAT) 0.4 MG SL tablet Place 1 tablet under the tongue every 5 minutes as needed for Chest pain 25 tablet 3    Omega-3 Fatty Acids (FISH OIL) 1200 MG CAPS Take 1,200 mg by mouth 2 times daily       Cyanocobalamin 5000 MCG CAPS Take 5,000 mcg by mouth every morning       aspirin 81 MG tablet Take 81 mg by mouth nightly       Cholecalciferol (VITAMIN D3) 2000 units TABS Take 2,000 Units by mouth 2 times daily       Semaglutide,0.25 or 0.5MG/DOS, (OZEMPIC, 0.25 OR 0.5 MG/DOSE,) 2 MG/1.5ML SOPN Inject 0.25 mg into the skin once a week (Patient not taking: Smoking status: Former Smoker     Packs/day: 2.00     Years: 32.00     Pack years: 64.00     Start date:      Last attempt to quit: 10/5/1998     Years since quittin.8    Smokeless tobacco: Former User   Substance Use Topics    Alcohol use: Yes     Comment: Very Little          Review of Systems:    General:      Complaint / Symptom Yes / No / Description if Yes       Fatigue No   Weight gain N/A   Insomnia N/A       Respiratory:        Complaint / Symptom Yes / No / Description if Yes       Cough No   Horseness N/A       Cardiovascular:    Complaint / Symptom Yes / No / Description if Yes       Chest Pain Yes: Exertional   Shortness of Air / Orthopnea Yes: Exertional   Presyncope / Syncope No   Palpitations No         Objective:    /84   Pulse 60   Ht 5' 10\" (1.778 m)   Wt 244 lb (110.7 kg)   BMI 35.01 kg/m²     GENERAL - well developed and well nourished, conversant  HEENT -  PERRLA, Hearing appears normal, gentleman lids are normal.  External inspection of ears and nose appear normal.  NECK - no thyromegaly, no JVD, trachea is in the midline  CARDIOVASCULAR - PMI is in the mid line clavicular position, Normal S1 and S2 with nho systolic murmur. No S3 or S4    PULMONARY - no respiratory distress. No wheezes or rales. Lungs are clear to ausculation, normal respiratory effort. ABDOMEN  - soft, non tender, no rebound  MUSCULOSKELETAL  - range of motion of the upper and lower extermites appears normal and equal and is without pain   EXTREMITIES - no significant edema   NEUROLOGIC - gait and station are normal  SKIN - turgor is normal, can warm and dry.   PSYCHIATRIC - normal mood and affect, alert and orientated x 3      Assessment and plan    #1 coronary status post CABG, recent cath showed patent LIMA grafts to the LAD, patent SVG to the RCA and occluded OM graft  Surgical opinion was taken for redo CABG and patient was turned down because of high risk and referral for optimizing medical therapy    Today I reviewed the films for his previous cath    Patient is currently doing well on current medications, he does have symptoms only if he has severe exertion but never had any ER visit or multiple nitroglycerin use recently  We will continue the same medication including beta-blocker aspirin Lipitor and nitroglycerin    2 hypertension seems to be well-controlled Medication will continue with the same    3 hyperlipidemia we will continue with statin and fish oil, follow-up with lipid panel with his primary care physician      Plan of care discussed with patient. Follow-up after 6 months, earlier if needed      I greatly appreciate the opportunity to meet Flavia Gardner and your confidence in allowing me to participate in his cardiovascular care. Guanako Abraham MD, [place], [place]  Interventional Cardiologist   Medical Director, Structural Heart Program       Guanako Abraham MD  8/3/2020 9:10 AM       This dictation was generated by voice recognition computer software. Although all attempts are made to edit dictation for accuracy, there may be errors in the transcription that are not intended.

## 2020-08-18 ENCOUNTER — VIRTUAL VISIT (OUTPATIENT)
Dept: PRIMARY CARE CLINIC | Age: 70
End: 2020-08-18
Payer: MEDICARE

## 2020-08-18 PROBLEM — Z12.5 PROSTATE CANCER SCREENING: Status: ACTIVE | Noted: 2019-11-26

## 2020-08-18 PROCEDURE — 2022F DILAT RTA XM EVC RTNOPTHY: CPT | Performed by: NURSE PRACTITIONER

## 2020-08-18 PROCEDURE — G8417 CALC BMI ABV UP PARAM F/U: HCPCS | Performed by: NURSE PRACTITIONER

## 2020-08-18 PROCEDURE — G8428 CUR MEDS NOT DOCUMENT: HCPCS | Performed by: NURSE PRACTITIONER

## 2020-08-18 PROCEDURE — 3044F HG A1C LEVEL LT 7.0%: CPT | Performed by: NURSE PRACTITIONER

## 2020-08-18 PROCEDURE — 1036F TOBACCO NON-USER: CPT | Performed by: NURSE PRACTITIONER

## 2020-08-18 PROCEDURE — 3017F COLORECTAL CA SCREEN DOC REV: CPT | Performed by: NURSE PRACTITIONER

## 2020-08-18 PROCEDURE — 1123F ACP DISCUSS/DSCN MKR DOCD: CPT | Performed by: NURSE PRACTITIONER

## 2020-08-18 PROCEDURE — 99214 OFFICE O/P EST MOD 30 MIN: CPT | Performed by: NURSE PRACTITIONER

## 2020-08-18 PROCEDURE — 4040F PNEUMOC VAC/ADMIN/RCVD: CPT | Performed by: NURSE PRACTITIONER

## 2020-08-18 RX ORDER — NIFEDIPINE 60 MG/1
60 TABLET, EXTENDED RELEASE ORAL EVERY MORNING
Qty: 30 TABLET | Refills: 5 | Status: SHIPPED | OUTPATIENT
Start: 2020-08-18 | End: 2020-11-04 | Stop reason: ALTCHOICE

## 2020-08-18 RX ORDER — FAMOTIDINE 20 MG/1
20 TABLET, FILM COATED ORAL 2 TIMES DAILY
Qty: 60 TABLET | Refills: 5 | Status: SHIPPED | OUTPATIENT
Start: 2020-08-18 | End: 2022-03-31 | Stop reason: ALTCHOICE

## 2020-08-18 RX ORDER — ISOSORBIDE MONONITRATE 60 MG/1
60 TABLET, EXTENDED RELEASE ORAL NIGHTLY
Qty: 30 TABLET | Refills: 5 | Status: SHIPPED | OUTPATIENT
Start: 2020-08-18 | End: 2022-03-31 | Stop reason: ALTCHOICE

## 2020-08-18 RX ORDER — NEBIVOLOL 10 MG/1
TABLET ORAL
Qty: 30 TABLET | Refills: 5 | Status: SHIPPED | OUTPATIENT
Start: 2020-08-18 | End: 2020-11-04 | Stop reason: ALTCHOICE

## 2020-08-18 RX ORDER — DOCUSATE SODIUM 100 MG/1
100 CAPSULE, LIQUID FILLED ORAL 2 TIMES DAILY
Qty: 60 CAPSULE | Refills: 5 | Status: SHIPPED | OUTPATIENT
Start: 2020-08-18 | End: 2020-09-17

## 2020-08-18 ASSESSMENT — ENCOUNTER SYMPTOMS
DIARRHEA: 0
RHINORRHEA: 0
CHEST TIGHTNESS: 0
COUGH: 0
EYE REDNESS: 0
VOMITING: 0
SHORTNESS OF BREATH: 1
BLOOD IN STOOL: 0
NAUSEA: 0
WHEEZING: 0
ABDOMINAL PAIN: 0
SORE THROAT: 0
CONSTIPATION: 0
TROUBLE SWALLOWING: 0
VOICE CHANGE: 0

## 2020-08-18 NOTE — PROGRESS NOTES
2020    TELEHEALTH EVALUATION -- Audio/Visual (During PKDTY-85 public health emergency)    HPI:    Yessy Diggs (:  1950) has requested an audio/video evaluation for the following concern(s):    Hypertension:  2020: Patient states that blood pressure has improved. Patient states that his blood pressure has been running 130s over 84. Patient states that he followed with cardiology last week and blood pressure was 130/84. Patient states that he is not able to withstand activity that he was previously and he becomes sob and developes chest pain with vigorous activity. Pt reports understanding he has blockages that are in operable. He states that he was transitioned over to a new cardiologist Dr. Blue Roche most recently who told the patient that surgery might be an option for him. Pt is unsure of who to believe and request another opinion. Pt reports regimen of Imdur  60 mg daily, lisinopril 10 mg twice daily, and Procardia 60 mg daily.     Transposed Cardiologist Dr. Edi Oshea note below  \"#1 coronary status post CABG, recent cath showed patent LIMA grafts to the LAD, patent SVG to the RCA and occluded OM graft  Surgical opinion was taken for redo CABG and patient was turned down because of high risk and referral for optimizing medical therapy. \"    DM:   2020: Patient reports that he is using Ozempic 0.5 mg once weekly. He does note that having issues with constipation. Patient also notes issues with acid reflux on this dosage. Patient wishes to decrease Ozempic dosage to 0.25 mg to see if this would help with his constipation and acid reflux. Patient has a decreased in his weight. Patient's weight down to 234. Patient's blood pressure has also declined due to patient's weight loss. Patient notes a total of 16 pound weight loss. Review of Systems   Constitutional: Negative for activity change, appetite change, fatigue, fever and unexpected weight change.    HENT: Negative for congestion, ear pain, nosebleeds, rhinorrhea, sore throat, trouble swallowing and voice change. Eyes: Negative for redness and visual disturbance. Respiratory: Positive for shortness of breath. Negative for cough, chest tightness and wheezing. Cardiovascular: Positive for chest pain. Negative for palpitations and leg swelling. Gastrointestinal: Negative for abdominal pain, blood in stool, constipation, diarrhea, nausea and vomiting. Endocrine: Negative for polydipsia, polyphagia and polyuria. Genitourinary: Negative for dysuria, frequency and urgency. Musculoskeletal: Negative for myalgias. Skin: Negative for rash and wound. Neurological: Negative for dizziness, speech difficulty, light-headedness and headaches. Psychiatric/Behavioral: Negative for agitation, confusion, self-injury and suicidal ideas. The patient is not nervous/anxious. Prior to Visit Medications    Medication Sig Taking?  Authorizing Provider   famotidine (PEPCID) 20 MG tablet Take 1 tablet by mouth 2 times daily Yes MISSY Joe   NIFEdipine (PROCARDIA XL) 60 MG extended release tablet Take 1 tablet by mouth every morning Yes MISSY Joe   isosorbide mononitrate (IMDUR) 60 MG extended release tablet Take 1 tablet by mouth nightly Yes MISSY Joe   nebivolol (BYSTOLIC) 10 MG tablet TAKE 1 TABLET BY MOUTH EVERY EVENING Yes MISSY Joe   docusate sodium (COLACE) 100 MG capsule Take 1 capsule by mouth 2 times daily Yes MISSY Joe   allopurinol (ZYLOPRIM) 100 MG tablet TAKE ONE TABLET BY MOUTH TWO TIMES DAILY  MISSY Joe   lisinopril (PRINIVIL;ZESTRIL) 10 MG tablet Take 2 tablets by mouth 2 times daily  MISSY Joe   atorvastatin (LIPITOR) 20 MG tablet Take 1 tablet by mouth daily  Patient taking differently: Take 20 mg by mouth nightly   MISSY Joe   nitroGLYCERIN (NITROSTAT) 0.4 MG SL tablet Place 1 tablet under the tongue every 5 minutes as needed for Chest pain  MISSY Zamora   Omega-3 Fatty Acids (FISH OIL) 1200 MG CAPS Take 1,200 mg by mouth 2 times daily   Historical Provider, MD   Cyanocobalamin 5000 MCG CAPS Take 5,000 mcg by mouth every morning   Historical Provider, MD   aspirin 81 MG tablet Take 81 mg by mouth nightly   Historical Provider, MD   Cholecalciferol (VITAMIN D3) 2000 units TABS Take 2,000 Units by mouth 2 times daily   Historical Provider, MD       Social History     Tobacco Use    Smoking status: Former Smoker     Packs/day: 2.00     Years: 32.00     Pack years: 64.00     Start date:      Last attempt to quit: 10/5/1998     Years since quittin.8    Smokeless tobacco: Former User   Substance Use Topics    Alcohol use: Yes     Comment: Very Little    Drug use: Never        No Known Allergies,   Past Medical History:   Diagnosis Date    Acute bilateral low back pain with bilateral sciatica 2019    Body mass index (bmi) 33.0-33.9, adult     CAD (coronary artery disease), native coronary artery     Class 2 obesity due to excess calories in adult 2018    GERD (gastroesophageal reflux disease)     Hypercholesteremia     Hypertension     Malignant neoplasm of right kidney, except renal pelvis (HCC)     Malignant neoplasm of right kidney, except renal pelvis ([facility]    ,   Past Surgical History:   Procedure Laterality Date    CARDIAC CATHETERIZATION  07, JDT    Left heart cath    CARDIAC CATHETERIZATION  07, JDT    Left heart cath    CARDIOVASCULAR STRESS TEST  2009, JDT    Stress Echo    CARDIOVASCULAR STRESS TEST  07, J    Stress Echo    CORONARY ARTERY BYPASS GRAFT      X4 utilizing the left MOLINA to the LAD, right MOLINA to the second diagonal, and vein grafts to the obtuse marinal and PLOM.     CORONARY ARTERY BYPASS GRAFT  08, Myles Pleasure CABG placing the LIMA to the LAD, MIGUELANGEL to the second diagonal branch and SVBG to the OMB and PMB.     KIDNEY REMOVAL Right 2019    KIDNEY SURGERY      Right Kidney removed    UT COLONOSCOPY FLX DX W/COLLJ SPEC WHEN PFRMD N/A 10/4/2018    Dr KASSY Solorio-Diverticular disease, 10 yr recall    SKIN BIOPSY     ,   Social History     Tobacco Use    Smoking status: Former Smoker     Packs/day: 2.00     Years: 32.00     Pack years: 64.00     Start date:      Last attempt to quit: 10/5/1998     Years since quittin.8    Smokeless tobacco: Former User   Substance Use Topics    Alcohol use: Yes     Comment: Very Little    Drug use: Never   ,   Family History   Problem Relation Age of Onset    Cancer Mother         lung    Coronary Art Dis Father     Stroke Father     Hypertension Father     High Blood Pressure Brother     Coronary Art Dis Paternal Grandmother     Coronary Art Dis Paternal Grandfather    ,   Immunization History   Administered Date(s) Administered    Influenza Virus Vaccine 11/15/2018    Influenza, High Dose (Fluzone 65 yrs and older) 11/15/2018    Influenza, Triv, inactivated, subunit, adjuvanted, IM (Fluad 65 yrs and older) 2019    Pneumococcal Conjugate 13-valent (Orlxtck12) 2018    Pneumococcal Polysaccharide (Eacsuqikk13) 2019    Tdap (Boostrix, Adacel) 2018    Zoster Live (Zostavax) 03/15/2018    Zoster Recombinant (Shingrix) 2019   ,   Health Maintenance   Topic Date Due    Diabetic retinal exam  1960    Diabetic foot exam  10/05/2019    Shingles Vaccine (3 of 3) 2060 (Originally 2019)    Flu vaccine (1) 2020    Annual Wellness Visit (AWV)  2020    A1C test (Diabetic or Prediabetic)  2021    Diabetic microalbuminuria test  2021    Lipid screen  2021    Potassium monitoring  2021    Creatinine monitoring  2021    DTaP/Tdap/Td vaccine (2 - Td) 2028    Colon cancer screen colonoscopy  10/04/2028    Pneumococcal 65+ years Vaccine  Completed  AAA screen  Completed    Hepatitis C screen  Completed    Hepatitis A vaccine  Aged Out    Hib vaccine  Aged Out    Meningococcal (ACWY) vaccine  Aged Out       PHYSICAL EXAMINATION:  [ INSTRUCTIONS:  \"[x]\" Indicates a positive item  \"[]\" Indicates a negative item  -- DELETE ALL ITEMS NOT EXAMINED]  Vital Signs: (As obtained by patient/caregiver or practitioner observation)    Blood pressure-  Heart rate-    Respiratory rate-    Temperature-  Pulse oximetry-     Constitutional: [x] Appears well-developed and well-nourished [x] No apparent distress      [] Abnormal-   Mental status  [x] Alert and awake  [x] Oriented to person/place/time []Able to follow commands      Eyes:  EOM    [x]  Normal  [] Abnormal-  Sclera  [x]  Normal  [] Abnormal -         Discharge [x]  None visible  [] Abnormal -    HENT:   [x] Normocephalic, atraumatic. [] Abnormal   [x] Mouth/Throat: Mucous membranes are moist.     External Ears [x] Normal  [] Abnormal-     Neck: [x] No visualized mass     Pulmonary/Chest: [x] Respiratory effort normal.  [] No visualized signs of difficulty breathing or respiratory distress        [] Abnormal-      Musculoskeletal:   [x] Normal gait with no signs of ataxia         [x] Normal range of motion of neck        [] Abnormal-       Neurological:        [x] No Facial Asymmetry (Cranial nerve 7 motor function) (limited exam to video visit)          [x] No gaze palsy        [] Abnormal-         Skin:        [x] No significant exanthematous lesions or discoloration noted on facial skin         [] Abnormal-            Psychiatric:       [x] Normal Affect [x] No Hallucinations        [] Abnormal-     Other pertinent observable physical exam findings-     ASSESSMENT/PLAN:  1. Chest pain     2. Essential hypertension    - nebivolol (BYSTOLIC) 10 MG tablet; TAKE 1 TABLET BY MOUTH EVERY EVENING  Dispense: 30 tablet; Refill: 5    3.  Type 2 diabetes mellitus with complication, without long-term current use of insulin (HCC)    - Microalbumin / Creatinine Urine Ratio; Future  - CBC Auto Differential; Future  - Comprehensive Metabolic Panel; Future  - Hemoglobin A1C; Future  - Lipid Panel; Future  - TSH without Reflex; Future  - Urinalysis; Future    4. Gastroesophageal reflux disease, esophagitis presence not specified    - famotidine (PEPCID) 20 MG tablet; Take 1 tablet by mouth 2 times daily  Dispense: 60 tablet; Refill: 5    5. History of Renal mass s/p nephrectomy     - NIFEdipine (PROCARDIA XL) 60 MG extended release tablet; Take 1 tablet by mouth every morning  Dispense: 30 tablet; Refill: 5    5. Hypertension, essential, benign    - NIFEdipine (PROCARDIA XL) 60 MG extended release tablet; Take 1 tablet by mouth every morning  Dispense: 30 tablet; Refill: 5  - nebivolol (BYSTOLIC) 10 MG tablet; TAKE 1 TABLET BY MOUTH EVERY EVENING  Dispense: 30 tablet; Refill: 5    6. Chest pain, unspecified type    - isosorbide mononitrate (IMDUR) 60 MG extended release tablet; Take 1 tablet by mouth nightly  Dispense: 30 tablet; Refill: 5    7. Constipation, unspecified constipation type    - docusate sodium (COLACE) 100 MG capsule; Take 1 capsule by mouth 2 times daily  Dispense: 60 capsule; Refill: 5    8. Elevated blood uric acid level      9. Prostate cancer screening    - Psa screening; Future  10. Coronary artery Disease involving left main coronary artery    11. Chronic kidney disease, stage 3    12. H/O unilateral nephrectomy    13. 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 Gely Burton is a 79 y.o. male being evaluated by a Virtual Visit (video visit) encounter to address concerns as mentioned above. A caregiver was present when appropriate.  Due to this being a TeleHealth encounter (During QGGDJ-35 public health emergency), evaluation of the following organ systems was limited: Vitals/Constitutional/EENT/Resp/CV/GI//MS/Neuro/Skin/Heme-Lymph-Imm. Pursuant to the emergency declaration under the [address] [place] [address], [address] [place] [address] and the [employer] Act, this Virtual Visit was conducted with patient's (and/or legal guardian's) consent, to reduce the patient's risk of exposure to COVID-19 and provide necessary medical care. The patient (and/or legal guardian) has also been advised to contact this office for worsening conditions or problems, and seek emergency medical treatment and/or call 911 if deemed necessary. Patient identification was verified at the start of the visit: Yes    Total time spent on this encounter: 30    Services were provided through a video synchronous discussion virtually to substitute for in-person clinic visit. Patient and provider were located at their individual homes.     --MISSY Leiva on 8/18/2020 at 7:29 PM    An electronic signature was used to authenticate this note.error

## 2020-09-11 RX ORDER — ALLOPURINOL 100 MG/1
TABLET ORAL
Qty: 30 TABLET | Refills: 2 | Status: SHIPPED | OUTPATIENT
Start: 2020-09-11 | End: 2020-09-30 | Stop reason: SDUPTHER

## 2020-09-15 ENCOUNTER — TELEPHONE (OUTPATIENT)
Dept: PRIMARY CARE CLINIC | Age: 70
End: 2020-09-15

## 2020-09-15 ENCOUNTER — TELEPHONE (OUTPATIENT)
Dept: CARDIOLOGY | Age: 70
End: 2020-09-15

## 2020-09-15 NOTE — TELEPHONE ENCOUNTER
EMIGDIO, patient is schedule at [facility] on 9/25/20 at 1:00 pm. [address]

## 2020-09-15 NOTE — TELEPHONE ENCOUNTER
9/15 called needing to r/s apt to a tuesday. Provider is changing his days in the office.   Attempt-1

## 2020-09-17 ENCOUNTER — OFFICE VISIT (OUTPATIENT)
Dept: CARDIOLOGY | Age: 70
End: 2020-09-17
Payer: MEDICARE

## 2020-09-17 VITALS
HEART RATE: 70 BPM | BODY MASS INDEX: 34.79 KG/M2 | SYSTOLIC BLOOD PRESSURE: 124 MMHG | HEIGHT: 70 IN | DIASTOLIC BLOOD PRESSURE: 62 MMHG | WEIGHT: 243 LBS

## 2020-09-17 PROBLEM — Z12.5 PROSTATE CANCER SCREENING: Status: RESOLVED | Noted: 2019-11-26 | Resolved: 2020-09-17

## 2020-09-17 PROCEDURE — 1036F TOBACCO NON-USER: CPT | Performed by: CLINICAL NURSE SPECIALIST

## 2020-09-17 PROCEDURE — G8427 DOCREV CUR MEDS BY ELIG CLIN: HCPCS | Performed by: CLINICAL NURSE SPECIALIST

## 2020-09-17 PROCEDURE — G8417 CALC BMI ABV UP PARAM F/U: HCPCS | Performed by: CLINICAL NURSE SPECIALIST

## 2020-09-17 PROCEDURE — 99214 OFFICE O/P EST MOD 30 MIN: CPT | Performed by: CLINICAL NURSE SPECIALIST

## 2020-09-17 PROCEDURE — 1123F ACP DISCUSS/DSCN MKR DOCD: CPT | Performed by: CLINICAL NURSE SPECIALIST

## 2020-09-17 PROCEDURE — 3044F HG A1C LEVEL LT 7.0%: CPT | Performed by: CLINICAL NURSE SPECIALIST

## 2020-09-17 PROCEDURE — 3017F COLORECTAL CA SCREEN DOC REV: CPT | Performed by: CLINICAL NURSE SPECIALIST

## 2020-09-17 PROCEDURE — 93000 ELECTROCARDIOGRAM COMPLETE: CPT | Performed by: CLINICAL NURSE SPECIALIST

## 2020-09-17 PROCEDURE — 4040F PNEUMOC VAC/ADMIN/RCVD: CPT | Performed by: CLINICAL NURSE SPECIALIST

## 2020-09-17 PROCEDURE — 2022F DILAT RTA XM EVC RTNOPTHY: CPT | Performed by: CLINICAL NURSE SPECIALIST

## 2020-09-17 RX ORDER — RANOLAZINE 500 MG/1
500 TABLET, EXTENDED RELEASE ORAL 2 TIMES DAILY
Qty: 60 TABLET | Refills: 3 | Status: SHIPPED | OUTPATIENT
Start: 2020-09-17 | End: 2021-01-19

## 2020-09-17 RX ORDER — LISINOPRIL 20 MG/1
20 TABLET ORAL DAILY
COMMUNITY
End: 2021-10-06

## 2020-09-17 NOTE — PROGRESS NOTES
[address] Michelle Boat[place], [address] [address]  Phone: [phone]  Fax: [phone]    OFFICE VISIT:  2020    Aaron Alvarado - : 1950    Reason For Visit:  Isaac Clark is a 79 y.o. male who is here for Follow-up (patient has had some chest pain) and Coronary Artery Disease  History of coronary disease with CABG x4 in   Had heart catheterization 3/6/2020 that showed patent  left MOLINA-LAD, patent right LIMA-diagonal, closed VG-OM, patent VG-PLOM, normal LVFX. distal disease  He had consultation in  with CT surgery regarding his diffuse disease. Thought most of the symptomology was related to small vessel disease. He had routine follow-up visit 8/3/2020 with Dr. Florencio Yip    He states that he started noticing chest pain and in the cold back in March which led to his heart catheterization  Continues to have this ongoing for the last several months. He will have chest pain usually with activity especially after he east then is active. Will radiate to left arm. He usually notices this in the evening. Has had to take nitroglycerin at times for relief. States he is taken 4-5 times since March  He reports his blood pressures well controlled. He works daily and somewhat physical job    Subjective  Isaac Clark denies resting shortness of breath, orthopnea, paroxysmal nocturnal dyspnea, syncope, presyncope, arrhythmia, edema and fatigue. The patient denies numbness or weakness to suggest cerebrovascular accident or transient ischemic attack. MISSY Zamora is PCP and follows labs-   Follows with nephrology for CKD, had nephrectomy for cancer last year.   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 ([facility] 05/29/2020    Dyspnea 05/29/2020    Pulmonary nodule 01/23/2020    Body mass index (bmi) 33.0-33.9, adult     Prostate cancer screening 11/26/2019    Body mass index (bmi) 34.0-34.9, adult  11/26/2019    Dysphagia 11/26/2019    Constipation 11/26/2019    H/O unilateral nephrectomy 11/26/2019    Acute bilateral low back pain with bilateral sciatica 11/26/2019    Need for prophylactic vaccination against Streptococcus pneumoniae (pneumococcus) 11/26/2019    Need for immunization against influenza 11/26/2019    Hypertension, essential, benign 01/13/2019    Bradycardia 01/13/2019    Vascular calcification 12/02/2018    Arthritis 12/02/2018    Renal mass 10/15/2018    Chronic kidney disease, stage 3 ([address] 05/11/2018    Type 2 diabetes mellitus with complication, without long-term current use of insulin ([address] 05/11/2018    Hypercalcemia 05/11/2018    Gout 04/23/2018    Increased creatine kinase level 04/23/2018    Decreased GFR 04/23/2018    Decreased pedal pulses 04/23/2018    Elevated blood uric acid level 04/23/2018    Morbidly obese ([address] 02/01/2018    Hypercholesteremia     Hypertension     CAD (coronary artery disease), native coronary artery     GERD (gastroesophageal reflux disease)      Past Medical History:   Diagnosis Date    Acute bilateral low back pain with bilateral sciatica 11/26/2019    Body mass index (bmi) 33.0-33.9, adult     CAD (coronary artery disease), native coronary artery     Class 2 obesity due to excess calories in adult 2/1/2018    GERD (gastroesophageal reflux disease)     Hypercholesteremia     Hypertension     Malignant neoplasm of right kidney, except renal pelvis ([address]     Malignant neoplasm of right kidney, except renal pelvis [facility])      Past Surgical History:   Procedure Laterality Date    CARDIAC CATHETERIZATION  12/31/07, JDT    Left heart cath    CARDIAC CATHETERIZATION  12/31/07, [facility]    [facility]    Stress Echo    CARDIOVASCULAR STRESS TEST  07, J[facility]    Stress Echo    CORONARY ARTERY BYPASS GRAFT      X4 utilizing the left MOLINA to the LAD, right MOLINA to the second diagonal, and vein grafts to the obtuse marinal and PLOM.  CORONARY ARTERY BYPASS GRAFT  08, [facility] CABG placing the LIMA to the LAD, MIGUELANGEL to the second diagonal branch and SVBG to the OMB and PMB.     KIDNEY REMOVAL Right 2019    KIDNEY SURGERY      Right Kidney removed    VA COLONOSCOPY FLX DX W/COLLJ SPEC WHEN PFRMD N/A 10/4/2018    Dr KASSY Solorio-Diverticular disease, 8 yr recall    SKIN BIOPSY       Family History   Problem Relation Age of Onset    Cancer Mother         lung    Coronary Art Dis Father     Stroke Father     Hypertension Father     High Blood Pressure Brother     Coronary Art Dis Paternal Grandmother     Coronary Art Dis Paternal Grandfather      Social History     Tobacco Use    Smoking status: Former Smoker     Packs/day: 2.00     Years: 32.00     Pack years: 64.00     Start date:      Last attempt to quit: 10/5/1998     Years since quittin.9    Smokeless tobacco: Former User   Substance Use Topics    Alcohol use: Yes     Comment: Very Little      Current Outpatient Medications   Medication Sig Dispense Refill    lisinopril (PRINIVIL;ZESTRIL) 20 MG tablet Take 20 mg by mouth 2 times daily      Semaglutide (OZEMPIC, 0.25 OR 0.5 MG/DOSE, SC) Inject into the skin once a week      ranolazine (RANEXA) 500 MG extended release tablet Take 1 tablet by mouth 2 times daily 60 tablet 3    allopurinol (ZYLOPRIM) 100 MG tablet TAKE ONE TABLET BY MOUTH TWO TIMES DAILY 30 tablet 2    NIFEdipine (PROCARDIA XL) 60 MG extended release tablet Take 1 tablet by mouth every morning 30 tablet 5    isosorbide mononitrate (IMDUR) 60 MG extended release tablet Take 1 tablet by mouth nightly 30 tablet 5    nebivolol (BYSTOLIC) 10 MG tablet TAKE 1 TABLET BY MOUTH EVERY EVENING 30 tablet 5    docusate sodium (COLACE) 100 MG capsule Take 1 capsule by mouth 2 times daily 60 capsule 5    atorvastatin (LIPITOR) 20 MG tablet Take 1 tablet by mouth daily (Patient taking differently: Take 20 mg by mouth nightly ) 90 tablet 2    nitroGLYCERIN (NITROSTAT) 0.4 MG SL tablet Place 1 tablet under the tongue every 5 minutes as needed for Chest pain 25 tablet 3    Omega-3 Fatty Acids (FISH OIL) 1200 MG CAPS Take 1,200 mg by mouth 2 times daily       Cyanocobalamin 5000 MCG CAPS Take 5,000 mcg by mouth every morning       aspirin 81 MG tablet Take 81 mg by mouth nightly       Cholecalciferol (VITAMIN D3) 2000 units TABS Take 2,000 Units by mouth 2 times daily       famotidine (PEPCID) 20 MG tablet Take 1 tablet by mouth 2 times daily (Patient not taking: Reported on 9/17/2020) 60 tablet 5     No current facility-administered medications for this visit. Allergies: Patient has no known allergies. Review of Systems  Constitutional - no significant activity change, appetite change, or unexpected weight change. No fever, chills or diaphoresis. No fatigue. HEENT - no significant rhinorrhea or epistaxis. No tinnitus or significant hearing loss. Eyes - no sudden vision change or amaurosis. Respiratory - no significant wheezing, stridor, apnea or cough. No dyspnea on exertion or shortness of breath. Cardiovascular - + exertional chest pain. No orthopnea or PND. No sensation of arrhythmia or slow heart rate. No claudication or leg edema. Gastrointestinal - no abdominal swelling or pain. No blood in stool. No severe constipation, diarrhea, nausea, or vomiting. Genitourinary - no difficulty urinating, dysuria, frequency, or urgency. No flank pain or hematuria. Musculoskeletal - no back pain, gait disturbance, or myalgia. Skin - no color change or rash. No pallor. No new surgical incision. Neurologic - no speech difficulty, facial asymmetry or lateralizing weakness.   No seizures, presyncope, syncope, or significant dizziness. Hematologic - no easy bruising or excessive bleeding. Psychiatric - no severe anxiety or insomnia. No confusion. All other review of systems are negative. Objective  Vital Signs - /62   Pulse 70   Ht 5' 10\" (1.778 m)   Wt 243 lb (110.2 kg)   BMI 34.87 kg/m²   General - Sobia Aguilera is alert, cooperative, and pleasant. Well groomed. No acute distress. Body habitus is overweight. HEENT - The head is normocephalic. No circumoral cyanosis. Dentition is normal.   EYES -  No Xanthelasma, no arcus senilis, no conjunctival hemorrhages or discharge. Neck - Supple, without increased jugular venous pressures. No carotid bruits. No mass. Respiratory - Lungs are clear bilaterally. No wheezes or rales. Normal effort without use of accessory muscles. Cardiovascular - Heart has regular rhythm and rate. No murmurs, rubs or gallops. + pedal pulses and no varicosities. Abdominal -  Soft, nontender, nondistended. Bowel sounds are intact. Extremities - No clubbing, cyanosis, or  edema. Musculoskeletal -  No clubbing . No Osler's nodes. Gait normal .  No kyphosis or scoliosis. Skin -  no statis ulcers or dermatitis. Neurological - No focal signs are identified. Oriented to person, place and time. Psychiatric -  Appropriate affect and mood. Assessment:     Diagnosis Orders   1. Coronary artery disease of native artery of native heart with stable angina pectoris ([address]     2. Essential hypertension     3. Chest pain in adult  EKG 12 lead   4. Hypercholesteremia     5.  Type 2 diabetes mellitus with complication, without long-term current use of insulin ([facility])       Data:  BP Readings from Last 3 Encounters:   09/17/20 124/62   08/03/20 136/84   07/02/20 (!) 170/67    Pulse Readings from Last 3 Encounters:   09/17/20 70   08/03/20 60   07/02/20 65        Wt Readings from Last 3 Encounters:   09/17/20 243 lb (110.2 kg)   08/03/20 244 lb (110.7 kg)   07/02/20 251 lb (113.9 kg)     EKG today shows sinus rhythm with a rate of 70. 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 Unchanged EKG    Blood pressure and heart rate controlled. Medical management includes beta-blocker, ACE inhibitor and CCB. On long-acting nitrate on statin and aspirin. Reviewed labs, stable    Ongoing chronic angina most noted in the evening time after he eats and goes back to doing some work. He has to really cut back on how much he eats and his activity in the evening time. He has had some relief with nitroglycerin. Reviewed last office notes from cardiology and CT surgery as well as his heart catheterization. Has significant small vessel disease. Had lengthy discussion on ongoing medical management for chronic angina. He is taking his isosorbide daily in the morning. Some of his symptoms may be due to that wearing off by evening time. We will try adding some Ranexa and see if this will give him better activity tolerance and angina control around the clock. See him back in 4 to 6 weeks and see how he is doing with this. States taking medications as prescribed      Plan    Make sure you are taking your Isosorbide in the AM  Add Ranexa 500mg twice a day- this will help with Chest Pain  Can still take the Nitro as needed. Follow up in 4-6 weeks   Call with any questions or concerns  Follow up with MISSY Sarah for non cardiac problems and labs   Follow up with Dr Zakiya Gimenez for labs for kidneys   Report any new problems  Cardiovascular Fitness-Exercise as tolerated. Strive for 30 minutes of exercise most days of the week. Cardiac / Healthy Diet  Continue current medications as directed  Continue plan of treatment  It is always recommended that you bring your medications bottles with you to each visit - this is for your safety!        MISSY Rivera    EMR dragon/transcription disclaimer: Much of this encounter note is electronic transcription/translation of spoken language to printed tach. Electronic translation of spoken language may be erroneous, or at times, nonsensical words or phrases may be inadvertently transcribed.  Although, I have reviewed the note for such errors, some may still exist.

## 2020-09-17 NOTE — PATIENT INSTRUCTIONS
Make sure you are taking your Isosorbide in the AM  Add Ranexa 500mg twice a day- this will help with Chest Pain  Can still take the Nitro as needed. Follow up in 4-6 weeks   Call with any questions or concerns  Follow up with MISSY Madrigal for non cardiac problems  Follow up with Dr Adenike Jimenez for labs for kidneys   Report any new problems  Cardiovascular Fitness-Exercise as tolerated. Strive for 30 minutes of exercise most days of the week. Cardiac / Healthy Diet  Continue current medications as directed  Continue plan of treatment  It is always recommended that you bring your medications bottles with you to each visit - this is for your safety! Patient Education        Learning About Managing Stable Angina  What is angina? Angina is a symptom of coronary artery disease, or heart disease. For most people, it feels like chest pain or pressure. Some people feel other symptoms. These symptoms include pain, pressure, or a strange feeling in the back, neck, jaw, or upper belly, or in one or both shoulders or arms. The most common types of angina are stable angina and unstable angina. · Stable angina means that you can usually predict when your symptoms will happen. You probably know what things cause your angina. · Unstable angina means that your symptoms have changed and are not following your typical pattern of stable angina. For example, you may feel angina when you are resting. Your symptoms may not go away when you try your typical ways of relieving them, such as rest or nitroglycerin. Unstable angina is an emergency. It may mean that you are having a heart attack. Most people who have stable angina can manage their symptoms. This includes taking medicines as prescribed and knowing when to call your doctor or get help right away. It also includes paying attention to your symptoms so you can see what causes them and what is typical for you. What can you do to manage angina?   Be aware of your symptoms  Tracking when and why your angina symptoms happen is one way to understand your angina better. It also helps you know what's normal for you. If you know what's normal for you, it'll be easier to tell if you have a change in symptoms that means it's time to call for help. Understanding your normal patterns may also help you make some changes that might prevent or reduce symptoms. To track your symptoms, write down:  · The day and time of day you notice symptoms. · What you were doing when you had them:  ? Exercising or doing a physical activity? ? Eating a large meal?  ? Spending time outside in very cold weather? ? Feeling a lot of stress? ? Something else? · How long the symptoms lasted. · What you did to help your symptoms. Work with your doctor  You and your doctor can use your symptoms tracking information to talk about whether you need any changes to your angina treatment. For example, you may decide to use medicine or to change your medicine. Or you may talk about other treatments you could try. Most people who have stable angina can control their symptoms by taking prescribed medicines, including nitroglycerin, when needed. Balance activity and rest  Staying active and knowing when to rest during activity are also important. Here are some tips that might help you manage your angina. · Ease into your day. · Warm up slowly before activity. Talk to your doctor about a level of activity that is safe for you. · Give yourself time to rest and digest right after meals. · Change the way you eat. Eat smaller meals more often during the day instead of two or three large meals. · If an activity causes angina, stop and rest. Be active at a level that does not cause symptoms. When should you call for help? [id number] anytime you think you may need emergency care. For example, call if:  · You passed out (lost consciousness). · You have symptoms of a heart attack.  These may include:  ? Chest pain or pressure, or a strange feeling in the chest.  ? Sweating. ? Shortness of breath. ? Nausea or vomiting. ? Pain, pressure, or a strange feeling in the back, neck, jaw, or upper belly or in one or both shoulders or arms. ? Lightheadedness or sudden weakness. ? A fast or irregular heartbeat. After you call 911, the  may tell you to chew 1 adult-strength or 2 to 4 low-dose aspirin. Wait for an ambulance. Do not try to drive yourself. · You have angina symptoms that do not go away with rest or are not getting better within 5 minutes after you take a dose of nitroglycerin. Call your doctor now if:  · Your angina symptoms seem worse but still follow your typical pattern. You can predict when symptoms will happen, but they may come on sooner, feel worse, or last longer. · You feel dizzy or lightheaded, or you feel like you may faint. Watch closely for changes in your health, and be sure to contact your doctor if you have any problems. Follow-up care is a key part of your treatment and safety. Be sure to make and go to all appointments, and call your doctor if you are having problems. It's also a good idea to know your test results and keep a list of the medicines you take. Where can you learn more? Go to https://Boomsetclaudiaeb.[employer]. org and sign in to your [employer] account. Enter [phone] [id number] in the [facility] box to learn more about \"Learning About Managing Stable Angina. \"     If you do not have an account, please click on the \"Sign Up Now\" link. Current as of: December 16, 2019               Content Version: 12.5  © 8783-8165 Healthwise, Incorporated. Care instructions adapted under license by [facility] ([facility]). If you have questions about a medical condition or this instruction, always ask your healthcare professional. [address] any warranty or liability for your use of this information.

## 2020-09-30 ENCOUNTER — HOSPITAL ENCOUNTER (OUTPATIENT)
Dept: LAB | Age: 70
Discharge: HOME | End: 2020-09-30
Payer: MEDICARE

## 2020-09-30 LAB
ALBUMIN SERPL-MCNC: 4.7 G/DL (ref 3.5–5.2)
ALP BLD-CCNC: 71 U/L (ref 40–130)
ALT SERPL-CCNC: 17 U/L (ref 5–41)
ANION GAP SERPL CALCULATED.3IONS-SCNC: 12 MMOL/L (ref 7–19)
AST SERPL-CCNC: 16 U/L (ref 5–40)
BASOPHILS ABSOLUTE: 0.1 K/UL (ref 0–0.2)
BASOPHILS RELATIVE PERCENT: 0.8 % (ref 0–1)
BILIRUB SERPL-MCNC: 0.4 MG/DL (ref 0.2–1.2)
BUN BLDV-MCNC: 23 MG/DL (ref 8–23)
CALCIUM SERPL-MCNC: 10 MG/DL (ref 8.8–10.2)
CHLORIDE BLD-SCNC: 104 MMOL/L (ref 98–111)
CHOLESTEROL, TOTAL: 122 MG/DL (ref 160–199)
CO2: 26 MMOL/L (ref 22–29)
CREAT SERPL-MCNC: 2.1 MG/DL (ref 0.5–1.2)
EOSINOPHILS ABSOLUTE: 0.5 K/UL (ref 0–0.6)
EOSINOPHILS RELATIVE PERCENT: 5.4 % (ref 0–5)
GFR AFRICAN AMERICAN: 38
GFR NON-AFRICAN AMERICAN: 31
GLUCOSE BLD-MCNC: 116 MG/DL (ref 74–109)
HCT VFR BLD CALC: 45.2 % (ref 42–52)
HDLC SERPL-MCNC: 49 MG/DL (ref 55–121)
HEMOGLOBIN: 14.7 G/DL (ref 14–18)
IMMATURE GRANULOCYTES #: 0 K/UL
LDL CHOLESTEROL CALCULATED: 61 MG/DL
LYMPHOCYTES ABSOLUTE: 1.3 K/UL (ref 1.1–4.5)
LYMPHOCYTES RELATIVE PERCENT: 15.9 % (ref 20–40)
MCH RBC QN AUTO: 29.6 PG (ref 27–31)
MCHC RBC AUTO-ENTMCNC: 32.5 G/DL (ref 33–37)
MCV RBC AUTO: 91.1 FL (ref 80–94)
MONOCYTES ABSOLUTE: 0.8 K/UL (ref 0–0.9)
MONOCYTES RELATIVE PERCENT: 9.8 % (ref 0–10)
NEUTROPHILS ABSOLUTE: 5.7 K/UL (ref 1.5–7.5)
NEUTROPHILS RELATIVE PERCENT: 67.6 % (ref 50–65)
PDW BLD-RTO: 13.2 % (ref 11.5–14.5)
PLATELET # BLD: 198 K/UL (ref 130–400)
PMV BLD AUTO: 11.7 FL (ref 9.4–12.4)
POTASSIUM SERPL-SCNC: 4.4 MMOL/L (ref 3.5–5)
RBC # BLD: 4.96 M/UL (ref 4.7–6.1)
SODIUM BLD-SCNC: 142 MMOL/L (ref 136–145)
TOTAL PROTEIN: 7.9 G/DL (ref 6.6–8.7)
TRIGL SERPL-MCNC: 60 MG/DL (ref 0–149)
WBC # BLD: 8.5 K/UL (ref 4.8–10.8)

## 2020-09-30 RX ORDER — ALLOPURINOL 100 MG/1
TABLET ORAL
Qty: 60 TABLET | Refills: 2 | Status: SHIPPED | OUTPATIENT
Start: 2020-09-30 | End: 2021-02-01

## 2020-09-30 NOTE — TELEPHONE ENCOUNTER
Patient came to the office with his pill bottle for his allopurinol. He has been taking 2 tablets a day but has only been getting #30. Uric acid level elevated per Dr. Sp Hinojosa check and increased allopurinol to twice per day. No issues. from 8/22/19 visit with Kalpana Molina. New Rx sent in with correct quantity. [place] Man called to request a refill on his medication.       Last office visit : 8/18/2020   Next office visit : Visit date not found     Requested Prescriptions     Pending Prescriptions Disp Refills    allopurinol (ZYLOPRIM) 100 MG tablet 60 tablet 2     Sig: TAKE ONE TABLET BY MOUTH TWO TIMES DAILY            [place], [place]

## 2020-10-13 RX ORDER — SEMAGLUTIDE 1.34 MG/ML
INJECTION, SOLUTION SUBCUTANEOUS
Qty: 1.5 ML | Refills: 1 | Status: SHIPPED | OUTPATIENT
Start: 2020-10-13 | End: 2021-02-18

## 2020-10-13 NOTE — TELEPHONE ENCOUNTER
Jaimeelibrado Ranjana called to request a refill on his medication.       Last office visit : 8/18/2020   Next office visit : Visit date not found     Requested Prescriptions     Pending Prescriptions Disp Refills    Semaglutide,0.25 or 0.5MG/DOS, (OZEMPIC, 0.25 OR 0.5 MG/DOSE,) 2 MG/1.5ML SOPN [Pharmacy Med Name: OZEMPIC 0.25 OR 0.5 MG/DOSE 2 Solution Pen-injector] 1.5 mL 1     Sig: INJECT 0.25MG UNDER THE SKIN ONCE WEEKLY FOR [address]. Jose Delgado [place], [place]

## 2020-10-16 ENCOUNTER — HOSPITAL ENCOUNTER (OUTPATIENT)
Dept: LAB | Age: 70
Discharge: HOME | End: 2020-10-16
Payer: MEDICARE

## 2020-10-16 LAB
ANION GAP SERPL CALCULATED.3IONS-SCNC: 9 MMOL/L (ref 7–19)
BUN BLDV-MCNC: 27 MG/DL (ref 8–23)
CALCIUM SERPL-MCNC: 10 MG/DL (ref 8.8–10.2)
CHLORIDE BLD-SCNC: 105 MMOL/L (ref 98–111)
CO2: 27 MMOL/L (ref 22–29)
CREAT SERPL-MCNC: 2.2 MG/DL (ref 0.5–1.2)
GFR AFRICAN AMERICAN: 36
GFR NON-AFRICAN AMERICAN: 30
GLUCOSE BLD-MCNC: 105 MG/DL (ref 74–109)
POTASSIUM SERPL-SCNC: 5 MMOL/L (ref 3.5–5)
SODIUM BLD-SCNC: 141 MMOL/L (ref 136–145)

## 2020-11-02 ENCOUNTER — HOSPITAL ENCOUNTER (OUTPATIENT)
Dept: LAB | Age: 70
Discharge: HOME | End: 2020-11-02
Payer: MEDICARE

## 2020-11-02 LAB
ALBUMIN SERPL-MCNC: 4.8 G/DL (ref 3.5–5.2)
ALP BLD-CCNC: 77 U/L (ref 40–130)
ALT SERPL-CCNC: 15 U/L (ref 5–41)
ANION GAP SERPL CALCULATED.3IONS-SCNC: 12 MMOL/L (ref 7–19)
AST SERPL-CCNC: 15 U/L (ref 5–40)
BASOPHILS ABSOLUTE: 0.1 K/UL (ref 0–0.2)
BASOPHILS RELATIVE PERCENT: 0.9 % (ref 0–1)
BILIRUB SERPL-MCNC: 0.5 MG/DL (ref 0.2–1.2)
BILIRUBIN URINE: NEGATIVE
BLOOD, URINE: NEGATIVE
BUN BLDV-MCNC: 25 MG/DL (ref 8–23)
CALCIUM SERPL-MCNC: 10.3 MG/DL (ref 8.8–10.2)
CHLORIDE BLD-SCNC: 105 MMOL/L (ref 98–111)
CLARITY: CLEAR
CO2: 26 MMOL/L (ref 22–29)
COLOR: YELLOW
CREAT SERPL-MCNC: 2.1 MG/DL (ref 0.5–1.2)
CREATININE URINE: 18.4 MG/DL (ref 4.2–622)
EOSINOPHILS ABSOLUTE: 0.4 K/UL (ref 0–0.6)
EOSINOPHILS RELATIVE PERCENT: 5 % (ref 0–5)
GFR AFRICAN AMERICAN: 38
GFR NON-AFRICAN AMERICAN: 31
GLUCOSE BLD-MCNC: 103 MG/DL (ref 74–109)
GLUCOSE URINE: NEGATIVE MG/DL
HCT VFR BLD CALC: 43.1 % (ref 42–52)
HEMOGLOBIN: 13.7 G/DL (ref 14–18)
IMMATURE GRANULOCYTES #: 0 K/UL
KETONES, URINE: NEGATIVE MG/DL
LEUKOCYTE ESTERASE, URINE: NEGATIVE
LYMPHOCYTES ABSOLUTE: 1.2 K/UL (ref 1.1–4.5)
LYMPHOCYTES RELATIVE PERCENT: 16.6 % (ref 20–40)
MAGNESIUM: 2.1 MG/DL (ref 1.6–2.4)
MCH RBC QN AUTO: 29.7 PG (ref 27–31)
MCHC RBC AUTO-ENTMCNC: 31.8 G/DL (ref 33–37)
MCV RBC AUTO: 93.3 FL (ref 80–94)
MICROALBUMIN UR-MCNC: <1.2 MG/DL (ref 0–19)
MICROALBUMIN/CREAT UR-RTO: NORMAL MG/G
MONOCYTES ABSOLUTE: 0.7 K/UL (ref 0–0.9)
MONOCYTES RELATIVE PERCENT: 10.6 % (ref 0–10)
NEUTROPHILS ABSOLUTE: 4.7 K/UL (ref 1.5–7.5)
NEUTROPHILS RELATIVE PERCENT: 66.6 % (ref 50–65)
NITRITE, URINE: NEGATIVE
PARATHYROID HORMONE INTACT: 32.1 PG/ML (ref 15–65)
PDW BLD-RTO: 13.3 % (ref 11.5–14.5)
PH UA: 6 (ref 5–8)
PHOSPHORUS: 3.6 MG/DL (ref 2.5–4.5)
PLATELET # BLD: 176 K/UL (ref 130–400)
PMV BLD AUTO: 11.4 FL (ref 9.4–12.4)
POTASSIUM SERPL-SCNC: 5.1 MMOL/L (ref 3.5–5)
PROTEIN UA: NEGATIVE MG/DL
RBC # BLD: 4.62 M/UL (ref 4.7–6.1)
SODIUM BLD-SCNC: 143 MMOL/L (ref 136–145)
SPECIFIC GRAVITY UA: 1 (ref 1–1.03)
TOTAL PROTEIN: 7.7 G/DL (ref 6.6–8.7)
URIC ACID, SERUM: 6 MG/DL (ref 3.4–7)
UROBILINOGEN, URINE: 0.2 E.U./DL
VITAMIN D 25-HYDROXY: >100 NG/ML
WBC # BLD: 7 K/UL (ref 4.8–10.8)

## 2020-11-03 PROBLEM — I10 HYPERTENSION: Status: RESOLVED | Noted: 2020-11-03 | Resolved: 2020-11-03

## 2020-11-04 ENCOUNTER — OFFICE VISIT (OUTPATIENT)
Dept: CARDIOLOGY | Age: 70
End: 2020-11-04
Payer: MEDICARE

## 2020-11-04 VITALS
WEIGHT: 241 LBS | BODY MASS INDEX: 34.5 KG/M2 | SYSTOLIC BLOOD PRESSURE: 124 MMHG | HEIGHT: 70 IN | DIASTOLIC BLOOD PRESSURE: 64 MMHG | HEART RATE: 78 BPM

## 2020-11-04 PROCEDURE — 3017F COLORECTAL CA SCREEN DOC REV: CPT | Performed by: CLINICAL NURSE SPECIALIST

## 2020-11-04 PROCEDURE — 4040F PNEUMOC VAC/ADMIN/RCVD: CPT | Performed by: CLINICAL NURSE SPECIALIST

## 2020-11-04 PROCEDURE — G8417 CALC BMI ABV UP PARAM F/U: HCPCS | Performed by: CLINICAL NURSE SPECIALIST

## 2020-11-04 PROCEDURE — G8484 FLU IMMUNIZE NO ADMIN: HCPCS | Performed by: CLINICAL NURSE SPECIALIST

## 2020-11-04 PROCEDURE — G8427 DOCREV CUR MEDS BY ELIG CLIN: HCPCS | Performed by: CLINICAL NURSE SPECIALIST

## 2020-11-04 PROCEDURE — 1123F ACP DISCUSS/DSCN MKR DOCD: CPT | Performed by: CLINICAL NURSE SPECIALIST

## 2020-11-04 PROCEDURE — 99214 OFFICE O/P EST MOD 30 MIN: CPT | Performed by: CLINICAL NURSE SPECIALIST

## 2020-11-04 PROCEDURE — 1036F TOBACCO NON-USER: CPT | Performed by: CLINICAL NURSE SPECIALIST

## 2020-11-04 RX ORDER — CARVEDILOL 3.12 MG/1
3.12 TABLET ORAL 2 TIMES DAILY WITH MEALS
COMMUNITY

## 2020-11-04 RX ORDER — AMLODIPINE BESYLATE 10 MG/1
10 TABLET ORAL DAILY
COMMUNITY
End: 2021-10-06

## 2020-11-04 NOTE — PROGRESS NOTES
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 ([facility] [address] 05/29/2020    Dyspnea 05/29/2020    Pulmonary nodule 01/23/2020    Body mass index (bmi) 33.0-33.9, adult     Body mass index (bmi) 34.0-34.9, adult  11/26/2019    Dysphagia 11/26/2019    Constipation 11/26/2019    H/O unilateral nephrectomy 11/26/2019    Acute bilateral low back pain with bilateral sciatica 11/26/2019    Need for prophylactic vaccination against Streptococcus pneumoniae (pneumococcus) 11/26/2019    Need for immunization against influenza 11/26/2019    Hypertension, essential, benign 01/13/2019    Bradycardia 01/13/2019    Vascular calcification 12/02/2018    Arthritis 12/02/2018    Renal mass 10/15/2018    Chronic kidney disease, stage 3 05/11/2018    Type 2 diabetes mellitus with complication, without long-term current use of insulin ([address] 05/11/2018    Hypercalcemia 05/11/2018    Gout 04/23/2018    Increased creatine kinase level 04/23/2018    Decreased GFR 04/23/2018    Decreased pedal pulses 04/23/2018    Elevated blood uric acid level 04/23/2018    Morbidly obese ([address] 02/01/2018    Hypercholesteremia     CAD (coronary artery disease), native coronary artery     GERD (gastroesophageal reflux disease)      Past Medical History:   Diagnosis Date    Acute bilateral low back pain with bilateral sciatica 11/26/2019    Body mass index (bmi) 33.0-33.9, adult     CAD (coronary artery disease), native coronary artery     Class 2 obesity due to excess calories in adult 2/1/2018    GERD (gastroesophageal reflux disease)     Hypercholesteremia     Hypertension     Malignant neoplasm of right kidney, except renal pelvis ([address]     Malignant neoplasm of right kidney, except renal pelvis [facility])      Past Surgical History:   Procedure Laterality Date    CARDIAC CATHETERIZATION  12/31/07, JDT    Left heart cath    CARDIAC CATHETERIZATION  12/31/07, JDT Left heart cath    CARDIOVASCULAR STRESS TEST  2009, JDT    Stress Echo    CARDIOVASCULAR STRESS TEST  07, JDT    Stress Echo    CORONARY ARTERY BYPASS GRAFT      X4 utilizing the left MOLINA to the LAD, right MOLINA to the second diagonal, and vein grafts to the obtuse marinal and PLOM.  CORONARY ARTERY BYPASS GRAFT  08, Mercedes Fus CABG placing the LIMA to the LAD, MIGUELANGEL to the second diagonal branch and SVBG to the OMB and PMB.     KIDNEY REMOVAL Right 2019    KIDNEY SURGERY      Right Kidney removed    OR COLONOSCOPY FLX DX W/COLLJ SPEC WHEN PFRMD N/A 10/4/2018    Dr KASSY Solorio-Diverticular disease, 10 yr recall    SKIN BIOPSY       Family History   Problem Relation Age of Onset    Cancer Mother         lung    Coronary Art Dis Father     Stroke Father     Hypertension Father     High Blood Pressure Brother     Coronary Art Dis Paternal Grandmother     Coronary Art Dis Paternal Grandfather      Social History     Tobacco Use    Smoking status: Former Smoker     Packs/day: 2.00     Years: 32.00     Pack years: 64.00     Start date:      Last attempt to quit: 10/5/1998     Years since quittin.0    Smokeless tobacco: Former User   Substance Use Topics    Alcohol use: Yes     Comment: Very Little      Current Outpatient Medications   Medication Sig Dispense Refill    carvedilol (COREG) 3.125 MG tablet Take 3.125 mg by mouth 2 times daily (with meals)      amLODIPine (NORVASC) 5 MG tablet Take 5 mg by mouth daily      Semaglutide,0.25 or 0.5MG/DOS, (OZEMPIC, 0.25 OR 0.5 MG/DOSE,) 2 MG/1.5ML SOPN INJECT 0.25MG UNDER THE SKIN ONCE WEEKLY FOR 4 WEEK 1.5 mL 1    allopurinol (ZYLOPRIM) 100 MG tablet TAKE ONE TABLET BY MOUTH TWO TIMES DAILY 60 tablet 2    lisinopril (PRINIVIL;ZESTRIL) 20 MG tablet Take 20 mg by mouth 2 times daily      ranolazine (RANEXA) 500 MG extended release tablet Take 1 tablet by mouth 2 times daily 60 tablet 3    famotidine (PEPCID) 20 MG tablet Take 1 tablet by mouth 2 times daily 60 tablet 5    isosorbide mononitrate (IMDUR) 60 MG extended release tablet Take 1 tablet by mouth nightly 30 tablet 5    atorvastatin (LIPITOR) 20 MG tablet Take 1 tablet by mouth daily (Patient taking differently: Take 20 mg by mouth nightly ) 90 tablet 2    nitroGLYCERIN (NITROSTAT) 0.4 MG SL tablet Place 1 tablet under the tongue every 5 minutes as needed for Chest pain 25 tablet 3    Omega-3 Fatty Acids (FISH OIL) 1200 MG CAPS Take 1,200 mg by mouth 2 times daily       aspirin 81 MG tablet Take 81 mg by mouth nightly       Cholecalciferol (VITAMIN D3) 2000 units TABS Take 2,000 Units by mouth 2 times daily       Cyanocobalamin 5000 MCG CAPS Take 5,000 mcg by mouth every morning        No current facility-administered medications for this visit. Allergies: Patient has no known allergies. Review of Systems  Constitutional - no significant activity change, appetite change, or unexpected weight change. No fever, chills or diaphoresis. No fatigue. HEENT - no significant rhinorrhea or epistaxis. No tinnitus or significant hearing loss. Eyes - no sudden vision change or amaurosis. Respiratory - no significant wheezing, stridor, apnea or cough. No dyspnea on exertion or shortness of breath. Cardiovascular - no exertional chest pain, orthopnea or PND. No sensation of arrhythmia or slow heart rate. No claudication or leg edema. Gastrointestinal - no abdominal swelling or pain. No blood in stool. No severe constipation, diarrhea, nausea, or vomiting. Genitourinary - no difficulty urinating, dysuria, frequency, or urgency. No flank pain or hematuria. Musculoskeletal - no back pain, gait disturbance, or myalgia. Skin - no color change or rash. No pallor. No new surgical incision. Neurologic - no speech difficulty, facial asymmetry or lateralizing weakness. No seizures, presyncope, syncope, or significant dizziness.   Hematologic - no easy bruising or excessive bleeding. Psychiatric - no severe anxiety or insomnia. No confusion. All other review of systems are negative. Objective  Vital Signs - /64   Pulse 78   Ht 5' 10\" (1.778 m)   Wt 241 lb (109.3 kg)   BMI 34.58 kg/m²   General - Talat Yang is alert, cooperative, and pleasant. Well groomed. No acute distress. Body habitus is overweight. HEENT - The head is normocephalic. No circumoral cyanosis. Dentition is normal.   EYES -  No Xanthelasma, no arcus senilis, no conjunctival hemorrhages or discharge. Neck - Supple, without increased jugular venous pressures. No carotid bruits. No mass. Respiratory - Lungs are clear bilaterally. No wheezes or rales. Normal effort without use of accessory muscles. Cardiovascular - Heart has regular rhythm and rate. No murmurs, rubs or gallops. + pedal pulses and no varicosities. Abdominal -  Soft, nontender, nondistended. Bowel sounds are intact. Extremities - No clubbing, cyanosis, or  edema. Musculoskeletal -  No clubbing . No Osler's nodes. Gait normal .  No kyphosis or scoliosis. Skin -  no statis ulcers or dermatitis. Neurological - No focal signs are identified. Oriented to person, place and time. Psychiatric -  Appropriate affect and mood. Assessment:     Diagnosis Orders   1. Coronary artery disease of native artery of native heart with stable angina pectoris ([facility] [address]     2. Essential hypertension     3. Hypercholesteremia       Data:  BP Readings from Last 3 Encounters:   11/04/20 124/64   09/17/20 124/62   08/03/20 136/84    Pulse Readings from Last 3 Encounters:   11/04/20 78   09/17/20 70   08/03/20 60        Wt Readings from Last 3 Encounters:   11/04/20 241 lb (109.3 kg)   09/17/20 243 lb (110.2 kg)   08/03/20 244 lb (110.7 kg)   Reviewed records from [address] procedures including heart catheterization and repeat labs. Set for staged intervention with PCI to distal vessels.   Tolerating his medication changes, change beta-blocker and CCB by Jaskaran Delcid cardiologist  Does remain on his Imdur, recommend taking in the morning. He is on the Ranexa 5 mg twice a day  On statin, lipids well controlled    Lab Results   Component Value Date    CHOL 122 (L) 09/30/2020    CHOL 111 (L) 01/21/2020    CHOL 118 (L) 08/22/2019     Lab Results   Component Value Date    TRIG 60 09/30/2020    TRIG 86 01/21/2020    TRIG 65 08/22/2019     Lab Results   Component Value Date    HDL 49 (L) 09/30/2020    HDL 45 (L) 01/21/2020    HDL 47 (L) 08/22/2019     Lab Results   Component Value Date    LDLCALC 61 09/30/2020    [address] 49 01/21/2020    [address] 58 08/22/2019     We discussed upcoming intervention as well as long-term ongoing medical management  See him back in February as planned  States taking medications as prescribed  Stable cardiovascular status. No evidence of overt heart failure, worsening angina or dysrhythmia. Plan  Have procedure as planned  Follow up in Feb With Dr. Geoffrey Parker  Call with any questions or concerns  Follow up with MISSY Sanz for non cardiac problems  Report any new problems  Cardiovascular Fitness-Exercise as tolerated. Strive for 30 minutes of exercise most days of the week. Cardiac / Healthy Diet  Continue current medications as directed  Continue plan of treatment  It is always recommended that you bring your medications bottles with you to each visit - this is for your safety! MISSY Tony    EMR dragon/transcription disclaimer: Much of this encounter note is electronic transcription/translation of spoken language to printed tach. Electronic translation of spoken language may be erroneous, or at times, nonsensical words or phrases may be inadvertently transcribed.  Although, I have reviewed the note for such errors, some may still exist.

## 2020-11-04 NOTE — PATIENT INSTRUCTIONS
Have procedure as planned  Follow up in Feb With Dr. Deidre Hughes  Call with any questions or concerns  Follow up with MISSY Leslie for non cardiac problems  Report any new problems  Cardiovascular Fitness-Exercise as tolerated. Strive for 30 minutes of exercise most days of the week. Cardiac / Healthy Diet  Continue current medications as directed  Continue plan of treatment  It is always recommended that you bring your medications bottles with you to each visit - this is for your safety!

## 2020-11-19 ENCOUNTER — NURSE TRIAGE (OUTPATIENT)
Dept: OTHER | Facility: CLINIC | Age: 70
End: 2020-11-19

## 2020-11-19 NOTE — TELEPHONE ENCOUNTER
Patient called pre-service center [facility] with red flag complaint. Brief description of triage: Chest congestion, runny nose, green phlegm, occasional shortness of breath when coughs    Triage indicates for patient to be seen today or tomorrow. Care advice provided, patient verbalizes understanding; denies any other questions or concerns; instructed to call back for any new or worsening symptoms. Writer provided warm transfer to [facility] for appointment scheduling. Attention Provider: Thank you for allowing me to participate in the care of your patient. The patient was connected to triage in response to information provided to the [facility]. Please do not respond through this encounter as the response is not directed to a shared pool. Reason for Disposition   Using nasal washes and pain medicine > 24 hours and sinus pain (lower forehead, cheekbone, or eye) persists    Answer Assessment - Initial Assessment Questions  1. ONSET: \"When did the nasal discharge start? \"       Last week    2. AMOUNT: \"How much discharge is there? \"       \"very little\"    3. COUGH: \"Do you have a cough? \" If yes, ask: \"Describe the color of your sputum\" (clear, white, yellow, green)      \"productive cough that comes and goes\"    4. RESPIRATORY DISTRESS: \"Describe your breathing. \"       Normal    5. FEVER: \"Do you have a fever? \" If so, ask: \"What is your temperature, how was it measured, and when did it start? \"      No fever since last week    6. SEVERITY: \"Overall, how bad are you feeling right now? \" (e.g., doesn't interfere with normal activities, staying home from school/work, staying in bed)       Not keeping him from normal activities. Still taking dogs out for a walk. 7. OTHER SYMPTOMS: \"Do you have any other symptoms? \" (e.g., sore throat, earache, wheezing, vomiting)      Occasional shortness of breath with cough, green phlegm    8. PREGNANCY: \"Is there any chance you are pregnant? \" \"When was your last menstrual period? \"      N/A    Protocols used: COMMON COLD-ADULT-[place]

## 2020-11-20 ENCOUNTER — VIRTUAL VISIT (OUTPATIENT)
Dept: PRIMARY CARE CLINIC | Age: 70
End: 2020-11-20
Payer: MEDICARE

## 2020-11-20 PROCEDURE — 99442 PR PHYS/QHP TELEPHONE EVALUATION 11-20 MIN: CPT | Performed by: STUDENT IN AN ORGANIZED HEALTH CARE EDUCATION/TRAINING PROGRAM

## 2020-11-23 RX ORDER — AZITHROMYCIN 250 MG/1
250 TABLET, FILM COATED ORAL SEE ADMIN INSTRUCTIONS
Qty: 6 TABLET | Refills: 0 | Status: ON HOLD | OUTPATIENT
Start: 2020-11-23 | End: 2020-12-04 | Stop reason: HOSPADM

## 2020-11-23 RX ORDER — NEBULIZER ACCESSORIES
1 KIT MISCELLANEOUS DAILY PRN
Qty: 1 KIT | Refills: 0 | Status: SHIPPED | OUTPATIENT
Start: 2020-11-23 | End: 2022-09-23

## 2020-11-23 RX ORDER — METHYLPREDNISOLONE 4 MG/1
TABLET ORAL
Qty: 1 KIT | Refills: 0 | Status: ON HOLD | OUTPATIENT
Start: 2020-11-23 | End: 2020-12-04 | Stop reason: HOSPADM

## 2020-11-23 RX ORDER — ALBUTEROL SULFATE 2.5 MG/3ML
2.5 SOLUTION RESPIRATORY (INHALATION) EVERY 6 HOURS PRN
Qty: 120 EACH | Refills: 0 | Status: SHIPPED | OUTPATIENT
Start: 2020-11-23 | End: 2021-10-06

## 2020-11-25 RX ORDER — ATORVASTATIN CALCIUM 20 MG/1
20 TABLET, FILM COATED ORAL DAILY
Qty: 90 TABLET | Refills: 2 | Status: SHIPPED | OUTPATIENT
Start: 2020-11-25 | End: 2021-10-06

## 2020-11-27 ENCOUNTER — HOSPITAL ENCOUNTER (INPATIENT)
Age: 70
LOS: 7 days | Discharge: HOME | DRG: 177 | End: 2020-12-04
Attending: STUDENT IN AN ORGANIZED HEALTH CARE EDUCATION/TRAINING PROGRAM | Admitting: INTERNAL MEDICINE
Payer: MEDICARE

## 2020-11-27 ENCOUNTER — HOSPITAL ENCOUNTER (OUTPATIENT)
Dept: GENERAL RADIOLOGY | Age: 70
Discharge: HOME | DRG: 177 | End: 2020-11-27
Payer: MEDICARE

## 2020-11-27 ENCOUNTER — HOSPITAL ENCOUNTER (OUTPATIENT)
Dept: GENERAL RADIOLOGY | Age: 70
DRG: 177 | End: 2020-11-27
Payer: MEDICARE

## 2020-11-27 PROBLEM — U07.1 PNEUMONIA DUE TO COVID-19 VIRUS: Status: ACTIVE | Noted: 2020-11-27

## 2020-11-27 PROBLEM — J12.82 PNEUMONIA DUE TO COVID-19 VIRUS: Status: ACTIVE | Noted: 2020-11-27

## 2020-11-27 PROBLEM — J96.01 ACUTE RESPIRATORY FAILURE WITH HYPOXIA (HCC): Status: ACTIVE | Noted: 2020-11-27

## 2020-11-27 LAB
ABO/RH: NORMAL
ADENOVIRUS BY PCR: NOT DETECTED
ALBUMIN SERPL-MCNC: 3.8 G/DL (ref 3.5–5.2)
ALP BLD-CCNC: 58 U/L (ref 40–130)
ALT SERPL-CCNC: 25 U/L (ref 5–41)
ANION GAP SERPL CALCULATED.3IONS-SCNC: 11 MMOL/L (ref 7–19)
ANTIBODY SCREEN: NORMAL
APTT: 31.6 SEC (ref 26–36.2)
AST SERPL-CCNC: 22 U/L (ref 5–40)
BASOPHILS ABSOLUTE: 0 K/UL (ref 0–0.2)
BASOPHILS RELATIVE PERCENT: 0.1 % (ref 0–1)
BILIRUB SERPL-MCNC: 0.3 MG/DL (ref 0.2–1.2)
BORDETELLA PARAPERTUSSIS BY PCR: NOT DETECTED
BORDETELLA PERTUSSIS BY PCR: NOT DETECTED
BUN BLDV-MCNC: 39 MG/DL (ref 8–23)
C-REACTIVE PROTEIN: 6.26 MG/DL (ref 0–0.5)
CALCIUM SERPL-MCNC: 8.8 MG/DL (ref 8.8–10.2)
CHLAMYDOPHILIA PNEUMONIAE BY PCR: NOT DETECTED
CHLORIDE BLD-SCNC: 104 MMOL/L (ref 98–111)
CO2: 23 MMOL/L (ref 22–29)
CORONAVIRUS 229E BY PCR: NOT DETECTED
CORONAVIRUS HKU1 BY PCR: NOT DETECTED
CORONAVIRUS NL63 BY PCR: NOT DETECTED
CORONAVIRUS OC43 BY PCR: NOT DETECTED
CREAT SERPL-MCNC: 2.1 MG/DL (ref 0.5–1.2)
D DIMER: 0.99 UG/ML FEU (ref 0–0.48)
EOSINOPHILS ABSOLUTE: 0 K/UL (ref 0–0.6)
EOSINOPHILS RELATIVE PERCENT: 0 % (ref 0–5)
FERRITIN: 540.6 NG/ML (ref 30–400)
FIBRINOGEN: 774 MG/DL (ref 238–505)
GFR AFRICAN AMERICAN: 38
GFR NON-AFRICAN AMERICAN: 31
GLUCOSE BLD-MCNC: 131 MG/DL (ref 70–99)
GLUCOSE BLD-MCNC: 161 MG/DL (ref 74–109)
HCT VFR BLD CALC: 39.1 % (ref 42–52)
HEMOGLOBIN: 12.9 G/DL (ref 14–18)
HUMAN METAPNEUMOVIRUS BY PCR: NOT DETECTED
HUMAN RHINOVIRUS/ENTEROVIRUS BY PCR: NOT DETECTED
IMMATURE GRANULOCYTES #: 0.1 K/UL
INFLUENZA A BY PCR: NOT DETECTED
INFLUENZA B BY PCR: NOT DETECTED
INR BLD: 0.99 (ref 0.88–1.18)
LACTATE DEHYDROGENASE: 256 U/L (ref 91–215)
LYMPHOCYTES ABSOLUTE: 0.4 K/UL (ref 1.1–4.5)
LYMPHOCYTES RELATIVE PERCENT: 3.9 % (ref 20–40)
MCH RBC QN AUTO: 29.9 PG (ref 27–31)
MCHC RBC AUTO-ENTMCNC: 33 G/DL (ref 33–37)
MCV RBC AUTO: 90.5 FL (ref 80–94)
MONOCYTES ABSOLUTE: 0.8 K/UL (ref 0–0.9)
MONOCYTES RELATIVE PERCENT: 8.5 % (ref 0–10)
MYCOPLASMA PNEUMONIAE BY PCR: NOT DETECTED
NEUTROPHILS ABSOLUTE: 8.4 K/UL (ref 1.5–7.5)
NEUTROPHILS RELATIVE PERCENT: 86.7 % (ref 50–65)
PARAINFLUENZA VIRUS 1 BY PCR: NOT DETECTED
PARAINFLUENZA VIRUS 2 BY PCR: NOT DETECTED
PARAINFLUENZA VIRUS 3 BY PCR: NOT DETECTED
PARAINFLUENZA VIRUS 4 BY PCR: NOT DETECTED
PDW BLD-RTO: 13.2 % (ref 11.5–14.5)
PERFORMED ON: ABNORMAL
PLATELET # BLD: 184 K/UL (ref 130–400)
PMV BLD AUTO: 11.3 FL (ref 9.4–12.4)
POTASSIUM REFLEX MAGNESIUM: 4.7 MMOL/L (ref 3.5–5)
PROCALCITONIN: 0.1 NG/ML (ref 0–0.09)
PROTHROMBIN TIME: 13 SEC (ref 12–14.6)
RBC # BLD: 4.32 M/UL (ref 4.7–6.1)
RESPIRATORY SYNCYTIAL VIRUS BY PCR: NOT DETECTED
SARS-COV-2, PCR: DETECTED
SODIUM BLD-SCNC: 138 MMOL/L (ref 136–145)
TOTAL PROTEIN: 7.2 G/DL (ref 6.6–8.7)
WBC # BLD: 9.7 K/UL (ref 4.8–10.8)

## 2020-11-27 PROCEDURE — 93005 ELECTROCARDIOGRAM TRACING: CPT

## 2020-11-27 PROCEDURE — 71045 X-RAY EXAM CHEST 1 VIEW: CPT

## 2020-11-27 PROCEDURE — 82728 ASSAY OF FERRITIN: CPT

## 2020-11-27 PROCEDURE — 6360000002 HC RX W HCPCS: Performed by: STUDENT IN AN ORGANIZED HEALTH CARE EDUCATION/TRAINING PROGRAM

## 2020-11-27 PROCEDURE — 99283 EMERGENCY DEPT VISIT LOW MDM: CPT

## 2020-11-27 PROCEDURE — 6370000000 HC RX 637 (ALT 250 FOR IP): Performed by: STUDENT IN AN ORGANIZED HEALTH CARE EDUCATION/TRAINING PROGRAM

## 2020-11-27 PROCEDURE — 85610 PROTHROMBIN TIME: CPT

## 2020-11-27 PROCEDURE — 85384 FIBRINOGEN ACTIVITY: CPT

## 2020-11-27 PROCEDURE — 86850 RBC ANTIBODY SCREEN: CPT

## 2020-11-27 PROCEDURE — 0202U NFCT DS 22 TRGT SARS-COV-2: CPT

## 2020-11-27 PROCEDURE — 86900 BLOOD TYPING SEROLOGIC ABO: CPT

## 2020-11-27 PROCEDURE — 36415 COLL VENOUS BLD VENIPUNCTURE: CPT

## 2020-11-27 PROCEDURE — 85025 COMPLETE CBC W/AUTO DIFF WBC: CPT

## 2020-11-27 PROCEDURE — 83615 LACTATE (LD) (LDH) ENZYME: CPT

## 2020-11-27 PROCEDURE — 86140 C-REACTIVE PROTEIN: CPT

## 2020-11-27 PROCEDURE — 85730 THROMBOPLASTIN TIME PARTIAL: CPT

## 2020-11-27 PROCEDURE — 2100000000 HC CCU R&B

## 2020-11-27 PROCEDURE — 86901 BLOOD TYPING SEROLOGIC RH(D): CPT

## 2020-11-27 PROCEDURE — 85379 FIBRIN DEGRADATION QUANT: CPT

## 2020-11-27 PROCEDURE — 84145 PROCALCITONIN (PCT): CPT

## 2020-11-27 PROCEDURE — 80053 COMPREHEN METABOLIC PANEL: CPT

## 2020-11-27 PROCEDURE — 2700000000 HC OXYGEN THERAPY PER DAY

## 2020-11-27 RX ORDER — HEPARIN SODIUM 5000 [USP'U]/ML
7500 INJECTION, SOLUTION INTRAVENOUS; SUBCUTANEOUS EVERY 8 HOURS SCHEDULED
Status: DISCONTINUED | OUTPATIENT
Start: 2020-11-27 | End: 2020-11-28

## 2020-11-27 RX ORDER — RANOLAZINE 500 MG/1
500 TABLET, EXTENDED RELEASE ORAL 2 TIMES DAILY
Status: DISCONTINUED | OUTPATIENT
Start: 2020-11-27 | End: 2020-12-04 | Stop reason: HOSPADM

## 2020-11-27 RX ORDER — DEXTROSE MONOHYDRATE 25 G/50ML
12.5 INJECTION, SOLUTION INTRAVENOUS PRN
Status: DISCONTINUED | OUTPATIENT
Start: 2020-11-27 | End: 2020-12-04 | Stop reason: HOSPADM

## 2020-11-27 RX ORDER — NICOTINE POLACRILEX 4 MG
15 LOZENGE BUCCAL PRN
Status: DISCONTINUED | OUTPATIENT
Start: 2020-11-27 | End: 2020-12-04 | Stop reason: HOSPADM

## 2020-11-27 RX ORDER — ACETAMINOPHEN 325 MG/1
650 TABLET ORAL EVERY 6 HOURS PRN
Status: DISCONTINUED | OUTPATIENT
Start: 2020-11-27 | End: 2020-12-04 | Stop reason: HOSPADM

## 2020-11-27 RX ORDER — INSULIN GLARGINE 100 [IU]/ML
0.1 INJECTION, SOLUTION SUBCUTANEOUS NIGHTLY
Status: DISCONTINUED | OUTPATIENT
Start: 2020-11-27 | End: 2020-11-28

## 2020-11-27 RX ORDER — ASPIRIN 81 MG/1
81 TABLET ORAL NIGHTLY
Status: DISCONTINUED | OUTPATIENT
Start: 2020-11-27 | End: 2020-12-04 | Stop reason: HOSPADM

## 2020-11-27 RX ORDER — CARVEDILOL 3.12 MG/1
3.12 TABLET ORAL 2 TIMES DAILY WITH MEALS
Status: DISCONTINUED | OUTPATIENT
Start: 2020-11-28 | End: 2020-12-04 | Stop reason: HOSPADM

## 2020-11-27 RX ORDER — ATORVASTATIN CALCIUM 20 MG/1
20 TABLET, FILM COATED ORAL DAILY
Status: DISCONTINUED | OUTPATIENT
Start: 2020-11-28 | End: 2020-12-04 | Stop reason: HOSPADM

## 2020-11-27 RX ORDER — ALLOPURINOL 100 MG/1
100 TABLET ORAL 2 TIMES DAILY
Status: DISCONTINUED | OUTPATIENT
Start: 2020-11-28 | End: 2020-12-04 | Stop reason: HOSPADM

## 2020-11-27 RX ORDER — DEXTROSE MONOHYDRATE 50 MG/ML
100 INJECTION, SOLUTION INTRAVENOUS PRN
Status: DISCONTINUED | OUTPATIENT
Start: 2020-11-27 | End: 2020-12-04 | Stop reason: HOSPADM

## 2020-11-27 RX ORDER — ISOSORBIDE MONONITRATE 60 MG/1
60 TABLET, EXTENDED RELEASE ORAL NIGHTLY
Status: DISCONTINUED | OUTPATIENT
Start: 2020-11-27 | End: 2020-12-04 | Stop reason: HOSPADM

## 2020-11-27 RX ORDER — VITAMIN B COMPLEX
2000 TABLET ORAL DAILY
Status: DISCONTINUED | OUTPATIENT
Start: 2020-11-27 | End: 2020-12-04 | Stop reason: HOSPADM

## 2020-11-27 RX ORDER — FAMOTIDINE 20 MG/1
20 TABLET, FILM COATED ORAL DAILY
Status: DISCONTINUED | OUTPATIENT
Start: 2020-11-27 | End: 2020-12-04 | Stop reason: HOSPADM

## 2020-11-27 RX ORDER — CHOLECALCIFEROL (VITAMIN D3) 125 MCG
500 CAPSULE ORAL EVERY MORNING
Status: DISCONTINUED | OUTPATIENT
Start: 2020-11-28 | End: 2020-12-04 | Stop reason: HOSPADM

## 2020-11-27 RX ORDER — GUAIFENESIN/DEXTROMETHORPHAN 100-10MG/5
5 SYRUP ORAL EVERY 4 HOURS PRN
Status: DISCONTINUED | OUTPATIENT
Start: 2020-11-27 | End: 2020-11-28

## 2020-11-27 RX ORDER — ACETAMINOPHEN 650 MG/1
650 SUPPOSITORY RECTAL EVERY 6 HOURS PRN
Status: DISCONTINUED | OUTPATIENT
Start: 2020-11-27 | End: 2020-12-04 | Stop reason: HOSPADM

## 2020-11-27 RX ORDER — FAMOTIDINE 20 MG/1
20 TABLET, FILM COATED ORAL 2 TIMES DAILY
Status: DISCONTINUED | OUTPATIENT
Start: 2020-11-27 | End: 2020-11-27 | Stop reason: DRUGHIGH

## 2020-11-27 RX ORDER — AMLODIPINE BESYLATE 5 MG/1
5 TABLET ORAL DAILY
Status: DISCONTINUED | OUTPATIENT
Start: 2020-11-28 | End: 2020-12-04 | Stop reason: HOSPADM

## 2020-11-27 RX ADMIN — DEXAMETHASONE 6 MG: 2 TABLET ORAL at 21:53

## 2020-11-27 RX ADMIN — Medication 7 UNITS: at 23:10

## 2020-11-27 RX ADMIN — ASPIRIN 81 MG: 81 TABLET, COATED ORAL at 21:52

## 2020-11-27 RX ADMIN — RANOLAZINE 500 MG: 500 TABLET, FILM COATED, EXTENDED RELEASE ORAL at 21:52

## 2020-11-27 RX ADMIN — CHOLECALCIFEROL (VITAMIN D3) 25 MCG (1,000 UNIT) TABLET 2000 UNITS: TABLET at 21:52

## 2020-11-27 RX ADMIN — HEPARIN SODIUM 7500 UNITS: 5000 INJECTION INTRAVENOUS; SUBCUTANEOUS at 21:53

## 2020-11-27 RX ADMIN — ACETAMINOPHEN 650 MG: 325 TABLET ORAL at 23:24

## 2020-11-27 RX ADMIN — FAMOTIDINE 20 MG: 20 TABLET, FILM COATED ORAL at 21:54

## 2020-11-27 RX ADMIN — ISOSORBIDE MONONITRATE 60 MG: 60 TABLET, EXTENDED RELEASE ORAL at 21:52

## 2020-11-27 ASSESSMENT — PAIN SCALES - GENERAL
PAINLEVEL_OUTOF10: 0
PAINLEVEL_OUTOF10: 0

## 2020-11-27 NOTE — ED PROVIDER NOTES
[facility] CORONARY CARE UNIT  eMERGENCYdEPARTMENT eNCOUnter      Pt Name: Shannan Mcelroy  MRN: [id number]  Birthdate 1950  Date of evaluation: 11/27/2020  Provider:ROBERT Borrero    CHIEF COMPLAINT       Chief Complaint   Patient presents with    Pneumonia     Double PNA dx today with a chest x ray         HISTORY OF PRESENT ILLNESS  (Location/Symptom, Timing/Onset, Context/Setting, Quality, Duration, Modifying Factors, Severity.)   Shannan Mcelroy is a 79 y.o. male who presents to the emergency department with complants of resp symptoms since 15th last day of z pack today had CXR today outpatient concerning for double pnx. Has been on nebulizer every 6 hrs. 103 fever at home last took tylenol at 1500 today. 2hrs ago. Cough; productive. Usually after nebulizer. No covid swab has been. No nausea no loss of taste smell. HPI    Nursing Notes were reviewed and I agree. REVIEW OF SYSTEMS    (2-9 systems for level 4, 10 or more for level 5)     Review of Systems   Constitutional: Negative for activity change, appetite change, chills and fever. HENT: Negative for congestion and dental problem. Eyes: Negative for photophobia, discharge and itching. Respiratory: Positive for cough and shortness of breath. Negative for apnea. Cardiovascular: Negative for chest pain. Musculoskeletal: Negative for arthralgias, back pain, gait problem, myalgias and neck pain. Skin: Negative for color change, pallor and rash. Neurological: Negative for dizziness, seizures and syncope. Psychiatric/Behavioral: Negative for agitation. The patient is not nervous/anxious. Except as noted above the remainder of the review of systems was reviewed and negative.        PAST MEDICAL HISTORY     Past Medical History:   Diagnosis Date    Acute bilateral low back pain with bilateral sciatica 11/26/2019    Body mass index (bmi) 33.0-33.9, adult     CAD (coronary artery disease), native coronary artery     Class 2 obesity due to excess calories in adult 2/1/2018    Diabetes mellitus ([facility] [address]     GERD (gastroesophageal reflux disease)     Hypercholesteremia     Hypertension     Malignant neoplasm of right kidney, except renal pelvis (HCC)     Malignant neoplasm of right kidney, except renal pelvis ([facility] [address]          SURGICAL HISTORY       Past Surgical History:   Procedure Laterality Date    CARDIAC CATHETERIZATION  12/31/07, JDT    Left heart cath    CARDIAC CATHETERIZATION  12/31/07, JDT    Left heart cath    CARDIOVASCULAR STRESS TEST  06/11/2009, JDT    Stress Echo    CARDIOVASCULAR STRESS TEST  12/31/07, JDT    Stress Echo    CORONARY ARTERY BYPASS GRAFT      X4 utilizing the left MOLINA to the LAD, right MOLINA to the second diagonal, and vein grafts to the obtuse marinal and PLOM.  CORONARY ARTERY BYPASS GRAFT  01/02/08, Ofe Jones CABG placing the LIMA to the LAD, MIGUELANGEL to the second diagonal branch and SVBG to the OMB and PMB.  KIDNEY REMOVAL Right 02/2019    KIDNEY SURGERY      Right Kidney removed    CO COLONOSCOPY FLX DX W/COLLJ SPEC WHEN PFRMD N/A 10/4/2018    Dr KASSY Solorio-Diverticular disease, 10 yr recall    SKIN BIOPSY           CURRENT MEDICATIONS       Current Discharge Medication List      CONTINUE these medications which have NOT CHANGED    Details   atorvastatin (LIPITOR) 20 MG tablet TAKE 1 TABLET BY MOUTH DAILY  Qty: 90 tablet, Refills: 2    Associated Diagnoses: Hypercholesteremia;  Hypertension, essential, benign; Coronary artery disease involving native coronary artery of native heart without angina pectoris      albuterol (PROVENTIL) (2.5 MG/3ML) 0.083% nebulizer solution Take 3 mLs by nebulization every 6 hours as needed for Wheezing or Shortness of Breath  Qty: 120 each, Refills: 0    Associated Diagnoses: Upper respiratory tract infection, unspecified type; COVID-19; Bronchitis      carvedilol (COREG) 3.125 MG tablet Take 3.125 mg by mouth 2 times daily (with meals)      amLODIPine (NORVASC) 5 MG tablet Take 5 mg by mouth daily      allopurinol (ZYLOPRIM) 100 MG tablet TAKE ONE TABLET BY MOUTH TWO TIMES DAILY  Qty: 60 tablet, Refills: 2    Associated Diagnoses: Elevated blood uric acid level; Gout, unspecified cause, unspecified chronicity, unspecified site      lisinopril (PRINIVIL;ZESTRIL) 20 MG tablet Take 20 mg by mouth daily       ranolazine (RANEXA) 500 MG extended release tablet Take 1 tablet by mouth 2 times daily  Qty: 60 tablet, Refills: 3      Omega-3 Fatty Acids (FISH OIL) 1200 MG CAPS Take 1,000 mg by mouth 2 times daily       aspirin 81 MG tablet Take 81 mg by mouth nightly     Associated Diagnoses: Coronary artery disease involving native coronary artery of native heart without angina pectoris      Cholecalciferol (VITAMIN D3) 2000 units TABS Take 2,000 Units by mouth 2 times daily       Respiratory Therapy Supplies (NEBULIZER/TUBING/MOUTHPIECE) KIT 1 kit by Does not apply route daily as needed (persistant cough, sob, or wheezing)  Qty: 1 kit, Refills: 0    Associated Diagnoses: Upper respiratory tract infection, unspecified type; COVID-19; Bronchitis      azithromycin (ZITHROMAX) 250 MG tablet Take 1 tablet by mouth See Admin Instructions for 5 days 500mg on day 1 followed by 250mg on days 2 - 5  Qty: 6 tablet, Refills: 0    Associated Diagnoses: Upper respiratory tract infection, unspecified type; COVID-19      methylPREDNISolone (MEDROL DOSEPACK) 4 MG tablet Take by mouth.   Qty: 1 kit, Refills: 0    Associated Diagnoses: Upper respiratory tract infection, unspecified type; COVID-19      Semaglutide,0.25 or 0.5MG/DOS, (OZEMPIC, 0.25 OR 0.5 MG/DOSE,) 2 MG/1.5ML SOPN INJECT 0.25MG UNDER THE SKIN ONCE WEEKLY FOR 4 WEEK  Qty: 1.5 mL, Refills: 1      famotidine (PEPCID) 20 MG tablet Take 1 tablet by mouth 2 times daily  Qty: 60 tablet, Refills: 5    Associated Diagnoses: Gastroesophageal reflux disease, esophagitis presence not specified      isosorbide mononitrate (IMDUR) 60 MG extended release tablet Take 1 tablet by mouth nightly  Qty: 30 tablet, Refills: 5    Associated Diagnoses: Chest pain, unspecified type      nitroGLYCERIN (NITROSTAT) 0.4 MG SL tablet Place 1 tablet under the tongue every 5 minutes as needed for Chest pain  Qty: 25 tablet, Refills: 3    Associated Diagnoses: Coronary artery disease involving native coronary artery of native heart without angina pectoris      Cyanocobalamin 5000 MCG CAPS Take 5,000 mcg by mouth every morning              ALLERGIES     Patient has no known allergies.     FAMILY HISTORY       Family History   Problem Relation Age of Onset    Cancer Mother         lung    Coronary Art Dis Father     Stroke Father     Hypertension Father     High Blood Pressure Brother     Coronary Art Dis Paternal Grandmother     Coronary Art Dis Paternal Grandfather           SOCIAL HISTORY       Social History     Socioeconomic History    Marital status:      Spouse name: None    Number of children: None    Years of education: None    Highest education level: None   Occupational History    None   Social Needs    Financial resource strain: None    Food insecurity     Worry: None     Inability: None    Transportation needs     Medical: None     Non-medical: None   Tobacco Use    Smoking status: Former Smoker     Packs/day: 2.00     Years: 32.00     Pack years: 64.00     Start date:      Last attempt to quit: 10/5/1998     Years since quittin.1    Smokeless tobacco: Former User   Substance and Sexual Activity    Alcohol use: Yes     Comment: Very Little    Drug use: Never    Sexual activity: None   Lifestyle    Physical activity     Days per week: None     Minutes per session: None    Stress: None   Relationships    Social connections     Talks on phone: None     Gets together: None     Attends Yarsanism service: None     Active member of club or organization: None     Attends meetings of clubs or organizations: None     Relationship status: None focal deficit present. Mental Status: He is alert and oriented to person, place, and time. Mental status is at baseline. Psychiatric:         Mood and Affect: Mood normal.         Behavior: Behavior normal.         Thought Content:  Thought content normal.         Judgment: Judgment normal.           DIAGNOSTIC RESULTS     RADIOLOGY:   Non-plain film images such as CT, Ultrasound and MRI are read by the radiologist. Plain radiographic images are visualized and preliminarilyinterpreted by Trinh Gage MD with the below findings:      Interpretation per the Radiologist below, if available at the time of this note:    No orders to display       LABS:  Labs Reviewed   RESPIRATORY PANEL, MOLECULAR, WITH COVID-19 - Abnormal; Notable for the following components:       Result Value    SARS-CoV-2, PCR DETECTED (*)     All other components within normal limits    Narrative:     Velma Rod tel. ,  Results called to Addy Lynch RN ER, 11/27/2020 18:55, by [facility]  Results called to Addy Lynch RN ER, 11/27/2020 18:55, by [facility]   CBC WITH AUTO DIFFERENTIAL - Abnormal; Notable for the following components:    RBC 4.32 (*)     Hemoglobin 12.9 (*)     Hematocrit 39.1 (*)     Neutrophils % 86.7 (*)     Lymphocytes % 3.9 (*)     Neutrophils Absolute 8.4 (*)     Lymphocytes Absolute 0.4 (*)     All other components within normal limits   COMPREHENSIVE METABOLIC PANEL W/ REFLEX TO MG FOR LOW K - Abnormal; Notable for the following components:    Glucose 161 (*)     BUN 39 (*)     CREATININE 2.1 (*)     GFR Non- 31 (*)     GFR  38 (*)     All other components within normal limits   FIBRINOGEN - Abnormal; Notable for the following components:    Fibrinogen 774 (*)     All other components within normal limits   PROCALCITONIN - Abnormal; Notable for the following components:    Procalcitonin 0.10 (*)     All other components within normal limits   C-REACTIVE PROTEIN - Abnormal; Notable for the following components:    CRP 6.26 (*)     All other components within normal limits   LACTATE DEHYDROGENASE - Abnormal; Notable for the following components:     (*)     All other components within normal limits   FERRITIN - Abnormal; Notable for the following components:    Ferritin 540.6 (*)     All other components within normal limits   D-DIMER, QUANTITATIVE - Abnormal; Notable for the following components:    D-Dimer, Quant 0.99 (*)     All other components within normal limits   CBC WITH AUTO DIFFERENTIAL - Abnormal; Notable for the following components:    RBC 4.10 (*)     Hemoglobin 12.2 (*)     Hematocrit 37.4 (*)     MCHC 32.6 (*)     Neutrophils % 88.1 (*)     Lymphocytes % 5.0 (*)     Neutrophils Absolute 7.7 (*)     Lymphocytes Absolute 0.4 (*)     All other components within normal limits   COMPREHENSIVE METABOLIC PANEL W/ REFLEX TO MG FOR LOW K - Abnormal; Notable for the following components:    Potassium reflex Magnesium 5.6 (*)     Glucose 182 (*)     BUN 40 (*)     CREATININE 2.1 (*)     GFR Non- 31 (*)     GFR  38 (*)     Calcium 8.7 (*)     All other components within normal limits   D-DIMER, QUANTITATIVE - Abnormal; Notable for the following components:    D-Dimer, Quant 0.94 (*)     All other components within normal limits   FIBRINOGEN - Abnormal; Notable for the following components:    Fibrinogen 568 (*)     All other components within normal limits   POCT GLUCOSE - Abnormal; Notable for the following components:    POC Glucose 131 (*)     All other components within normal limits   POCT GLUCOSE - Abnormal; Notable for the following components:    POC Glucose 181 (*)     All other components within normal limits   PROTIME-INR   APTT   APTT   LACTIC ACID, PLASMA   PROTIME-INR   POCT GLUCOSE   POCT GLUCOSE   POCT GLUCOSE   POCT GLUCOSE   TYPE AND SCREEN       All other labs were within normal range or notreturned as of this dictation.     RE-ASSESSMENT EMERGENCY DEPARTMENT COURSE and DIFFERENTIAL DIAGNOSIS/MDM:   Vitals:    Vitals:    11/28/20 0800 11/28/20 0819 11/28/20 0900 11/28/20 1000   BP: (!) 145/69 (!) 145/69 (!) 143/73    Pulse: 66 64 84    Resp: 22  25    Temp: 98.1 °F (36.7 °C)      TempSrc: Temporal      SpO2: 93%  (!) 87% 93%   Weight:       Height:           MDM  During patient encounter the patient cannot stay above 93% room air and actually during coughing fit and ambulation is down into the low 80s which we have documented. We placed him on oxygen I am going to get him admitted he he does have Covid today I spoke with his PCP Chelsea Nunn who tells me through telehealth visits she was of the impression he had actually been swabbed at some point over these past 2 weeks but this is the first time he is actually been swabbed upon further investigation. He is Covid positive today and based on hypoxia we will get him admitted for observation I talked to Dr. Wilson Singh hospitalist who agrees to take over care. PROCEDURES:    Procedures      FINAL IMPRESSION      1. Viral pneumonia    2.  Hypoxia          DISPOSITION/PLAN   DISPOSITION Admitted 11/27/2020 08:25:46 PM      PATIENT REFERRED TO:  [address] [place] [address] EMERGENCY DEPT  [facility]  [phone]    If symptoms worsen    MISSY Wayne  [address] [place] [address] Dr SIGNER [address] [facility] [phone]      As needed      DISCHARGE MEDICATIONS:  Current Discharge Medication List          (Please note that portions of this note were completed with a voice recognition program.  Efforts were made to edit the dictations but occasionallywords are mis-transcribed.)    Naveen Galeas [phone], [phone]  11/28/20 5218

## 2020-11-27 NOTE — ED NOTES
Bed: 04  Expected date:   Expected time:   Means of arrival:   Comments:  Hold for Lori Bloom, RN  11/27/20 5226

## 2020-11-28 LAB
ALBUMIN SERPL-MCNC: 3.5 G/DL (ref 3.5–5.2)
ALP BLD-CCNC: 50 U/L (ref 40–130)
ALT SERPL-CCNC: 22 U/L (ref 5–41)
ANION GAP SERPL CALCULATED.3IONS-SCNC: 10 MMOL/L (ref 7–19)
APTT: 33.4 SEC (ref 26–36.2)
AST SERPL-CCNC: 20 U/L (ref 5–40)
BASOPHILS ABSOLUTE: 0 K/UL (ref 0–0.2)
BASOPHILS RELATIVE PERCENT: 0 % (ref 0–1)
BILIRUB SERPL-MCNC: 0.4 MG/DL (ref 0.2–1.2)
BUN BLDV-MCNC: 40 MG/DL (ref 8–23)
CALCIUM SERPL-MCNC: 8.7 MG/DL (ref 8.8–10.2)
CHLORIDE BLD-SCNC: 104 MMOL/L (ref 98–111)
CO2: 23 MMOL/L (ref 22–29)
CREAT SERPL-MCNC: 2.1 MG/DL (ref 0.5–1.2)
D DIMER: 0.94 UG/ML FEU (ref 0–0.48)
EOSINOPHILS ABSOLUTE: 0 K/UL (ref 0–0.6)
EOSINOPHILS RELATIVE PERCENT: 0 % (ref 0–5)
FIBRINOGEN: 568 MG/DL (ref 238–505)
GFR AFRICAN AMERICAN: 38
GFR NON-AFRICAN AMERICAN: 31
GLUCOSE BLD-MCNC: 134 MG/DL (ref 70–99)
GLUCOSE BLD-MCNC: 144 MG/DL (ref 70–99)
GLUCOSE BLD-MCNC: 155 MG/DL (ref 70–99)
GLUCOSE BLD-MCNC: 181 MG/DL (ref 70–99)
GLUCOSE BLD-MCNC: 182 MG/DL (ref 74–109)
HCT VFR BLD CALC: 37.4 % (ref 42–52)
HEMOGLOBIN: 12.2 G/DL (ref 14–18)
IMMATURE GRANULOCYTES #: 0.1 K/UL
INR BLD: 1.11 (ref 0.88–1.18)
L. PNEUMOPHILA SEROGP 1 UR AG: NORMAL
LACTIC ACID: 1.4 MMOL/L (ref 0.5–1.9)
LYMPHOCYTES ABSOLUTE: 0.4 K/UL (ref 1.1–4.5)
LYMPHOCYTES RELATIVE PERCENT: 5 % (ref 20–40)
MCH RBC QN AUTO: 29.8 PG (ref 27–31)
MCHC RBC AUTO-ENTMCNC: 32.6 G/DL (ref 33–37)
MCV RBC AUTO: 91.2 FL (ref 80–94)
MONOCYTES ABSOLUTE: 0.5 K/UL (ref 0–0.9)
MONOCYTES RELATIVE PERCENT: 6.1 % (ref 0–10)
NEUTROPHILS ABSOLUTE: 7.7 K/UL (ref 1.5–7.5)
NEUTROPHILS RELATIVE PERCENT: 88.1 % (ref 50–65)
PDW BLD-RTO: 13.3 % (ref 11.5–14.5)
PERFORMED ON: ABNORMAL
PLATELET # BLD: 174 K/UL (ref 130–400)
PMV BLD AUTO: 11.5 FL (ref 9.4–12.4)
POTASSIUM REFLEX MAGNESIUM: 5.6 MMOL/L (ref 3.5–5)
POTASSIUM SERPL-SCNC: 4.7 MMOL/L (ref 3.5–5)
POTASSIUM SERPL-SCNC: 4.7 MMOL/L (ref 3.5–5)
PROTHROMBIN TIME: 14.3 SEC (ref 12–14.6)
RBC # BLD: 4.1 M/UL (ref 4.7–6.1)
SODIUM BLD-SCNC: 137 MMOL/L (ref 136–145)
STREP PNEUMONIAE ANTIGEN, URINE: NORMAL
TOTAL PROTEIN: 6.8 G/DL (ref 6.6–8.7)
WBC # BLD: 8.8 K/UL (ref 4.8–10.8)

## 2020-11-28 PROCEDURE — 6360000002 HC RX W HCPCS: Performed by: INTERNAL MEDICINE

## 2020-11-28 PROCEDURE — 83605 ASSAY OF LACTIC ACID: CPT

## 2020-11-28 PROCEDURE — 6370000000 HC RX 637 (ALT 250 FOR IP): Performed by: STUDENT IN AN ORGANIZED HEALTH CARE EDUCATION/TRAINING PROGRAM

## 2020-11-28 PROCEDURE — 85025 COMPLETE CBC W/AUTO DIFF WBC: CPT

## 2020-11-28 PROCEDURE — 6360000002 HC RX W HCPCS: Performed by: STUDENT IN AN ORGANIZED HEALTH CARE EDUCATION/TRAINING PROGRAM

## 2020-11-28 PROCEDURE — 80053 COMPREHEN METABOLIC PANEL: CPT

## 2020-11-28 PROCEDURE — 85384 FIBRINOGEN ACTIVITY: CPT

## 2020-11-28 PROCEDURE — 85379 FIBRIN DEGRADATION QUANT: CPT

## 2020-11-28 PROCEDURE — 2700000000 HC OXYGEN THERAPY PER DAY

## 2020-11-28 PROCEDURE — 82947 ASSAY GLUCOSE BLOOD QUANT: CPT

## 2020-11-28 PROCEDURE — 1210000000 HC MED SURG R&B

## 2020-11-28 PROCEDURE — 6370000000 HC RX 637 (ALT 250 FOR IP): Performed by: INTERNAL MEDICINE

## 2020-11-28 PROCEDURE — 85730 THROMBOPLASTIN TIME PARTIAL: CPT

## 2020-11-28 PROCEDURE — 87449 NOS EACH ORGANISM AG IA: CPT

## 2020-11-28 PROCEDURE — 85610 PROTHROMBIN TIME: CPT

## 2020-11-28 PROCEDURE — 84132 ASSAY OF SERUM POTASSIUM: CPT

## 2020-11-28 PROCEDURE — 2580000003 HC RX 258: Performed by: INTERNAL MEDICINE

## 2020-11-28 RX ORDER — SODIUM POLYSTYRENE SULFONATE 15 G/60ML
15 SUSPENSION ORAL; RECTAL ONCE
Status: COMPLETED | OUTPATIENT
Start: 2020-11-28 | End: 2020-11-28

## 2020-11-28 RX ORDER — INSULIN GLARGINE 100 [IU]/ML
10 INJECTION, SOLUTION SUBCUTANEOUS NIGHTLY
Status: DISCONTINUED | OUTPATIENT
Start: 2020-11-28 | End: 2020-12-04 | Stop reason: HOSPADM

## 2020-11-28 RX ORDER — ASCORBIC ACID 500 MG
1000 TABLET ORAL DAILY
Status: DISCONTINUED | OUTPATIENT
Start: 2020-11-28 | End: 2020-12-04 | Stop reason: HOSPADM

## 2020-11-28 RX ORDER — BUDESONIDE AND FORMOTEROL FUMARATE DIHYDRATE 80; 4.5 UG/1; UG/1
2 AEROSOL RESPIRATORY (INHALATION) 4 TIMES DAILY
Status: DISCONTINUED | OUTPATIENT
Start: 2020-11-28 | End: 2020-12-04 | Stop reason: HOSPADM

## 2020-11-28 RX ORDER — ZINC SULFATE 50(220)MG
50 CAPSULE ORAL DAILY
Status: DISCONTINUED | OUTPATIENT
Start: 2020-11-28 | End: 2020-12-04 | Stop reason: HOSPADM

## 2020-11-28 RX ORDER — LINEZOLID 600 MG/1
600 TABLET, FILM COATED ORAL EVERY 12 HOURS SCHEDULED
Status: COMPLETED | OUTPATIENT
Start: 2020-11-28 | End: 2020-12-01

## 2020-11-28 RX ORDER — 0.9 % SODIUM CHLORIDE 0.9 %
30 INTRAVENOUS SOLUTION INTRAVENOUS PRN
Status: DISCONTINUED | OUTPATIENT
Start: 2020-11-28 | End: 2020-12-04 | Stop reason: HOSPADM

## 2020-11-28 RX ADMIN — ALLOPURINOL 100 MG: 100 TABLET ORAL at 08:19

## 2020-11-28 RX ADMIN — OXYCODONE HYDROCHLORIDE AND ACETAMINOPHEN 1000 MG: 500 TABLET ORAL at 08:22

## 2020-11-28 RX ADMIN — RANOLAZINE 500 MG: 500 TABLET, FILM COATED, EXTENDED RELEASE ORAL at 19:47

## 2020-11-28 RX ADMIN — INSULIN LISPRO 2 UNITS: 100 INJECTION, SOLUTION INTRAVENOUS; SUBCUTANEOUS at 08:39

## 2020-11-28 RX ADMIN — Medication 10 UNITS: at 19:47

## 2020-11-28 RX ADMIN — ENOXAPARIN SODIUM 40 MG: 40 INJECTION SUBCUTANEOUS at 08:21

## 2020-11-28 RX ADMIN — ENOXAPARIN SODIUM 40 MG: 40 INJECTION SUBCUTANEOUS at 19:47

## 2020-11-28 RX ADMIN — ASPIRIN 81 MG: 81 TABLET, COATED ORAL at 19:46

## 2020-11-28 RX ADMIN — FAMOTIDINE 20 MG: 20 TABLET, FILM COATED ORAL at 19:46

## 2020-11-28 RX ADMIN — INSULIN LISPRO 2 UNITS: 100 INJECTION, SOLUTION INTRAVENOUS; SUBCUTANEOUS at 12:02

## 2020-11-28 RX ADMIN — ALLOPURINOL 100 MG: 100 TABLET ORAL at 19:47

## 2020-11-28 RX ADMIN — LINEZOLID 600 MG: 600 TABLET, FILM COATED ORAL at 19:47

## 2020-11-28 RX ADMIN — ATORVASTATIN CALCIUM 20 MG: 20 TABLET, FILM COATED ORAL at 08:20

## 2020-11-28 RX ADMIN — SODIUM CHLORIDE, PRESERVATIVE FREE 2 G: 5 INJECTION INTRAVENOUS at 15:47

## 2020-11-28 RX ADMIN — BUDESONIDE AND FORMOTEROL FUMARATE DIHYDRATE 2 PUFF: 80; 4.5 AEROSOL RESPIRATORY (INHALATION) at 19:51

## 2020-11-28 RX ADMIN — CARVEDILOL 3.12 MG: 3.12 TABLET, FILM COATED ORAL at 17:13

## 2020-11-28 RX ADMIN — ACETAMINOPHEN 650 MG: 325 TABLET ORAL at 20:04

## 2020-11-28 RX ADMIN — AMLODIPINE BESYLATE 5 MG: 5 TABLET ORAL at 08:19

## 2020-11-28 RX ADMIN — Medication 50 MG: at 08:22

## 2020-11-28 RX ADMIN — BUDESONIDE AND FORMOTEROL FUMARATE DIHYDRATE 2 PUFF: 80; 4.5 AEROSOL RESPIRATORY (INHALATION) at 14:42

## 2020-11-28 RX ADMIN — SODIUM POLYSTYRENE SULFONATE 15 G: 15 SUSPENSION ORAL; RECTAL at 08:22

## 2020-11-28 RX ADMIN — DEXAMETHASONE 6 MG: 2 TABLET ORAL at 08:19

## 2020-11-28 RX ADMIN — CYANOCOBALAMIN TAB 500 MCG 500 MCG: 500 TAB at 08:20

## 2020-11-28 RX ADMIN — ISOSORBIDE MONONITRATE 60 MG: 60 TABLET, EXTENDED RELEASE ORAL at 19:47

## 2020-11-28 RX ADMIN — INSULIN LISPRO 3 UNITS: 100 INJECTION, SOLUTION INTRAVENOUS; SUBCUTANEOUS at 17:25

## 2020-11-28 RX ADMIN — CHOLECALCIFEROL (VITAMIN D3) 25 MCG (1,000 UNIT) TABLET 2000 UNITS: TABLET at 08:22

## 2020-11-28 RX ADMIN — CARVEDILOL 3.12 MG: 3.12 TABLET, FILM COATED ORAL at 08:20

## 2020-11-28 RX ADMIN — RANOLAZINE 500 MG: 500 TABLET, FILM COATED, EXTENDED RELEASE ORAL at 08:19

## 2020-11-28 ASSESSMENT — PAIN SCALES - GENERAL
PAINLEVEL_OUTOF10: 0
PAINLEVEL_OUTOF10: 0

## 2020-11-28 ASSESSMENT — ENCOUNTER SYMPTOMS
EYE DISCHARGE: 0
BACK PAIN: 0
EYE ITCHING: 0
APNEA: 0
SHORTNESS OF BREATH: 1
PHOTOPHOBIA: 0
COLOR CHANGE: 0
COUGH: 1

## 2020-11-28 NOTE — ACP (ADVANCE CARE PLANNING)
Advance Care Planning     Advance Care Planning Activator (Inpatient)  Conversation Note      Date of ACP Conversation: 11/27/2020    [facility] Conducted with: Patient with Tayla Bronson 69 - [phone], Pt requested to call her cell phone: [phone]    ACP Activator: Adrianne Koehler    . Health Care Decision Maker:     Current Designated Health Care Decision Maker:   Primary Decision Maker: Hema Rocha - [phone]     call her cell phone: [phone]  . Care Preferences    Ventilation: \"If you were in your present state of health and suddenly became very ill and were unable to breathe on your own, what would your preference be about the use of a ventilator (breathing machine) if it were available to you? \"         Yes    Would the patient desire the use of ventilator (breathing machine)?:     \"If your health worsens and it becomes clear that your chance of recovery is unlikely, what would your preference be about the use of a ventilator (breathing machine) if it were available to you? \"     Would the patient desire the use of ventilator (breathing machine)?:             Yes      Resuscitation  \"CPR works best to restart the heart when there is a sudden event, like a heart attack, in someone who is otherwise healthy. Unfortunately, CPR does not typically restart the heart for people who have serious health conditions or who are very sick. \"    \"In the event your heart stopped as a result of an underlying serious health condition, would you want attempts to be made to restart your heart (answer \"yes\" for attempt to resuscitate) or would you prefer a natural death (answer \"no\" for do not attempt to resuscitate)? \"            Yes        Length of ACP Conversation in minutes:      Conversation Outcomes:  [x] ACP discussion completed      Electronically signed by Ruth Ann Thayer on 11/28/2020 at 3:20 PM

## 2020-11-28 NOTE — PROGRESS NOTES
Pharmacy Renal Adjustment    Elodia Fernandez is a 79 y.o. male. Pharmacy has renally adjusted medications per protocol. Recent Labs     11/27/20 1754   BUN 39*       Recent Labs     11/27/20 1754   CREATININE 2.1*       Estimated Creatinine Clearance: 40 mL/min (A) (based on SCr of 2.1 mg/dL (H)).     Height:   Ht Readings from Last 1 Encounters:   11/27/20 5' 10\" (1.778 m)     Weight:  Wt Readings from Last 1 Encounters:   11/27/20 232 lb (105.2 kg)     Plan: Adjust the following medications based on renal function:           Famotidine 20 mg by mouth twice daily to once daily    Electronically signed by Rey Neville [facility] on 11/27/2020 at 9:45 PM

## 2020-11-28 NOTE — H&P
Hospitalist: History and Physical    Date: 2020 Time: 8:25 PM    Name: Mason Rodríguez : 1950 MRN: [id number]    Code Status: Prior No additional code details    PCP: MISSY Lennon    Patient's Chief Complaint Is: Shortness of breath    HPI: Patient is a 79 y.o. male with solitary kidney with history of nephrectomy, CAD with plan in near future for stenting at [facility] in [place]. Patient presented to [facility] ED with development of worsening dyspnea on exertion, productive cough and fevers over the past few days, with preceding symptoms of fatigue, malaise and myalgias over the last 2 weeks. He does not have any nausea, vomiting or loss of taste or smell. He had a fever at home of over 102. He does note previous Covid exposure around friends, but has not been previously tested. He went to his PCP earlier today where he had chest x-ray done in clinic which showed bilateral infiltrates with partial right lower lobe consolidation and subsequently sent to the ED.  patient has been on azithromycin past few days. In the ED, tested positive for COVID-19. Temperature 99. Initially satting well on room air at rest, however he subsequently had O2 desaturation down to low to mid 80s with minimal exertion. SPO2 87% documented on room air, subsequently improved on 2 L nasal cannula.         Past Medical History:   Diagnosis Date    Acute bilateral low back pain with bilateral sciatica 2019    Body mass index (bmi) 33.0-33.9, adult     CAD (coronary artery disease), native coronary artery     Class 2 obesity due to excess calories in adult 2018    GERD (gastroesophageal reflux disease)     Hypercholesteremia     Hypertension     Malignant neoplasm of right kidney, except renal pelvis (HCC)     Malignant neoplasm of right kidney, except renal pelvis [facility])      Past Surgical History:   Procedure Laterality Date    CARDIAC CATHETERIZATION 07, JDT    Left heart cath    CARDIAC CATHETERIZATION  07, JDT    Left heart cath    CARDIOVASCULAR STRESS TEST  2009, JDT    Stress Echo    CARDIOVASCULAR STRESS TEST  07, JDT    Stress Echo    CORONARY ARTERY BYPASS GRAFT      X4 utilizing the left MOLINA to the LAD, right MOLINA to the second diagonal, and vein grafts to the obtuse marinal and PLOM.  CORONARY ARTERY BYPASS GRAFT  08, Danielle Cruz CABG placing the LIMA to the LAD, MIGUELANGEL to the second diagonal branch and SVBG to the OMB and PMB.     KIDNEY REMOVAL Right 2019    KIDNEY SURGERY      Right Kidney removed    [place] COLONOSCOPY FLX DX W/COLLJ SPEC WHEN PFRMD N/A 10/4/2018    Dr KASSY Solorio-Diverticular disease, 8 yr recall    SKIN BIOPSY       Family History   Problem Relation Age of Onset   [employer] Mother         lung    Coronary Art Dis Father     Stroke Father     Hypertension Father     High Blood Pressure Brother     Coronary Art Dis Paternal Grandmother     Coronary Art Dis Paternal Grandfather      Social History     Socioeconomic History    Marital status:      Spouse name: Not on file    Number of children: Not on file    Years of education: Not on file    Highest education level: Not on file   Occupational History    Not on file   Social Needs    Financial resource strain: Not on file    Food insecurity     Worry: Not on file     Inability: Not on file    Transportation needs     Medical: Not on file     Non-medical: Not on file   Tobacco Use    Smoking status: Former Smoker     Packs/day: 2.00     Years: 32.00     Pack years: 64.00     Start date:      Last attempt to quit: 10/5/1998     Years since quittin.1    Smokeless tobacco: Former User   Substance and Sexual Activity    Alcohol use: Yes     Comment: Very Little    Drug use: Never    Sexual activity: Not on file   Lifestyle    Physical activity     Days per week: Not on file     Minutes per session: Not on file  Stress: Not on file   Relationships    Social connections     Talks on phone: Not on file     Gets together: Not on file     Attends Worship service: Not on file     Active member of club or organization: Not on file     Attends meetings of clubs or organizations: Not on file     Relationship status: Not on file    Intimate partner violence     Fear of current or ex partner: Not on file     Emotionally abused: Not on file     Physically abused: Not on file     Forced sexual activity: Not on file   Other Topics Concern    Not on file   Social History Narrative    Not on file     No Known Allergies  Prior to Admission medications    Medication Sig Start Date End Date Taking? Authorizing Provider   atorvastatin (LIPITOR) 20 MG tablet TAKE 1 TABLET BY MOUTH DAILY 11/25/20 12/25/20  MISSY Davis   Respiratory Therapy Supplies (NEBULIZER/TUBING/MOUTHPIECE) KIT 1 kit by Does not apply route daily as needed (persistant cough, sob, or wheezing) 11/23/20 12/23/20  MISSY Davis   albuterol (PROVENTIL) (2.5 MG/3ML) 0.083% nebulizer solution Take 3 mLs by nebulization every 6 hours as needed for Wheezing or Shortness of Breath 11/23/20 12/23/20  MISSY Davis   azithromycin (ZITHROMAX) 250 MG tablet Take 1 tablet by mouth See Admin Instructions for 5 days 500mg on day 1 followed by 250mg on days 2 - 5 11/23/20 11/28/20  MISSY Davis   methylPREDNISolone (MEDROL DOSEPACK) 4 MG tablet Take by mouth.  11/23/20 11/29/20  MISSY Davis   carvedilol (COREG) 3.125 MG tablet Take 3.125 mg by mouth 2 times daily (with meals)    Historical Provider, MD   amLODIPine (NORVASC) 5 MG tablet Take 5 mg by mouth daily    Historical Provider, MD   Semaglutide,0.25 or 0.5MG/DOS, (OZEMPIC, 0.25 OR 0.5 MG/DOSE,) 2 MG/1.5ML SOPN INJECT 0.25MG UNDER THE SKIN ONCE WEEKLY FOR 4 WEEK 10/13/20   MISSY Davis   allopurinol (ZYLOPRIM) 100 MG tablet TAKE ONE TABLET BY MOUTH TWO TIMES DAILY 9/30/20   MISSY Avila   lisinopril (PRINIVIL;ZESTRIL) 20 MG tablet Take 20 mg by mouth 2 times daily    Historical Provider, MD   ranolazine (RANEXA) 500 MG extended release tablet Take 1 tablet by mouth 2 times daily 9/17/20   MISSY Wyatt   famotidine (PEPCID) 20 MG tablet Take 1 tablet by mouth 2 times daily 8/18/20 11/4/20  MISSY Avila   isosorbide mononitrate (IMDUR) 60 MG extended release tablet Take 1 tablet by mouth nightly 8/18/20 11/4/20  MISSY Avila   nitroGLYCERIN (NITROSTAT) 0.4 MG SL tablet Place 1 tablet under the tongue every 5 minutes as needed for Chest pain 1/22/20   MISSY Avila   Omega-3 Fatty Acids (FISH OIL) 1200 MG CAPS Take 1,200 mg by mouth 2 times daily     Historical Provider, MD   Cyanocobalamin 5000 MCG CAPS Take 5,000 mcg by mouth every morning     Historical Provider, MD   aspirin 81 MG tablet Take 81 mg by mouth nightly     Historical Provider, MD   Cholecalciferol (VITAMIN D3) 2000 units TABS Take 2,000 Units by mouth 2 times daily     Historical Provider, MD       I have reviewed all pertinent history. Prior medical records and laboratory evaluation reviewed. Imaging independently reviewed. Review of Systems:   As per HPI, otherwise all other ROS performed and found to be negative at this time. Physical Exam:  Vitals:    11/27/20 1933   BP: (!) 151/70   Pulse: 92   Resp: (!) 34   Temp:    SpO2: (!) 87%     CONSTITUTIONAL: Appears ill  PSYCH: Judgement and insight are normal. Mental status alert and oriented to person, place and time. Mood and affect are normal  EYES: symmetrical. Conjunctiva are moist, non-icteric. Lids are normal   ENT: Hearing is grossly normal. Throat: Lips are normal. Oral mucosa is pink and moist.   NECK: Trachea is midline. LUNGS: Normal respiratory effort, no intercostal retractions or accessory muscle use.    CARDIOVASCULAR: Regular rate and rhythm  GI/ABDOMEN: No observed abdominal distention  SKIN: No rashes observed  NEUROLOGICAL: Moves extremities without apparent weakness, no dysarthria  EXTREMITIES: No deformities    Labs:   Recent Results (from the past 72 hour(s))   CBC Auto Differential    Collection Time: 11/27/20  5:54 PM   Result Value Ref Range    WBC 9.7 4.8 - 10.8 K/uL    RBC 4.32 (L) 4.70 - 6.10 M/uL    Hemoglobin 12.9 (L) 14.0 - 18.0 g/dL    Hematocrit 39.1 (L) 42.0 - 52.0 %    MCV 90.5 80.0 - 94.0 fL    MCH 29.9 27.0 - 31.0 pg    MCHC 33.0 33.0 - 37.0 g/dL    RDW 13.2 11.5 - 14.5 %    Platelets 017 793 - 106 K/uL    MPV 11.3 9.4 - 12.4 fL    Neutrophils % 86.7 (H) 50.0 - 65.0 %    Lymphocytes % 3.9 (L) 20.0 - 40.0 %    Monocytes % 8.5 0.0 - 10.0 %    Eosinophils % 0.0 0.0 - 5.0 %    Basophils % 0.1 0.0 - 1.0 %    Neutrophils Absolute 8.4 (H) 1.5 - 7.5 K/uL    Immature Granulocytes # 0.1 K/uL    Lymphocytes Absolute 0.4 (L) 1.1 - 4.5 K/uL    Monocytes Absolute 0.80 0.00 - 0.90 K/uL    Eosinophils Absolute 0.00 0.00 - 0.60 K/uL    Basophils Absolute 0.00 0.00 - 0.20 K/uL   Comprehensive Metabolic Panel w/ Reflex to MG    Collection Time: 11/27/20  5:54 PM   Result Value Ref Range    Sodium 138 136 - 145 mmol/L    Potassium reflex Magnesium 4.7 3.5 - 5.0 mmol/L    Chloride 104 98 - 111 mmol/L    CO2 23 22 - 29 mmol/L    Anion Gap 11 7 - 19 mmol/L    Glucose 161 (H) 74 - 109 mg/dL    BUN 39 (H) 8 - 23 mg/dL    CREATININE 2.1 (H) 0.5 - 1.2 mg/dL    GFR Non- 31 (A) >60    GFR  38 (L) >59    Calcium 8.8 8.8 - 10.2 mg/dL    Total Protein 7.2 6.6 - 8.7 g/dL    Alb 3.8 3.5 - 5.2 g/dL    Total Bilirubin 0.3 0.2 - 1.2 mg/dL    Alkaline Phosphatase 58 40 - 130 U/L    ALT 25 5 - 41 U/L    AST 22 5 - 40 U/L   Respiratory Panel, Molecular, with COVID-19 (Restricted: peds pts or suitable admitted adults)    Collection Time: 11/27/20  5:54 PM    Specimen: Nasopharyngeal   Result Value Ref Range    Adenovirus by PCR Not Detected Not Detected Bordetella parapertussis by PCR Not Detected Not Detected    Bordetella pertussis by PCR Not Detected Not Detected    Chlamydophilia pneumoniae by PCR Not Detected Not Detected    Coronavirus 229E by PCR Not Detected Not Detected    Coronavirus HKU1 by PCR Not Detected Not Detected    Coronavirus NL63 by PCR Not Detected Not Detected    Coronavirus OC43 by PCR Not Detected Not Detected    SARS-CoV-2, PCR DETECTED (AA) Not Detected    Human Metapneumovirus by PCR Not Detected Not Detected    Human Rhinovirus/Enterovirus by PCR Not Detected Not Detected    Influenza A by PCR Not Detected Not Detected    Influenza B by PCR Not Detected Not Detected    Mycoplasma pneumoniae by PCR Not Detected Not Detected    Parainfluenza Virus 1 by PCR Not Detected Not Detected    Parainfluenza Virus 2 by PCR Not Detected Not Detected    Parainfluenza Virus 3 by PCR Not Detected Not Detected    Parainfluenza Virus 4 by PCR Not Detected Not Detected    Respiratory Syncytial Virus by PCR Not Detected Not Detected       Imaging: Xr Chest Portable    Result Date: 11/27/2020  EXAMINATION: XR CHEST PORTABLE 11/27/2020 3:13 PM HISTORY: Shortness of breath , clinical concern for COVID-19 infection. Report: A portable upright frontal view the chest was obtained. COMPARISON: Chest x-ray 3/6/2020. There are new mixed interstitial and groundglass infiltrates in both lungs, with partial consolidation of the right lower lobe and slight bulging of the minor fissure. No pneumothorax or pleural effusion is identified. Heart size is normal. Previous median sternotomy is noted. The osseous structures and upper abdomen are unremarkable. New bilateral pulmonary infiltrates and partial consolidation of the right lower lobe with bulging of the minor fissure. This is compatible with pneumonia and atypical pneumonia, including COVID-19 is not excluded. Signed by Dr Gisele Smalls.  Ky on 11/27/2020 3:15 PM      Assessment/Plan:     Active Problems: Pneumonia due to COVID-19 virus  Resolved Problems:    * No resolved hospital problems.  *       COVID-19 pneumonia, with acute hypoxic respiratory failure, SPO2 87%, present on admission  -Supplemental O2 as needed, goal SPO2 over 90%  -Dexamethasone  -Hold off remdesivir for now given kidney disease with solitary kidney, will discuss risks/benefits   -Defer consideration for convalescent plasma to day team  -Encourage incentive spirometry  -As needed antitussives  -Prophylactic anticoagulation per Covid protocol  -Monitor CBC, CMP, coagulation studies  -Procalcitonin negative, lower suspicion for concomitant bacterial infection    DM with hyperglycemia  -Hold home meds  -Basal insulin nightly  -POC blood glucose checks every 6 hours with corrective sliding scale insulin    Other chronic issues:  Hypertension-Home amlodipine, Coreg  CAD-aspirin, statin, beta-blocker, Imdur, ranolazine  Gout-allopurinol  GERD-famotidine    DVT prophylaxis-  lovenox    Bruce Parekh  11/27/2020 8:25 PM

## 2020-11-28 NOTE — PROGRESS NOTES
4 Eyes Skin Assessment    Ernestina Smyth is being assessed upon: Admission    I agree that I, Rachel Ramos, along with *** (either 2 RN's or 1 LPN and 1 RN) have performed a thorough Head to Toe Skin Assessment on the patient. ALL assessment sites listed below have been assessed. Areas assessed by both nurses:     [x]   Head, Face, and Ears   [x]   Shoulders, Back, and Chest  [x]   Arms, Elbows, and Hands   [x]   Coccyx, Sacrum, and Ischium  [x]   Legs, Feet, and Heels    Does the Patient have Skin Breakdown?  No    Vinh Prevention initiated: No  Wound Care Orders initiated: No    [facility] nurse consulted for Pressure Injury (Stage 3,4, Unstageable, DTI, NWPT, and Complex wounds) and New or Established Ostomies: No        Primary Nurse eSignature: Rachel Ramos RN on 11/27/2020 at 10:26 PM      Co-Signer eSignature: {Esignature:[id number]}

## 2020-11-28 NOTE — PLAN OF CARE
Problem: Airway Clearance - Ineffective  Goal: Achieve or maintain patent airway  Outcome: Met This Shift     Problem: Gas Exchange - Impaired  Goal: Absence of hypoxia  Outcome: Met This Shift  Goal: Promote optimal lung function  Outcome: Met This Shift     Problem: Breathing Pattern - Ineffective  Goal: Ability to achieve and maintain a regular respiratory rate  Outcome: Met This Shift     Problem: Body Temperature -  Risk of, Imbalanced  Goal: Will regain or maintain usual level of consciousness  Outcome: Met This Shift  Goal: Complications related to the disease process, condition or treatment will be avoided or minimized  Outcome: Met This Shift     Problem: Isolation Precautions - Risk of Spread of Infection  Goal: Prevent transmission of infection  Outcome: Met This Shift     Problem: Nutrition Deficits  Goal: Optimize nutrtional status  Outcome: Met This Shift     Problem: Risk for Fluid Volume Deficit  Goal: Maintain normal heart rhythm  Outcome: Met This Shift  Goal: Maintain absence of muscle cramping  Outcome: Met This Shift  Goal: Maintain normal serum potassium, sodium, calcium, phosphorus, and pH  Outcome: Met This Shift     Problem: Falls - Risk of:  Goal: Will remain free from falls  Description: Will remain free from falls  Outcome: Met This Shift  Goal: Absence of physical injury  Description: Absence of physical injury  Outcome: Met This Shift     Problem:  Body Temperature -  Risk of, Imbalanced  Goal: Ability to maintain a body temperature within defined limits  Outcome: Ongoing

## 2020-11-28 NOTE — PROGRESS NOTES
[facility]    Patient:  Sp Sutton  YOB: 1950  Date of Service: 11/28/2020  MRN: [id number]   Acct: [de-identified]   Primary Care Physician: MISSY Steele  Advance Directive: Full Code  Admit Date: 11/27/2020       Hospital Day: 1      CHIEF COMPLAINT:     Chief Complaint   Patient presents with    Pneumonia     Double PNA dx today with a chest x ray       11/28/2020 7:39 AM  Subjective / Interval History:   11/28/2020  Patient seen and examined. Doing well. Laying comfortably in bed in CCU, in no apparent acute distress. Currently requiring supplemental oxygen. Patient endorses overall improvement. States his breathing is improving gradually. No acute changes or acute overnight event reported. Brief History:    As per Initial admission HPI 11/27/2020, quoted below;  \"Patient is a 79 y.o. male with solitary kidney with history of nephrectomy, CAD with plan in near future for stenting at [facility] in [place].      Patient presented to [facility] ED with development of worsening dyspnea on exertion, productive cough and fevers over the past few days, with preceding symptoms of fatigue, malaise and myalgias over the last 2 weeks. He does not have any nausea, vomiting or loss of taste or smell. He had a fever at home of over 102. He does note previous Covid exposure around friends, but has not been previously tested. He went to his PCP earlier today where he had chest x-ray done in clinic which showed bilateral infiltrates with partial right lower lobe consolidation and subsequently sent to the ED.  patient has been on azithromycin past few days.     In the ED, tested positive for COVID-19. Temperature 99. Initially satting well on room air at rest, however he subsequently had O2 desaturation down to low to mid 80s with minimal exertion.   SPO2 87% documented on room air, subsequently improved on 2 L nasal cannula. \"      Review of Systems:   Review of Systems  ROS: 14 point review of systems is negative except as specifically addressed above.     No diet orders on file  No intake or output data in the 24 hours ending 11/28/20 0739    Medications:   dextrose       Current Facility-Administered Medications   Medication Dose Route Frequency Provider Last Rate Last Dose    enoxaparin (LOVENOX) injection 40 mg  40 mg Subcutaneous BID Kate Aviles MD        zinc sulfate (ZINCATE) capsule 50 mg  50 mg Oral Daily Luzmaria Cai MD        vitamin C (ASCORBIC ACID) tablet 1,000 mg  1,000 mg Oral Daily Luzmaria Cai MD        sodium polystyrene (KAYEXALATE) 15 GM/60ML suspension 15 g  15 g Oral Once Luzmaria Cai MD        acetaminophen (TYLENOL) tablet 650 mg  650 mg Oral Q6H PRN Kate Aviles MD   650 mg at 11/27/20 2324    Or    acetaminophen (TYLENOL) suppository 650 mg  650 mg Rectal Q6H PRN Kate Aviles MD        guaiFENesin-dextromethorphan (ROBITUSSIN DM) 100-10 MG/5ML syrup 5 mL  5 mL Oral Q4H PRN Kate Aviles MD        dexamethasone (DECADRON) tablet 6 mg  6 mg Oral Daily Kate Aviles MD   6 mg at 11/27/20 2153    Vitamin D (CHOLECALCIFEROL) tablet 2,000 Units  2,000 Units Oral Daily Kate Aviles MD   2,000 Units at 11/27/20 2152    allopurinol (ZYLOPRIM) tablet 100 mg  100 mg Oral BID Kate Aviles MD        amLODIPine (NORVASC) tablet 5 mg  5 mg Oral Daily Kate Aviles MD        aspirin EC tablet 81 mg  81 mg Oral Nightly Kate Aviles MD   81 mg at 11/27/20 2152    atorvastatin (LIPITOR) tablet 20 mg  20 mg Oral Daily Kate Aviles MD        carvedilol (COREG) tablet 3.125 mg  3.125 mg Oral BID WC Kate Aviles MD        vitamin B-12 (CYANOCOBALAMIN) tablet 500 mcg  500 mcg Oral QAM Kate Aviles MD        isosorbide mononitrate (IMDUR) extended release tablet 60 mg  60 mg Oral Nightly Kate Aviles MD   60 mg at 11/27/20 2152    ranolazine (RANEXA) extended release tablet 500 mg  500 mg Oral BID Zoila Helms MD   500 mg at 11/27/20 2152    famotidine (PEPCID) tablet 20 mg  20 mg Oral Daily Zoila Helms MD   20 mg at 11/27/20 2154    insulin glargine (LANTUS) injection vial 7 Units  0.1 Units/kg (Ideal) Subcutaneous Nightly Zoila Helms MD   7 Units at 11/27/20 2310    insulin lispro (HUMALOG) injection vial 0-12 Units  0-12 Units Subcutaneous TID WC Zoila Helms MD        insulin lispro (HUMALOG) injection vial 0-6 Units  0-6 Units Subcutaneous Nightly Zoila Helms MD        glucose (GLUTOSE) 40 % oral gel 15 g  15 g Oral PRN Zoila Helms MD        dextrose 50 % IV solution  12.5 g Intravenous PRN Zoila Helms MD        glucagon (rDNA) injection 1 mg  1 mg Intramuscular PRN Zoila Helms MD        dextrose 5 % solution  100 mL/hr Intravenous PRN Zoila Helms MD             dextrose        enoxaparin  40 mg Subcutaneous BID    zinc sulfate  50 mg Oral Daily    vitamin C  1,000 mg Oral Daily    sodium polystyrene  15 g Oral Once    dexamethasone  6 mg Oral Daily    Vitamin D  2,000 Units Oral Daily    allopurinol  100 mg Oral BID    amLODIPine  5 mg Oral Daily    aspirin  81 mg Oral Nightly    atorvastatin  20 mg Oral Daily    carvedilol  3.125 mg Oral BID WC    vitamin B-12  500 mcg Oral QAM    isosorbide mononitrate  60 mg Oral Nightly    ranolazine  500 mg Oral BID    famotidine  20 mg Oral Daily    insulin glargine  0.1 Units/kg (Ideal) Subcutaneous Nightly    insulin lispro  0-12 Units Subcutaneous TID     insulin lispro  0-6 Units Subcutaneous Nightly     acetaminophen **OR** acetaminophen, guaiFENesin-dextromethorphan, glucose, dextrose, glucagon (rDNA), dextrose  No diet orders on file       Labs:   CBC with DIFF:  Recent Labs     11/27/20 1754 11/28/20  0449   WBC 9.7 8.8   RBC 4.32* 4.10*   HGB 12.9* 12.2*   HCT 39.1* 37.4*   MCV 90.5 91.2   MCH 29.9 29.8   MCHC 33.0 32.6*   RDW 13.2 13.3    174   MPV 11.3 11.5   NEUTOPHILPCT 86.7* 88.1*   LYMPHOPCT 3.9* 5.0*   MONOPCT 8.5 6.1   EOSRELPCT 0.0 0.0   BASOPCT 0.1 0.0   NEUTROABS 8.4* 7.7*   LYMPHSABS 0.4* 0.4*   MONOSABS 0.80 0.50   EOSABS 0.00 0.00   BASOSABS 0.00 0.00       CMP/BMP:  Recent Labs     11/27/20 1754 11/28/20 0449    137   K 4.7 5.6*    104   CO2 23 23   ANIONGAP 11 10   GLUCOSE 161* 182*   BUN 39* 40*   CREATININE 2.1* 2.1*   LABGLOM 31* 31*   CALCIUM 8.8 8.7*   PROT 7.2 6.8   LABALBU 3.8 3.5   BILITOT 0.3 0.4   ALKPHOS 58 50   ALT 25 22   AST 22 20         CRP:    Recent Labs     11/27/20 1754   CRP 6.26*     Sed Rate:  No results for input(s): SEDRATE in the last 72 hours. HgBA1c:  No components found for: HGBA1C  FLP:    Lab Results   Component Value Date    TRIG 60 09/30/2020    HDL 49 09/30/2020    LDLCALC 61 09/30/2020     TSH:    Lab Results   Component Value Date    TSH 1.260 01/21/2020     Troponin T: No results for input(s): TROPONINI in the last 72 hours. Pro-BNP: No results for input(s): BNP in the last 72 hours. INR:   Recent Labs     11/27/20 1754 11/28/20 0449   INR 0.99 1.11     ABGs: No results found for: PHART, PO2ART, [id number]  UA:No results for input(s): NITRITE, COLORU, PHUR, LABCAST, WBCUA, RBCUA, MUCUS, TRICHOMONAS, YEAST, BACTERIA, CLARITYU, SPECGRAV, LEUKOCYTESUR, UROBILINOGEN, BILIRUBINUR, BLOODU, GLUCOSEU, AMORPHOUS in the last 72 hours. Invalid input(s): Susan Valliant      Culture Results:    No results for input(s): CXSURG in the last 720 hours. Blood Culture Recent: No results for input(s): BC in the last 720 hours. Cultures:   No results for input(s): CULTURE in the last 72 hours. No results for input(s): BC, [place] in the last 72 hours. No results for input(s): CXSURG in the last 72 hours. No results for input(s): MG, PHOS in the last 72 hours.   Recent Labs     11/27/20  1754 11/28/20  0449   AST 22 20   ALT 25 22   BILITOT 0.3 0.4 ALKPHOS 58 50         RAD:   Xr Chest Portable    Result Date: 11/27/2020  EXAMINATION: XR CHEST PORTABLE 11/27/2020 3:13 PM HISTORY: Shortness of breath , clinical concern for COVID-19 infection. Report: A portable upright frontal view the chest was obtained. COMPARISON: Chest x-ray 3/6/2020. There are new mixed interstitial and groundglass infiltrates in both lungs, with partial consolidation of the right lower lobe and slight bulging of the minor fissure. No pneumothorax or pleural effusion is identified. Heart size is normal. Previous median sternotomy is noted. The osseous structures and upper abdomen are unremarkable. New bilateral pulmonary infiltrates and partial consolidation of the right lower lobe with bulging of the minor fissure. This is compatible with pneumonia and atypical pneumonia, including COVID-19 is not excluded. Signed by Dr Larry Mcpherson on 11/27/2020 3:15 PM        Objective:   Vitals:   /63   Pulse 57   Temp 98.4 °F (36.9 °C) (Temporal)   Resp 11   Ht 5' 10\" (1.778 m)   Wt 230 lb 3.2 oz (104.4 kg)   SpO2 92%   BMI 33.03 kg/m²       Patient Vitals for the past 24 hrs:   BP Temp Temp src Pulse Resp SpO2 Height Weight   11/28/20 0700 -- -- -- -- 11 92 % -- --   11/28/20 0600 107/63 -- -- 57 15 94 % -- --   11/28/20 0500 139/65 -- -- 72 26 (!) 87 % -- --   11/28/20 0400 (!) 111/53 98.4 °F (36.9 °C) Temporal 56 16 93 % -- --   11/28/20 0300 (!) 108/51 -- -- 60 18 93 % -- --   11/28/20 0200 (!) 110/54 -- -- 66 18 (!) 89 % -- --   11/28/20 0100 (!) 107/57 -- -- 71 20 (!) 89 % -- --   11/28/20 0000 (!) 122/55 -- -- 85 18 90 % -- 230 lb 3.2 oz (104.4 kg)   11/27/20 2330 123/61 101.5 °F (38.6 °C) Axillary 82 20 93 % -- --   11/27/20 2300 (!) 140/63 -- -- 86 16 93 % -- --   11/27/20 2230 130/68 -- -- 90 28 92 % -- --   11/27/20 2215 (!) 181/93 -- -- 98 19 90 % -- --   11/27/20 2200 (!) 165/74 100.3 °F (37.9 °C) Axillary 94 26 93 % -- --   11/27/20 2150 (!) 158/83 -- -- 97 26 92 % -- --   11/27/20 2145 (!) 163/109 -- -- 98 27 91 % -- --   11/27/20 2103 139/83 -- -- 90 18 94 % -- --   11/27/20 2033 (!) 163/76 -- -- 92 23 94 % -- --   11/27/20 2003 (!) 155/78 -- -- 86 15 95 % -- --   11/27/20 1933 (!) 151/70 -- -- 92 (!) 34 (!) 87 % -- --   11/27/20 1903 (!) 151/67 -- -- 86 23 91 % -- --   11/27/20 1833 137/67 -- -- 86 19 93 % -- --   11/27/20 1803 (!) 147/61 -- -- 87 24 93 % -- --   11/27/20 1745 -- -- -- -- -- (!) 89 % -- --   11/27/20 1733 (!) 142/64 -- -- -- -- 93 % -- --   11/27/20 1703 (!) 151/65 -- -- -- -- 93 % -- --   11/27/20 1656 (!) 167/69 99 °F (37.2 °C) -- 90 18 93 % 5' 10\" (1.778 m) 232 lb (105.2 kg)   11/27/20 1655 (!) 167/69 -- -- -- -- 93 % -- --       24HR INTAKE/OUTPUT:  No intake or output data in the 24 hours ending 11/28/20 0739    Patient interviewed/observed/monitored from CCU glass door, in order to preserve PPE's. Physical Exam  Vitals signs and nursing note reviewed. Constitutional:       General: He is not in acute distress. Appearance: Normal appearance. He is not ill-appearing, toxic-appearing or diaphoretic. HENT:      Head: Normocephalic and atraumatic. Right Ear: External ear normal.      Left Ear: External ear normal.   Neck:      Musculoskeletal: Normal range of motion and neck supple. Pulmonary:      Effort: Pulmonary effort is normal. No respiratory distress. Comments: Patient no obvious acute respiratory distress. Responding to questions appropriately and speaking in full sentences. Musculoskeletal: Normal range of motion. Neurological:      Mental Status: He is alert and oriented to person, place, and time. Psychiatric:         Mood and Affect: Mood normal.         Behavior: Behavior normal.         Thought Content:  Thought content normal.         Judgment: Judgment normal.           Assessment/plan:       Hospital Problems           Last Modified POA    * (Principal) Pneumonia due to COVID-19 virus 11/28/2020 Yes    [facility] 11/27/2020 Yes    Hypercholesteremia 11/27/2020 Yes    GERD (gastroesophageal reflux disease) 11/27/2020 Yes    Morbidly obese ([facility] 11/27/2020 Yes    Gout 11/27/2020 Yes    Chronic kidney disease, stage 3 11/27/2020 Yes    Type 2 diabetes mellitus with complication, without long-term current use of insulin ([facility] 11/27/2020 Yes    Hypertension, essential, benign 11/27/2020 Yes    H/O unilateral nephrectomy 11/27/2020 Yes    Coronary artery disease involving left main coronary artery 11/27/2020 Yes          Principal Problem:    Pneumonia due to COVID-19 virus  Active Problems:    Acute respiratory failure with hypoxia (HCC)    Hypercholesteremia    GERD (gastroesophageal reflux disease)    Morbidly obese (HCC)    Gout    Chronic kidney disease, stage 3    Type 2 diabetes mellitus with complication, without long-term current use of insulin (HCC)    Hypertension, essential, benign    H/O unilateral nephrectomy    Coronary artery disease involving left main coronary artery  Resolved Problems:    * No resolved hospital problems. *      COVID 19 Infection PNA.- Diagnosed on 11/27/2020   Continue oxygen supplementation keep SPO2 above 90%   Continuous Pulse Ox   Dexamethasone 6 mg PO Daily   Adjunct therapy with;  Melatonin, Vitamin D, Zinc, & Inhaled Corticosteroids. o Zinc sulfate (ZINCATE) capsule 50 mg  PO Daily  o Melatonin PO Daily  o Vitamin D (CHOLECALCIFEROL) capsule 2,000 Units  PO Daily  o Budesonide-formoterol (SYMBICORT) 80-4.5 MCG/ACT inhaler 2 puff Twice Daily   Remdesivir not initiated, given patient's renal function as well as reported history of right nephrectomy.  ID consulted, for evaluation for possible convalescent plasma treatment.  Type and screen, in anticipation of possible convalescent plasma, as per current practice guideline, if indicated   Avoid Nebulizer treatments to avoid aerosolization.      Encourage Prone positioning   Enoxaparin 40 mg subcu twice daily        History of Diabetes Mellitus II   Uncontrolled   Hemoglobin A1C    Reported Home regimen include,      Inpatient Regimens to include;  o - Insulin Glargine (Lantus) 10 units subcu nightly  o - Insulin Lispro (Humalog) 3 units subcu pre-meal 3 times a day  o - Insulin Lispro (Humalog) on a Medium dose sliding scale   Monitor POC glucose, and adjust insulin regimen accordingly based on daily insulin requirement. 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 Advance Directive: Full Code    No diet orders on file         Consults Made:   IP CONSULT TO PHARMACY  IP CONSULT TO INFECTIOUS DISEASES    DVT prophylaxis: Enoxaparin      Discharge planning: tbd    Time Spent is 25 mins in the examination, evaluation, counseling and review of medications, assessment and plan.      Electronically signed by   Efrem Brooke MD, MPH,   Internal Medicine Hospitalist   11/28/2020 7:39 AM

## 2020-11-29 LAB
ALBUMIN SERPL-MCNC: 3.7 G/DL (ref 3.5–5.2)
ALP BLD-CCNC: 50 U/L (ref 40–130)
ALT SERPL-CCNC: 22 U/L (ref 5–41)
ANION GAP SERPL CALCULATED.3IONS-SCNC: 11 MMOL/L (ref 7–19)
APTT: 37.5 SEC (ref 26–36.2)
AST SERPL-CCNC: 20 U/L (ref 5–40)
BACTERIA: NEGATIVE /HPF
BASOPHILS ABSOLUTE: 0 K/UL (ref 0–0.2)
BASOPHILS RELATIVE PERCENT: 0.1 % (ref 0–1)
BILIRUB SERPL-MCNC: 0.4 MG/DL (ref 0.2–1.2)
BILIRUBIN URINE: NEGATIVE
BLOOD, URINE: NEGATIVE
BUN BLDV-MCNC: 50 MG/DL (ref 8–23)
C-REACTIVE PROTEIN: 9.09 MG/DL (ref 0–0.5)
CALCIUM SERPL-MCNC: 8.3 MG/DL (ref 8.8–10.2)
CHLORIDE BLD-SCNC: 104 MMOL/L (ref 98–111)
CLARITY: CLEAR
CO2: 22 MMOL/L (ref 22–29)
COLOR: YELLOW
CREAT SERPL-MCNC: 2 MG/DL (ref 0.5–1.2)
CRYSTALS, UA: ABNORMAL /HPF
D DIMER: 2.88 UG/ML FEU (ref 0–0.48)
EOSINOPHILS ABSOLUTE: 0 K/UL (ref 0–0.6)
EOSINOPHILS RELATIVE PERCENT: 0 % (ref 0–5)
EPITHELIAL CELLS, UA: 2 /HPF (ref 0–5)
FERRITIN: 754 NG/ML (ref 30–400)
FIBRINOGEN: 581 MG/DL (ref 238–505)
GFR AFRICAN AMERICAN: 40
GFR NON-AFRICAN AMERICAN: 33
GLUCOSE BLD-MCNC: 106 MG/DL (ref 70–99)
GLUCOSE BLD-MCNC: 140 MG/DL (ref 70–99)
GLUCOSE BLD-MCNC: 147 MG/DL (ref 70–99)
GLUCOSE BLD-MCNC: 162 MG/DL (ref 74–109)
GLUCOSE BLD-MCNC: 222 MG/DL (ref 70–99)
GLUCOSE URINE: NEGATIVE MG/DL
HBA1C MFR BLD: 6.5 % (ref 4–6)
HCT VFR BLD CALC: 37.3 % (ref 42–52)
HEMOGLOBIN: 12.4 G/DL (ref 14–18)
HYALINE CASTS: 4 /HPF (ref 0–8)
IMMATURE GRANULOCYTES #: 0.1 K/UL
INR BLD: 1.07 (ref 0.88–1.18)
KETONES, URINE: NEGATIVE MG/DL
L. PNEUMOPHILA SEROGP 1 UR AG: NORMAL
LEUKOCYTE ESTERASE, URINE: NEGATIVE
LYMPHOCYTES ABSOLUTE: 0.4 K/UL (ref 1.1–4.5)
LYMPHOCYTES RELATIVE PERCENT: 3 % (ref 20–40)
MCH RBC QN AUTO: 30.2 PG (ref 27–31)
MCHC RBC AUTO-ENTMCNC: 33.2 G/DL (ref 33–37)
MCV RBC AUTO: 91 FL (ref 80–94)
MONOCYTES ABSOLUTE: 1.1 K/UL (ref 0–0.9)
MONOCYTES RELATIVE PERCENT: 8.8 % (ref 0–10)
NEUTROPHILS ABSOLUTE: 10.6 K/UL (ref 1.5–7.5)
NEUTROPHILS RELATIVE PERCENT: 87.3 % (ref 50–65)
NITRITE, URINE: NEGATIVE
PDW BLD-RTO: 13.2 % (ref 11.5–14.5)
PERFORMED ON: ABNORMAL
PH UA: 5.5 (ref 5–8)
PLATELET # BLD: 187 K/UL (ref 130–400)
PMV BLD AUTO: 11.2 FL (ref 9.4–12.4)
POTASSIUM REFLEX MAGNESIUM: 4.7 MMOL/L (ref 3.5–5)
PROTEIN UA: 100 MG/DL
PROTHROMBIN TIME: 13.8 SEC (ref 12–14.6)
RBC # BLD: 4.1 M/UL (ref 4.7–6.1)
RBC UA: 6 /HPF (ref 0–4)
SODIUM BLD-SCNC: 137 MMOL/L (ref 136–145)
SPECIFIC GRAVITY UA: 1.02 (ref 1–1.03)
STREP PNEUMONIAE ANTIGEN, URINE: NORMAL
TOTAL PROTEIN: 6.9 G/DL (ref 6.6–8.7)
UROBILINOGEN, URINE: 1 E.U./DL
WBC # BLD: 12.1 K/UL (ref 4.8–10.8)
WBC UA: 3 /HPF (ref 0–5)

## 2020-11-29 PROCEDURE — 86140 C-REACTIVE PROTEIN: CPT

## 2020-11-29 PROCEDURE — 82947 ASSAY GLUCOSE BLOOD QUANT: CPT

## 2020-11-29 PROCEDURE — 2580000003 HC RX 258: Performed by: INTERNAL MEDICINE

## 2020-11-29 PROCEDURE — 1210000000 HC MED SURG R&B

## 2020-11-29 PROCEDURE — 6370000000 HC RX 637 (ALT 250 FOR IP): Performed by: STUDENT IN AN ORGANIZED HEALTH CARE EDUCATION/TRAINING PROGRAM

## 2020-11-29 PROCEDURE — 6370000000 HC RX 637 (ALT 250 FOR IP): Performed by: INTERNAL MEDICINE

## 2020-11-29 PROCEDURE — 2700000000 HC OXYGEN THERAPY PER DAY

## 2020-11-29 PROCEDURE — 83036 HEMOGLOBIN GLYCOSYLATED A1C: CPT

## 2020-11-29 PROCEDURE — 85384 FIBRINOGEN ACTIVITY: CPT

## 2020-11-29 PROCEDURE — 86738 MYCOPLASMA ANTIBODY: CPT

## 2020-11-29 PROCEDURE — 85025 COMPLETE CBC W/AUTO DIFF WBC: CPT

## 2020-11-29 PROCEDURE — 6360000002 HC RX W HCPCS: Performed by: STUDENT IN AN ORGANIZED HEALTH CARE EDUCATION/TRAINING PROGRAM

## 2020-11-29 PROCEDURE — 6360000002 HC RX W HCPCS: Performed by: INTERNAL MEDICINE

## 2020-11-29 PROCEDURE — 81001 URINALYSIS AUTO W/SCOPE: CPT

## 2020-11-29 PROCEDURE — 85610 PROTHROMBIN TIME: CPT

## 2020-11-29 PROCEDURE — 87449 NOS EACH ORGANISM AG IA: CPT

## 2020-11-29 PROCEDURE — 87040 BLOOD CULTURE FOR BACTERIA: CPT

## 2020-11-29 PROCEDURE — 80053 COMPREHEN METABOLIC PANEL: CPT

## 2020-11-29 PROCEDURE — 85379 FIBRIN DEGRADATION QUANT: CPT

## 2020-11-29 PROCEDURE — 85730 THROMBOPLASTIN TIME PARTIAL: CPT

## 2020-11-29 PROCEDURE — 82728 ASSAY OF FERRITIN: CPT

## 2020-11-29 RX ADMIN — BUDESONIDE AND FORMOTEROL FUMARATE DIHYDRATE 2 PUFF: 80; 4.5 AEROSOL RESPIRATORY (INHALATION) at 08:48

## 2020-11-29 RX ADMIN — LINEZOLID 600 MG: 600 TABLET, FILM COATED ORAL at 08:46

## 2020-11-29 RX ADMIN — Medication 10 UNITS: at 20:07

## 2020-11-29 RX ADMIN — BUDESONIDE AND FORMOTEROL FUMARATE DIHYDRATE 2 PUFF: 80; 4.5 AEROSOL RESPIRATORY (INHALATION) at 18:15

## 2020-11-29 RX ADMIN — INSULIN LISPRO 2 UNITS: 100 INJECTION, SOLUTION INTRAVENOUS; SUBCUTANEOUS at 13:08

## 2020-11-29 RX ADMIN — FAMOTIDINE 20 MG: 20 TABLET, FILM COATED ORAL at 19:54

## 2020-11-29 RX ADMIN — INSULIN LISPRO 3 UNITS: 100 INJECTION, SOLUTION INTRAVENOUS; SUBCUTANEOUS at 13:08

## 2020-11-29 RX ADMIN — LINEZOLID 600 MG: 600 TABLET, FILM COATED ORAL at 19:54

## 2020-11-29 RX ADMIN — SODIUM CHLORIDE, PRESERVATIVE FREE 2 G: 5 INJECTION INTRAVENOUS at 03:38

## 2020-11-29 RX ADMIN — ATORVASTATIN CALCIUM 20 MG: 20 TABLET, FILM COATED ORAL at 08:46

## 2020-11-29 RX ADMIN — OXYCODONE HYDROCHLORIDE AND ACETAMINOPHEN 1000 MG: 500 TABLET ORAL at 08:47

## 2020-11-29 RX ADMIN — CYANOCOBALAMIN TAB 500 MCG 500 MCG: 500 TAB at 08:46

## 2020-11-29 RX ADMIN — BUDESONIDE AND FORMOTEROL FUMARATE DIHYDRATE 2 PUFF: 80; 4.5 AEROSOL RESPIRATORY (INHALATION) at 19:55

## 2020-11-29 RX ADMIN — CARVEDILOL 3.12 MG: 3.12 TABLET, FILM COATED ORAL at 18:14

## 2020-11-29 RX ADMIN — AMLODIPINE BESYLATE 5 MG: 5 TABLET ORAL at 08:46

## 2020-11-29 RX ADMIN — INSULIN LISPRO 3 UNITS: 100 INJECTION, SOLUTION INTRAVENOUS; SUBCUTANEOUS at 08:05

## 2020-11-29 RX ADMIN — CHOLECALCIFEROL (VITAMIN D3) 25 MCG (1,000 UNIT) TABLET 2000 UNITS: TABLET at 08:47

## 2020-11-29 RX ADMIN — ALLOPURINOL 100 MG: 100 TABLET ORAL at 19:54

## 2020-11-29 RX ADMIN — INSULIN LISPRO 2 UNITS: 100 INJECTION, SOLUTION INTRAVENOUS; SUBCUTANEOUS at 09:02

## 2020-11-29 RX ADMIN — ACETAMINOPHEN 650 MG: 325 TABLET ORAL at 18:14

## 2020-11-29 RX ADMIN — ENOXAPARIN SODIUM 40 MG: 40 INJECTION SUBCUTANEOUS at 08:47

## 2020-11-29 RX ADMIN — ALLOPURINOL 100 MG: 100 TABLET ORAL at 08:47

## 2020-11-29 RX ADMIN — ISOSORBIDE MONONITRATE 60 MG: 60 TABLET, EXTENDED RELEASE ORAL at 19:54

## 2020-11-29 RX ADMIN — Medication 50 MG: at 08:46

## 2020-11-29 RX ADMIN — RANOLAZINE 500 MG: 500 TABLET, FILM COATED, EXTENDED RELEASE ORAL at 19:54

## 2020-11-29 RX ADMIN — ASPIRIN 81 MG: 81 TABLET, COATED ORAL at 19:54

## 2020-11-29 RX ADMIN — INSULIN LISPRO 2 UNITS: 100 INJECTION, SOLUTION INTRAVENOUS; SUBCUTANEOUS at 18:13

## 2020-11-29 RX ADMIN — ACETAMINOPHEN 650 MG: 325 TABLET ORAL at 09:02

## 2020-11-29 RX ADMIN — BUDESONIDE AND FORMOTEROL FUMARATE DIHYDRATE 2 PUFF: 80; 4.5 AEROSOL RESPIRATORY (INHALATION) at 13:08

## 2020-11-29 RX ADMIN — ENOXAPARIN SODIUM 40 MG: 40 INJECTION SUBCUTANEOUS at 19:54

## 2020-11-29 RX ADMIN — CARVEDILOL 3.12 MG: 3.12 TABLET, FILM COATED ORAL at 08:46

## 2020-11-29 RX ADMIN — INSULIN LISPRO 3 UNITS: 100 INJECTION, SOLUTION INTRAVENOUS; SUBCUTANEOUS at 18:12

## 2020-11-29 RX ADMIN — RANOLAZINE 500 MG: 500 TABLET, FILM COATED, EXTENDED RELEASE ORAL at 08:46

## 2020-11-29 RX ADMIN — DEXAMETHASONE 6 MG: 2 TABLET ORAL at 08:46

## 2020-11-29 RX ADMIN — MEROPENEM 1 G: 1 INJECTION, POWDER, FOR SOLUTION INTRAVENOUS at 18:15

## 2020-11-29 ASSESSMENT — PAIN SCALES - GENERAL
PAINLEVEL_OUTOF10: 0

## 2020-11-29 NOTE — PROGRESS NOTES
Moreno Wen transferred to [phone] from [phone] via wheelchair. Reason for transfer: lower level of care     Explained reason for transfer to Patient. Belongings: Hearing Aides bilateral with patient at bedside . Soft chart transferred with patient: Yes. Telemetry box number [id number]  transferred with patient: yes. Report given to: Helen Snow, via telephone.       Electronically signed by Lexii Monique RN on 11/28/2020 at 11:10 PM

## 2020-11-29 NOTE — PROGRESS NOTES
[facility] Hospitalists Progress Note    Patient:  Quincy Peters  YOB: 1950  Date of Service: 11/29/2020  MRN: [id number]   Acct: [de-identified]   Primary Care Physician: MISSY Styles  Advance Directive: Full Code  Admit Date: 11/27/2020       Hospital Day: 2      CHIEF COMPLAINT:     Chief Complaint   Patient presents with    Pneumonia     Double PNA dx today with a chest x ray       11/29/2020 10:29 AM  Subjective / Interval History:   11/29/2020  Patient doing well. Intermittent febrile episodes reported. Shortness of breath improved. Patient endorses some dyspnea with minimal exertion. Sitting comfortably in bed in no apparent acute distress. No other acute changes or acute overnight event reported. 11/28/2020  Patient seen and examined. Doing well. Laying comfortably in bed in CCU, in no apparent acute distress. Currently requiring supplemental oxygen. Patient endorses overall improvement. States his breathing is improving gradually. No acute changes or acute overnight event reported. Brief History:    As per Initial admission HPI 11/27/2020, quoted below;  \"Patient is a 79 y.o. male with solitary kidney with history of nephrectomy, CAD with plan in near future for stenting at [facility] in [place].      Patient presented to [facility] ED with development of worsening dyspnea on exertion, productive cough and fevers over the past few days, with preceding symptoms of fatigue, malaise and myalgias over the last 2 weeks. He does not have any nausea, vomiting or loss of taste or smell. He had a fever at home of over 102. He does note previous Covid exposure around friends, but has not been previously tested.   He went to his PCP earlier today where he had chest x-ray done in clinic which showed bilateral infiltrates with partial right lower lobe consolidation and subsequently sent to the ED.  patient has been on azithromycin past few days.     In the ED, tested positive for COVID-19. Temperature 99. Initially satting well on room air at rest, however he subsequently had O2 desaturation down to low to mid 80s with minimal exertion. SPO2 87% documented on room air, subsequently improved on 2 L nasal cannula. \"      Review of Systems:   Review of Systems  ROS: 14 point review of systems is negative except as specifically addressed above.     DIET RENAL; Carb Control: 4 carb choices (60 gms)/meal    Intake/Output Summary (Last 24 hours) at 11/29/2020 1029  Last data filed at 11/28/2020 2000  Gross per 24 hour   Intake 1040 ml   Output --   Net 1040 ml       Medications:   dextrose       Current Facility-Administered Medications   Medication Dose Route Frequency Provider Last Rate Last Dose    enoxaparin (LOVENOX) injection 40 mg  40 mg Subcutaneous BID Judge Musa MD   40 mg at 11/29/20 0847    zinc sulfate (ZINCATE) capsule 50 mg  50 mg Oral Daily Chau Wray MD   50 mg at 11/29/20 0846    vitamin C (ASCORBIC ACID) tablet 1,000 mg  1,000 mg Oral Daily Chau Wray MD   1,000 mg at 11/29/20 0847    0.9 % sodium chloride bolus  30 mL Intravenous PRN Chau Wray MD        budesonide-formoterol (SYMBICORT) 80-4.5 MCG/ACT inhaler 2 puff  2 puff Inhalation 4x daily Chau Wray MD   2 puff at 11/29/20 0848    insulin glargine (LANTUS) injection vial 10 Units  10 Units Subcutaneous Nightly Chau Wray MD   10 Units at 11/28/20 1947    insulin lispro (HUMALOG) injection vial 3 Units  3 Units Subcutaneous TID WC Chau Wray MD   3 Units at 11/28/20 1725    linezolid (ZYVOX) tablet 600 mg  600 mg Oral 2 times per day Naseem Smyth MD   600 mg at 11/29/20 0846    ceFEPIme (MAXIPIME) 2 g in sodium chloride (PF) 20 mL IV syringe  2 g Intravenous Q12H Naseem Smyth MD   2 g at 11/29/20 0338    acetaminophen (TYLENOL) tablet 650 mg  650 mg Oral Q6H PRN Judge Musa MD   650 mg at 11/28/20 2004 Or    acetaminophen (TYLENOL) suppository 650 mg  650 mg Rectal Q6H PRN Kate Aviles MD        dexamethasone (DECADRON) tablet 6 mg  6 mg Oral Daily Kate Aviles MD   6 mg at 11/29/20 0846    Vitamin D (CHOLECALCIFEROL) tablet 2,000 Units  2,000 Units Oral Daily Kate Aviles MD   2,000 Units at 11/29/20 0847    allopurinol (ZYLOPRIM) tablet 100 mg  100 mg Oral BID Kate Aviles MD   100 mg at 11/29/20 0847    amLODIPine (NORVASC) tablet 5 mg  5 mg Oral Daily Kate Aviles MD   5 mg at 11/29/20 0846    aspirin EC tablet 81 mg  81 mg Oral Nightly Kate Aviles MD   81 mg at 11/28/20 1946    atorvastatin (LIPITOR) tablet 20 mg  20 mg Oral Daily Kate Aviles MD   20 mg at 11/29/20 0846    carvedilol (COREG) tablet 3.125 mg  3.125 mg Oral BID  Kate Aviles MD   3.125 mg at 11/29/20 0846    vitamin B-12 (CYANOCOBALAMIN) tablet 500 mcg  500 mcg Oral QAM Kate Aviles MD   500 mcg at 11/29/20 0846    isosorbide mononitrate (IMDUR) extended release tablet 60 mg  60 mg Oral Nightly Kate Aviles MD   60 mg at 11/28/20 1947    ranolazine (RANEXA) extended release tablet 500 mg  500 mg Oral BID Kate Aviles MD   500 mg at 11/29/20 0846    famotidine (PEPCID) tablet 20 mg  20 mg Oral Daily Kate Aviles MD   20 mg at 11/28/20 1946    insulin lispro (HUMALOG) injection vial 0-12 Units  0-12 Units Subcutaneous TID  Kate Aviles MD   2 Units at 11/28/20 1202    insulin lispro (HUMALOG) injection vial 0-6 Units  0-6 Units Subcutaneous Nightly Kate Aviles MD        glucose (GLUTOSE) 40 % oral gel 15 g  15 g Oral PRN Kate Aviles MD        dextrose 50 % IV solution  12.5 g Intravenous PRN Kate Aviles MD        glucagon (rDNA) injection 1 mg  1 mg Intramuscular PRN Kate Aviles MD        dextrose 5 % solution  100 mL/hr Intravenous PRN Kate Aviles MD             dextrose        enoxaparin  40 mg Subcutaneous BID    zinc sulfate  50 mg Oral Daily    vitamin C  1,000 mg Oral Daily    budesonide-formoterol  2 puff Inhalation 4x daily    insulin glargine  10 Units Subcutaneous Nightly    insulin lispro  3 Units Subcutaneous TID WC    linezolid  600 mg Oral 2 times per day    cefepime  2 g Intravenous Q12H    dexamethasone  6 mg Oral Daily    Vitamin D  2,000 Units Oral Daily    allopurinol  100 mg Oral BID    amLODIPine  5 mg Oral Daily    aspirin  81 mg Oral Nightly    atorvastatin  20 mg Oral Daily    carvedilol  3.125 mg Oral BID WC    vitamin B-12  500 mcg Oral QAM    isosorbide mononitrate  60 mg Oral Nightly    ranolazine  500 mg Oral BID    famotidine  20 mg Oral Daily    insulin lispro  0-12 Units Subcutaneous TID WC    insulin lispro  0-6 Units Subcutaneous Nightly     sodium chloride, acetaminophen **OR** acetaminophen, glucose, dextrose, glucagon (rDNA), dextrose  DIET RENAL; Carb Control: 4 carb choices (60 gms)/meal       Labs:   CBC with DIFF:  Recent Labs     11/27/20 1754 11/28/20 0449 11/29/20  0713   WBC 9.7 8.8 12.1*   RBC 4.32* 4.10* 4.10*   HGB 12.9* 12.2* 12.4*   HCT 39.1* 37.4* 37.3*   MCV 90.5 91.2 91.0   MCH 29.9 29.8 30.2   MCHC 33.0 32.6* 33.2   RDW 13.2 13.3 13.2    174 187   MPV 11.3 11.5 11.2   NEUTOPHILPCT 86.7* 88.1* 87.3*   LYMPHOPCT 3.9* 5.0* 3.0*   MONOPCT 8.5 6.1 8.8   EOSRELPCT 0.0 0.0 0.0   BASOPCT 0.1 0.0 0.1   NEUTROABS 8.4* 7.7* 10.6*   LYMPHSABS 0.4* 0.4* 0.4*   MONOSABS 0.80 0.50 1.10*   EOSABS 0.00 0.00 0.00   BASOSABS 0.00 0.00 0.00       CMP/BMP:  Recent Labs     11/27/20  1754 11/28/20 0449 11/28/20  1600 11/28/20  2105 11/29/20  0713    137  --   --  137   K 4.7 5.6* 4.7 4.7 4.7    104  --   --  104   CO2 23 23  --   --  22   ANIONGAP 11 10  --   --  11   GLUCOSE 161* 182*  --   --  162*   BUN 39* 40*  --   --  50*   CREATININE 2.1* 2.1*  --   --  2.0*   LABGLOM 31* 31*  --   --  33*   CALCIUM 8.8 8.7*  --   --  8.3*   PROT 7.2 6.8  --   --  6.9   LABALBU 3.8 3.5  -- --  3.7   BILITOT 0.3 0.4  --   --  0.4   ALKPHOS 58 50  --   --  50   ALT 25 22  --   --  22   AST 22 20  --   --  20         CRP:    Recent Labs     11/27/20 1754 11/29/20 0713   CRP 6.26* 9.09*     Sed Rate:  No results for input(s): SEDRATE in the last 72 hours. HgBA1c:  No components found for: HGBA1C  FLP:    Lab Results   Component Value Date    TRIG 60 09/30/2020    HDL 49 09/30/2020    LDLCALC 61 09/30/2020     TSH:    Lab Results   Component Value Date    TSH 1.260 01/21/2020     Troponin T: No results for input(s): TROPONINI in the last 72 hours. Pro-BNP: No results for input(s): BNP in the last 72 hours. INR:   Recent Labs     11/27/20 1754 11/28/20 0449 11/29/20 0713   INR 0.99 1.11 1.07     ABGs: No results found for: PHART, PO2ART, UDF1PWP  UA:No results for input(s): NITRITE, COLORU, PHUR, LABCAST, WBCUA, RBCUA, MUCUS, TRICHOMONAS, YEAST, BACTERIA, CLARITYU, SPECGRAV, LEUKOCYTESUR, UROBILINOGEN, BILIRUBINUR, BLOODU, GLUCOSEU, AMORPHOUS in the last 72 hours. Invalid input(s): Eveline Lilly      Culture Results:    No results for input(s): CXSURG in the last 720 hours. Blood Culture Recent: No results for input(s): BC in the last 720 hours. Cultures:   No results for input(s): CULTURE in the last 72 hours. No results for input(s): BC, Darlene Plan in the last 72 hours. No results for input(s): CXSURG in the last 72 hours. No results for input(s): MG, PHOS in the last 72 hours. Recent Labs     11/27/20 1754 11/28/20 0449 11/29/20 0713   AST 22 20 20   ALT 25 22 22   BILITOT 0.3 0.4 0.4   ALKPHOS 58 50 50         RAD:   Xr Chest Portable    Result Date: 11/27/2020  EXAMINATION: XR CHEST PORTABLE 11/27/2020 3:13 PM HISTORY: Shortness of breath , clinical concern for COVID-19 infection. Report: A portable upright frontal view the chest was obtained. COMPARISON: Chest x-ray 3/6/2020.  There are new mixed interstitial and groundglass infiltrates in both lungs, with partial interviewed/observed/monitored from door, in order to preserve PPE's. Physical Exam  Vitals signs and nursing note reviewed. Constitutional:       General: He is not in acute distress. Appearance: Normal appearance. He is not ill-appearing, toxic-appearing or diaphoretic. HENT:      Head: Normocephalic and atraumatic. Right Ear: External ear normal.      Left Ear: External ear normal.   Neck:      Musculoskeletal: Normal range of motion and neck supple. Pulmonary:      Effort: Pulmonary effort is normal. No respiratory distress. Comments: Patient no obvious acute respiratory distress. Responding to questions appropriately and speaking in full sentences. Musculoskeletal: Normal range of motion. Neurological:      Mental Status: He is alert and oriented to person, place, and time. Psychiatric:         Mood and Affect: Mood normal.         Behavior: Behavior normal.         Thought Content:  Thought content normal.         Judgment: Judgment normal.           Assessment/plan:       Hospital Problems           Last Modified POA    * (Principal) Pneumonia due to COVID-19 virus 11/28/2020 Yes    Acute respiratory failure with hypoxia ([address] 11/27/2020 Yes    Hypercholesteremia 11/27/2020 Yes    GERD (gastroesophageal reflux disease) 11/27/2020 Yes    Morbidly obese ([address] 11/27/2020 Yes    Gout 11/27/2020 Yes    Chronic kidney disease, stage 3 11/27/2020 Yes    Type 2 diabetes mellitus with complication, without long-term current use of insulin ([address] 11/27/2020 Yes    Hypertension, essential, benign 11/27/2020 Yes    H/O unilateral nephrectomy 11/27/2020 Yes    Coronary artery disease involving left main coronary artery 11/27/2020 Yes          Principal Problem:    Pneumonia due to COVID-19 virus  Active Problems:    Acute respiratory failure with hypoxia (HCC)    Hypercholesteremia    GERD (gastroesophageal reflux disease)    Morbidly obese (HCC)    Gout    Chronic kidney disease, stage 3    Type 2 diabetes mellitus with complication, without long-term current use of insulin (HCC)    Hypertension, essential, benign    H/O unilateral nephrectomy    Coronary artery disease involving left main coronary artery  Resolved Problems:    * No resolved hospital problems. *      COVID 19 Infection PNA.- Diagnosed on 11/27/2020   Febrile with T-max of 102.4   Continue oxygen supplementation keep SPO2 above 90%   Continuous Pulse Ox   Dexamethasone 6 mg PO Daily   Adjunct therapy with;  Melatonin, Vitamin D, Zinc, & Inhaled Corticosteroids. o Zinc sulfate (ZINCATE) capsule 50 mg  PO Daily  o Melatonin PO Daily  o Vitamin D (CHOLECALCIFEROL) capsule 2,000 Units  PO Daily  o Budesonide-formoterol (SYMBICORT) 80-4.5 MCG/ACT inhaler 2 puff Twice Daily   Remdesivir not initiated, given patient's renal function as well as reported history of right nephrectomy.  ID following-appreciate recommendations   Empiric antibiotic with cefepime and linezolid initiated (11/28/2020) by ID   Sputum culture pending   Avoid Nebulizer treatments to avoid aerosolization.  Encourage Prone positioning   Enoxaparin 40 mg subcu twice daily        History of Diabetes Mellitus II   Hemoglobin A1C: 6.5% (11/29/2020)   Reported Home regimen include,    Inpatient Regimens to include;  o - Insulin Glargine (Lantus) 10 units subcu nightly  o - Insulin Lispro (Humalog) 3 units subcu pre-meal 3 times a day  o - Insulin Lispro (Humalog) on a Medium dose sliding scale   Monitor POC glucose, and adjust insulin regimen accordingly based on daily insulin requirement. Hx malignant neoplasm of right kidney (status post right nephrectomy)  CKD stage III  · -Baseline creatinine of 2.0  · -Creatinine level on presentation 2.1  · Creatinine stable  · -IV hydration  · -Avoid hypotension and nephrotoxins          Continue management of other chronic medical conditions - see above and orders.             Advance Directive: Full Code    DIET RENAL;

## 2020-11-29 NOTE — PROGRESS NOTES
Infectious Diseases Progress Note    Patient:  Zane Beckett  YOB: 1950  MRN: [id number]   Admit date: 11/27/2020   Admitting Physician: Tony Kent MD  Primary Care Physician: MISSY Rodgers    Chief Complaint/Interval History: He indicates he has some shortness of breath earlier. He is not having chest pain. He is more comfortable currently. He is not having nausea or diarrhea. He does not have abdominal pain. He does not report any dysuria or flank pain. No rash or skin itching. He had temperature elevation as high as 102.4 earlier. Looked back at prior creatinines.   His baseline creatinine appears to be 2.1.  (Creatinine on June 29 of this year was 2.0)    In/Out    Intake/Output Summary (Last 24 hours) at 11/29/2020 1250  Last data filed at 11/28/2020 2000  Gross per 24 hour   Intake 1040 ml   Output --   Net 1040 ml     Allergies: No Known Allergies  Current Meds: enoxaparin (LOVENOX) injection 40 mg, BID  zinc sulfate (ZINCATE) capsule 50 mg, Daily  vitamin C (ASCORBIC ACID) tablet 1,000 mg, Daily  0.9 % sodium chloride bolus, PRN  budesonide-formoterol (SYMBICORT) 80-4.5 MCG/ACT inhaler 2 puff, 4x daily  insulin glargine (LANTUS) injection vial 10 Units, Nightly  insulin lispro (HUMALOG) injection vial 3 Units, TID WC  linezolid (ZYVOX) tablet 600 mg, 2 times per day  ceFEPIme (MAXIPIME) 2 g in sodium chloride (PF) 20 mL IV syringe, Q12H  acetaminophen (TYLENOL) tablet 650 mg, Q6H PRN    Or  acetaminophen (TYLENOL) suppository 650 mg, Q6H PRN  dexamethasone (DECADRON) tablet 6 mg, Daily  Vitamin D (CHOLECALCIFEROL) tablet 2,000 Units, Daily  allopurinol (ZYLOPRIM) tablet 100 mg, BID  amLODIPine (NORVASC) tablet 5 mg, Daily  aspirin EC tablet 81 mg, Nightly  atorvastatin (LIPITOR) tablet 20 mg, Daily  carvedilol (COREG) tablet 3.125 mg, BID WC  vitamin B-12 (CYANOCOBALAMIN) tablet 500 mcg, QAM  isosorbide mononitrate (IMDUR) extended release tablet 60 mg, Nightly  ranolazine (RANEXA) extended release tablet 500 mg, BID  famotidine (PEPCID) tablet 20 mg, Daily  insulin lispro (HUMALOG) injection vial 0-12 Units, TID WC  insulin lispro (HUMALOG) injection vial 0-6 Units, Nightly  glucose (GLUTOSE) 40 % oral gel 15 g, PRN  dextrose 50 % IV solution, PRN  glucagon (rDNA) injection 1 mg, PRN  dextrose 5 % solution, PRN      Review of Systems see HPI    VitalSigns:  /66   Pulse 74   Temp 102.4 °F (39.1 °C) (Temporal)   Resp 18   Ht 5' 10\" (1.778 m)   Wt 230 lb 3.2 oz (104.4 kg)   SpO2 94%   BMI 33.03 kg/m²      Physical Exam  Talked with him in his room. He is alert, oriented, comfortable appearing, and in no distress  He is on nasal cannula oxygen  He was not tachypneic  He was not coughing  He did not appear to be wheezing  He did not appear to be in distress    Lab Results:  CBC:   Recent Labs     11/27/20  1754 11/28/20  0449 11/29/20  0713   WBC 9.7 8.8 12.1*   HGB 12.9* 12.2* 12.4*    174 187     BMP:  Recent Labs     11/27/20  1754 11/28/20  0449 11/28/20  1600 11/28/20  2105 11/29/20  0713    137  --   --  137   K 4.7 5.6* 4.7 4.7 4.7    104  --   --  104   CO2 23 23  --   --  22   BUN 39* 40*  --   --  50*   CREATININE 2.1* 2.1*  --   --  2.0*   GLUCOSE 161* 182*  --   --  162*     CultureResults: None    Radiology:   Chest x-ray November 27, 2020: Impression    New bilateral pulmonary infiltrates and partial    consolidation of the right lower lobe with bulging of the minor    fissure. This is compatible with pneumonia and atypical pneumonia,    including COVID-19 is not excluded. Signed by Dr Curtis Ndiaye. Ky on 11/27/2020 3:15 PM      Additional Studies Reviewed:  None    Impression:  1. COVID-19 infection/COVID-19 pneumonia-he has had some element of symptoms since November 12. He may have bacterial pneumonia complicating Covid infection. 2.  Coronary artery disease  3. Diabetes mellitus  4.   History of nephrectomy for renal cell cancer.   Indicates baseline creatinine is 2.1    Recommendations:  Blood cultures  Urinalysis and urine culture  Sputum culture  Continue dexamethasone  Empiric antibiotic treatment-we will treat with meropenem and linezolid  Await culture results  Continue to follow    Merissa Mejia MD

## 2020-11-30 LAB
ALBUMIN SERPL-MCNC: 3.3 G/DL (ref 3.5–5.2)
ALP BLD-CCNC: 48 U/L (ref 40–130)
ALT SERPL-CCNC: 21 U/L (ref 5–41)
ANION GAP SERPL CALCULATED.3IONS-SCNC: 11 MMOL/L (ref 7–19)
APTT: 37.7 SEC (ref 26–36.2)
AST SERPL-CCNC: 23 U/L (ref 5–40)
BASOPHILS ABSOLUTE: 0 K/UL (ref 0–0.2)
BASOPHILS RELATIVE PERCENT: 0.1 % (ref 0–1)
BILIRUB SERPL-MCNC: 0.4 MG/DL (ref 0.2–1.2)
BUN BLDV-MCNC: 49 MG/DL (ref 8–23)
CALCIUM SERPL-MCNC: 8.1 MG/DL (ref 8.8–10.2)
CHLORIDE BLD-SCNC: 103 MMOL/L (ref 98–111)
CO2: 19 MMOL/L (ref 22–29)
CREAT SERPL-MCNC: 1.9 MG/DL (ref 0.5–1.2)
D DIMER: 0.57 UG/ML FEU (ref 0–0.48)
EOSINOPHILS ABSOLUTE: 0 K/UL (ref 0–0.6)
EOSINOPHILS RELATIVE PERCENT: 0 % (ref 0–5)
FIBRINOGEN: 595 MG/DL (ref 238–505)
GFR AFRICAN AMERICAN: 43
GFR NON-AFRICAN AMERICAN: 35
GLUCOSE BLD-MCNC: 115 MG/DL (ref 70–99)
GLUCOSE BLD-MCNC: 133 MG/DL (ref 74–109)
GLUCOSE BLD-MCNC: 134 MG/DL (ref 70–99)
GLUCOSE BLD-MCNC: 160 MG/DL (ref 70–99)
GLUCOSE BLD-MCNC: 212 MG/DL (ref 70–99)
HCT VFR BLD CALC: 35 % (ref 42–52)
HEMOGLOBIN: 11.9 G/DL (ref 14–18)
IMMATURE GRANULOCYTES #: 0.2 K/UL
INR BLD: 1.08 (ref 0.88–1.18)
LYMPHOCYTES ABSOLUTE: 0.3 K/UL (ref 1.1–4.5)
LYMPHOCYTES RELATIVE PERCENT: 2.4 % (ref 20–40)
MCH RBC QN AUTO: 29.8 PG (ref 27–31)
MCHC RBC AUTO-ENTMCNC: 34 G/DL (ref 33–37)
MCV RBC AUTO: 87.5 FL (ref 80–94)
MONOCYTES ABSOLUTE: 1.1 K/UL (ref 0–0.9)
MONOCYTES RELATIVE PERCENT: 8.2 % (ref 0–10)
NEUTROPHILS ABSOLUTE: 11.3 K/UL (ref 1.5–7.5)
NEUTROPHILS RELATIVE PERCENT: 88.1 % (ref 50–65)
PDW BLD-RTO: 13.2 % (ref 11.5–14.5)
PERFORMED ON: ABNORMAL
PLATELET # BLD: 207 K/UL (ref 130–400)
PMV BLD AUTO: 11.4 FL (ref 9.4–12.4)
POTASSIUM REFLEX MAGNESIUM: 4.9 MMOL/L (ref 3.5–5)
PROTHROMBIN TIME: 13.9 SEC (ref 12–14.6)
RBC # BLD: 4 M/UL (ref 4.7–6.1)
SODIUM BLD-SCNC: 133 MMOL/L (ref 136–145)
TOTAL PROTEIN: 6.5 G/DL (ref 6.6–8.7)
WBC # BLD: 12.9 K/UL (ref 4.8–10.8)

## 2020-11-30 PROCEDURE — 80053 COMPREHEN METABOLIC PANEL: CPT

## 2020-11-30 PROCEDURE — 85730 THROMBOPLASTIN TIME PARTIAL: CPT

## 2020-11-30 PROCEDURE — 6370000000 HC RX 637 (ALT 250 FOR IP): Performed by: STUDENT IN AN ORGANIZED HEALTH CARE EDUCATION/TRAINING PROGRAM

## 2020-11-30 PROCEDURE — 2700000000 HC OXYGEN THERAPY PER DAY

## 2020-11-30 PROCEDURE — 82947 ASSAY GLUCOSE BLOOD QUANT: CPT

## 2020-11-30 PROCEDURE — 2580000003 HC RX 258: Performed by: INTERNAL MEDICINE

## 2020-11-30 PROCEDURE — 6370000000 HC RX 637 (ALT 250 FOR IP): Performed by: INTERNAL MEDICINE

## 2020-11-30 PROCEDURE — 85025 COMPLETE CBC W/AUTO DIFF WBC: CPT

## 2020-11-30 PROCEDURE — 85379 FIBRIN DEGRADATION QUANT: CPT

## 2020-11-30 PROCEDURE — 85610 PROTHROMBIN TIME: CPT

## 2020-11-30 PROCEDURE — 6360000002 HC RX W HCPCS: Performed by: STUDENT IN AN ORGANIZED HEALTH CARE EDUCATION/TRAINING PROGRAM

## 2020-11-30 PROCEDURE — 85384 FIBRINOGEN ACTIVITY: CPT

## 2020-11-30 PROCEDURE — 1210000000 HC MED SURG R&B

## 2020-11-30 PROCEDURE — 6360000002 HC RX W HCPCS: Performed by: INTERNAL MEDICINE

## 2020-11-30 RX ADMIN — LINEZOLID 600 MG: 600 TABLET, FILM COATED ORAL at 08:49

## 2020-11-30 RX ADMIN — ENOXAPARIN SODIUM 40 MG: 40 INJECTION SUBCUTANEOUS at 08:49

## 2020-11-30 RX ADMIN — ALLOPURINOL 100 MG: 100 TABLET ORAL at 08:50

## 2020-11-30 RX ADMIN — BUDESONIDE AND FORMOTEROL FUMARATE DIHYDRATE 2 PUFF: 80; 4.5 AEROSOL RESPIRATORY (INHALATION) at 16:30

## 2020-11-30 RX ADMIN — INSULIN LISPRO 2 UNITS: 100 INJECTION, SOLUTION INTRAVENOUS; SUBCUTANEOUS at 17:45

## 2020-11-30 RX ADMIN — Medication 10 UNITS: at 21:07

## 2020-11-30 RX ADMIN — ISOSORBIDE MONONITRATE 60 MG: 60 TABLET, EXTENDED RELEASE ORAL at 21:01

## 2020-11-30 RX ADMIN — LINEZOLID 600 MG: 600 TABLET, FILM COATED ORAL at 21:01

## 2020-11-30 RX ADMIN — BUDESONIDE AND FORMOTEROL FUMARATE DIHYDRATE 2 PUFF: 80; 4.5 AEROSOL RESPIRATORY (INHALATION) at 21:01

## 2020-11-30 RX ADMIN — RANOLAZINE 500 MG: 500 TABLET, FILM COATED, EXTENDED RELEASE ORAL at 08:49

## 2020-11-30 RX ADMIN — MEROPENEM 1 G: 1 INJECTION, POWDER, FOR SOLUTION INTRAVENOUS at 03:50

## 2020-11-30 RX ADMIN — ATORVASTATIN CALCIUM 20 MG: 20 TABLET, FILM COATED ORAL at 08:50

## 2020-11-30 RX ADMIN — AMLODIPINE BESYLATE 5 MG: 5 TABLET ORAL at 08:50

## 2020-11-30 RX ADMIN — ALLOPURINOL 100 MG: 100 TABLET ORAL at 21:01

## 2020-11-30 RX ADMIN — BUDESONIDE AND FORMOTEROL FUMARATE DIHYDRATE 2 PUFF: 80; 4.5 AEROSOL RESPIRATORY (INHALATION) at 12:58

## 2020-11-30 RX ADMIN — ENOXAPARIN SODIUM 40 MG: 40 INJECTION SUBCUTANEOUS at 21:01

## 2020-11-30 RX ADMIN — CARVEDILOL 3.12 MG: 3.12 TABLET, FILM COATED ORAL at 08:50

## 2020-11-30 RX ADMIN — INSULIN LISPRO 3 UNITS: 100 INJECTION, SOLUTION INTRAVENOUS; SUBCUTANEOUS at 08:30

## 2020-11-30 RX ADMIN — INSULIN LISPRO 3 UNITS: 100 INJECTION, SOLUTION INTRAVENOUS; SUBCUTANEOUS at 12:45

## 2020-11-30 RX ADMIN — CYANOCOBALAMIN TAB 500 MCG 500 MCG: 500 TAB at 08:49

## 2020-11-30 RX ADMIN — Medication 50 MG: at 08:49

## 2020-11-30 RX ADMIN — BUDESONIDE AND FORMOTEROL FUMARATE DIHYDRATE 2 PUFF: 80; 4.5 AEROSOL RESPIRATORY (INHALATION) at 08:52

## 2020-11-30 RX ADMIN — OXYCODONE HYDROCHLORIDE AND ACETAMINOPHEN 1000 MG: 500 TABLET ORAL at 08:49

## 2020-11-30 RX ADMIN — CARVEDILOL 3.12 MG: 3.12 TABLET, FILM COATED ORAL at 17:44

## 2020-11-30 RX ADMIN — CHOLECALCIFEROL (VITAMIN D3) 25 MCG (1,000 UNIT) TABLET 2000 UNITS: TABLET at 08:50

## 2020-11-30 RX ADMIN — DEXAMETHASONE 6 MG: 2 TABLET ORAL at 08:49

## 2020-11-30 RX ADMIN — FAMOTIDINE 20 MG: 20 TABLET, FILM COATED ORAL at 21:01

## 2020-11-30 RX ADMIN — INSULIN LISPRO 3 UNITS: 100 INJECTION, SOLUTION INTRAVENOUS; SUBCUTANEOUS at 17:45

## 2020-11-30 RX ADMIN — MEROPENEM 1 G: 1 INJECTION, POWDER, FOR SOLUTION INTRAVENOUS at 15:09

## 2020-11-30 RX ADMIN — RANOLAZINE 500 MG: 500 TABLET, FILM COATED, EXTENDED RELEASE ORAL at 21:05

## 2020-11-30 RX ADMIN — ASPIRIN 81 MG: 81 TABLET, COATED ORAL at 21:01

## 2020-11-30 NOTE — PROGRESS NOTES
Blood culture #1 was drawn at 1755 from Left [facility]. Bodd culture #2 was drawn at 1811 from Right forearm.

## 2020-11-30 NOTE — PROGRESS NOTES
[facility] Progress Note    Patient:  Phuc Jordan  YOB: 1950  Date of Service: 11/30/2020  MRN: [id number]   Acct: [de-identified]   Primary Care Physician: MISSY Martines  Advance Directive: Full Code  Admit Date: 11/27/2020       Hospital Day: 3      CHIEF COMPLAINT:     Chief Complaint   Patient presents with    Pneumonia     Double PNA dx today with a chest x ray       11/30/2020 9:25 AM  Subjective / Interval History:   11/30/2020  No new complaints. Patient doing well. Sitting comfortably in bed in no apparent acute distress. No acute changes or acute overnight event reported. Shortness of breath improved. No further febrile episodes reported overnight. Patient denies any other acute complaints or distress at this time. 11/29/2020  Patient doing well. Intermittent febrile episodes reported. Shortness of breath improved. Patient endorses some dyspnea with minimal exertion. Sitting comfortably in bed in no apparent acute distress. No other acute changes or acute overnight event reported. 11/28/2020  Patient seen and examined. Doing well. Laying comfortably in bed in CCU, in no apparent acute distress. Currently requiring supplemental oxygen. Patient endorses overall improvement. States his breathing is improving gradually. No acute changes or acute overnight event reported. Brief History:    As per Initial admission HPI 11/27/2020, quoted below;  \"Patient is a 79 y.o. male with solitary kidney with history of nephrectomy, CAD with plan in near future for stenting at [facility] in [address] [place] [address].      Patient presented to [facility] ED with development of worsening dyspnea on exertion, productive cough and fevers over the past few days, with preceding symptoms of fatigue, malaise and myalgias over the last 2 weeks. He does not have any nausea, vomiting or loss of taste or smell. He had a fever at home of over 102.   He does note previous Covid exposure around friends, but has not been previously tested. He went to his PCP earlier today where he had chest x-ray done in clinic which showed bilateral infiltrates with partial right lower lobe consolidation and subsequently sent to the ED.  patient has been on azithromycin past few days.     In the ED, tested positive for COVID-19. Temperature 99. Initially satting well on room air at rest, however he subsequently had O2 desaturation down to low to mid 80s with minimal exertion. SPO2 87% documented on room air, subsequently improved on 2 L nasal cannula. \"      Review of Systems:   Review of Systems  ROS: 14 point review of systems is negative except as specifically addressed above.     DIET RENAL; Carb Control: 4 carb choices (60 gms)/meal    Intake/Output Summary (Last 24 hours) at 11/30/2020 0925  Last data filed at 11/29/2020 1946  Gross per 24 hour   Intake 120 ml   Output --   Net 120 ml       Medications:   dextrose       Current Facility-Administered Medications   Medication Dose Route Frequency Provider Last Rate Last Dose    meropenem (MERREM) 1 g in sterile water 20 mL IV syringe  1 g Intravenous Q12H Shawn Chamorro MD   1 g at 11/30/20 0350    enoxaparin (LOVENOX) injection 40 mg  40 mg Subcutaneous BID John Peres MD   40 mg at 11/30/20 0849    zinc sulfate (ZINCATE) capsule 50 mg  50 mg Oral Daily Trinh Gage MD   50 mg at 11/30/20 0849    vitamin C (ASCORBIC ACID) tablet 1,000 mg  1,000 mg Oral Daily Trinh Gage MD   1,000 mg at 11/30/20 0849    0.9 % sodium chloride bolus  30 mL Intravenous PRN Trinh Gage MD        budesonide-formoterol (SYMBICORT) 80-4.5 MCG/ACT inhaler 2 puff  2 puff Inhalation 4x daily Trinh Gage MD   2 puff at 11/30/20 0852    insulin glargine (LANTUS) injection vial 10 Units  10 Units Subcutaneous Nightly Trinh Gage MD   10 Units at 11/29/20 2007    insulin lispro (HUMALOG) injection vial 3 Units  3 Units Subcutaneous TID  Leon Aggarwal MD   3 Units at 11/29/20 1812    linezolid (ZYVOX) tablet 600 mg  600 mg Oral 2 times per day Madison Diaz MD   600 mg at 11/30/20 0849    acetaminophen (TYLENOL) tablet 650 mg  650 mg Oral Q6H PRN Thea Alvarez MD   650 mg at 11/29/20 1814    Or    acetaminophen (TYLENOL) suppository 650 mg  650 mg Rectal Q6H PRN Thea Alvarez MD        dexamethasone (DECADRON) tablet 6 mg  6 mg Oral Daily Thea Alvarez MD   6 mg at 11/30/20 6716    Vitamin D (CHOLECALCIFEROL) tablet 2,000 Units  2,000 Units Oral Daily Thea Alvarez MD   2,000 Units at 11/30/20 0850    allopurinol (ZYLOPRIM) tablet 100 mg  100 mg Oral BID Thea Alvarez MD   100 mg at 11/30/20 0850    amLODIPine (NORVASC) tablet 5 mg  5 mg Oral Daily Thea Alvarez MD   5 mg at 11/30/20 0850    aspirin EC tablet 81 mg  81 mg Oral Nightly Thea Alvarez MD   81 mg at 11/29/20 1954    atorvastatin (LIPITOR) tablet 20 mg  20 mg Oral Daily Thea Alvarez MD   20 mg at 11/30/20 0850    carvedilol (COREG) tablet 3.125 mg  3.125 mg Oral BID  Thea Alvarez MD   3.125 mg at 11/30/20 0850    vitamin B-12 (CYANOCOBALAMIN) tablet 500 mcg  500 mcg Oral QAM Thea Alvarez MD   500 mcg at 11/30/20 0849    isosorbide mononitrate (IMDUR) extended release tablet 60 mg  60 mg Oral Nightly Thea Alvarez MD   60 mg at 11/29/20 1954    ranolazine (RANEXA) extended release tablet 500 mg  500 mg Oral BID Thea Alvarez MD   500 mg at 11/30/20 0849    famotidine (PEPCID) tablet 20 mg  20 mg Oral Daily Thea Alvarez MD   20 mg at 11/29/20 1954    insulin lispro (HUMALOG) injection vial 0-12 Units  0-12 Units Subcutaneous TID  Thea Alvarez MD   2 Units at 11/29/20 1813    insulin lispro (HUMALOG) injection vial 0-6 Units  0-6 Units Subcutaneous Nightly Thea Alvarez MD        glucose (GLUTOSE) 40 % oral gel 15 g  15 g Oral PRN Thea Alvarez MD        dextrose 50 % IV solution  12.5 g Intravenous PRN Fredrick Wagner MD        glucagon (rDNA) injection 1 mg  1 mg Intramuscular PRN Fredrick Wagner MD        dextrose 5 % solution  100 mL/hr Intravenous PRN MD Libby Pena        meropenem  1 g Intravenous Q12H    enoxaparin  40 mg Subcutaneous BID    zinc sulfate  50 mg Oral Daily    vitamin C  1,000 mg Oral Daily    budesonide-formoterol  2 puff Inhalation 4x daily    insulin glargine  10 Units Subcutaneous Nightly    insulin lispro  3 Units Subcutaneous TID WC    linezolid  600 mg Oral 2 times per day    dexamethasone  6 mg Oral Daily    Vitamin D  2,000 Units Oral Daily    allopurinol  100 mg Oral BID    amLODIPine  5 mg Oral Daily    aspirin  81 mg Oral Nightly    atorvastatin  20 mg Oral Daily    carvedilol  3.125 mg Oral BID WC    vitamin B-12  500 mcg Oral QAM    isosorbide mononitrate  60 mg Oral Nightly    ranolazine  500 mg Oral BID    famotidine  20 mg Oral Daily    insulin lispro  0-12 Units Subcutaneous TID WC    insulin lispro  0-6 Units Subcutaneous Nightly     sodium chloride, acetaminophen **OR** acetaminophen, glucose, dextrose, glucagon (rDNA), dextrose  DIET RENAL; Carb Control: 4 carb choices (60 gms)/meal       Labs:   CBC with DIFF:  Recent Labs     11/28/20  0449 11/29/20  0713 11/30/20  0345   WBC 8.8 12.1* 12.9*   RBC 4.10* 4.10* 4.00*   HGB 12.2* 12.4* 11.9*   HCT 37.4* 37.3* 35.0*   MCV 91.2 91.0 87.5   MCH 29.8 30.2 29.8   MCHC 32.6* 33.2 34.0   RDW 13.3 13.2 13.2    187 207   MPV 11.5 11.2 11.4   NEUTOPHILPCT 88.1* 87.3* 88.1*   LYMPHOPCT 5.0* 3.0* 2.4*   MONOPCT 6.1 8.8 8.2   EOSRELPCT 0.0 0.0 0.0   BASOPCT 0.0 0.1 0.1   NEUTROABS 7.7* 10.6* 11.3*   LYMPHSABS 0.4* 0.4* 0.3*   MONOSABS 0.50 1.10* 1.10*   EOSABS 0.00 0.00 0.00   BASOSABS 0.00 0.00 0.00       CMP/BMP:  Recent Labs     11/28/20  0449  11/28/20  2105 11/29/20  0713 11/30/20  0345     --   --  137 133*   K 5.6*   < > 4.7 4.7 4.9   --   --  104 103   CO2 23  --   --  22 19*   ANIONGAP 10  --   --  11 11   GLUCOSE 182*  --   --  162* 133*   BUN 40*  --   --  50* 49*   CREATININE 2.1*  --   --  2.0* 1.9*   LABGLOM 31*  --   --  33* 35*   CALCIUM 8.7*  --   --  8.3* 8.1*   PROT 6.8  --   --  6.9 6.5*   LABALBU 3.5  --   --  3.7 3.3*   BILITOT 0.4  --   --  0.4 0.4   ALKPHOS 50  --   --  50 48   ALT 22  --   --  22 21   AST 20  --   --  20 23    < > = values in this interval not displayed. CRP:    Recent Labs     11/27/20  1754 11/29/20  0713   CRP 6.26* 9.09*     Sed Rate:  No results for input(s): SEDRATE in the last 72 hours. HgBA1c:  No components found for: HGBA1C  FLP:    Lab Results   Component Value Date    TRIG 60 09/30/2020    HDL 49 09/30/2020    LDLCALC 61 09/30/2020     TSH:    Lab Results   Component Value Date    TSH 1.260 01/21/2020     Troponin T: No results for input(s): TROPONINI in the last 72 hours. Pro-BNP: No results for input(s): BNP in the last 72 hours. INR:   Recent Labs     11/28/20  0449 11/29/20  0713 11/30/20  0345   INR 1.11 1.07 1.08     ABGs: No results found for: PHART, PO2ART, YAB6CKH  UA:  Recent Labs     11/29/20  2106   COLORU YELLOW   PHUR 5.5   WBCUA 3   RBCUA 6*   BACTERIA NEGATIVE*   CLARITYU Clear   SPECGRAV 1.023   LEUKOCYTESUR Negative   UROBILINOGEN 1.0   BILIRUBINUR Negative   BLOODU Negative   GLUCOSEU Negative         Culture Results:    No results for input(s): CXSURG in the last 720 hours. Blood Culture Recent: No results for input(s): BC in the last 720 hours. Cultures:   No results for input(s): CULTURE in the last 72 hours. No results for input(s): BC, [place] in the last 72 hours. No results for input(s): CXSURG in the last 72 hours. No results for input(s): MG, PHOS in the last 72 hours.   Recent Labs     11/28/20  0449 11/29/20  0713 11/30/20  0345   AST 20 20 23   ALT 22 22 21   BILITOT 0.4 0.4 0.4   ALKPHOS 50 50 48         RAD:   Xr Chest Portable    Result Date: 11/27/2020  EXAMINATION: XR CHEST PORTABLE 11/27/2020 3:13 PM HISTORY: Shortness of breath , clinical concern for COVID-19 infection. Report: A portable upright frontal view the chest was obtained. COMPARISON: Chest x-ray 3/6/2020. There are new mixed interstitial and groundglass infiltrates in both lungs, with partial consolidation of the right lower lobe and slight bulging of the minor fissure. No pneumothorax or pleural effusion is identified. Heart size is normal. Previous median sternotomy is noted. The osseous structures and upper abdomen are unremarkable. New bilateral pulmonary infiltrates and partial consolidation of the right lower lobe with bulging of the minor fissure. This is compatible with pneumonia and atypical pneumonia, including COVID-19 is not excluded. Signed by Dr Enriqueta Hollingsworth. [place] on 11/27/2020 3:15 PM        Objective:   Vitals:   BP (!) 169/82   Pulse 74   Temp 98.5 °F (36.9 °C) (Temporal)   Resp 17   Ht 5' 10\" (1.778 m)   Wt 230 lb 3.2 oz (104.4 kg)   SpO2 90%   BMI 33.03 kg/m²       Patient Vitals for the past 24 hrs:   BP Temp Temp src Pulse Resp SpO2   11/30/20 0755 (!) 169/82 98.5 °F (36.9 °C) Temporal 74 17 90 %   11/29/20 2354 (!) 120/55 98.5 °F (36.9 °C) Temporal 74 17 92 %   11/29/20 1946 (!) 142/73 98.1 °F (36.7 °C) Temporal 79 18 90 %       24HR INTAKE/OUTPUT:      Intake/Output Summary (Last 24 hours) at 11/30/2020 6618  Last data filed at 11/29/2020 1946  Gross per 24 hour   Intake 120 ml   Output --   Net 120 ml       Patient interviewed/observed/monitored from door, in order to preserve PPE's. Physical Exam  Vitals signs and nursing note reviewed. Constitutional:       General: He is not in acute distress. Appearance: Normal appearance. He is not ill-appearing, toxic-appearing or diaphoretic. HENT:      Head: Normocephalic and atraumatic.       Right Ear: External ear normal.      Left Ear: External ear normal.   Neck: Musculoskeletal: Normal range of motion and neck supple. Pulmonary:      Effort: Pulmonary effort is normal. No respiratory distress. Comments: Patient no obvious acute respiratory distress. Responding to questions appropriately and speaking in full sentences. Musculoskeletal: Normal range of motion. Neurological:      Mental Status: He is alert and oriented to person, place, and time. Psychiatric:         Mood and Affect: Mood normal.         Behavior: Behavior normal.         Thought Content: Thought content normal.         Judgment: Judgment normal.           Assessment/plan:       Hospital Problems           Last Modified POA    * (Principal) Pneumonia due to COVID-19 virus 11/28/2020 Yes    Acute respiratory failure with hypoxia ([address] 11/27/2020 Yes    Hypercholesteremia 11/27/2020 Yes    GERD (gastroesophageal reflux disease) 11/27/2020 Yes    Morbidly obese ([address] 11/27/2020 Yes    Gout 11/27/2020 Yes    Chronic kidney disease, stage 3 11/27/2020 Yes    Type 2 diabetes mellitus with complication, without long-term current use of insulin ([address] 11/27/2020 Yes    Hypertension, essential, benign 11/27/2020 Yes    H/O unilateral nephrectomy 11/27/2020 Yes    Coronary artery disease involving left main coronary artery 11/27/2020 Yes          Principal Problem:    Pneumonia due to COVID-19 virus  Active Problems:    Acute respiratory failure with hypoxia (HCC)    Hypercholesteremia    GERD (gastroesophageal reflux disease)    Morbidly obese (HCC)    Gout    Chronic kidney disease, stage 3    Type 2 diabetes mellitus with complication, without long-term current use of insulin (HCC)    Hypertension, essential, benign    H/O unilateral nephrectomy    Coronary artery disease involving left main coronary artery  Resolved Problems:    * No resolved hospital problems.  *    Brief Summary  Mr. Mary Jo Mcarthur, a 57-year-old male, history of right nephrectomy admitted to [facility] (11/27/2020), for the management of COVID-19 pneumonia. Currently requiring submental oxygen, with intermittent febrile episode, with a T-max of 102.4 (11/29/2020). ID following. COVID 19 Infection PNA.- Diagnosed on 11/27/2020   Febrile with T-max of 102.4 (11/29/2020)   Continue oxygen supplementation keep SPO2 above 90%   Continuous Pulse Ox   Dexamethasone 6 mg PO Daily   Adjunct therapy with;  Melatonin, Vitamin D, Zinc, & Inhaled Corticosteroids. o Zinc sulfate (ZINCATE) capsule 50 mg  PO Daily  o Melatonin PO Daily  o Vitamin D (CHOLECALCIFEROL) capsule 2,000 Units  PO Daily  o Budesonide-formoterol (SYMBICORT) 80-4.5 MCG/ACT inhaler 2 puff Twice Daily   Remdesivir not initiated, given patient's renal function as well as reported history of right Nephrectomy.  ID following-appreciate recommendations   Empiric antibiotic initially with cefepime and linezolid initiated (11/28/2020) by ID   Now on meropenem and linezolid.  Antibiotic as per ID.  Sputum culture pending   Avoid Nebulizer treatments to avoid aerosolization.  Encourage Prone positioning   Enoxaparin 40 mg subcu twice daily        History of Diabetes Mellitus II   Hemoglobin A1C: 6.5% (11/29/2020)   Reported Home regimen include,    Inpatient Regimens to include;  o - Insulin Glargine (Lantus) 10 units subcu nightly  o - Insulin Lispro (Humalog) 3 units subcu pre-meal 3 times a day  o - Insulin Lispro (Humalog) on a Medium dose sliding scale   Monitor POC glucose, and adjust insulin regimen accordingly based on daily insulin requirement. Hx malignant neoplasm of right kidney (status post right nephrectomy)  CKD stage III  · -Baseline creatinine of 2.0  · -Creatinine level on presentation 2.1  · Creatinine stable  · -IV hydration  · -Avoid hypotension and nephrotoxins          Continue management of other chronic medical conditions - see above and orders.             Advance Directive: Full Code    DIET RENAL; Carb Control: 4 carb choices (60 gms)/meal

## 2020-12-01 LAB
ALBUMIN SERPL-MCNC: 3.1 G/DL (ref 3.5–5.2)
ALP BLD-CCNC: 50 U/L (ref 40–130)
ALT SERPL-CCNC: 30 U/L (ref 5–41)
ANION GAP SERPL CALCULATED.3IONS-SCNC: 13 MMOL/L (ref 7–19)
APTT: 34.2 SEC (ref 26–36.2)
AST SERPL-CCNC: 24 U/L (ref 5–40)
BASOPHILS ABSOLUTE: 0 K/UL (ref 0–0.2)
BASOPHILS RELATIVE PERCENT: 0.3 % (ref 0–1)
BILIRUB SERPL-MCNC: 0.4 MG/DL (ref 0.2–1.2)
BUN BLDV-MCNC: 53 MG/DL (ref 8–23)
C-REACTIVE PROTEIN: 5.1 MG/DL (ref 0–0.5)
CALCIUM SERPL-MCNC: 8 MG/DL (ref 8.8–10.2)
CHLORIDE BLD-SCNC: 105 MMOL/L (ref 98–111)
CO2: 19 MMOL/L (ref 22–29)
CREAT SERPL-MCNC: 1.9 MG/DL (ref 0.5–1.2)
D DIMER: 0.61 UG/ML FEU (ref 0–0.48)
EKG P AXIS: -15 DEGREES
EKG P-R INTERVAL: 184 MS
EKG Q-T INTERVAL: 410 MS
EKG QRS DURATION: 150 MS
EKG QTC CALCULATION (BAZETT): 445 MS
EKG T AXIS: -23 DEGREES
EOSINOPHILS ABSOLUTE: 0 K/UL (ref 0–0.6)
EOSINOPHILS RELATIVE PERCENT: 0 % (ref 0–5)
FIBRINOGEN: 504 MG/DL (ref 238–505)
GFR AFRICAN AMERICAN: 43
GFR NON-AFRICAN AMERICAN: 35
GLUCOSE BLD-MCNC: 118 MG/DL (ref 70–99)
GLUCOSE BLD-MCNC: 123 MG/DL (ref 70–99)
GLUCOSE BLD-MCNC: 146 MG/DL (ref 70–99)
GLUCOSE BLD-MCNC: 150 MG/DL (ref 74–109)
GLUCOSE BLD-MCNC: 190 MG/DL (ref 70–99)
HCT VFR BLD CALC: 36.9 % (ref 42–52)
HEMOGLOBIN: 12.3 G/DL (ref 14–18)
IMMATURE GRANULOCYTES #: 0.2 K/UL
INR BLD: 1.09 (ref 0.88–1.18)
LYMPHOCYTES ABSOLUTE: 0.4 K/UL (ref 1.1–4.5)
LYMPHOCYTES RELATIVE PERCENT: 3.7 % (ref 20–40)
MCH RBC QN AUTO: 29.4 PG (ref 27–31)
MCHC RBC AUTO-ENTMCNC: 33.3 G/DL (ref 33–37)
MCV RBC AUTO: 88.3 FL (ref 80–94)
MONOCYTES ABSOLUTE: 1.3 K/UL (ref 0–0.9)
MONOCYTES RELATIVE PERCENT: 11.3 % (ref 0–10)
NEUTROPHILS ABSOLUTE: 9.7 K/UL (ref 1.5–7.5)
NEUTROPHILS RELATIVE PERCENT: 82.7 % (ref 50–65)
PDW BLD-RTO: 13.2 % (ref 11.5–14.5)
PERFORMED ON: ABNORMAL
PLATELET # BLD: 240 K/UL (ref 130–400)
PMV BLD AUTO: 11.8 FL (ref 9.4–12.4)
POTASSIUM REFLEX MAGNESIUM: 4.9 MMOL/L (ref 3.5–5)
PROTHROMBIN TIME: 14 SEC (ref 12–14.6)
RBC # BLD: 4.18 M/UL (ref 4.7–6.1)
SODIUM BLD-SCNC: 137 MMOL/L (ref 136–145)
TOTAL PROTEIN: 6.4 G/DL (ref 6.6–8.7)
WBC # BLD: 11.7 K/UL (ref 4.8–10.8)

## 2020-12-01 PROCEDURE — 85384 FIBRINOGEN ACTIVITY: CPT

## 2020-12-01 PROCEDURE — 2580000003 HC RX 258: Performed by: INTERNAL MEDICINE

## 2020-12-01 PROCEDURE — 82947 ASSAY GLUCOSE BLOOD QUANT: CPT

## 2020-12-01 PROCEDURE — 85730 THROMBOPLASTIN TIME PARTIAL: CPT

## 2020-12-01 PROCEDURE — 6370000000 HC RX 637 (ALT 250 FOR IP): Performed by: STUDENT IN AN ORGANIZED HEALTH CARE EDUCATION/TRAINING PROGRAM

## 2020-12-01 PROCEDURE — 85379 FIBRIN DEGRADATION QUANT: CPT

## 2020-12-01 PROCEDURE — 6360000002 HC RX W HCPCS: Performed by: STUDENT IN AN ORGANIZED HEALTH CARE EDUCATION/TRAINING PROGRAM

## 2020-12-01 PROCEDURE — 80053 COMPREHEN METABOLIC PANEL: CPT

## 2020-12-01 PROCEDURE — 1210000000 HC MED SURG R&B

## 2020-12-01 PROCEDURE — 6360000002 HC RX W HCPCS: Performed by: INTERNAL MEDICINE

## 2020-12-01 PROCEDURE — 85610 PROTHROMBIN TIME: CPT

## 2020-12-01 PROCEDURE — 86140 C-REACTIVE PROTEIN: CPT

## 2020-12-01 PROCEDURE — 6370000000 HC RX 637 (ALT 250 FOR IP): Performed by: INTERNAL MEDICINE

## 2020-12-01 PROCEDURE — 2700000000 HC OXYGEN THERAPY PER DAY

## 2020-12-01 PROCEDURE — 85025 COMPLETE CBC W/AUTO DIFF WBC: CPT

## 2020-12-01 RX ADMIN — ENOXAPARIN SODIUM 40 MG: 40 INJECTION SUBCUTANEOUS at 08:46

## 2020-12-01 RX ADMIN — INSULIN LISPRO 3 UNITS: 100 INJECTION, SOLUTION INTRAVENOUS; SUBCUTANEOUS at 17:30

## 2020-12-01 RX ADMIN — BUDESONIDE AND FORMOTEROL FUMARATE DIHYDRATE 2 PUFF: 80; 4.5 AEROSOL RESPIRATORY (INHALATION) at 20:35

## 2020-12-01 RX ADMIN — Medication 50 MG: at 08:46

## 2020-12-01 RX ADMIN — ASPIRIN 81 MG: 81 TABLET, COATED ORAL at 20:35

## 2020-12-01 RX ADMIN — INSULIN LISPRO 3 UNITS: 100 INJECTION, SOLUTION INTRAVENOUS; SUBCUTANEOUS at 08:50

## 2020-12-01 RX ADMIN — CARVEDILOL 3.12 MG: 3.12 TABLET, FILM COATED ORAL at 08:46

## 2020-12-01 RX ADMIN — FAMOTIDINE 20 MG: 20 TABLET, FILM COATED ORAL at 20:35

## 2020-12-01 RX ADMIN — CHOLECALCIFEROL (VITAMIN D3) 25 MCG (1,000 UNIT) TABLET 2000 UNITS: TABLET at 08:47

## 2020-12-01 RX ADMIN — CYANOCOBALAMIN TAB 500 MCG 500 MCG: 500 TAB at 08:46

## 2020-12-01 RX ADMIN — ATORVASTATIN CALCIUM 20 MG: 20 TABLET, FILM COATED ORAL at 08:46

## 2020-12-01 RX ADMIN — CARVEDILOL 3.12 MG: 3.12 TABLET, FILM COATED ORAL at 17:35

## 2020-12-01 RX ADMIN — Medication 10 UNITS: at 20:37

## 2020-12-01 RX ADMIN — BUDESONIDE AND FORMOTEROL FUMARATE DIHYDRATE 2 PUFF: 80; 4.5 AEROSOL RESPIRATORY (INHALATION) at 16:24

## 2020-12-01 RX ADMIN — INSULIN LISPRO 2 UNITS: 100 INJECTION, SOLUTION INTRAVENOUS; SUBCUTANEOUS at 17:30

## 2020-12-01 RX ADMIN — RANOLAZINE 500 MG: 500 TABLET, FILM COATED, EXTENDED RELEASE ORAL at 20:35

## 2020-12-01 RX ADMIN — LINEZOLID 600 MG: 600 TABLET, FILM COATED ORAL at 08:47

## 2020-12-01 RX ADMIN — DEXAMETHASONE 6 MG: 2 TABLET ORAL at 08:46

## 2020-12-01 RX ADMIN — ALLOPURINOL 100 MG: 100 TABLET ORAL at 20:35

## 2020-12-01 RX ADMIN — ENOXAPARIN SODIUM 40 MG: 40 INJECTION SUBCUTANEOUS at 20:35

## 2020-12-01 RX ADMIN — OXYCODONE HYDROCHLORIDE AND ACETAMINOPHEN 1000 MG: 500 TABLET ORAL at 08:46

## 2020-12-01 RX ADMIN — AMLODIPINE BESYLATE 5 MG: 5 TABLET ORAL at 08:46

## 2020-12-01 RX ADMIN — LINEZOLID 600 MG: 600 TABLET, FILM COATED ORAL at 20:35

## 2020-12-01 RX ADMIN — RANOLAZINE 500 MG: 500 TABLET, FILM COATED, EXTENDED RELEASE ORAL at 08:46

## 2020-12-01 RX ADMIN — BUDESONIDE AND FORMOTEROL FUMARATE DIHYDRATE 2 PUFF: 80; 4.5 AEROSOL RESPIRATORY (INHALATION) at 08:47

## 2020-12-01 RX ADMIN — ISOSORBIDE MONONITRATE 60 MG: 60 TABLET, EXTENDED RELEASE ORAL at 20:35

## 2020-12-01 RX ADMIN — ALLOPURINOL 100 MG: 100 TABLET ORAL at 08:46

## 2020-12-01 RX ADMIN — MEROPENEM 1 G: 1 INJECTION, POWDER, FOR SOLUTION INTRAVENOUS at 03:31

## 2020-12-01 RX ADMIN — BUDESONIDE AND FORMOTEROL FUMARATE DIHYDRATE 2 PUFF: 80; 4.5 AEROSOL RESPIRATORY (INHALATION) at 12:41

## 2020-12-01 RX ADMIN — MEROPENEM 1 G: 1 INJECTION, POWDER, FOR SOLUTION INTRAVENOUS at 15:13

## 2020-12-01 NOTE — PROGRESS NOTES
Infectious Diseases Progress Note    Patient:  Dipti Galvan  YOB: 1950  MRN: [id number]   Admit date: 11/27/2020   Admitting Physician: Calbe Marlow MD  Primary Care Physician: MISSY Nesbitt    Chief Complaint/Interval History: He indicates he is feeling some better. He indicates he is comfortable at rest.  He does have dyspnea with exertion. He has not had fever over the past 48 hours which is an improvement for him. He is tolerating his antibiotics without nausea, diarrhea, or rash. He has no pain at his IV site. He has had no chills or night sweats.     In/Out    Intake/Output Summary (Last 24 hours) at 11/30/2020 2126  Last data filed at 11/30/2020 1650  Gross per 24 hour   Intake 160 ml   Output --   Net 160 ml     Allergies: No Known Allergies  Current Meds: meropenem (MERREM) 1 g in sterile water 20 mL IV syringe, Q12H  enoxaparin (LOVENOX) injection 40 mg, BID  zinc sulfate (ZINCATE) capsule 50 mg, Daily  vitamin C (ASCORBIC ACID) tablet 1,000 mg, Daily  0.9 % sodium chloride bolus, PRN  budesonide-formoterol (SYMBICORT) 80-4.5 MCG/ACT inhaler 2 puff, 4x daily  insulin glargine (LANTUS) injection vial 10 Units, Nightly  insulin lispro (HUMALOG) injection vial 3 Units, TID WC  linezolid (ZYVOX) tablet 600 mg, 2 times per day  acetaminophen (TYLENOL) tablet 650 mg, Q6H PRN    Or  acetaminophen (TYLENOL) suppository 650 mg, Q6H PRN  dexamethasone (DECADRON) tablet 6 mg, Daily  Vitamin D (CHOLECALCIFEROL) tablet 2,000 Units, Daily  allopurinol (ZYLOPRIM) tablet 100 mg, BID  amLODIPine (NORVASC) tablet 5 mg, Daily  aspirin EC tablet 81 mg, Nightly  atorvastatin (LIPITOR) tablet 20 mg, Daily  carvedilol (COREG) tablet 3.125 mg, BID WC  vitamin B-12 (CYANOCOBALAMIN) tablet 500 mcg, QAM  isosorbide mononitrate (IMDUR) extended release tablet 60 mg, Nightly  ranolazine (RANEXA) extended release tablet 500 mg, BID  famotidine (PEPCID) tablet 20 mg, Daily  insulin lispro (HUMALOG) empirically for the possibility of secondary bacterial pneumonia  Overall he seems a little bit better  Continue empiric linezolid and meropenem  Continue dexamethasone  Continue to follow    Holly Fernandez MD

## 2020-12-01 NOTE — PROGRESS NOTES
[facility] Hospitalists Progress Note    Patient:  Brendon Michael  YOB: 1950  Date of Service: 12/1/2020  MRN: [id number]   Acct: [de-identified]   Primary Care Physician: MISSY Pitt  Advance Directive: Full Code  Admit Date: 11/27/2020       Hospital Day: 4      CHIEF COMPLAINT:     Chief Complaint   Patient presents with    Pneumonia     Double PNA dx today with a chest x ray       12/1/2020 9:18 AM  Subjective / Interval History:   12/01/2020  Patient remains afebrile. Doing well. However continues to have shortness of breath with oxygen desaturations with minimal activity. .  Laying comfortably in bed, in no apparent acute distress. Patient endorses overall improvement. 11/30/2020  No new complaints. Patient doing well. Sitting comfortably in bed in no apparent acute distress. No acute changes or acute overnight event reported. Shortness of breath improved. No further febrile episodes reported overnight. Patient denies any other acute complaints or distress at this time. 11/29/2020  Patient doing well. Intermittent febrile episodes reported. Shortness of breath improved. Patient endorses some dyspnea with minimal exertion. Sitting comfortably in bed in no apparent acute distress. No other acute changes or acute overnight event reported. 11/28/2020  Patient seen and examined. Doing well. Laying comfortably in bed in CCU, in no apparent acute distress. Currently requiring supplemental oxygen. Patient endorses overall improvement. States his breathing is improving gradually. No acute changes or acute overnight event reported.       Brief History:    As per Initial admission HPI 11/27/2020, quoted below;  \"Patient is a 79 y.o. male with solitary kidney with history of nephrectomy, CAD with plan in near future for stenting at [facility] in [place].      Patient presented to [facility] ED with development of worsening dyspnea on exertion, productive cough and fevers over the past few days, with preceding symptoms of fatigue, malaise and myalgias over the last 2 weeks. He does not have any nausea, vomiting or loss of taste or smell. He had a fever at home of over 102. He does note previous Covid exposure around friends, but has not been previously tested. He went to his PCP earlier today where he had chest x-ray done in clinic which showed bilateral infiltrates with partial right lower lobe consolidation and subsequently sent to the ED.  patient has been on azithromycin past few days.     In the ED, tested positive for COVID-19. Temperature 99. Initially satting well on room air at rest, however he subsequently had O2 desaturation down to low to mid 80s with minimal exertion. SPO2 87% documented on room air, subsequently improved on 2 L nasal cannula. \"      Review of Systems:   Review of Systems  ROS: 14 point review of systems is negative except as specifically addressed above.     DIET RENAL; Carb Control: 4 carb choices (60 gms)/meal    Intake/Output Summary (Last 24 hours) at 12/1/2020 9443  Last data filed at 11/30/2020 1650  Gross per 24 hour   Intake 160 ml   Output --   Net 160 ml       Medications:   dextrose       Current Facility-Administered Medications   Medication Dose Route Frequency Provider Last Rate Last Dose    meropenem (MERREM) 1 g in sterile water 20 mL IV syringe  1 g Intravenous Q12H Nasir Peace MD   1 g at 12/01/20 0331    enoxaparin (LOVENOX) injection 40 mg  40 mg Subcutaneous BID Shu Mckay MD   40 mg at 12/01/20 0846    zinc sulfate (ZINCATE) capsule 50 mg  50 mg Oral Daily Ethan Tamayo MD   50 mg at 12/01/20 0846    vitamin C (ASCORBIC ACID) tablet 1,000 mg  1,000 mg Oral Daily Ethan Tamayo MD   1,000 mg at 12/01/20 0846    0.9 % sodium chloride bolus  30 mL Intravenous PRN Ethan Tamayo MD        budesonide-formoterol (SYMBICORT) 80-4.5 MCG/ACT inhaler 2 puff  2 puff MD Izabella   2 Units at 11/30/20 1745    insulin lispro (HUMALOG) injection vial 0-6 Units  0-6 Units Subcutaneous Nightly Skylar Craven MD        glucose (GLUTOSE) 40 % oral gel 15 g  15 g Oral PRN Skylar Craven MD        dextrose 50 % IV solution  12.5 g Intravenous PRN Skylar Craven MD        glucagon (rDNA) injection 1 mg  1 mg Intramuscular PRN Skylar Craven MD        dextrose 5 % solution  100 mL/hr Intravenous PRN Skylar Craven MD             dextrose        meropenem  1 g Intravenous Q12H    enoxaparin  40 mg Subcutaneous BID    zinc sulfate  50 mg Oral Daily    vitamin C  1,000 mg Oral Daily    budesonide-formoterol  2 puff Inhalation 4x daily    insulin glargine  10 Units Subcutaneous Nightly    insulin lispro  3 Units Subcutaneous TID WC    linezolid  600 mg Oral 2 times per day    dexamethasone  6 mg Oral Daily    Vitamin D  2,000 Units Oral Daily    allopurinol  100 mg Oral BID    amLODIPine  5 mg Oral Daily    aspirin  81 mg Oral Nightly    atorvastatin  20 mg Oral Daily    carvedilol  3.125 mg Oral BID WC    vitamin B-12  500 mcg Oral QAM    isosorbide mononitrate  60 mg Oral Nightly    ranolazine  500 mg Oral BID    famotidine  20 mg Oral Daily    insulin lispro  0-12 Units Subcutaneous TID WC    insulin lispro  0-6 Units Subcutaneous Nightly     sodium chloride, acetaminophen **OR** acetaminophen, glucose, dextrose, glucagon (rDNA), dextrose  DIET RENAL; Carb Control: 4 carb choices (60 gms)/meal       Labs:   CBC with DIFF:  Recent Labs     11/29/20  0713 11/30/20  0345 12/01/20  0340   WBC 12.1* 12.9* 11.7*   RBC 4.10* 4.00* 4.18*   HGB 12.4* 11.9* 12.3*   HCT 37.3* 35.0* 36.9*   MCV 91.0 87.5 88.3   MCH 30.2 29.8 29.4   MCHC 33.2 34.0 33.3   RDW 13.2 13.2 13.2    207 240   MPV 11.2 11.4 11.8   NEUTOPHILPCT 87.3* 88.1* 82.7*   LYMPHOPCT 3.0* 2.4* 3.7*   MONOPCT 8.8 8.2 11.3*   EOSRELPCT 0.0 0.0 0.0   BASOPCT 0.1 0.1 0.3   NEUTROABS 10.6* 11.3* 9.7*   LYMPHSABS 0.4* 0.3* 0.4*   MONOSABS 1.10* 1.10* 1.30*   EOSABS 0.00 0.00 0.00   BASOSABS 0.00 0.00 0.00       CMP/BMP:  Recent Labs     11/29/20  0713 11/30/20  0345 12/01/20  0340    133* 137   K 4.7 4.9 4.9    103 105   CO2 22 19* 19*   ANIONGAP 11 11 13   GLUCOSE 162* 133* 150*   BUN 50* 49* 53*   CREATININE 2.0* 1.9* 1.9*   LABGLOM 33* 35* 35*   CALCIUM 8.3* 8.1* 8.0*   PROT 6.9 6.5* 6.4*   LABALBU 3.7 3.3* 3.1*   BILITOT 0.4 0.4 0.4   ALKPHOS 50 48 50   ALT 22 21 30   AST 20 23 24         CRP:    Recent Labs     11/29/20  0713 12/01/20 0340   CRP 9.09* 5.10*     Sed Rate:  No results for input(s): SEDRATE in the last 72 hours. HgBA1c:  No components found for: HGBA1C  FLP:    Lab Results   Component Value Date    TRIG 60 09/30/2020    HDL 49 09/30/2020    LDLCALC 61 09/30/2020     TSH:    Lab Results   Component Value Date    TSH 1.260 01/21/2020     Troponin T: No results for input(s): TROPONINI in the last 72 hours. Pro-BNP: No results for input(s): BNP in the last 72 hours. INR:   Recent Labs     11/29/20  0713 11/30/20 0345 12/01/20  0340   INR 1.07 1.08 1.09     ABGs: No results found for: PHART, PO2ART, BXB8FOY  UA:  Recent Labs     11/29/20  2106   COLORU YELLOW   PHUR 5.5   WBCUA 3   RBCUA 6*   BACTERIA NEGATIVE*   CLARITYU Clear   SPECGRAV 1.023   LEUKOCYTESUR Negative   UROBILINOGEN 1.0   BILIRUBINUR Negative   BLOODU Negative   GLUCOSEU Negative         Culture Results:    No results for input(s): CXSURG in the last 720 hours. Blood Culture Recent:   Recent Labs     11/29/20  1755   BC No Growth to date. Any change in status will be called. Cultures:   No results for input(s): CULTURE in the last 72 hours. Recent Labs     11/29/20  1755 11/29/20  1811   BC No Growth to date. Any change in status will be called. --    BLOODCULT2  --  No Growth to date. Any change in status will be called. No results for input(s): CXSURG in the last 72 hours.       No results for input(s): MG, PHOS in the last 72 hours. Recent Labs     11/29/20  0713 11/30/20  0345 12/01/20  0340   AST 20 23 24   ALT 22 21 30   BILITOT 0.4 0.4 0.4   ALKPHOS 50 48 50         RAD:   Xr Chest Portable    Result Date: 11/27/2020  EXAMINATION: XR CHEST PORTABLE 11/27/2020 3:13 PM HISTORY: Shortness of breath , clinical concern for COVID-19 infection. Report: A portable upright frontal view the chest was obtained. COMPARISON: Chest x-ray 3/6/2020. There are new mixed interstitial and groundglass infiltrates in both lungs, with partial consolidation of the right lower lobe and slight bulging of the minor fissure. No pneumothorax or pleural effusion is identified. Heart size is normal. Previous median sternotomy is noted. The osseous structures and upper abdomen are unremarkable. New bilateral pulmonary infiltrates and partial consolidation of the right lower lobe with bulging of the minor fissure. This is compatible with pneumonia and atypical pneumonia, including COVID-19 is not excluded. Signed by Dr Jennifer Figueroa on 11/27/2020 3:15 PM        Objective:   Vitals:   /73   Pulse 77   Temp 98.1 °F (36.7 °C) (Temporal)   Resp 16   Ht 5' 10\" (1.778 m)   Wt 230 lb 3.2 oz (104.4 kg)   SpO2 90%   BMI 33.03 kg/m²       Patient Vitals for the past 24 hrs:   BP Temp Temp src Pulse Resp SpO2   12/01/20 0709 133/73 98.1 °F (36.7 °C) Temporal 77 16 90 %   11/30/20 1856 (!) 153/81 97.4 °F (36.3 °C) Temporal 81 16 (!) 89 %   11/30/20 1730 (!) 151/78 -- -- 77 -- --       24HR INTAKE/OUTPUT:      Intake/Output Summary (Last 24 hours) at 12/1/2020 0918  Last data filed at 11/30/2020 1650  Gross per 24 hour   Intake 160 ml   Output --   Net 160 ml       Patient interviewed/observed/monitored from door, in order to preserve PPE's. Physical Exam  Vitals signs and nursing note reviewed. Constitutional:       General: He is not in acute distress. Appearance: Normal appearance.  He is not ill-appearing, toxic-appearing or diaphoretic. HENT:      Head: Normocephalic and atraumatic. Right Ear: External ear normal.      Left Ear: External ear normal.   Neck:      Musculoskeletal: Normal range of motion and neck supple. Pulmonary:      Effort: Pulmonary effort is normal. No respiratory distress. Comments: Patient no obvious acute respiratory distress. Responding to questions appropriately and speaking in full sentences. Musculoskeletal: Normal range of motion. Neurological:      Mental Status: He is alert and oriented to person, place, and time. Psychiatric:         Mood and Affect: Mood normal.         Behavior: Behavior normal.         Thought Content:  Thought content normal.         Judgment: Judgment normal.           Assessment/plan:       Hospital Problems           Last Modified POA    * (Principal) Pneumonia due to COVID-19 virus 11/28/2020 Yes    Acute respiratory failure with hypoxia ([address] 11/27/2020 Yes    Hypercholesteremia 11/27/2020 Yes    GERD (gastroesophageal reflux disease) 11/27/2020 Yes    Morbidly obese ([address] 11/27/2020 Yes    Gout 11/27/2020 Yes    Chronic kidney disease, stage 3 11/27/2020 Yes    Type 2 diabetes mellitus with complication, without long-term current use of insulin ([address] 11/27/2020 Yes    Hypertension, essential, benign 11/27/2020 Yes    H/O unilateral nephrectomy 11/27/2020 Yes    Coronary artery disease involving left main coronary artery 11/27/2020 Yes          Principal Problem:    Pneumonia due to COVID-19 virus  Active Problems:    Acute respiratory failure with hypoxia (HCC)    Hypercholesteremia    GERD (gastroesophageal reflux disease)    Morbidly obese (HCC)    Gout    Chronic kidney disease, stage 3    Type 2 diabetes mellitus with complication, without long-term current use of insulin (HCC)    Hypertension, essential, benign    H/O unilateral nephrectomy    Coronary artery disease involving left main coronary artery  Resolved Problems:    * No resolved hospital problems. *    Brief Summary  Mr. Audrey Wall, a 66-year-old male, history of right nephrectomy admitted to [facility] (11/27/2020), for the management of COVID-19 pneumonia. Currently requiring submental oxygen, with intermittent febrile episode, with a T-max of 102.4 (11/29/2020). ID following. COVID 19 Infection PNA.- Diagnosed on 11/27/2020   Febrile with T-max of 102.4 (11/29/2020)   Continue oxygen supplementation keep SPO2 above 90%   Continuous Pulse Ox   Dexamethasone 6 mg PO Daily   Adjunct therapy with;  Melatonin, Vitamin D, Zinc, & Inhaled Corticosteroids. o Zinc sulfate (ZINCATE) capsule 50 mg  PO Daily  o Melatonin PO Daily  o Vitamin D (CHOLECALCIFEROL) capsule 2,000 Units  PO Daily  o Budesonide-formoterol (SYMBICORT) 80-4.5 MCG/ACT inhaler 2 puff Twice Daily   Remdesivir not initiated, given patient's renal function as well as reported history of right Nephrectomy.  ID following-appreciate recommendations   Empiric antibiotic initially with cefepime and linezolid initiated (11/28/2020) by ID   Now on meropenem and linezolid.  Antibiotic as per ID.  Sputum culture pending   Avoid Nebulizer treatments to avoid aerosolization.  Encourage Prone positioning   Enoxaparin 40 mg subcu twice daily        History of Diabetes Mellitus II   Hemoglobin A1C: 6.5% (11/29/2020)   Reported Home regimen include,    Inpatient Regimens to include;  o - Insulin Glargine (Lantus) 10 units subcu nightly  o - Insulin Lispro (Humalog) 3 units subcu pre-meal 3 times a day  o - Insulin Lispro (Humalog) on a Medium dose sliding scale   Monitor POC glucose, and adjust insulin regimen accordingly based on daily insulin requirement.     Hx malignant neoplasm of right kidney (status post right nephrectomy)  CKD stage III  · -Baseline creatinine of 2.0  · -Creatinine level on presentation 2.1  · Creatinine stable  · -IV hydration  · -Avoid hypotension and nephrotoxins          Continue management of other chronic medical conditions - see above and orders. Advance Directive: Full Code    DIET RENAL; Carb Control: 4 carb choices (60 gms)/meal         Consults Made:   IP CONSULT TO INFECTIOUS DISEASES  PALLIATIVE CARE EVAL    DVT prophylaxis: Enoxaparin      Discharge planning:   · Continue management for COVID-19 pneumonia as well as antibiotic (meropenem and linezolid) for possible superimposed bacterial infection. · Continues to require supplemental oxygen  · Continue current management for now. Time Spent is 25 mins in the examination, evaluation, counseling and review of medications, assessment and plan.      Electronically signed by   Trinh Gage MD, MPH,   Internal Medicine Hospitalist   12/1/2020 9:18 AM

## 2020-12-01 NOTE — PROGRESS NOTES
Infectious Diseases Progress Note    Patient:  Zane Beckett  YOB: 1950  MRN: [id number]   Admit date: 11/27/2020   Admitting Physician: Tony Kent MD  Primary Care Physician: MISSY Rodgers    Chief Complaint/Interval History: He feels about the same. At rest he feels fine. He got up and moved around the room today and picked up his room. He indicates he will get dyspneic with exertion. That symptom bothers him the most.  Overall that is stable from yesterday. No productive cough. No chest pain. No new symptoms.     In/Out    Intake/Output Summary (Last 24 hours) at 12/1/2020 0956  Last data filed at 11/30/2020 1650  Gross per 24 hour   Intake 160 ml   Output --   Net 160 ml     Allergies: No Known Allergies  Current Meds: meropenem (MERREM) 1 g in sterile water 20 mL IV syringe, Q12H  enoxaparin (LOVENOX) injection 40 mg, BID  zinc sulfate (ZINCATE) capsule 50 mg, Daily  vitamin C (ASCORBIC ACID) tablet 1,000 mg, Daily  0.9 % sodium chloride bolus, PRN  budesonide-formoterol (SYMBICORT) 80-4.5 MCG/ACT inhaler 2 puff, 4x daily  insulin glargine (LANTUS) injection vial 10 Units, Nightly  insulin lispro (HUMALOG) injection vial 3 Units, TID WC  linezolid (ZYVOX) tablet 600 mg, 2 times per day  acetaminophen (TYLENOL) tablet 650 mg, Q6H PRN    Or  acetaminophen (TYLENOL) suppository 650 mg, Q6H PRN  dexamethasone (DECADRON) tablet 6 mg, Daily  Vitamin D (CHOLECALCIFEROL) tablet 2,000 Units, Daily  allopurinol (ZYLOPRIM) tablet 100 mg, BID  amLODIPine (NORVASC) tablet 5 mg, Daily  aspirin EC tablet 81 mg, Nightly  atorvastatin (LIPITOR) tablet 20 mg, Daily  carvedilol (COREG) tablet 3.125 mg, BID WC  vitamin B-12 (CYANOCOBALAMIN) tablet 500 mcg, QAM  isosorbide mononitrate (IMDUR) extended release tablet 60 mg, Nightly  ranolazine (RANEXA) extended release tablet 500 mg, BID  famotidine (PEPCID) tablet 20 mg, Daily  insulin lispro (HUMALOG) injection vial 0-12 Units, TID WC  insulin lispro (HUMALOG) injection vial 0-6 Units, Nightly  glucose (GLUTOSE) 40 % oral gel 15 g, PRN  dextrose 50 % IV solution, PRN  glucagon (rDNA) injection 1 mg, PRN  dextrose 5 % solution, PRN      Review of Systems he has some loose but not watery bowel movement. They are not particularly frequent. No abdominal pain. No urinary symptoms    VitalSigns:  /73   Pulse 77   Temp 98.1 °F (36.7 °C) (Temporal)   Resp 16   Ht 5' 10\" (1.778 m)   Wt 230 lb 3.2 oz (104.4 kg)   SpO2 90%   BMI 33.03 kg/m²      Physical Exam  He is alert and interactive. He did not appear short of breath at rest.  He was not coughing during evaluation. He was alert, oriented, and appropriate. He looks a little bit stronger than yesterday. Lab Results:  CBC:   Recent Labs     11/29/20  0713 11/30/20  0345 12/01/20  0340   WBC 12.1* 12.9* 11.7*   HGB 12.4* 11.9* 12.3*    207 240     BMP:  Recent Labs     11/29/20  0713 11/30/20  0345 12/01/20  0340    133* 137   K 4.7 4.9 4.9    103 105   CO2 22 19* 19*   BUN 50* 49* 53*   CREATININE 2.0* 1.9* 1.9*   GLUCOSE 162* 133* 150*     CultureResults: Blood culture November 29, 2020 no growth    Radiology: None    Additional Studies Reviewed:  None    Impression:  1. COVID-19 infection/COVID-19 pneumonia  2. Coronary artery disease  3. Diabetes mellitus  4. History nephrectomy for renal cell cancer    Recommendations:  Talked with him at some length this morning. He seems to be pretty long into the course of Covid infection. If current lung symptoms Covid related, would lean more toward the inflammatory response following recent viral infection as opposed to active viral replication itself. Also think the possibility of secondary bacterial pneumonia remains high on the list.  At this point I think we need to continue oxygen supplementation, incentive spirometry, empiric antibiotic treatment, and dexamethasone.   That seems to me to be the best way to cover all bases. I am encouraged that he is not having increasing oxygen requirements. Hopefully he will remain stable and oxygen requirements can start to be decreased over the next few days. Called to update his wife, Shira-received voicemail-left message to call. Continue to follow closely.     Ruy Hammonds MD

## 2020-12-02 LAB
ALBUMIN SERPL-MCNC: 3.4 G/DL (ref 3.5–5.2)
ALP BLD-CCNC: 54 U/L (ref 40–130)
ALT SERPL-CCNC: 67 U/L (ref 5–41)
ANION GAP SERPL CALCULATED.3IONS-SCNC: 13 MMOL/L (ref 7–19)
APTT: 33.7 SEC (ref 26–36.2)
AST SERPL-CCNC: 50 U/L (ref 5–40)
BASOPHILS ABSOLUTE: 0 K/UL (ref 0–0.2)
BASOPHILS RELATIVE PERCENT: 0.3 % (ref 0–1)
BILIRUB SERPL-MCNC: 0.4 MG/DL (ref 0.2–1.2)
BUN BLDV-MCNC: 52 MG/DL (ref 8–23)
CALCIUM SERPL-MCNC: 8.3 MG/DL (ref 8.8–10.2)
CHLORIDE BLD-SCNC: 104 MMOL/L (ref 98–111)
CO2: 18 MMOL/L (ref 22–29)
CREAT SERPL-MCNC: 2 MG/DL (ref 0.5–1.2)
D DIMER: 0.55 UG/ML FEU (ref 0–0.48)
EOSINOPHILS ABSOLUTE: 0 K/UL (ref 0–0.6)
EOSINOPHILS RELATIVE PERCENT: 0 % (ref 0–5)
FIBRINOGEN: 575 MG/DL (ref 238–505)
GFR AFRICAN AMERICAN: 40
GFR NON-AFRICAN AMERICAN: 33
GLUCOSE BLD-MCNC: 118 MG/DL (ref 70–99)
GLUCOSE BLD-MCNC: 131 MG/DL (ref 74–109)
GLUCOSE BLD-MCNC: 138 MG/DL (ref 70–99)
GLUCOSE BLD-MCNC: 200 MG/DL (ref 70–99)
GLUCOSE BLD-MCNC: 207 MG/DL (ref 70–99)
HCT VFR BLD CALC: 38.1 % (ref 42–52)
HEMOGLOBIN: 12.7 G/DL (ref 14–18)
IMMATURE GRANULOCYTES #: 0.5 K/UL
INR BLD: 1.09 (ref 0.88–1.18)
LYMPHOCYTES ABSOLUTE: 0.5 K/UL (ref 1.1–4.5)
LYMPHOCYTES RELATIVE PERCENT: 4.5 % (ref 20–40)
MCH RBC QN AUTO: 29.5 PG (ref 27–31)
MCHC RBC AUTO-ENTMCNC: 33.3 G/DL (ref 33–37)
MCV RBC AUTO: 88.6 FL (ref 80–94)
MONOCYTES ABSOLUTE: 1.1 K/UL (ref 0–0.9)
MONOCYTES RELATIVE PERCENT: 9.7 % (ref 0–10)
MYCOPLASMA PNEUMONIAE IGG: 0.33 U/L
MYCOPLASMA PNEUMONIAE IGM: 0.13 U/L
NEUTROPHILS ABSOLUTE: 9.3 K/UL (ref 1.5–7.5)
NEUTROPHILS RELATIVE PERCENT: 81.5 % (ref 50–65)
PDW BLD-RTO: 13.1 % (ref 11.5–14.5)
PERFORMED ON: ABNORMAL
PLATELET # BLD: 256 K/UL (ref 130–400)
PMV BLD AUTO: 11.2 FL (ref 9.4–12.4)
POTASSIUM REFLEX MAGNESIUM: 4.9 MMOL/L (ref 3.5–5)
PROTHROMBIN TIME: 14.1 SEC (ref 12–14.6)
RBC # BLD: 4.3 M/UL (ref 4.7–6.1)
SODIUM BLD-SCNC: 135 MMOL/L (ref 136–145)
TOTAL PROTEIN: 6.4 G/DL (ref 6.6–8.7)
WBC # BLD: 11.5 K/UL (ref 4.8–10.8)

## 2020-12-02 PROCEDURE — 94761 N-INVAS EAR/PLS OXIMETRY MLT: CPT

## 2020-12-02 PROCEDURE — 80053 COMPREHEN METABOLIC PANEL: CPT

## 2020-12-02 PROCEDURE — 6360000002 HC RX W HCPCS: Performed by: STUDENT IN AN ORGANIZED HEALTH CARE EDUCATION/TRAINING PROGRAM

## 2020-12-02 PROCEDURE — 6370000000 HC RX 637 (ALT 250 FOR IP): Performed by: STUDENT IN AN ORGANIZED HEALTH CARE EDUCATION/TRAINING PROGRAM

## 2020-12-02 PROCEDURE — 85384 FIBRINOGEN ACTIVITY: CPT

## 2020-12-02 PROCEDURE — 85025 COMPLETE CBC W/AUTO DIFF WBC: CPT

## 2020-12-02 PROCEDURE — 6360000002 HC RX W HCPCS: Performed by: INTERNAL MEDICINE

## 2020-12-02 PROCEDURE — 1210000000 HC MED SURG R&B

## 2020-12-02 PROCEDURE — 85730 THROMBOPLASTIN TIME PARTIAL: CPT

## 2020-12-02 PROCEDURE — 85610 PROTHROMBIN TIME: CPT

## 2020-12-02 PROCEDURE — 6370000000 HC RX 637 (ALT 250 FOR IP): Performed by: INTERNAL MEDICINE

## 2020-12-02 PROCEDURE — 85379 FIBRIN DEGRADATION QUANT: CPT

## 2020-12-02 PROCEDURE — 82947 ASSAY GLUCOSE BLOOD QUANT: CPT

## 2020-12-02 PROCEDURE — 2700000000 HC OXYGEN THERAPY PER DAY

## 2020-12-02 PROCEDURE — 2580000003 HC RX 258: Performed by: INTERNAL MEDICINE

## 2020-12-02 RX ADMIN — BUDESONIDE AND FORMOTEROL FUMARATE DIHYDRATE 2 PUFF: 80; 4.5 AEROSOL RESPIRATORY (INHALATION) at 12:00

## 2020-12-02 RX ADMIN — Medication 50 MG: at 09:14

## 2020-12-02 RX ADMIN — CARVEDILOL 3.12 MG: 3.12 TABLET, FILM COATED ORAL at 09:14

## 2020-12-02 RX ADMIN — CYANOCOBALAMIN TAB 500 MCG 500 MCG: 500 TAB at 09:13

## 2020-12-02 RX ADMIN — ALLOPURINOL 100 MG: 100 TABLET ORAL at 09:14

## 2020-12-02 RX ADMIN — ASPIRIN 81 MG: 81 TABLET, COATED ORAL at 21:09

## 2020-12-02 RX ADMIN — AMLODIPINE BESYLATE 5 MG: 5 TABLET ORAL at 09:13

## 2020-12-02 RX ADMIN — ENOXAPARIN SODIUM 40 MG: 40 INJECTION SUBCUTANEOUS at 09:13

## 2020-12-02 RX ADMIN — MEROPENEM 1 G: 1 INJECTION, POWDER, FOR SOLUTION INTRAVENOUS at 14:48

## 2020-12-02 RX ADMIN — ENOXAPARIN SODIUM 40 MG: 40 INJECTION SUBCUTANEOUS at 21:09

## 2020-12-02 RX ADMIN — BUDESONIDE AND FORMOTEROL FUMARATE DIHYDRATE 2 PUFF: 80; 4.5 AEROSOL RESPIRATORY (INHALATION) at 08:00

## 2020-12-02 RX ADMIN — INSULIN LISPRO 3 UNITS: 100 INJECTION, SOLUTION INTRAVENOUS; SUBCUTANEOUS at 17:30

## 2020-12-02 RX ADMIN — INSULIN LISPRO 3 UNITS: 100 INJECTION, SOLUTION INTRAVENOUS; SUBCUTANEOUS at 12:50

## 2020-12-02 RX ADMIN — CARVEDILOL 3.12 MG: 3.12 TABLET, FILM COATED ORAL at 17:52

## 2020-12-02 RX ADMIN — OXYCODONE HYDROCHLORIDE AND ACETAMINOPHEN 1000 MG: 500 TABLET ORAL at 09:13

## 2020-12-02 RX ADMIN — MEROPENEM 1 G: 1 INJECTION, POWDER, FOR SOLUTION INTRAVENOUS at 03:29

## 2020-12-02 RX ADMIN — ALLOPURINOL 100 MG: 100 TABLET ORAL at 21:09

## 2020-12-02 RX ADMIN — CHOLECALCIFEROL (VITAMIN D3) 25 MCG (1,000 UNIT) TABLET 2000 UNITS: TABLET at 09:13

## 2020-12-02 RX ADMIN — BUDESONIDE AND FORMOTEROL FUMARATE DIHYDRATE 2 PUFF: 80; 4.5 AEROSOL RESPIRATORY (INHALATION) at 16:18

## 2020-12-02 RX ADMIN — RANOLAZINE 500 MG: 500 TABLET, FILM COATED, EXTENDED RELEASE ORAL at 09:14

## 2020-12-02 RX ADMIN — RANOLAZINE 500 MG: 500 TABLET, FILM COATED, EXTENDED RELEASE ORAL at 21:09

## 2020-12-02 RX ADMIN — FAMOTIDINE 20 MG: 20 TABLET, FILM COATED ORAL at 21:09

## 2020-12-02 RX ADMIN — ATORVASTATIN CALCIUM 20 MG: 20 TABLET, FILM COATED ORAL at 09:14

## 2020-12-02 RX ADMIN — ISOSORBIDE MONONITRATE 60 MG: 60 TABLET, EXTENDED RELEASE ORAL at 21:09

## 2020-12-02 RX ADMIN — Medication 10 UNITS: at 21:10

## 2020-12-02 RX ADMIN — INSULIN LISPRO 4 UNITS: 100 INJECTION, SOLUTION INTRAVENOUS; SUBCUTANEOUS at 17:30

## 2020-12-02 RX ADMIN — DEXAMETHASONE 6 MG: 2 TABLET ORAL at 09:14

## 2020-12-02 RX ADMIN — BUDESONIDE AND FORMOTEROL FUMARATE DIHYDRATE 2 PUFF: 80; 4.5 AEROSOL RESPIRATORY (INHALATION) at 21:09

## 2020-12-02 NOTE — PROGRESS NOTES
[facility] Progress Note    Patient:  Alex Palacio  YOB: 1950  Date of Service: 12/2/2020  MRN: [id number]   Acct: [de-identified]   Primary Care Physician: MISSY Shaw  Advance Directive: Full Code  Admit Date: 11/27/2020       Hospital Day: 5      CHIEF COMPLAINT:     Chief Complaint   Patient presents with    Pneumonia     Double PNA dx today with a chest x ray       12/2/2020 10:15 AM  Subjective / Interval History:   12/02/2020  Doing well. Patient continues to have shortness of breath and hypoxia with minimal movement or activity in his room. No acute changes or acute overnight event reported. Continues to require supplemental oxygen. Sitting comfortably in bed in no apparent acute distress. Patient endorses much improvement in cough. Overall, patient reports feeling much better today compared to previous days. 12/01/2020  Patient remains afebrile. Doing well. However continues to have shortness of breath with oxygen desaturations with minimal activity. .  Laying comfortably in bed, in no apparent acute distress. Patient endorses overall improvement. 11/30/2020  No new complaints. Patient doing well. Sitting comfortably in bed in no apparent acute distress. No acute changes or acute overnight event reported. Shortness of breath improved. No further febrile episodes reported overnight. Patient denies any other acute complaints or distress at this time. 11/29/2020  Patient doing well. Intermittent febrile episodes reported. Shortness of breath improved. Patient endorses some dyspnea with minimal exertion. Sitting comfortably in bed in no apparent acute distress. No other acute changes or acute overnight event reported. 11/28/2020  Patient seen and examined. Doing well. Laying comfortably in bed in CCU, in no apparent acute distress. Currently requiring supplemental oxygen. Patient endorses overall improvement.   States his breathing is improving gradually. No acute changes or acute overnight event reported. Brief History:    As per Initial admission HPI 11/27/2020, quoted below;  \"Patient is a 79 y.o. male with solitary kidney with history of nephrectomy, CAD with plan in near future for stenting at [facility] in [place].      Patient presented to [facility] ED with development of worsening dyspnea on exertion, productive cough and fevers over the past few days, with preceding symptoms of fatigue, malaise and myalgias over the last 2 weeks. He does not have any nausea, vomiting or loss of taste or smell. He had a fever at home of over 102. He does note previous Covid exposure around friends, but has not been previously tested. He went to his PCP earlier today where he had chest x-ray done in clinic which showed bilateral infiltrates with partial right lower lobe consolidation and subsequently sent to the ED.  patient has been on azithromycin past few days.     In the ED, tested positive for COVID-19. Temperature 99. Initially satting well on room air at rest, however he subsequently had O2 desaturation down to low to mid 80s with minimal exertion. SPO2 87% documented on room air, subsequently improved on 2 L nasal cannula. \"      Review of Systems:   Review of Systems  ROS: 14 point review of systems is negative except as specifically addressed above.     DIET RENAL; Carb Control: 4 carb choices (60 gms)/meal    Intake/Output Summary (Last 24 hours) at 12/2/2020 1015  Last data filed at 12/1/2020 1926  Gross per 24 hour   Intake 600 ml   Output --   Net 600 ml       Medications:   dextrose       Current Facility-Administered Medications   Medication Dose Route Frequency Provider Last Rate Last Dose    meropenem (MERREM) 1 g in sterile water 20 mL IV syringe  1 g Intravenous Q12H Nathanael Monk MD   1 g at 12/02/20 0329    enoxaparin (LOVENOX) injection 40 mg  40 mg Subcutaneous BID Emily Ascencio MD 40 mg at 12/02/20 0913    zinc sulfate (ZINCATE) capsule 50 mg  50 mg Oral Daily Nato Mcmahon MD   50 mg at 12/02/20 1860    vitamin C (ASCORBIC ACID) tablet 1,000 mg  1,000 mg Oral Daily Nato Mcmahon MD   1,000 mg at 12/02/20 0913    0.9 % sodium chloride bolus  30 mL Intravenous PRN Nato Mcmahon MD        budesonide-formoterol (SYMBICORT) 80-4.5 MCG/ACT inhaler 2 puff  2 puff Inhalation 4x daily Nato Mcmahon MD   2 puff at 12/02/20 0800    insulin glargine (LANTUS) injection vial 10 Units  10 Units Subcutaneous Nightly Nato Mcmahon MD   10 Units at 12/01/20 2037    insulin lispro (HUMALOG) injection vial 3 Units  3 Units Subcutaneous TID  Nato Mcmahon MD   3 Units at 12/01/20 1730    acetaminophen (TYLENOL) tablet 650 mg  650 mg Oral Q6H PRN Ervin Weldon MD   650 mg at 11/29/20 1814    Or    acetaminophen (TYLENOL) suppository 650 mg  650 mg Rectal Q6H PRN Ervin Weldon MD        dexamethasone (DECADRON) tablet 6 mg  6 mg Oral Daily Ervin Weldon MD   6 mg at 12/02/20 7213    Vitamin D (CHOLECALCIFEROL) tablet 2,000 Units  2,000 Units Oral Daily Ervin Weldon MD   2,000 Units at 12/02/20 0913    allopurinol (ZYLOPRIM) tablet 100 mg  100 mg Oral BID Ervin Weldon MD   100 mg at 12/02/20 0914    amLODIPine (NORVASC) tablet 5 mg  5 mg Oral Daily Ervin Weldon MD   5 mg at 12/02/20 0913    aspirin EC tablet 81 mg  81 mg Oral Nightly Ervin Weldon MD   81 mg at 12/01/20 2035    atorvastatin (LIPITOR) tablet 20 mg  20 mg Oral Daily Ervin Weldon MD   20 mg at 12/02/20 0914    carvedilol (COREG) tablet 3.125 mg  3.125 mg Oral BID  Ervin Weldon MD   3.125 mg at 12/02/20 6219    vitamin B-12 (CYANOCOBALAMIN) tablet 500 mcg  500 mcg Oral QAM Ervin Weldon MD   500 mcg at 12/02/20 0913    isosorbide mononitrate (IMDUR) extended release tablet 60 mg  60 mg Oral Nightly Ervin Weldon MD   60 mg at 12/01/20 2035    ranolazine [facility]) extended release tablet 500 mg  500 mg Oral BID Julia Kerr MD   500 mg at 12/02/20 0914    famotidine (PEPCID) tablet 20 mg  20 mg Oral Daily Julia Kerr MD   20 mg at 12/01/20 2035    insulin lispro (HUMALOG) injection vial 0-12 Units  0-12 Units Subcutaneous TID WC Julia Kerr MD   2 Units at 12/01/20 1730    insulin lispro (HUMALOG) injection vial 0-6 Units  0-6 Units Subcutaneous Nightly Julia Kerr MD        glucose (GLUTOSE) 40 % oral gel 15 g  15 g Oral PRN Julia Kerr MD        dextrose 50 % IV solution  12.5 g Intravenous PRN Julia Kerr MD        glucagon (rDNA) injection 1 mg  1 mg Intramuscular PRN Julia Kerr MD        dextrose 5 % solution  100 mL/hr Intravenous PRN Julia Kerr MD             dextrose        meropenem  1 g Intravenous Q12H    enoxaparin  40 mg Subcutaneous BID    zinc sulfate  50 mg Oral Daily    vitamin C  1,000 mg Oral Daily    budesonide-formoterol  2 puff Inhalation 4x daily    insulin glargine  10 Units Subcutaneous Nightly    insulin lispro  3 Units Subcutaneous TID WC    dexamethasone  6 mg Oral Daily    Vitamin D  2,000 Units Oral Daily    allopurinol  100 mg Oral BID    amLODIPine  5 mg Oral Daily    aspirin  81 mg Oral Nightly    atorvastatin  20 mg Oral Daily    carvedilol  3.125 mg Oral BID WC    vitamin B-12  500 mcg Oral QAM    isosorbide mononitrate  60 mg Oral Nightly    ranolazine  500 mg Oral BID    famotidine  20 mg Oral Daily    insulin lispro  0-12 Units Subcutaneous TID WC    insulin lispro  0-6 Units Subcutaneous Nightly     sodium chloride, acetaminophen **OR** acetaminophen, glucose, dextrose, glucagon (rDNA), dextrose  DIET RENAL; Carb Control: 4 carb choices (60 gms)/meal       Labs:   CBC with DIFF:  Recent Labs     11/30/20  0345 12/01/20  0340 12/02/20  0340   WBC 12.9* 11.7* 11.5*   RBC 4.00* 4.18* 4.30*   HGB 11.9* 12.3* 12.7*   HCT 35.0* 36.9* 38.1*   MCV 87.5 88.3 88.6   MCH 29.8 29.4 29.5   MCHC 34.0 33.3 33.3   RDW 13.2 13.2 13.1    240 256   MPV 11.4 11.8 11.2   NEUTOPHILPCT 88.1* 82.7* 81.5*   LYMPHOPCT 2.4* 3.7* 4.5*   MONOPCT 8.2 11.3* 9.7   EOSRELPCT 0.0 0.0 0.0   BASOPCT 0.1 0.3 0.3   NEUTROABS 11.3* 9.7* 9.3*   LYMPHSABS 0.3* 0.4* 0.5*   MONOSABS 1.10* 1.30* 1.10*   EOSABS 0.00 0.00 0.00   BASOSABS 0.00 0.00 0.00       CMP/BMP:  Recent Labs     11/30/20 0345 12/01/20 0340 12/02/20 0340   * 137 135*   K 4.9 4.9 4.9    105 104   CO2 19* 19* 18*   ANIONGAP 11 13 13   GLUCOSE 133* 150* 131*   BUN 49* 53* 52*   CREATININE 1.9* 1.9* 2.0*   LABGLOM 35* 35* 33*   CALCIUM 8.1* 8.0* 8.3*   PROT 6.5* 6.4* 6.4*   LABALBU 3.3* 3.1* 3.4*   BILITOT 0.4 0.4 0.4   ALKPHOS 48 50 54   ALT 21 30 67*   AST 23 24 50*         CRP:    Recent Labs     12/01/20 0340   CRP 5.10*     Sed Rate:  No results for input(s): SEDRATE in the last 72 hours. HgBA1c:  No components found for: HGBA1C  FLP:    Lab Results   Component Value Date    TRIG 60 09/30/2020    HDL 49 09/30/2020    LDLCALC 61 09/30/2020     TSH:    Lab Results   Component Value Date    TSH 1.260 01/21/2020     Troponin T: No results for input(s): TROPONINI in the last 72 hours. Pro-BNP: No results for input(s): BNP in the last 72 hours. INR:   Recent Labs     11/30/20 0345 12/01/20 0340 12/02/20  0340   INR 1.08 1.09 1.09     ABGs: No results found for: PHART, PO2ART, XNX9HPA  UA:  Recent Labs     11/29/20  2106   COLORU YELLOW   PHUR 5.5   WBCUA 3   RBCUA 6*   BACTERIA NEGATIVE*   CLARITYU Clear   SPECGRAV 1.023   LEUKOCYTESUR Negative   UROBILINOGEN 1.0   BILIRUBINUR Negative   BLOODU Negative   GLUCOSEU Negative         Culture Results:    No results for input(s): CXSURG in the last 720 hours. Blood Culture Recent:   Recent Labs     11/29/20  1755   BC No Growth to date. Any change in status will be called. Cultures:   No results for input(s): CULTURE in the last 72 hours.   Recent Labs 11/29/20  1755 11/29/20  1811   BC No Growth to date. Any change in status will be called. --    BLOODCULT2  --  No Growth to date. Any change in status will be called. No results for input(s): CXSURG in the last 72 hours. No results for input(s): MG, PHOS in the last 72 hours. Recent Labs     11/30/20  0345 12/01/20  0340 12/02/20  0340   AST 23 24 50*   ALT 21 30 67*   BILITOT 0.4 0.4 0.4   ALKPHOS 48 50 54         RAD:   Xr Chest Portable    Result Date: 11/27/2020  EXAMINATION: XR CHEST PORTABLE 11/27/2020 3:13 PM HISTORY: Shortness of breath , clinical concern for COVID-19 infection. Report: A portable upright frontal view the chest was obtained. COMPARISON: Chest x-ray 3/6/2020. There are new mixed interstitial and groundglass infiltrates in both lungs, with partial consolidation of the right lower lobe and slight bulging of the minor fissure. No pneumothorax or pleural effusion is identified. Heart size is normal. Previous median sternotomy is noted. The osseous structures and upper abdomen are unremarkable. New bilateral pulmonary infiltrates and partial consolidation of the right lower lobe with bulging of the minor fissure. This is compatible with pneumonia and atypical pneumonia, including COVID-19 is not excluded. Signed by Dr Sagar Henson.  Ky on 11/27/2020 3:15 PM        Objective:   Vitals:   BP (!) 142/70   Pulse 64   Temp 97.3 °F (36.3 °C)   Resp 16   Ht 5' 10\" (1.778 m)   Wt 230 lb 3.2 oz (104.4 kg)   SpO2 91%   BMI 33.03 kg/m²       Patient Vitals for the past 24 hrs:   BP Temp Temp src Pulse Resp SpO2   12/02/20 0615 (!) 142/70 97.3 °F (36.3 °C) -- 64 16 91 %   12/02/20 0010 (!) 159/80 97.1 °F (36.2 °C) Temporal 61 14 90 %   12/01/20 1854 (!) 149/83 97.8 °F (36.6 °C) Temporal 84 16 91 %   12/01/20 1730 (!) 164/84 -- -- 83 -- --       24HR INTAKE/OUTPUT:      Intake/Output Summary (Last 24 hours) at 12/2/2020 1015  Last data filed at 12/1/2020 1926  Gross per 24 hour Intake 600 ml   Output --   Net 600 ml       Patient interviewed/observed/monitored from door, in order to preserve PPE's. Physical Exam  Vitals signs and nursing note reviewed. Constitutional:       General: He is not in acute distress. Appearance: Normal appearance. He is not ill-appearing, toxic-appearing or diaphoretic. HENT:      Head: Normocephalic and atraumatic. Right Ear: External ear normal.      Left Ear: External ear normal.   Neck:      Musculoskeletal: Normal range of motion and neck supple. Pulmonary:      Effort: Pulmonary effort is normal. No respiratory distress. Comments: Patient no obvious acute respiratory distress. Responding to questions appropriately and speaking in full sentences. Musculoskeletal: Normal range of motion. Neurological:      Mental Status: He is alert and oriented to person, place, and time. Psychiatric:         Mood and Affect: Mood normal.         Behavior: Behavior normal.         Thought Content:  Thought content normal.         Judgment: Judgment normal.           Assessment/plan:       Hospital Problems           Last Modified POA    * (Principal) Pneumonia due to COVID-19 virus 11/28/2020 Yes    Acute respiratory failure with hypoxia ([address] 11/27/2020 Yes    Hypercholesteremia 11/27/2020 Yes    GERD (gastroesophageal reflux disease) 11/27/2020 Yes    Morbidly obese ([address] 11/27/2020 Yes    Gout 11/27/2020 Yes    Chronic kidney disease, stage 3 11/27/2020 Yes    Type 2 diabetes mellitus with complication, without long-term current use of insulin ([address] 11/27/2020 Yes    Hypertension, essential, benign 11/27/2020 Yes    H/O unilateral nephrectomy 11/27/2020 Yes    Coronary artery disease involving left main coronary artery 11/27/2020 Yes          Principal Problem:    Pneumonia due to COVID-19 virus  Active Problems:    Acute respiratory failure with hypoxia (HCC)    Hypercholesteremia    GERD (gastroesophageal reflux disease)    Morbidly obese ([facility]    Gout    Chronic kidney disease, stage 3    Type 2 diabetes mellitus with complication, without long-term current use of insulin ([facility])    Hypertension, essential, benign    H/O unilateral nephrectomy    Coronary artery disease involving left main coronary artery  Resolved Problems:    * No resolved hospital problems. *      Brief Summary  Mr. Andrés Cox, a 68-year-old male, history of right nephrectomy admitted to [facility] (11/27/2020), for the management of COVID-19 pneumonia. Currently requiring submental oxygen, with intermittent febrile episodes, with a T-max of 102.4 (11/29/2020). Currently afebrile. ID following. COVID 19 Infection PNA.- Diagnosed on 11/27/2020   Febrile with T-max of 102.4 (11/29/2020)   Continue oxygen supplementation keep SPO2 above 90%, wean as tolerated   Continue pulse oximetry at bedside   Dexamethasone 6 mg PO Daily   Adjunct therapy with;  Melatonin, Vitamin D, Zinc, & Inhaled Corticosteroids. o Zinc sulfate (ZINCATE) capsule 50 mg  PO Daily  o Melatonin PO Daily  o Vitamin D (CHOLECALCIFEROL) capsule 2,000 Units  PO Daily  o Budesonide-formoterol (SYMBICORT) 80-4.5 MCG/ACT inhaler 2 puffs 4X Daily   Remdesivir not initiated, given patient's renal function as well as reported history of right Nephrectomy.  ID following-appreciate recommendations   Empiric antibiotics: Meropenem  and Linezolid initiated (11/28/2020) by ID   Antibiotic as per ID.  Avoid Nebulizer treatments to avoid aerosolization.      Encourage Prone positioning   Enoxaparin 40 mg subcu twice daily      History of Diabetes Mellitus II   Hemoglobin A1C: 6.5% (11/29/2020)   Reported Home regimen include,    Inpatient Regimens to include;  o - Insulin Glargine (Lantus) 10 units subcu nightly  o - Insulin Lispro (Humalog) 3 units subcu pre-meal 3 times a day  o - Insulin Lispro (Humalog) on a Medium dose sliding scale   Monitor POC glucose, and adjust insulin regimen accordingly based on daily insulin requirement. Hx malignant neoplasm of right kidney (status post right nephrectomy)  CKD stage III  · -Baseline creatinine of 2.0  · -Creatinine level on presentation 2.1  · Creatinine stable  · -IV hydration  · -Avoid hypotension and nephrotoxins          Continue management of other chronic medical conditions - see above and orders. Advance Directive: Full Code    DIET RENAL; Carb Control: 4 carb choices (60 gms)/meal         Consults Made:   IP CONSULT TO INFECTIOUS DISEASES  PALLIATIVE CARE EVAL    DVT prophylaxis: Enoxaparin      Discharge planning:   · Continue management for COVID-19 pneumonia as well as antibiotic (meropenem and linezolid) for possible superimposed bacterial infection. · Continues to require supplemental oxygen  · Continue current management for now. Time Spent is 25 mins in the examination, evaluation, counseling and review of medications, assessment and plan.      Electronically signed by   Yvonne Roa MD, MPH,   Internal Medicine Hospitalist   12/2/2020 10:15 AM

## 2020-12-03 LAB
ALBUMIN SERPL-MCNC: 2.9 G/DL (ref 3.5–5.2)
ALP BLD-CCNC: 59 U/L (ref 40–130)
ALT SERPL-CCNC: 104 U/L (ref 5–41)
ANION GAP SERPL CALCULATED.3IONS-SCNC: 10 MMOL/L (ref 7–19)
APTT: 34.1 SEC (ref 26–36.2)
AST SERPL-CCNC: 63 U/L (ref 5–40)
BASOPHILS ABSOLUTE: 0 K/UL (ref 0–0.2)
BASOPHILS RELATIVE PERCENT: 0.2 % (ref 0–1)
BILIRUB SERPL-MCNC: 0.4 MG/DL (ref 0.2–1.2)
BUN BLDV-MCNC: 48 MG/DL (ref 8–23)
CALCIUM SERPL-MCNC: 7.8 MG/DL (ref 8.8–10.2)
CHLORIDE BLD-SCNC: 108 MMOL/L (ref 98–111)
CO2: 18 MMOL/L (ref 22–29)
CREAT SERPL-MCNC: 1.7 MG/DL (ref 0.5–1.2)
D DIMER: 0.51 UG/ML FEU (ref 0–0.48)
EOSINOPHILS ABSOLUTE: 0 K/UL (ref 0–0.6)
EOSINOPHILS RELATIVE PERCENT: 0 % (ref 0–5)
FIBRINOGEN: 466 MG/DL (ref 238–505)
GFR AFRICAN AMERICAN: 48
GFR NON-AFRICAN AMERICAN: 40
GLUCOSE BLD-MCNC: 114 MG/DL (ref 74–109)
GLUCOSE BLD-MCNC: 129 MG/DL (ref 70–99)
GLUCOSE BLD-MCNC: 140 MG/DL (ref 70–99)
GLUCOSE BLD-MCNC: 157 MG/DL (ref 70–99)
GLUCOSE BLD-MCNC: 201 MG/DL (ref 70–99)
GLUCOSE BLD-MCNC: 92 MG/DL (ref 70–99)
HCT VFR BLD CALC: 36.2 % (ref 42–52)
HEMOGLOBIN: 12 G/DL (ref 14–18)
IMMATURE GRANULOCYTES #: 0.4 K/UL
INR BLD: 1.05 (ref 0.88–1.18)
LYMPHOCYTES ABSOLUTE: 0.5 K/UL (ref 1.1–4.5)
LYMPHOCYTES RELATIVE PERCENT: 4.1 % (ref 20–40)
MCH RBC QN AUTO: 29.8 PG (ref 27–31)
MCHC RBC AUTO-ENTMCNC: 33.1 G/DL (ref 33–37)
MCV RBC AUTO: 89.8 FL (ref 80–94)
MONOCYTES ABSOLUTE: 1.1 K/UL (ref 0–0.9)
MONOCYTES RELATIVE PERCENT: 8.9 % (ref 0–10)
NEUTROPHILS ABSOLUTE: 10.6 K/UL (ref 1.5–7.5)
NEUTROPHILS RELATIVE PERCENT: 83.8 % (ref 50–65)
PDW BLD-RTO: 13.1 % (ref 11.5–14.5)
PERFORMED ON: ABNORMAL
PERFORMED ON: NORMAL
PLATELET # BLD: 262 K/UL (ref 130–400)
PMV BLD AUTO: 11.4 FL (ref 9.4–12.4)
POTASSIUM REFLEX MAGNESIUM: 5 MMOL/L (ref 3.5–5)
POTASSIUM SERPL-SCNC: 4.8 MMOL/L (ref 3.5–5)
PROTHROMBIN TIME: 13.6 SEC (ref 12–14.6)
RBC # BLD: 4.03 M/UL (ref 4.7–6.1)
SODIUM BLD-SCNC: 136 MMOL/L (ref 136–145)
TOTAL PROTEIN: 6 G/DL (ref 6.6–8.7)
WBC # BLD: 12.6 K/UL (ref 4.8–10.8)

## 2020-12-03 PROCEDURE — 6360000002 HC RX W HCPCS: Performed by: STUDENT IN AN ORGANIZED HEALTH CARE EDUCATION/TRAINING PROGRAM

## 2020-12-03 PROCEDURE — 85384 FIBRINOGEN ACTIVITY: CPT

## 2020-12-03 PROCEDURE — 6360000002 HC RX W HCPCS: Performed by: INTERNAL MEDICINE

## 2020-12-03 PROCEDURE — 82947 ASSAY GLUCOSE BLOOD QUANT: CPT

## 2020-12-03 PROCEDURE — 85025 COMPLETE CBC W/AUTO DIFF WBC: CPT

## 2020-12-03 PROCEDURE — 85379 FIBRIN DEGRADATION QUANT: CPT

## 2020-12-03 PROCEDURE — 2580000003 HC RX 258: Performed by: INTERNAL MEDICINE

## 2020-12-03 PROCEDURE — 80053 COMPREHEN METABOLIC PANEL: CPT

## 2020-12-03 PROCEDURE — 85610 PROTHROMBIN TIME: CPT

## 2020-12-03 PROCEDURE — 85730 THROMBOPLASTIN TIME PARTIAL: CPT

## 2020-12-03 PROCEDURE — 6370000000 HC RX 637 (ALT 250 FOR IP): Performed by: STUDENT IN AN ORGANIZED HEALTH CARE EDUCATION/TRAINING PROGRAM

## 2020-12-03 PROCEDURE — 94761 N-INVAS EAR/PLS OXIMETRY MLT: CPT

## 2020-12-03 PROCEDURE — 2700000000 HC OXYGEN THERAPY PER DAY

## 2020-12-03 PROCEDURE — 6370000000 HC RX 637 (ALT 250 FOR IP): Performed by: INTERNAL MEDICINE

## 2020-12-03 PROCEDURE — 1210000000 HC MED SURG R&B

## 2020-12-03 PROCEDURE — 84132 ASSAY OF SERUM POTASSIUM: CPT

## 2020-12-03 RX ADMIN — RANOLAZINE 500 MG: 500 TABLET, FILM COATED, EXTENDED RELEASE ORAL at 09:23

## 2020-12-03 RX ADMIN — MEROPENEM 1 G: 1 INJECTION, POWDER, FOR SOLUTION INTRAVENOUS at 03:51

## 2020-12-03 RX ADMIN — Medication 10 UNITS: at 22:28

## 2020-12-03 RX ADMIN — CARVEDILOL 3.12 MG: 3.12 TABLET, FILM COATED ORAL at 09:24

## 2020-12-03 RX ADMIN — OXYCODONE HYDROCHLORIDE AND ACETAMINOPHEN 1000 MG: 500 TABLET ORAL at 09:24

## 2020-12-03 RX ADMIN — INSULIN LISPRO 3 UNITS: 100 INJECTION, SOLUTION INTRAVENOUS; SUBCUTANEOUS at 13:01

## 2020-12-03 RX ADMIN — Medication 50 MG: at 09:24

## 2020-12-03 RX ADMIN — ALLOPURINOL 100 MG: 100 TABLET ORAL at 09:24

## 2020-12-03 RX ADMIN — MEROPENEM 1 G: 1 INJECTION, POWDER, FOR SOLUTION INTRAVENOUS at 16:51

## 2020-12-03 RX ADMIN — ENOXAPARIN SODIUM 40 MG: 40 INJECTION SUBCUTANEOUS at 22:26

## 2020-12-03 RX ADMIN — DEXAMETHASONE 6 MG: 2 TABLET ORAL at 09:24

## 2020-12-03 RX ADMIN — ENOXAPARIN SODIUM 40 MG: 40 INJECTION SUBCUTANEOUS at 09:24

## 2020-12-03 RX ADMIN — INSULIN LISPRO 2 UNITS: 100 INJECTION, SOLUTION INTRAVENOUS; SUBCUTANEOUS at 13:01

## 2020-12-03 RX ADMIN — CYANOCOBALAMIN TAB 500 MCG 500 MCG: 500 TAB at 09:24

## 2020-12-03 RX ADMIN — INSULIN LISPRO 3 UNITS: 100 INJECTION, SOLUTION INTRAVENOUS; SUBCUTANEOUS at 18:42

## 2020-12-03 RX ADMIN — AMLODIPINE BESYLATE 5 MG: 5 TABLET ORAL at 09:23

## 2020-12-03 RX ADMIN — BUDESONIDE AND FORMOTEROL FUMARATE DIHYDRATE 2 PUFF: 80; 4.5 AEROSOL RESPIRATORY (INHALATION) at 13:01

## 2020-12-03 RX ADMIN — CHOLECALCIFEROL (VITAMIN D3) 25 MCG (1,000 UNIT) TABLET 2000 UNITS: TABLET at 09:24

## 2020-12-03 RX ADMIN — BUDESONIDE AND FORMOTEROL FUMARATE DIHYDRATE 2 PUFF: 80; 4.5 AEROSOL RESPIRATORY (INHALATION) at 23:22

## 2020-12-03 RX ADMIN — BUDESONIDE AND FORMOTEROL FUMARATE DIHYDRATE 2 PUFF: 80; 4.5 AEROSOL RESPIRATORY (INHALATION) at 16:51

## 2020-12-03 RX ADMIN — RANOLAZINE 500 MG: 500 TABLET, FILM COATED, EXTENDED RELEASE ORAL at 22:27

## 2020-12-03 RX ADMIN — ALLOPURINOL 100 MG: 100 TABLET ORAL at 22:27

## 2020-12-03 RX ADMIN — FAMOTIDINE 20 MG: 20 TABLET, FILM COATED ORAL at 22:27

## 2020-12-03 RX ADMIN — ATORVASTATIN CALCIUM 20 MG: 20 TABLET, FILM COATED ORAL at 09:24

## 2020-12-03 RX ADMIN — ASPIRIN 81 MG: 81 TABLET, COATED ORAL at 22:27

## 2020-12-03 RX ADMIN — ISOSORBIDE MONONITRATE 60 MG: 60 TABLET, EXTENDED RELEASE ORAL at 22:27

## 2020-12-03 RX ADMIN — BUDESONIDE AND FORMOTEROL FUMARATE DIHYDRATE 2 PUFF: 80; 4.5 AEROSOL RESPIRATORY (INHALATION) at 09:30

## 2020-12-03 RX ADMIN — INSULIN LISPRO 2 UNITS: 100 INJECTION, SOLUTION INTRAVENOUS; SUBCUTANEOUS at 22:30

## 2020-12-03 RX ADMIN — CARVEDILOL 3.12 MG: 3.12 TABLET, FILM COATED ORAL at 18:42

## 2020-12-03 NOTE — PROGRESS NOTES
[facility] Progress Note    Patient:  Darci Valdivia  YOB: 1950  Date of Service: 12/3/2020  MRN: [id number]   Acct: [de-identified]   Primary Care Physician: MISSY Frost  Advance Directive: Full Code  Admit Date: 11/27/2020       Hospital Day: 6      CHIEF COMPLAINT:     Chief Complaint   Patient presents with    Pneumonia     Double PNA dx today with a chest x ray       12/3/2020 8:17 AM  Subjective / Interval History:   12/03/2020  Patient endorses overall improvement. Doing well. Shortness of breath gradually improving. Continues to require supplemental oxygen. Intermittent cough fairly controlled. No acute changes or acute overnight event reported. 12/02/2020  Doing well. Patient continues to have shortness of breath and hypoxia with minimal movement or activity in his room. No acute changes or acute overnight event reported. Continues to require supplemental oxygen. Sitting comfortably in bed in no apparent acute distress. Patient endorses much improvement in cough. Overall, patient reports feeling much better today compared to previous days. 12/01/2020  Patient remains afebrile. Doing well. However continues to have shortness of breath with oxygen desaturations with minimal activity. .  Laying comfortably in bed, in no apparent acute distress. Patient endorses overall improvement. 11/30/2020  No new complaints. Patient doing well. Sitting comfortably in bed in no apparent acute distress. No acute changes or acute overnight event reported. Shortness of breath improved. No further febrile episodes reported overnight. Patient denies any other acute complaints or distress at this time. 11/29/2020  Patient doing well. Intermittent febrile episodes reported. Shortness of breath improved. Patient endorses some dyspnea with minimal exertion. Sitting comfortably in bed in no apparent acute distress.   No other acute changes or acute overnight B-12 (CYANOCOBALAMIN) tablet 500 mcg  500 mcg Oral QAM Va Oshea MD   500 mcg at 12/02/20 0913    isosorbide mononitrate (IMDUR) extended release tablet 60 mg  60 mg Oral Nightly Va Oshea MD   60 mg at 12/02/20 2109    ranolazine (RANEXA) extended release tablet 500 mg  500 mg Oral BID Va Oshea MD   500 mg at 12/02/20 2109    famotidine (PEPCID) tablet 20 mg  20 mg Oral Daily Va Oshea MD   20 mg at 12/02/20 2109    insulin lispro (HUMALOG) injection vial 0-12 Units  0-12 Units Subcutaneous TID WC Va Oshea MD   4 Units at 12/02/20 1730    insulin lispro (HUMALOG) injection vial 0-6 Units  0-6 Units Subcutaneous Nightly Va Oshea MD        glucose (GLUTOSE) 40 % oral gel 15 g  15 g Oral PRN Va Oshea MD        dextrose 50 % IV solution  12.5 g Intravenous PRN Va Oshea MD        glucagon (rDNA) injection 1 mg  1 mg Intramuscular PRN Va Oshea MD        dextrose 5 % solution  100 mL/hr Intravenous PRN Va Oshea MD             dextrose        meropenem  1 g Intravenous Q12H    enoxaparin  40 mg Subcutaneous BID    zinc sulfate  50 mg Oral Daily    vitamin C  1,000 mg Oral Daily    budesonide-formoterol  2 puff Inhalation 4x daily    insulin glargine  10 Units Subcutaneous Nightly    insulin lispro  3 Units Subcutaneous TID     dexamethasone  6 mg Oral Daily    Vitamin D  2,000 Units Oral Daily    allopurinol  100 mg Oral BID    amLODIPine  5 mg Oral Daily    aspirin  81 mg Oral Nightly    atorvastatin  20 mg Oral Daily    carvedilol  3.125 mg Oral BID WC    vitamin B-12  500 mcg Oral QAM    isosorbide mononitrate  60 mg Oral Nightly    ranolazine  500 mg Oral BID    famotidine  20 mg Oral Daily    insulin lispro  0-12 Units Subcutaneous TID WC    insulin lispro  0-6 Units Subcutaneous Nightly     sodium chloride, acetaminophen **OR** acetaminophen, glucose, dextrose, glucagon (rDNA), dextrose  DIET RENAL; Carb for input(s): CXSURG in the last 720 hours. Blood Culture Recent:   Recent Labs     11/29/20  1755   BC No Growth to date. Any change in status will be called. Cultures:   No results for input(s): CULTURE in the last 72 hours. No results for input(s): BC, [place] in the last 72 hours. No results for input(s): CXSURG in the last 72 hours. No results for input(s): MG, PHOS in the last 72 hours. Recent Labs     12/01/20  0340 12/02/20  0340 12/03/20  0335   AST 24 50* 63*   ALT 30 67* 104*   BILITOT 0.4 0.4 0.4   ALKPHOS 50 54 59         RAD:   Xr Chest Portable    Result Date: 11/27/2020  EXAMINATION: XR CHEST PORTABLE 11/27/2020 3:13 PM HISTORY: Shortness of breath , clinical concern for COVID-19 infection. Report: A portable upright frontal view the chest was obtained. COMPARISON: Chest x-ray 3/6/2020. There are new mixed interstitial and groundglass infiltrates in both lungs, with partial consolidation of the right lower lobe and slight bulging of the minor fissure. No pneumothorax or pleural effusion is identified. Heart size is normal. Previous median sternotomy is noted. The osseous structures and upper abdomen are unremarkable. New bilateral pulmonary infiltrates and partial consolidation of the right lower lobe with bulging of the minor fissure. This is compatible with pneumonia and atypical pneumonia, including COVID-19 is not excluded. Signed by Dr Mitzy Damon.  Ky on 11/27/2020 3:15 PM        Objective:   Vitals:   /72   Pulse 80   Temp 97.8 °F (36.6 °C)   Resp 18   Ht 5' 10\" (1.778 m)   Wt 230 lb 3.2 oz (104.4 kg)   SpO2 93%   BMI 33.03 kg/m²       Patient Vitals for the past 24 hrs:   BP Temp Pulse Resp SpO2   12/03/20 0045 130/72 97.8 °F (36.6 °C) 80 18 93 %   12/02/20 2001 125/78 98.1 °F (36.7 °C) 86 18 93 %   12/02/20 1946 -- -- -- -- 91 %   12/02/20 1907 (!) 157/86 -- 84 18 91 %   12/02/20 1709 (!) 165/85 -- 79 -- 92 %       24HR INTAKE/OUTPUT:      Intake/Output Summary (Last 24 hours) at 12/3/2020 0817  Last data filed at 12/2/2020 2001  Gross per 24 hour   Intake 120 ml   Output --   Net 120 ml       Patient interviewed/observed/monitored from door, in order to preserve PPE's. Physical Exam  Vitals signs and nursing note reviewed. Constitutional:       General: He is not in acute distress. Appearance: Normal appearance. He is not ill-appearing, toxic-appearing or diaphoretic. HENT:      Head: Normocephalic and atraumatic. Right Ear: External ear normal.      Left Ear: External ear normal.   Neck:      Musculoskeletal: Normal range of motion and neck supple. Pulmonary:      Effort: Pulmonary effort is normal. No respiratory distress. Comments: Patient no obvious acute respiratory distress. Responding to questions appropriately and speaking in full sentences. Musculoskeletal: Normal range of motion. Neurological:      Mental Status: He is alert and oriented to person, place, and time. Psychiatric:         Mood and Affect: Mood normal.         Behavior: Behavior normal.         Thought Content:  Thought content normal.         Judgment: Judgment normal.           Assessment/plan:       Hospital Problems           Last Modified POA    * (Principal) Pneumonia due to COVID-19 virus 11/28/2020 Yes    Acute respiratory failure with hypoxia ([address] 11/27/2020 Yes    Hypercholesteremia 11/27/2020 Yes    GERD (gastroesophageal reflux disease) 11/27/2020 Yes    Morbidly obese ([address] 11/27/2020 Yes    Gout 11/27/2020 Yes    Chronic kidney disease, stage 3 11/27/2020 Yes    Type 2 diabetes mellitus with complication, without long-term current use of insulin ([address] 11/27/2020 Yes    Hypertension, essential, benign 11/27/2020 Yes    H/O unilateral nephrectomy 11/27/2020 Yes    Coronary artery disease involving left main coronary artery 11/27/2020 Yes          Principal Problem:    Pneumonia due to COVID-19 virus  Active Problems:    Acute respiratory failure with hypoxia (HCC)    Hypercholesteremia    GERD (gastroesophageal reflux disease)    Morbidly obese (HCC)    Gout    Chronic kidney disease, stage 3    Type 2 diabetes mellitus with complication, without long-term current use of insulin (HCC)    Hypertension, essential, benign    H/O unilateral nephrectomy    Coronary artery disease involving left main coronary artery  Resolved Problems:    * No resolved hospital problems. *      Brief Summary  Mr. Melissa York, a 68-year-old male, history of right nephrectomy admitted to [facility] (11/27/2020), for the management of COVID-19 pneumonia. Currently requiring supplemental oxygen, with intermittent febrile episodes, with a T-max of 102.4 (11/29/2020). Currently afebrile. ID following. COVID 19 Infection PNA.- Diagnosed on 11/27/2020   Febrile with T-max of 102.4 (11/29/2020)   Continue oxygen supplementation keep SPO2 above 90%, wean as tolerated   Continue pulse oximetry at bedside   Dexamethasone 6 mg PO Daily   Adjunct therapy with;  Melatonin, Vitamin D, Zinc, & Inhaled Corticosteroids. o Zinc sulfate (ZINCATE) capsule 50 mg  PO Daily  o Melatonin PO Daily  o Vitamin D (CHOLECALCIFEROL) capsule 2,000 Units  PO Daily  o Budesonide-formoterol (SYMBICORT) 80-4.5 MCG/ACT inhaler 2 puffs 4X Daily   Remdesivir not initiated, given patient's renal function as well as reported history of right Nephrectomy.  ID following-appreciate recommendations   Antibiotics, as per ID.    o Meropenem (11/28/2020)   o Linezolid initiated (11/28/2020 - 12/01/2020)    Avoid Nebulizer treatments to avoid aerosolization.      Encourage Prone positioning   Enoxaparin 40 mg subcu twice daily      History of Diabetes Mellitus II   Hemoglobin A1C: 6.5% (11/29/2020)   Reported Home regimen include,    Inpatient Regimens to include;  o - Insulin Glargine (Lantus) 10 units subcu nightly  o - Insulin Lispro (Humalog) 3 units subcu pre-meal 3 times a day  o - Insulin Lispro (Humalog) on a Medium dose sliding scale   Monitor POC glucose, and adjust insulin regimen accordingly based on daily insulin requirement. Hx malignant neoplasm of right kidney (status post right nephrectomy)  CKD stage III  · -Baseline creatinine of 2.0  · -Creatinine level on presentation 2.1  · Creatinine improving  · -IV hydration  · -Avoid hypotension and nephrotoxins          Continue management of other chronic medical conditions - see above and orders. Advance Directive: Full Code    DIET RENAL; Carb Control: 4 carb choices (60 gms)/meal         Consults Made:   IP CONSULT TO INFECTIOUS DISEASES  PALLIATIVE CARE EVAL    DVT prophylaxis: Enoxaparin      Discharge planning:   · Continue management for COVID-19 pneumonia as well as antibiotic for possible superimposed bacterial infection. · Continues to require supplemental oxygen, wean as tolerated  · Continue current management for now. Time Spent is 25 mins in the examination, evaluation, counseling and review of medications, assessment and plan.      Electronically signed by   Marcellus Crenshaw MD, MPH,   Internal Medicine Hospitalist   12/3/2020 8:17 AM

## 2020-12-03 NOTE — PROGRESS NOTES
Patient's wife asking to speak with Dr. Celio Fabian. Her cell phone number is [phone].     Electronically signed by Nely Hendricks RN on 12/3/2020 at 5:18 PM

## 2020-12-03 NOTE — PROGRESS NOTES
[facility] Progress Note    Patient:  Phuc Jordan  YOB: 1950  Date of Service: 12/3/2020  MRN: [id number]   Acct: [de-identified]   Primary Care Physician: MISSY Martines  Advance Directive: Full Code  Admit Date: 11/27/2020       Hospital Day: 6      CHIEF COMPLAINT:     Chief Complaint   Patient presents with    Pneumonia     Double PNA dx today with a chest x ray       12/3/2020 8:17 AM  Subjective / Interval History:   12/01/2020  Patient remains afebrile. Doing well. However continues to have shortness of breath with oxygen desaturations with minimal activity. .  Laying comfortably in bed, in no apparent acute distress. Patient endorses overall improvement. 11/30/2020  No new complaints. Patient doing well. Sitting comfortably in bed in no apparent acute distress. No acute changes or acute overnight event reported. Shortness of breath improved. No further febrile episodes reported overnight. Patient denies any other acute complaints or distress at this time. 11/29/2020  Patient doing well. Intermittent febrile episodes reported. Shortness of breath improved. Patient endorses some dyspnea with minimal exertion. Sitting comfortably in bed in no apparent acute distress. No other acute changes or acute overnight event reported. 11/28/2020  Patient seen and examined. Doing well. Laying comfortably in bed in CCU, in no apparent acute distress. Currently requiring supplemental oxygen. Patient endorses overall improvement. States his breathing is improving gradually. No acute changes or acute overnight event reported.       Brief History:    As per Initial admission HPI 11/27/2020, quoted below;  \"Patient is a 79 y.o. male with solitary kidney with history of nephrectomy, CAD with plan in near future for stenting at [facility] in [place].      Patient presented to [facility] ED with development of worsening dyspnea on exertion, productive cough and fevers over the past few days, with preceding symptoms of fatigue, malaise and myalgias over the last 2 weeks. He does not have any nausea, vomiting or loss of taste or smell. He had a fever at home of over 102. He does note previous Covid exposure around friends, but has not been previously tested. He went to his PCP earlier today where he had chest x-ray done in clinic which showed bilateral infiltrates with partial right lower lobe consolidation and subsequently sent to the ED.  patient has been on azithromycin past few days.     In the ED, tested positive for COVID-19. Temperature 99. Initially satting well on room air at rest, however he subsequently had O2 desaturation down to low to mid 80s with minimal exertion. SPO2 87% documented on room air, subsequently improved on 2 L nasal cannula. \"      Review of Systems:   Review of Systems  ROS: 14 point review of systems is negative except as specifically addressed above.     DIET RENAL; Carb Control: 4 carb choices (60 gms)/meal    Intake/Output Summary (Last 24 hours) at 12/3/2020 0817  Last data filed at 12/2/2020 2001  Gross per 24 hour   Intake 120 ml   Output --   Net 120 ml       Medications:   dextrose       Current Facility-Administered Medications   Medication Dose Route Frequency Provider Last Rate Last Dose    meropenem (MERREM) 1 g in sterile water 20 mL IV syringe  1 g Intravenous Q12H Kaity Jonas MD   1 g at 12/03/20 0351    enoxaparin (LOVENOX) injection 40 mg  40 mg Subcutaneous BID Simona López MD   40 mg at 12/02/20 2109    zinc sulfate (ZINCATE) capsule 50 mg  50 mg Oral Daily Marcin Solo MD   50 mg at 12/02/20 0914    vitamin C (ASCORBIC ACID) tablet 1,000 mg  1,000 mg Oral Daily Marcin Solo MD   1,000 mg at 12/02/20 0913    0.9 % sodium chloride bolus  30 mL Intravenous PRN Marcin Solo MD        budesonide-formoterol (SYMBICORT) 80-4.5 MCG/ACT inhaler 2 puff  2 puff Inhalation 4x daily Bravo Rivera MD   2 puff at 12/02/20 2109    insulin glargine (LANTUS) injection vial 10 Units  10 Units Subcutaneous Nightly Bravo Rivera MD   10 Units at 12/02/20 2110    insulin lispro (HUMALOG) injection vial 3 Units  3 Units Subcutaneous TID  Bravo Rivera MD   3 Units at 12/02/20 1730    acetaminophen (TYLENOL) tablet 650 mg  650 mg Oral Q6H PRN Julia Kerr MD   650 mg at 11/29/20 1814    Or    acetaminophen (TYLENOL) suppository 650 mg  650 mg Rectal Q6H PRN Julia Kerr MD        dexamethasone (DECADRON) tablet 6 mg  6 mg Oral Daily Julia Kerr MD   6 mg at 12/02/20 1026    Vitamin D (CHOLECALCIFEROL) tablet 2,000 Units  2,000 Units Oral Daily Julia Kerr MD   2,000 Units at 12/02/20 0913    allopurinol (ZYLOPRIM) tablet 100 mg  100 mg Oral BID Julia Kerr MD   100 mg at 12/02/20 2109    amLODIPine (NORVASC) tablet 5 mg  5 mg Oral Daily Julia Kerr MD   5 mg at 12/02/20 0913    aspirin EC tablet 81 mg  81 mg Oral Nightly Julia Kerr MD   81 mg at 12/02/20 2109    atorvastatin (LIPITOR) tablet 20 mg  20 mg Oral Daily Julia Kerr MD   20 mg at 12/02/20 0914    carvedilol (COREG) tablet 3.125 mg  3.125 mg Oral BID  Julia Kerr MD   3.125 mg at 12/02/20 1752    vitamin B-12 (CYANOCOBALAMIN) tablet 500 mcg  500 mcg Oral QAM Julia Kerr MD   500 mcg at 12/02/20 0913    isosorbide mononitrate (IMDUR) extended release tablet 60 mg  60 mg Oral Nightly Julia Kerr MD   60 mg at 12/02/20 2109    ranolazine (RANEXA) extended release tablet 500 mg  500 mg Oral BID Julia Kerr MD   500 mg at 12/02/20 2109    famotidine (PEPCID) tablet 20 mg  20 mg Oral Daily Julia Kerr MD   20 mg at 12/02/20 2109    insulin lispro (HUMALOG) injection vial 0-12 Units  0-12 Units Subcutaneous TID  Julia Kerr MD   4 Units at 12/02/20 1730    insulin lispro (HUMALOG) injection vial 0-6 Units  0-6 Units Subcutaneous Nightly Priyanka Madison MD        glucose (GLUTOSE) 40 % oral gel 15 g  15 g Oral PRN Priyanka Madison MD        dextrose 50 % IV solution  12.5 g Intravenous PRN Priyanka Madison MD        glucagon (rDNA) injection 1 mg  1 mg Intramuscular PRN Priyanka Madison MD        dextrose 5 % solution  100 mL/hr Intravenous PRN Priyanka Madison MD             dextrose        meropenem  1 g Intravenous Q12H    enoxaparin  40 mg Subcutaneous BID    zinc sulfate  50 mg Oral Daily    vitamin C  1,000 mg Oral Daily    budesonide-formoterol  2 puff Inhalation 4x daily    insulin glargine  10 Units Subcutaneous Nightly    insulin lispro  3 Units Subcutaneous TID WC    dexamethasone  6 mg Oral Daily    Vitamin D  2,000 Units Oral Daily    allopurinol  100 mg Oral BID    amLODIPine  5 mg Oral Daily    aspirin  81 mg Oral Nightly    atorvastatin  20 mg Oral Daily    carvedilol  3.125 mg Oral BID WC    vitamin B-12  500 mcg Oral QAM    isosorbide mononitrate  60 mg Oral Nightly    ranolazine  500 mg Oral BID    famotidine  20 mg Oral Daily    insulin lispro  0-12 Units Subcutaneous TID WC    insulin lispro  0-6 Units Subcutaneous Nightly     sodium chloride, acetaminophen **OR** acetaminophen, glucose, dextrose, glucagon (rDNA), dextrose  DIET RENAL; Carb Control: 4 carb choices (60 gms)/meal       Labs:   CBC with DIFF:  Recent Labs     12/01/20  0340 12/02/20  0340 12/03/20  0335   WBC 11.7* 11.5* 12.6*   RBC 4.18* 4.30* 4.03*   HGB 12.3* 12.7* 12.0*   HCT 36.9* 38.1* 36.2*   MCV 88.3 88.6 89.8   MCH 29.4 29.5 29.8   MCHC 33.3 33.3 33.1   RDW 13.2 13.1 13.1    256 262   MPV 11.8 11.2 11.4   NEUTOPHILPCT 82.7* 81.5* 83.8*   LYMPHOPCT 3.7* 4.5* 4.1*   MONOPCT 11.3* 9.7 8.9   EOSRELPCT 0.0 0.0 0.0   BASOPCT 0.3 0.3 0.2   NEUTROABS 9.7* 9.3* 10.6*   LYMPHSABS 0.4* 0.5* 0.5*   MONOSABS 1.30* 1.10* 1.10*   EOSABS 0.00 0.00 0.00   BASOSABS 0.00 0.00 0.00       CMP/BMP:  Recent Labs 12/01/20 0340 12/02/20 0340 12/03/20 0335    135* 136   K 4.9 4.9 5.0    104 108   CO2 19* 18* 18*   ANIONGAP 13 13 10   GLUCOSE 150* 131* 114*   BUN 53* 52* 48*   CREATININE 1.9* 2.0* 1.7*   LABGLOM 35* 33* 40*   CALCIUM 8.0* 8.3* 7.8*   PROT 6.4* 6.4* 6.0*   LABALBU 3.1* 3.4* 2.9*   BILITOT 0.4 0.4 0.4   ALKPHOS 50 54 59   ALT 30 67* 104*   AST 24 50* 63*         CRP:    Recent Labs     12/01/20 0340   CRP 5.10*     Sed Rate:  No results for input(s): SEDRATE in the last 72 hours. HgBA1c:  No components found for: HGBA1C  FLP:    Lab Results   Component Value Date    TRIG 60 09/30/2020    HDL 49 09/30/2020    LDLCALC 61 09/30/2020     TSH:    Lab Results   Component Value Date    TSH 1.260 01/21/2020     Troponin T: No results for input(s): TROPONINI in the last 72 hours. Pro-BNP: No results for input(s): BNP in the last 72 hours. INR:   Recent Labs     12/01/20 0340 12/02/20 0340 12/03/20 0335   INR 1.09 1.09 1.05     ABGs: No results found for: PHART, PO2ART, HOR3HQI  UA:  No results for input(s): NITRITE, COLORU, PHUR, LABCAST, WBCUA, RBCUA, MUCUS, TRICHOMONAS, YEAST, BACTERIA, CLARITYU, SPECGRAV, LEUKOCYTESUR, UROBILINOGEN, BILIRUBINUR, BLOODU, GLUCOSEU, AMORPHOUS in the last 72 hours. Invalid input(s): Mehnazdavid Acre      Culture Results:    No results for input(s): CXSURG in the last 720 hours. Blood Culture Recent:   Recent Labs     11/29/20  1755   BC No Growth to date. Any change in status will be called. Cultures:   No results for input(s): CULTURE in the last 72 hours. No results for input(s): BC, Brett Chambers in the last 72 hours. No results for input(s): CXSURG in the last 72 hours. No results for input(s): MG, PHOS in the last 72 hours.   Recent Labs     12/01/20  0340 12/02/20  0340 12/03/20  0335   AST 24 50* 63*   ALT 30 67* 104*   BILITOT 0.4 0.4 0.4   ALKPHOS 50 54 59         RAD:   Xr Chest Portable    Result Date: 11/27/2020  EXAMINATION: XR CHEST PORTABLE 11/27/2020 3:13 PM HISTORY: Shortness of breath , clinical concern for COVID-19 infection. Report: A portable upright frontal view the chest was obtained. COMPARISON: Chest x-ray 3/6/2020. There are new mixed interstitial and groundglass infiltrates in both lungs, with partial consolidation of the right lower lobe and slight bulging of the minor fissure. No pneumothorax or pleural effusion is identified. Heart size is normal. Previous median sternotomy is noted. The osseous structures and upper abdomen are unremarkable. New bilateral pulmonary infiltrates and partial consolidation of the right lower lobe with bulging of the minor fissure. This is compatible with pneumonia and atypical pneumonia, including COVID-19 is not excluded. Signed by Dr Nhi Pino. Ky on 11/27/2020 3:15 PM        Objective:   Vitals:   /72   Pulse 80   Temp 97.8 °F (36.6 °C)   Resp 18   Ht 5' 10\" (1.778 m)   Wt 230 lb 3.2 oz (104.4 kg)   SpO2 93%   BMI 33.03 kg/m²       Patient Vitals for the past 24 hrs:   BP Temp Pulse Resp SpO2   12/03/20 0045 130/72 97.8 °F (36.6 °C) 80 18 93 %   12/02/20 2001 125/78 98.1 °F (36.7 °C) 86 18 93 %   12/02/20 1946 -- -- -- -- 91 %   12/02/20 1907 (!) 157/86 -- 84 18 91 %   12/02/20 1709 (!) 165/85 -- 79 -- 92 %       24HR INTAKE/OUTPUT:      Intake/Output Summary (Last 24 hours) at 12/3/2020 3412  Last data filed at 12/2/2020 2001  Gross per 24 hour   Intake 120 ml   Output --   Net 120 ml       Patient interviewed/observed/monitored from door, in order to preserve PPE's. Physical Exam  Vitals signs and nursing note reviewed. Constitutional:       General: He is not in acute distress. Appearance: Normal appearance. He is not ill-appearing, toxic-appearing or diaphoretic. HENT:      Head: Normocephalic and atraumatic. Right Ear: External ear normal.      Left Ear: External ear normal.   Neck:      Musculoskeletal: Normal range of motion and neck supple.    Pulmonary: episode, with a T-max of 102.4 (11/29/2020). ID following. COVID 19 Infection PNA.- Diagnosed on 11/27/2020   Febrile with T-max of 102.4 (11/29/2020)   Continue oxygen supplementation keep SPO2 above 90%   Continuous Pulse Ox   Dexamethasone 6 mg PO Daily   Adjunct therapy with;  Melatonin, Vitamin D, Zinc, & Inhaled Corticosteroids. o Zinc sulfate (ZINCATE) capsule 50 mg  PO Daily  o Melatonin PO Daily  o Vitamin D (CHOLECALCIFEROL) capsule 2,000 Units  PO Daily  o Budesonide-formoterol (SYMBICORT) 80-4.5 MCG/ACT inhaler 2 puff Twice Daily   Remdesivir not initiated, given patient's renal function as well as reported history of right Nephrectomy.  ID following-appreciate recommendations   Empiric antibiotic initially with cefepime and linezolid initiated (11/28/2020) by ID   Now on meropenem and linezolid.  Antibiotic as per ID.  Sputum culture pending   Avoid Nebulizer treatments to avoid aerosolization.  Encourage Prone positioning   Enoxaparin 40 mg subcu twice daily        History of Diabetes Mellitus II   Hemoglobin A1C: 6.5% (11/29/2020)   Reported Home regimen include,    Inpatient Regimens to include;  o - Insulin Glargine (Lantus) 10 units subcu nightly  o - Insulin Lispro (Humalog) 3 units subcu pre-meal 3 times a day  o - Insulin Lispro (Humalog) on a Medium dose sliding scale   Monitor POC glucose, and adjust insulin regimen accordingly based on daily insulin requirement. Hx malignant neoplasm of right kidney (status post right nephrectomy)  CKD stage III  · -Baseline creatinine of 2.0  · -Creatinine level on presentation 2.1  · Creatinine stable  · -IV hydration  · -Avoid hypotension and nephrotoxins          Continue management of other chronic medical conditions - see above and orders.             Advance Directive: Full Code    DIET RENAL; Carb Control: 4 carb choices (60 gms)/meal         Consults Made:   IP CONSULT TO INFECTIOUS DISEASES  PALLIATIVE CARE EVAL    DVT prophylaxis: Enoxaparin      Discharge planning:   · Continue management for COVID-19 pneumonia as well as antibiotic (meropenem and linezolid) for possible superimposed bacterial infection. · Continues to require supplemental oxygen  · Continue current management for now. Time Spent is 25 mins in the examination, evaluation, counseling and review of medications, assessment and plan.      Electronically signed by   Pierre Prajapati MD, MPH,   Internal Medicine Hospitalist   12/3/2020 8:17 AM

## 2020-12-03 NOTE — PROGRESS NOTES
Infectious Diseases Progress Note    Patient:  Kenney Machuca  YOB: 1950  MRN: [id number]   Admit date: 11/27/2020   Admitting Physician: Trinh Gage MD  Primary Care Physician: MISSY Kyle    Chief Complaint/Interval History: He feels about the same. He has not had fever. He is trying to ambulate some in the room. He indicates he did 11 deep knee bends this morning. He gets short of breath. He did not check his oxygen saturations during his exertion as he did not have a sat monitor in the room at that time. He is having a few loose stools but they are not watery diarrhea. He indicates he was able to get to between 20 503,000 on his incentive spirometer. Although he feels like his dyspnea on exertion is not improved much, he clearly seems to be stable and overall looks a little bit stronger and has not had fever. He is currently on 2 L. Oxygen saturation 95%.     In/Out    Intake/Output Summary (Last 24 hours) at 12/2/2020 2028  Last data filed at 12/2/2020 2001  Gross per 24 hour   Intake 120 ml   Output --   Net 120 ml     Allergies: No Known Allergies  Current Meds: meropenem (MERREM) 1 g in sterile water 20 mL IV syringe, Q12H  enoxaparin (LOVENOX) injection 40 mg, BID  zinc sulfate (ZINCATE) capsule 50 mg, Daily  vitamin C (ASCORBIC ACID) tablet 1,000 mg, Daily  0.9 % sodium chloride bolus, PRN  budesonide-formoterol (SYMBICORT) 80-4.5 MCG/ACT inhaler 2 puff, 4x daily  insulin glargine (LANTUS) injection vial 10 Units, Nightly  insulin lispro (HUMALOG) injection vial 3 Units, TID WC  acetaminophen (TYLENOL) tablet 650 mg, Q6H PRN    Or  acetaminophen (TYLENOL) suppository 650 mg, Q6H PRN  dexamethasone (DECADRON) tablet 6 mg, Daily  Vitamin D (CHOLECALCIFEROL) tablet 2,000 Units, Daily  allopurinol (ZYLOPRIM) tablet 100 mg, BID  amLODIPine (NORVASC) tablet 5 mg, Daily  aspirin EC tablet 81 mg, Nightly  atorvastatin (LIPITOR) tablet 20 mg, Daily  carvedilol (COREG) tablet 3.125 mg, BID WC  vitamin B-12 (CYANOCOBALAMIN) tablet 500 mcg, QAM  isosorbide mononitrate (IMDUR) extended release tablet 60 mg, Nightly  ranolazine (RANEXA) extended release tablet 500 mg, BID  famotidine (PEPCID) tablet 20 mg, Daily  insulin lispro (HUMALOG) injection vial 0-12 Units, TID WC  insulin lispro (HUMALOG) injection vial 0-6 Units, Nightly  glucose (GLUTOSE) 40 % oral gel 15 g, PRN  dextrose 50 % IV solution, PRN  glucagon (rDNA) injection 1 mg, PRN  dextrose 5 % solution, PRN      Review of Systems see HPI    VitalSigns:  /78   Pulse 86   Temp 98.1 °F (36.7 °C)   Resp 18   Ht 5' 10\" (1.778 m)   Wt 230 lb 3.2 oz (104.4 kg)   SpO2 93%   BMI 33.03 kg/m²      Physical Exam  Alert, pleasant, no distress  He is comfortable appearing at rest  He is not coughing significantly during evaluation  Lower extremities without edema    Lab Results:  CBC:   Recent Labs     11/30/20  0345 12/01/20  0340 12/02/20  0340   WBC 12.9* 11.7* 11.5*   HGB 11.9* 12.3* 12.7*    240 256     BMP:  Recent Labs     11/30/20  0345 12/01/20  0340 12/02/20  0340   * 137 135*   K 4.9 4.9 4.9    105 104   CO2 19* 19* 18*   BUN 49* 53* 52*   CREATININE 1.9* 1.9* 2.0*   GLUCOSE 133* 150* 131*     D-dimer 0.51  CRP on November 29 was 9.1    CultureResults: Blood cultures November 29, 2020-no growth    Radiology:   Chest x-ray from November 27, 2020: Impression    New bilateral pulmonary infiltrates and partial    consolidation of the right lower lobe with bulging of the minor    fissure. This is compatible with pneumonia and atypical pneumonia,    including COVID-19 is not excluded. Signed by Dr Lawrence German. Ky on 11/27/2020 3:15 PM      Additional Studies Reviewed:  None    Impression:  1. COVID-19 infection/COVID-19 pneumonia  2. Coronary artery disease  3. Diabetes mellitus  4.   History of nephrectomy for renal cell cancer    Recommendations:  Although he has not noticed much improvement in exercise tolerance, he clearly looks a little bit better each day  I think he is responding to the combination of dexamethasone and his empiric antimicrobial treatment  If ongoing improvement I think he would be ready for discharge home on December 4  Continue to follow    Radha López MD

## 2020-12-04 ENCOUNTER — APPOINTMENT (OUTPATIENT)
Dept: GENERAL RADIOLOGY | Age: 70
DRG: 177 | End: 2020-12-04
Payer: MEDICARE

## 2020-12-04 VITALS
RESPIRATION RATE: 18 BRPM | SYSTOLIC BLOOD PRESSURE: 164 MMHG | WEIGHT: 230.2 LBS | DIASTOLIC BLOOD PRESSURE: 82 MMHG | BODY MASS INDEX: 32.96 KG/M2 | TEMPERATURE: 97.4 F | HEART RATE: 60 BPM | OXYGEN SATURATION: 95 % | HEIGHT: 70 IN

## 2020-12-04 LAB
ALBUMIN SERPL-MCNC: 3.2 G/DL (ref 3.5–5.2)
ALP BLD-CCNC: 62 U/L (ref 40–130)
ALT SERPL-CCNC: 138 U/L (ref 5–41)
ANION GAP SERPL CALCULATED.3IONS-SCNC: 14 MMOL/L (ref 7–19)
APTT: 31.5 SEC (ref 26–36.2)
AST SERPL-CCNC: 64 U/L (ref 5–40)
BASOPHILS ABSOLUTE: 0.1 K/UL (ref 0–0.2)
BASOPHILS RELATIVE PERCENT: 0.3 % (ref 0–1)
BILIRUB SERPL-MCNC: 0.4 MG/DL (ref 0.2–1.2)
BLOOD CULTURE, ROUTINE: NORMAL
BUN BLDV-MCNC: 47 MG/DL (ref 8–23)
C-REACTIVE PROTEIN: 0.96 MG/DL (ref 0–0.5)
CALCIUM SERPL-MCNC: 8.2 MG/DL (ref 8.8–10.2)
CHLORIDE BLD-SCNC: 107 MMOL/L (ref 98–111)
CO2: 19 MMOL/L (ref 22–29)
CREAT SERPL-MCNC: 1.6 MG/DL (ref 0.5–1.2)
CULTURE, BLOOD 2: NORMAL
D DIMER: 0.46 UG/ML FEU (ref 0–0.48)
EOSINOPHILS ABSOLUTE: 0 K/UL (ref 0–0.6)
EOSINOPHILS RELATIVE PERCENT: 0 % (ref 0–5)
FIBRINOGEN: 460 MG/DL (ref 238–505)
GFR AFRICAN AMERICAN: 52
GFR NON-AFRICAN AMERICAN: 43
GLUCOSE BLD-MCNC: 109 MG/DL (ref 70–99)
GLUCOSE BLD-MCNC: 114 MG/DL (ref 74–109)
GLUCOSE BLD-MCNC: 123 MG/DL (ref 70–99)
GLUCOSE BLD-MCNC: 157 MG/DL (ref 70–99)
HCT VFR BLD CALC: 38.1 % (ref 42–52)
HEMOGLOBIN: 12.5 G/DL (ref 14–18)
IMMATURE GRANULOCYTES #: 0.6 K/UL
INR BLD: 1.09 (ref 0.88–1.18)
LYMPHOCYTES ABSOLUTE: 0.7 K/UL (ref 1.1–4.5)
LYMPHOCYTES RELATIVE PERCENT: 4.6 % (ref 20–40)
MCH RBC QN AUTO: 29.7 PG (ref 27–31)
MCHC RBC AUTO-ENTMCNC: 32.8 G/DL (ref 33–37)
MCV RBC AUTO: 90.5 FL (ref 80–94)
MONOCYTES ABSOLUTE: 1.2 K/UL (ref 0–0.9)
MONOCYTES RELATIVE PERCENT: 7.9 % (ref 0–10)
NEUTROPHILS ABSOLUTE: 12.4 K/UL (ref 1.5–7.5)
NEUTROPHILS RELATIVE PERCENT: 83.1 % (ref 50–65)
PDW BLD-RTO: 13.3 % (ref 11.5–14.5)
PERFORMED ON: ABNORMAL
PLATELET # BLD: 283 K/UL (ref 130–400)
PMV BLD AUTO: 11.2 FL (ref 9.4–12.4)
POTASSIUM REFLEX MAGNESIUM: 5 MMOL/L (ref 3.5–5)
PROTHROMBIN TIME: 14.1 SEC (ref 12–14.6)
RBC # BLD: 4.21 M/UL (ref 4.7–6.1)
SODIUM BLD-SCNC: 140 MMOL/L (ref 136–145)
TOTAL PROTEIN: 6.5 G/DL (ref 6.6–8.7)
WBC # BLD: 14.9 K/UL (ref 4.8–10.8)

## 2020-12-04 PROCEDURE — 6370000000 HC RX 637 (ALT 250 FOR IP): Performed by: INTERNAL MEDICINE

## 2020-12-04 PROCEDURE — 80053 COMPREHEN METABOLIC PANEL: CPT

## 2020-12-04 PROCEDURE — 85025 COMPLETE CBC W/AUTO DIFF WBC: CPT

## 2020-12-04 PROCEDURE — 82947 ASSAY GLUCOSE BLOOD QUANT: CPT

## 2020-12-04 PROCEDURE — 2580000003 HC RX 258: Performed by: INTERNAL MEDICINE

## 2020-12-04 PROCEDURE — 2700000000 HC OXYGEN THERAPY PER DAY

## 2020-12-04 PROCEDURE — 6370000000 HC RX 637 (ALT 250 FOR IP): Performed by: STUDENT IN AN ORGANIZED HEALTH CARE EDUCATION/TRAINING PROGRAM

## 2020-12-04 PROCEDURE — 94761 N-INVAS EAR/PLS OXIMETRY MLT: CPT

## 2020-12-04 PROCEDURE — 85610 PROTHROMBIN TIME: CPT

## 2020-12-04 PROCEDURE — 71045 X-RAY EXAM CHEST 1 VIEW: CPT

## 2020-12-04 PROCEDURE — 85730 THROMBOPLASTIN TIME PARTIAL: CPT

## 2020-12-04 PROCEDURE — 85379 FIBRIN DEGRADATION QUANT: CPT

## 2020-12-04 PROCEDURE — 6360000002 HC RX W HCPCS: Performed by: STUDENT IN AN ORGANIZED HEALTH CARE EDUCATION/TRAINING PROGRAM

## 2020-12-04 PROCEDURE — 86140 C-REACTIVE PROTEIN: CPT

## 2020-12-04 PROCEDURE — 6360000002 HC RX W HCPCS: Performed by: INTERNAL MEDICINE

## 2020-12-04 PROCEDURE — 85384 FIBRINOGEN ACTIVITY: CPT

## 2020-12-04 RX ORDER — BUDESONIDE AND FORMOTEROL FUMARATE DIHYDRATE 80; 4.5 UG/1; UG/1
2 AEROSOL RESPIRATORY (INHALATION) 2 TIMES DAILY
Qty: 1 INHALER | Refills: 0 | Status: SHIPPED | OUTPATIENT
Start: 2020-12-04 | End: 2022-02-11 | Stop reason: SDUPTHER

## 2020-12-04 RX ORDER — DEXAMETHASONE 6 MG/1
6 TABLET ORAL DAILY
Qty: 4 TABLET | Refills: 0 | Status: SHIPPED | OUTPATIENT
Start: 2020-12-05 | End: 2020-12-09

## 2020-12-04 RX ORDER — DEXAMETHASONE 6 MG/1
6 TABLET ORAL DAILY
Qty: 2 TABLET | Refills: 0 | Status: SHIPPED | OUTPATIENT
Start: 2020-12-05 | End: 2020-12-04

## 2020-12-04 RX ORDER — ZINC SULFATE 50(220)MG
50 CAPSULE ORAL DAILY
Qty: 30 CAPSULE | Refills: 0 | Status: SHIPPED | OUTPATIENT
Start: 2020-12-05 | End: 2022-09-23

## 2020-12-04 RX ADMIN — BUDESONIDE AND FORMOTEROL FUMARATE DIHYDRATE 2 PUFF: 80; 4.5 AEROSOL RESPIRATORY (INHALATION) at 13:45

## 2020-12-04 RX ADMIN — DEXAMETHASONE 6 MG: 2 TABLET ORAL at 11:20

## 2020-12-04 RX ADMIN — BUDESONIDE AND FORMOTEROL FUMARATE DIHYDRATE 2 PUFF: 80; 4.5 AEROSOL RESPIRATORY (INHALATION) at 16:15

## 2020-12-04 RX ADMIN — MEROPENEM 1 G: 1 INJECTION, POWDER, FOR SOLUTION INTRAVENOUS at 17:09

## 2020-12-04 RX ADMIN — BUDESONIDE AND FORMOTEROL FUMARATE DIHYDRATE 2 PUFF: 80; 4.5 AEROSOL RESPIRATORY (INHALATION) at 08:00

## 2020-12-04 RX ADMIN — OXYCODONE HYDROCHLORIDE AND ACETAMINOPHEN 1000 MG: 500 TABLET ORAL at 11:21

## 2020-12-04 RX ADMIN — CARVEDILOL 3.12 MG: 3.12 TABLET, FILM COATED ORAL at 11:20

## 2020-12-04 RX ADMIN — ENOXAPARIN SODIUM 40 MG: 40 INJECTION SUBCUTANEOUS at 11:19

## 2020-12-04 RX ADMIN — AMLODIPINE BESYLATE 5 MG: 5 TABLET ORAL at 11:20

## 2020-12-04 RX ADMIN — Medication 50 MG: at 11:29

## 2020-12-04 RX ADMIN — RANOLAZINE 500 MG: 500 TABLET, FILM COATED, EXTENDED RELEASE ORAL at 11:29

## 2020-12-04 RX ADMIN — CHOLECALCIFEROL (VITAMIN D3) 25 MCG (1,000 UNIT) TABLET 2000 UNITS: TABLET at 11:21

## 2020-12-04 RX ADMIN — ATORVASTATIN CALCIUM 20 MG: 20 TABLET, FILM COATED ORAL at 11:20

## 2020-12-04 RX ADMIN — CARVEDILOL 3.12 MG: 3.12 TABLET, FILM COATED ORAL at 17:34

## 2020-12-04 RX ADMIN — MEROPENEM 1 G: 1 INJECTION, POWDER, FOR SOLUTION INTRAVENOUS at 04:00

## 2020-12-04 RX ADMIN — MEROPENEM 1 G: 1 INJECTION, POWDER, FOR SOLUTION INTRAVENOUS at 16:06

## 2020-12-04 RX ADMIN — CYANOCOBALAMIN TAB 500 MCG 500 MCG: 500 TAB at 11:20

## 2020-12-04 RX ADMIN — MEROPENEM 1 G: 1 INJECTION, POWDER, FOR SOLUTION INTRAVENOUS at 16:05

## 2020-12-04 RX ADMIN — ALLOPURINOL 100 MG: 100 TABLET ORAL at 11:20

## 2020-12-04 NOTE — PROGRESS NOTES
Pt evaluated for home O2    Room air at rest Spo2 93%  Room air with ambulation in the room Spo2 90%

## 2020-12-04 NOTE — DISCHARGE SUMMARY
Alex Palacio  :  1950  MRN:  [id number]    Admit date:  2020  Discharge date:  2020       Admitting Physician:  Ramona Griggs MD    Advance Directive: Full Code    Consults Made:   IP CONSULT TO INFECTIOUS DISEASES  PALLIATIVE CARE EVAL      Primary Care Physician:  Rose Francis, APRN    Discharge Diagnoses:  Principal Problem:    Pneumonia due to COVID-19 virus  Active Problems:    Acute respiratory failure with hypoxia ([address]    Hypercholesteremia    GERD (gastroesophageal reflux disease)    Morbidly obese (HCC)    Gout    Chronic kidney disease, stage 3    Type 2 diabetes mellitus with complication, without long-term current use of insulin (HCC)    Hypertension, essential, benign    H/O unilateral nephrectomy    Coronary artery disease involving left main coronary artery  Resolved Problems:    * No resolved hospital problems.  *      Pertinent Labs:  CBC with DIFF:  Recent Labs     20  03420  0335 20  0445   WBC 11.5* 12.6* 14.9*   RBC 4.30* 4.03* 4.21*   HGB 12.7* 12.0* 12.5*   HCT 38.1* 36.2* 38.1*   MCV 88.6 89.8 90.5   MCH 29.5 29.8 29.7   MCHC 33.3 33.1 32.8*   RDW 13.1 13.1 13.3    262 283   MPV 11.2 11.4 11.2   NEUTOPHILPCT 81.5* 83.8* 83.1*   LYMPHOPCT 4.5* 4.1* 4.6*   MONOPCT 9.7 8.9 7.9   EOSRELPCT 0.0 0.0 0.0   BASOPCT 0.3 0.2 0.3   NEUTROABS 9.3* 10.6* 12.4*   LYMPHSABS 0.5* 0.5* 0.7*   MONOSABS 1.10* 1.10* 1.20*   EOSABS 0.00 0.00 0.00   BASOSABS 0.00 0.00 0.10       CMP/BMP:  Recent Labs     20  0340 20  0335 20  1400 20  0445   * 136  --  140   K 4.9 5.0 4.8 5.0    108  --  107   CO2 18* 18*  --  19*   ANIONGAP 13 10  --  14   GLUCOSE 131* 114*  --  114*   BUN 52* 48*  --  47*   CREATININE 2.0* 1.7*  --  1.6*   LABGLOM 33* 40*  --  43*   CALCIUM 8.3* 7.8*  --  8.2*   PROT 6.4* 6.0*  --  6.5*   LABALBU 3.4* 2.9*  --  3.2*   BILITOT 0.4 0.4  --  0.4   ALKPHOS 54 59  --  62   ALT 67* 104*  --  138*   AST 50* 63* --  64*         CRP:    Recent Labs     12/04/20 0445   CRP 0.96*     Sed Rate:  No results for input(s): SEDRATE in the last 72 hours. HgBA1c:  No components found for: HGBA1C  FLP:    Lab Results   Component Value Date    TRIG 60 09/30/2020    HDL 49 09/30/2020    LDLCALC 61 09/30/2020     TSH:    Lab Results   Component Value Date    TSH 1.260 01/21/2020     Troponin T: No results for input(s): TROPONINI in the last 72 hours. Pro-BNP: No results for input(s): BNP in the last 72 hours. INR:   Recent Labs     12/02/20  0340 12/03/20  0335 12/04/20 0445   INR 1.09 1.05 1.09     ABGs: No results found for: PHART, PO2ART, QFK5HHC  UA:No results for input(s): NITRITE, COLORU, PHUR, LABCAST, WBCUA, RBCUA, MUCUS, TRICHOMONAS, YEAST, BACTERIA, CLARITYU, SPECGRAV, LEUKOCYTESUR, UROBILINOGEN, BILIRUBINUR, BLOODU, GLUCOSEU, AMORPHOUS in the last 72 hours. Invalid input(s): Derrill Nissen      Culture Results:    No results for input(s): CXSURG in the last 720 hours. Blood Culture Recent:   Recent Labs     11/29/20  1755   BC No Growth to date. Any change in status will be called. Cultures:   No results for input(s): CULTURE in the last 72 hours. No results for input(s): BC, [place] in the last 72 hours. No results for input(s): CXSURG in the last 72 hours. No results for input(s): MG, PHOS in the last 72 hours. Recent Labs     12/02/20  0340 12/03/20  0335 12/04/20 0445   AST 50* 63* 64*   ALT 67* 104* 138*   BILITOT 0.4 0.4 0.4   ALKPHOS 54 59 62           Significant Diagnostic Studies:   Xr Chest Portable    Result Date: 11/27/2020  EXAMINATION: XR CHEST PORTABLE 11/27/2020 3:13 PM HISTORY: Shortness of breath , clinical concern for COVID-19 infection. Report: A portable upright frontal view the chest was obtained. COMPARISON: Chest x-ray 3/6/2020.  There are new mixed interstitial and groundglass infiltrates in both lungs, with partial consolidation of the right lower lobe and slight bulging Infection PNA.- Diagnosed on 11/27/2020  · Initial chest x-ray (11/27/2020): Impression: New bilateral pulmonary infiltrates and partial consolidation of the right lower lobe with bulging of the minor fissure. This is compatible with pneumonia and atypical pneumonia, including COVID-19 is not excluded. · Repeat chest x-ray (12/04/2020): Impression: Decreased lung volumes compared to 11/27/2020 with increased patchy bilateral pulmonary opacities. · Febrile with T-max of 102.4 (11/29/2020)  · Currently afebrile  · Continued oxygen supplementation keep SPO2 above 90%, weaned as tolerated   · Continued pulse oximetry at bedside  · DME home O2 order placed,   · Dexamethasone 6 mg PO Daily  (11/27/2020 - 12/08/2020)  · Adjunct therapy with;  Melatonin, Vitamin D, Zinc, & Inhaled Corticosteroids. · Remdesivir not initiated, given patient's renal function as well as reported history of right Nephrectomy. · Followed by ID -appreciate recommendations  · Antibiotics, as per ID. ? Meropenem (11/28/2020 - 12/04/2020)   ? Linezolid initiated (11/28/2020 - 12/01/2020)   · Okay for discharge from ID standpoint  · Follow-up with ID via telehealth within 5-7 days. · Avoid Nebulizer treatments to avoid aerosolization.    · Encourage Prone positioning  · Enoxaparin 40 mg subcu twice daily while inpatient          History of Diabetes Mellitus II  · Hemoglobin A1C: 6.5% (11/29/2020)  · Inpatient Regimens to included;  ? - Insulin Glargine (Lantus) 10 units subcu nightly  ? - Insulin Lispro (Humalog) 3 units subcu pre-meal 3 times a day  ?  - Insulin Lispro (Humalog) on a Medium dose sliding scale  · Monitored POC glucose, and adjusted insulin regimen accordingly based on daily insulin requirement.     Hx malignant neoplasm of right kidney (status post right nephrectomy)  CKD stage III  · -Baseline creatinine of 2.0  · -Creatinine level on presentation 2.1  · Creatinine improved: Creatinine of 1.6 (12/04/2020)  · -IV hydration  · -Avoided hypotension and nephrotoxins              Continued management of other chronic medical conditions -      Physical Exam:  Vital Signs: BP (!) 164/82   Pulse 60   Temp 97.4 °F (36.3 °C)   Resp 18   Ht 5' 10\" (1.778 m)   Wt 230 lb 3.2 oz (104.4 kg)   SpO2 95%   BMI 33.03 kg/m²   Physical Exam  Vitals signs and nursing note reviewed. Constitutional:       General: He is not in acute distress. Appearance: Normal appearance. He is obese. He is not ill-appearing, toxic-appearing or diaphoretic. HENT:      Head: Normocephalic and atraumatic. Right Ear: External ear normal.      Left Ear: External ear normal.      Nose: Nose normal.   Neck:      Musculoskeletal: Normal range of motion and neck supple. Pulmonary:      Effort: Pulmonary effort is normal.   Musculoskeletal: Normal range of motion. Neurological:      Mental Status: He is alert and oriented to person, place, and time. Psychiatric:         Mood and Affect: Mood normal.         Behavior: Behavior normal.         Thought Content:  Thought content normal.         Judgment: Judgment normal.           Discharge Medications:       Seymour Cynthia \"Bogdan\"   Home Medication Instructions FB:[id number]    Printed on:12/04/20 1625   Medication Information                      albuterol (PROVENTIL) (2.5 MG/3ML) 0.083% nebulizer solution  Take 3 mLs by nebulization every 6 hours as needed for Wheezing or Shortness of Breath             allopurinol (ZYLOPRIM) 100 MG tablet  TAKE ONE TABLET BY MOUTH TWO TIMES DAILY             amLODIPine (NORVASC) 5 MG tablet  Take 5 mg by mouth daily             aspirin 81 MG tablet  Take 81 mg by mouth nightly              atorvastatin (LIPITOR) 20 MG tablet  TAKE 1 TABLET BY MOUTH DAILY             budesonide-formoterol (SYMBICORT) 80-4.5 MCG/ACT AERO  Inhale 2 puffs into the lungs 2 times daily for 15 days             carvedilol (COREG) 3.125 MG tablet  Take 3.125 mg by mouth 2 times daily Contact: Lorena Rodriguez  Address: Kaitlin Tierney, [address] Kenan Mixon of [address] [place] [address] Phone: [phone]  Relation: Child       Time Spent on discharge is more than 34 mins in the examination, evaluation, counseling and review of medications and discharge plan. Electronically signed by   Ramona Griggs MD, MPH,   Internal Medicine Hospitalist   12/4/2020 4:21 PM      Thank you MISSY Shaw for the opportunity to be involved in this patient's care. If you have any questions or concerns please feel free to contact me at [phone]        EMR Dragon/Transcription disclaimer:   Much of this encounter note is an electronic transcription/translation of spoken language to printed text.  The electronic translation of spoken language may permit erroneous, or at times, nonsensical words or phrases to be inadvertently transcribed; although attempts have made to review the note for such errors, some may still exist. normal sinus rhythm

## 2020-12-04 NOTE — PROGRESS NOTES
[facility] Progress Note    Patient:  Shira Cantu  YOB: 1950  Date of Service: 12/4/2020  MRN: [id number]   Acct: [de-identified]   Primary Care Physician: MISSY Foreman  Advance Directive: Full Code  Admit Date: 11/27/2020       Hospital Day: 7      CHIEF COMPLAINT:     Chief Complaint   Patient presents with    Pneumonia     Double PNA dx today with a chest x ray       12/4/2020 9:29 AM  Subjective / Interval History:   12/04/2020  ***      12/03/2020  Patient endorses overall improvement. Doing well. Shortness of breath gradually improving. Continues to require supplemental oxygen. Intermittent cough fairly controlled. No acute changes or acute overnight event reported. 12/02/2020  Doing well. Patient continues to have shortness of breath and hypoxia with minimal movement or activity in his room. No acute changes or acute overnight event reported. Continues to require supplemental oxygen. Sitting comfortably in bed in no apparent acute distress. Patient endorses much improvement in cough. Overall, patient reports feeling much better today compared to previous days. 12/01/2020  Patient remains afebrile. Doing well. However continues to have shortness of breath with oxygen desaturations with minimal activity. .  Laying comfortably in bed, in no apparent acute distress. Patient endorses overall improvement. 11/30/2020  No new complaints. Patient doing well. Sitting comfortably in bed in no apparent acute distress. No acute changes or acute overnight event reported. Shortness of breath improved. No further febrile episodes reported overnight. Patient denies any other acute complaints or distress at this time. 11/29/2020  Patient doing well. Intermittent febrile episodes reported. Shortness of breath improved. Patient endorses some dyspnea with minimal exertion. Sitting comfortably in bed in no apparent acute distress.   No other acute changes or acute overnight event reported. 11/28/2020  Patient seen and examined. Doing well. Laying comfortably in bed in CCU, in no apparent acute distress. Currently requiring supplemental oxygen. Patient endorses overall improvement. States his breathing is improving gradually. No acute changes or acute overnight event reported. Brief History:    As per Initial admission HPI 11/27/2020, quoted below;  \"Patient is a 79 y.o. male with solitary kidney with history of nephrectomy, CAD with plan in near future for stenting at [facility] in [place].      Patient presented to [facility] ED with development of worsening dyspnea on exertion, productive cough and fevers over the past few days, with preceding symptoms of fatigue, malaise and myalgias over the last 2 weeks. He does not have any nausea, vomiting or loss of taste or smell. He had a fever at home of over 102. He does note previous Covid exposure around friends, but has not been previously tested. He went to his PCP earlier today where he had chest x-ray done in clinic which showed bilateral infiltrates with partial right lower lobe consolidation and subsequently sent to the ED.  patient has been on azithromycin past few days.     In the ED, tested positive for COVID-19. Temperature 99. Initially satting well on room air at rest, however he subsequently had O2 desaturation down to low to mid 80s with minimal exertion. SPO2 87% documented on room air, subsequently improved on 2 L nasal cannula. \"      Review of Systems:   Review of Systems  ROS: 14 point review of systems is negative except as specifically addressed above.     DIET RENAL; Carb Control: 4 carb choices (60 gms)/meal  No intake or output data in the 24 hours ending 12/04/20 0929    Medications:   dextrose       Current Facility-Administered Medications   Medication Dose Route Frequency Provider Last Rate Last Dose    meropenem (MERREM) 1 g in sterile water 20 mL IV syringe  1 g Intravenous Q12H Deangelo Jaramillo MD   1 g at 12/04/20 0400    enoxaparin (LOVENOX) injection 40 mg  40 mg Subcutaneous BID Lance Prajapati MD   40 mg at 12/03/20 2226    zinc sulfate (ZINCATE) capsule 50 mg  50 mg Oral Daily Chitra Helm MD   50 mg at 12/03/20 8612    vitamin C (ASCORBIC ACID) tablet 1,000 mg  1,000 mg Oral Daily Chitra Helm MD   1,000 mg at 12/03/20 0924    0.9 % sodium chloride bolus  30 mL Intravenous PRN Chitra Helm MD        budesonide-formoterol (SYMBICORT) 80-4.5 MCG/ACT inhaler 2 puff  2 puff Inhalation 4x daily Chitra Helm MD   2 puff at 12/03/20 2322    insulin glargine (LANTUS) injection vial 10 Units  10 Units Subcutaneous Nightly Chitra Helm MD   10 Units at 12/03/20 2228    insulin lispro (HUMALOG) injection vial 3 Units  3 Units Subcutaneous TID WC Chitra Helm MD   3 Units at 12/03/20 1842    acetaminophen (TYLENOL) tablet 650 mg  650 mg Oral Q6H PRN Lance Prajapati MD   650 mg at 11/29/20 1814    Or    acetaminophen (TYLENOL) suppository 650 mg  650 mg Rectal Q6H PRN Lance Prajapati MD        dexamethasone (DECADRON) tablet 6 mg  6 mg Oral Daily Lance Prajapati MD   6 mg at 12/03/20 4402    Vitamin D (CHOLECALCIFEROL) tablet 2,000 Units  2,000 Units Oral Daily Lance Prajapati MD   2,000 Units at 12/03/20 0924    allopurinol (ZYLOPRIM) tablet 100 mg  100 mg Oral BID Lance Prajapati MD   100 mg at 12/03/20 2227    amLODIPine (NORVASC) tablet 5 mg  5 mg Oral Daily Lance Prajapati MD   5 mg at 12/03/20 2663    aspirin EC tablet 81 mg  81 mg Oral Nightly Lance Prajapati MD   81 mg at 12/03/20 2227    atorvastatin (LIPITOR) tablet 20 mg  20 mg Oral Daily Lance Prajapati MD   20 mg at 12/03/20 1570    carvedilol (COREG) tablet 3.125 mg  3.125 mg Oral BID  Lance Prajapati MD   3.125 mg at 12/03/20 1842    vitamin B-12 (CYANOCOBALAMIN) tablet 500 mcg  500 mcg Oral QA Lance Prajapati MD with DIFF:  Recent Labs     12/02/20  0340 12/03/20  0335 12/04/20  0445   WBC 11.5* 12.6* 14.9*   RBC 4.30* 4.03* 4.21*   HGB 12.7* 12.0* 12.5*   HCT 38.1* 36.2* 38.1*   MCV 88.6 89.8 90.5   MCH 29.5 29.8 29.7   MCHC 33.3 33.1 32.8*   RDW 13.1 13.1 13.3    262 283   MPV 11.2 11.4 11.2   NEUTOPHILPCT 81.5* 83.8* 83.1*   LYMPHOPCT 4.5* 4.1* 4.6*   MONOPCT 9.7 8.9 7.9   EOSRELPCT 0.0 0.0 0.0   BASOPCT 0.3 0.2 0.3   NEUTROABS 9.3* 10.6* 12.4*   LYMPHSABS 0.5* 0.5* 0.7*   MONOSABS 1.10* 1.10* 1.20*   EOSABS 0.00 0.00 0.00   BASOSABS 0.00 0.00 0.10       CMP/BMP:  Recent Labs     12/02/20  0340 12/03/20  0335 12/03/20  1400 12/04/20  0445   * 136  --  140   K 4.9 5.0 4.8 5.0    108  --  107   CO2 18* 18*  --  19*   ANIONGAP 13 10  --  14   GLUCOSE 131* 114*  --  114*   BUN 52* 48*  --  47*   CREATININE 2.0* 1.7*  --  1.6*   LABGLOM 33* 40*  --  43*   CALCIUM 8.3* 7.8*  --  8.2*   PROT 6.4* 6.0*  --  6.5*   LABALBU 3.4* 2.9*  --  3.2*   BILITOT 0.4 0.4  --  0.4   ALKPHOS 54 59  --  62   ALT 67* 104*  --  138*   AST 50* 63*  --  64*         CRP:    No results for input(s): CRP in the last 72 hours. Sed Rate:  No results for input(s): SEDRATE in the last 72 hours. HgBA1c:  No components found for: HGBA1C  FLP:    Lab Results   Component Value Date    TRIG 60 09/30/2020    HDL 49 09/30/2020    LDLCALC 61 09/30/2020     TSH:    Lab Results   Component Value Date    TSH 1.260 01/21/2020     Troponin T: No results for input(s): TROPONINI in the last 72 hours. Pro-BNP: No results for input(s): BNP in the last 72 hours. INR:   Recent Labs     12/02/20  0340 12/03/20  0335 12/04/20  0445   INR 1.09 1.05 1.09     ABGs: No results found for: PHART, PO2ART, [id number]  UA:  No results for input(s): NITRITE, COLORU, PHUR, LABCAST, WBCUA, RBCUA, MUCUS, TRICHOMONAS, YEAST, BACTERIA, CLARITYU, SPECGRAV, LEUKOCYTESUR, UROBILINOGEN, BILIRUBINUR, BLOODU, GLUCOSEU, AMORPHOUS in the last 72 hours.     Invalid input(s): Troy Seymour      Culture Results:    No results for input(s): CXSURG in the last 720 hours. Blood Culture Recent:   Recent Labs     11/29/20  1755   BC No Growth to date. Any change in status will be called. Cultures:   No results for input(s): CULTURE in the last 72 hours. No results for input(s): BC, Ivon Patient in the last 72 hours. No results for input(s): CXSURG in the last 72 hours. No results for input(s): MG, PHOS in the last 72 hours. Recent Labs     12/02/20  0340 12/03/20  0335 12/04/20  0445   AST 50* 63* 64*   ALT 67* 104* 138*   BILITOT 0.4 0.4 0.4   ALKPHOS 54 59 62         RAD:   Xr Chest Portable    Result Date: 11/27/2020  EXAMINATION: XR CHEST PORTABLE 11/27/2020 3:13 PM HISTORY: Shortness of breath , clinical concern for COVID-19 infection. Report: A portable upright frontal view the chest was obtained. COMPARISON: Chest x-ray 3/6/2020. There are new mixed interstitial and groundglass infiltrates in both lungs, with partial consolidation of the right lower lobe and slight bulging of the minor fissure. No pneumothorax or pleural effusion is identified. Heart size is normal. Previous median sternotomy is noted. The osseous structures and upper abdomen are unremarkable. New bilateral pulmonary infiltrates and partial consolidation of the right lower lobe with bulging of the minor fissure. This is compatible with pneumonia and atypical pneumonia, including COVID-19 is not excluded. Signed by Dr Aric Mcpherson on 11/27/2020 3:15 PM        Objective:   Vitals:   /65   Pulse 60   Temp 97.4 °F (36.3 °C)   Resp 18   Ht 5' 10\" (1.778 m)   Wt 230 lb 3.2 oz (104.4 kg)   SpO2 91%   BMI 33.03 kg/m²       Patient Vitals for the past 24 hrs:   BP Temp Temp src Pulse Resp SpO2   12/04/20 0803 -- -- -- -- -- 91 %   12/04/20 0709 138/65 97.4 °F (36.3 °C) -- 60 18 96 %   12/03/20 2349 (!) 141/66 97.1 °F (36.2 °C) Temporal 57 18 97 %   12/03/20 1811 (!) 159/73 97.3 °F (36.3 °C) Temporal 71 18 95 %   12/03/20 1654 (!) 151/77 96.9 °F (36.1 °C) -- 75 20 95 %   12/03/20 1227 (!) 166/85 96.5 °F (35.8 °C) -- 71 18 96 %       24HR INTAKE/OUTPUT:    No intake or output data in the 24 hours ending 12/04/20 8609    Patient interviewed/observed/monitored from door, in order to preserve PPE's. Physical Exam  Vitals signs and nursing note reviewed. Constitutional:       General: He is not in acute distress. Appearance: Normal appearance. He is not ill-appearing, toxic-appearing or diaphoretic. HENT:      Head: Normocephalic and atraumatic. Right Ear: External ear normal.      Left Ear: External ear normal.   Neck:      Musculoskeletal: Normal range of motion and neck supple. Pulmonary:      Effort: Pulmonary effort is normal. No respiratory distress. Comments: Patient no obvious acute respiratory distress. Responding to questions appropriately and speaking in full sentences. Musculoskeletal: Normal range of motion. Neurological:      Mental Status: He is alert and oriented to person, place, and time. Psychiatric:         Mood and Affect: Mood normal.         Behavior: Behavior normal.         Thought Content:  Thought content normal.         Judgment: Judgment normal.           Assessment/plan:       Hospital Problems           Last Modified POA    * (Principal) Pneumonia due to COVID-19 virus 11/28/2020 Yes    Acute respiratory failure with hypoxia ([place]) 11/27/2020 Yes    Hypercholesteremia 11/27/2020 Yes    GERD (gastroesophageal reflux disease) 11/27/2020 Yes    Morbidly obese ([place]) 11/27/2020 Yes    Gout 11/27/2020 Yes    Chronic kidney disease, stage 3 11/27/2020 Yes    Type 2 diabetes mellitus with complication, without long-term current use of insulin ([place]) 11/27/2020 Yes    Hypertension, essential, benign 11/27/2020 Yes    H/O unilateral nephrectomy 11/27/2020 Yes    Coronary artery disease involving left main coronary artery 11/27/2020 Yes          Principal Problem:    Pneumonia due to COVID-19 virus  Active Problems:    Acute respiratory failure with hypoxia (HCC)    Hypercholesteremia    GERD (gastroesophageal reflux disease)    Morbidly obese (HCC)    Gout    Chronic kidney disease, stage 3    Type 2 diabetes mellitus with complication, without long-term current use of insulin ([facility])    Hypertension, essential, benign    H/O unilateral nephrectomy    Coronary artery disease involving left main coronary artery  Resolved Problems:    * No resolved hospital problems. *      Brief Summary  Mr. Gatito Ayon, a 75-year-old male, history of right nephrectomy admitted to [facility] (11/27/2020), for the management of COVID-19 pneumonia. Currently requiring supplemental oxygen, with intermittent febrile episodes, with a T-max of 102.4 (11/29/2020). Currently afebrile. ID following. COVID 19 Infection PNA.- Diagnosed on 11/27/2020   Febrile with T-max of 102.4 (11/29/2020)   Continue oxygen supplementation keep SPO2 above 90%, wean as tolerated   Continue pulse oximetry at bedside   Dexamethasone 6 mg PO Daily   Adjunct therapy with;  Melatonin, Vitamin D, Zinc, & Inhaled Corticosteroids. o Zinc sulfate (ZINCATE) capsule 50 mg  PO Daily  o Melatonin PO Daily  o Vitamin D (CHOLECALCIFEROL) capsule 2,000 Units  PO Daily  o Budesonide-formoterol (SYMBICORT) 80-4.5 MCG/ACT inhaler 2 puffs 4X Daily   Remdesivir not initiated, given patient's renal function as well as reported history of right Nephrectomy.  ID following-appreciate recommendations   Antibiotics, as per ID.    o Meropenem (11/28/2020)   o Linezolid initiated (11/28/2020 - 12/01/2020)    Avoid Nebulizer treatments to avoid aerosolization.      Encourage Prone positioning   Enoxaparin 40 mg subcu twice daily      History of Diabetes Mellitus II   Hemoglobin A1C: 6.5% (11/29/2020)   Reported Home regimen include,    Inpatient Regimens to include;  o - Insulin Glargine (Lantus) 10 units subcu nightly  o - Insulin Lispro (Humalog) 3 units subcu pre-meal 3 times a day  o - Insulin Lispro (Humalog) on a Medium dose sliding scale   Monitor POC glucose, and adjust insulin regimen accordingly based on daily insulin requirement. Hx malignant neoplasm of right kidney (status post right nephrectomy)  CKD stage III  · -Baseline creatinine of 2.0  · -Creatinine level on presentation 2.1  · Creatinine improving  · -IV hydration  · -Avoid hypotension and nephrotoxins          Continue management of other chronic medical conditions - see above and orders. Advance Directive: Full Code    DIET RENAL; Carb Control: 4 carb choices (60 gms)/meal         Consults Made:   IP CONSULT TO INFECTIOUS DISEASES  PALLIATIVE CARE EVAL    DVT prophylaxis: Enoxaparin      Discharge planning:   · Continue management for COVID-19 pneumonia as well as antibiotic for possible superimposed bacterial infection. · Continues to require supplemental oxygen, wean as tolerated  · Continue current management for now. Time Spent is 25 mins in the examination, evaluation, counseling and review of medications, assessment and plan.      Electronically signed by   Kayli Rivero MD, MPH,   Internal Medicine Hospitalist   12/4/2020 9:29 AM

## 2020-12-04 NOTE — PROGRESS NOTES
Nightly  glucose (GLUTOSE) 40 % oral gel 15 g, PRN  dextrose 50 % IV solution, PRN  glucagon (rDNA) injection 1 mg, PRN  dextrose 5 % solution, PRN      Review of Systems see HPI    VitalSigns:  BP (!) 164/82   Pulse 60   Temp 97.4 °F (36.3 °C)   Resp 18   Ht 5' 10\" (1.778 m)   Wt 230 lb 3.2 oz (104.4 kg)   SpO2 95%   BMI 33.03 kg/m²      Physical Exam  Looks comfortable. He is not coughing. He does not appear to be short of breath. Lab Results:  CBC:   Recent Labs     12/02/20  0340 12/03/20  0335 12/04/20  0445   WBC 11.5* 12.6* 14.9*   HGB 12.7* 12.0* 12.5*    262 283     BMP:  Recent Labs     12/02/20  0340 12/03/20  0335 12/03/20  1400 12/04/20  0445   * 136  --  140   K 4.9 5.0 4.8 5.0    108  --  107   CO2 18* 18*  --  19*   BUN 52* 48*  --  47*   CREATININE 2.0* 1.7*  --  1.6*   GLUCOSE 131* 114*  --  114*     Radiology:   Impression    1. Decreased lung volumes compared to 11/27/2020 with increased patchy    bilateral pulmonary opacities. Signed by Dr Gerard Davies on 12/4/2020 7:22 AM      Additional Studies Reviewed:  None    Impression:  1. COVID-19 infection/COVID-19 pneumonia-improving  2. Coronary artery disease  3. Diabetes mellitus  4.   History nephrectomy    Recommendations:    He seems to be improving  Okay with me for discharge home  Suggest dexamethasone 6 mg orally daily for an additional 4 days through December 8, 2020  Would be happy to see him in follow-up in 5 to 7 days via telehealth    Josué Urban MD

## 2020-12-04 NOTE — CARE COORDINATION
Pt not qualifying for home o2 per eval but can be ordered with COVID-19 diagnosis. SW called to let nurse know that o2 DME order needed for [facility]. [facility]   P  [phone] [id number]    Electronically signed by Aydee Coburn on 12/4/2020 at 10:46 AM    Sent Eliquis coupon. Still waiting on o2 order. Electronically signed by Aydee Coburn on 12/4/2020 at 2:54 PM    o2 ordered through [employer].    Electronically signed by Aydee Cobrun on 12/4/2020 at 4:16 PM

## 2020-12-04 NOTE — PROGRESS NOTES
Infectious Diseases Progress Note    Patient:  Danish Nicholson  YOB: 1950  MRN: [id number]   Admit date: 11/27/2020   Admitting Physician: Elisabet Hicks MD  Primary Care Physician: Jeevan Patient, APRN    Chief Complaint/Interval History: He is feeling better. He is without fever. Exercise tolerance improving. Spoke with nursing earlier. Needed home oxygen assessment. On room air he was 95%. Walking in the room after 5 minutes he still maintained saturation on room air of 91%.     In/Out  No intake or output data in the 24 hours ending 12/03/20 7669  Allergies: No Known Allergies  Current Meds: meropenem (MERREM) 1 g in sterile water 20 mL IV syringe, Q12H  enoxaparin (LOVENOX) injection 40 mg, BID  zinc sulfate (ZINCATE) capsule 50 mg, Daily  vitamin C (ASCORBIC ACID) tablet 1,000 mg, Daily  0.9 % sodium chloride bolus, PRN  budesonide-formoterol (SYMBICORT) 80-4.5 MCG/ACT inhaler 2 puff, 4x daily  insulin glargine (LANTUS) injection vial 10 Units, Nightly  insulin lispro (HUMALOG) injection vial 3 Units, TID WC  acetaminophen (TYLENOL) tablet 650 mg, Q6H PRN    Or  acetaminophen (TYLENOL) suppository 650 mg, Q6H PRN  dexamethasone (DECADRON) tablet 6 mg, Daily  Vitamin D (CHOLECALCIFEROL) tablet 2,000 Units, Daily  allopurinol (ZYLOPRIM) tablet 100 mg, BID  amLODIPine (NORVASC) tablet 5 mg, Daily  aspirin EC tablet 81 mg, Nightly  atorvastatin (LIPITOR) tablet 20 mg, Daily  carvedilol (COREG) tablet 3.125 mg, BID WC  vitamin B-12 (CYANOCOBALAMIN) tablet 500 mcg, QAM  isosorbide mononitrate (IMDUR) extended release tablet 60 mg, Nightly  ranolazine (RANEXA) extended release tablet 500 mg, BID  famotidine (PEPCID) tablet 20 mg, Daily  insulin lispro (HUMALOG) injection vial 0-12 Units, TID WC  insulin lispro (HUMALOG) injection vial 0-6 Units, Nightly  glucose (GLUTOSE) 40 % oral gel 15 g, PRN  dextrose 50 % IV solution, PRN  glucagon (rDNA) injection 1 mg, PRN  dextrose 5 % solution, PRN      Review of Systems    VitalSigns:  BP (!) 159/73   Pulse 71   Temp 97.3 °F (36.3 °C) (Temporal)   Resp 18   Ht 5' 10\" (1.778 m)   Wt 230 lb 3.2 oz (104.4 kg)   SpO2 95%   BMI 33.03 kg/m²      Physical Exam  Line/IV site: No erythema, warmth, induration, or tenderness. Lab Results:  CBC:   Recent Labs     12/01/20 0340 12/02/20  0340 12/03/20  0335   WBC 11.7* 11.5* 12.6*   HGB 12.3* 12.7* 12.0*    256 262     BMP:  Recent Labs     12/01/20 0340 12/02/20 0340 12/03/20  0335 12/03/20  1400    135* 136  --    K 4.9 4.9 5.0 4.8    104 108  --    CO2 19* 18* 18*  --    BUN 53* 52* 48*  --    CREATININE 1.9* 2.0* 1.7*  --    GLUCOSE 150* 131* 114*  --      CultureResults: No new results    Radiology: None    Additional Studies Reviewed:  None    Impression:  1. COVID-19 infection/COVID-19 pneumonia  Coronary artery disease  Diabetes mellitus  History of nephrectomy for renal cell cancer    Recommendations:  He is improving  Continue IV antibiotic treatment  Continue dexamethasone  Likely discharge home tomorrow  It appears he might not require home oxygen  Chest x-ray in a.m.     Sunny Degroot MD

## 2020-12-04 NOTE — PROGRESS NOTES
Patient O2 sat on 2L at rest 96-97%. O2 removed, after 30 minutes O2 sat remained 95-96% at rest on room air. Patient up and ambulated in room for approximately 5 minutes continuously and O2 sat ranged from 90-92% on room air.     Electronically signed by Ilana Rojo RN on 12/3/2020 at 6:30 PM

## 2020-12-04 NOTE — PROGRESS NOTES
Spoke with pt's wife to provide spiritual care. Pt's wife says pt is supposed to discharge today and is going home. Provided spiritual care with support, and prayer. Pt's wife expressed gratitude for spiritual care.     Electronically signed by Lee Kincaid on 12/4/2020 at 1:21 PM

## 2020-12-05 ENCOUNTER — CARE COORDINATION (OUTPATIENT)
Dept: CASE MANAGEMENT | Age: 70
End: 2020-12-05

## 2020-12-05 PROCEDURE — 1111F DSCHRG MED/CURRENT MED MERGE: CPT | Performed by: NURSE PRACTITIONER

## 2020-12-05 NOTE — CARE COORDINATION
[facility] 45 Transitions Initial Follow Up Call    Call within 2 business days of discharge: Yes    Patient: Ameya Chilel Patient : 1950   MRN: <[id number]>  Reason for Admission: Covid-19  Discharge Date: 20 RARS: Readmission Risk Score: 22      Last Discharge [facility]       Complaint Diagnosis Description Type Department Provider    20 Pneumonia Viral pneumonia . .. ED to Hosp-Admission (Discharged) (ADMITTED) [facility] Kenton Ballard MD; Magdy Hoyt. .. Attempted to contact for an 24hr care transition call and COVID follow up. Unable to reach patient. I left a confidential VM with contact information for a return call. CTN updated. MELANIE Fields RN  Care Transition Nurse.          Non-face-to-face services provided:      Care Transitions 24 Hour Call    Care Transitions Interventions         Follow Up  Future Appointments   Date Time Provider Sylvia Spain   12/10/2020  4:45 PM MISSY Begum [facility]   2020  8:30 AM [facility]   2020  8:45 AM SCHEDULE, L MED ONC MA L MED ONC Michelle [facility]   2021 11:00 AM Levada Brittle, MD [facility] Cardio [facility]       Adriana Jackson, MITCH

## 2020-12-05 NOTE — CARE COORDINATION
[facility] 45 Transitions Initial Follow Up Call    Call within 2 business days of discharge: Yes    Patient: Nickolas Seth Patient : 1950   MRN: [id number]  Reason for Admission: Viral Pneumonia d/t Covid 19  Discharge Date: 20 RARS: Readmission Risk Score: 22      Last Discharge [address] [address]       Complaint Diagnosis Description Type Department Provider    20 Pneumonia Viral pneumonia . .. ED to Hosp-Admission (Discharged) (ADMITTED) [facility] Matt Patel MD; Shanice Lilly. .. Patient returned the call. He states he is doing well. He was up this am feeding his birds and walking the dogs. Denies sob, fever, cough, congestion. States he so happy to be home. Appetite good. New and discontinued medications reviewed. Patient states he has two daughters that are nurses and a son that is a radiologist all with [employer] [employer]. Discussed upcoming appointments. I reminded patient to schedule with ID for appointment within 5-7 days on Monday. Challenges to be reviewed by the provider   Additional needs identified to be addressed with provider No  none    Discussed COVID-19 related testing which was available at this time. Test results were positive. Patient informed of results, if available? Yes         Method of communication with provider : none    Advance Care Planning:   Does patient have an Advance Directive:  reviewed and current. Was this a readmission? No  Patient stated reason for admission: COVID 19  Patients top risk factors for readmission: None noted at this time    Care Transition Nurse (CTN) contacted the patient by telephone to perform post hospital discharge assessment. Verified name and  with patient as identifiers. Provided introduction to self, and explanation of the CTN role. CTN reviewed discharge instructions, medical action plan and red flags with patient who verbalized understanding.  Patient given an opportunity to ask questions and does not have any further 12/18/2020  8:45 AM SCHEDULE, [facility] MED ONC MA [facility] MED ONC Michelle [facility]   2/9/2021 11:00 AM Dru Byers MD [facility] Cardio [facility]       Marsha Macias RN

## 2020-12-07 ENCOUNTER — CARE COORDINATION (OUTPATIENT)
Dept: CASE MANAGEMENT | Age: 70
End: 2020-12-07

## 2020-12-07 ENCOUNTER — TELEPHONE (OUTPATIENT)
Dept: PRIMARY CARE CLINIC | Age: 70
End: 2020-12-07

## 2020-12-07 NOTE — TELEPHONE ENCOUNTER
[facility]    Outreach made within 2 business days of discharge: Yes    Patient: Zane Beckett Patient : 1950   MRN: [id number]    Reason for Admission: Pneumonia due to COVID-19 virus  Active Problems:    Acute respiratory failure with hypoxia [facility])  Discharge Date: 20       Spoke with:     Discharge department/facility: Unable to reach, first attempt. Message left requesting call back. TCM CALL WAS MADE BY THE HOSPITAL TCM NURSE.      Future Appointments   Date Time Provider Sylvia Spain   12/10/2020  4:45 PM MISSY Rodgers [facility]   2020  8:30 AM Sameer Thorne MD [facility]   2020  8:45 AM SCHEDULE, L MED ONC MA [facility] MED ONC Michelle [facility]   2021 11:00 AM Emely Avalos MD [facility] Cardio [address] [place] [address]

## 2020-12-11 ENCOUNTER — CARE COORDINATION (OUTPATIENT)
Dept: CASE MANAGEMENT | Age: 70
End: 2020-12-11

## 2020-12-11 NOTE — CARE COORDINATION
[facility] 45 Transitions Follow Up Call    2020    Patient: Polo Never  Patient : 1950   MRN: <[id number]>  Reason for Admission:   Discharge Date: 20 RARS: Readmission Risk Score: 22       Spoke with: Ana Richard  Patient reports he is doing good. He was out buying dog food. Appetite and fluid intake good. Regular bladder and bowel elimination pattern. He denies fever, chills, cough, sob, dyspnea or pain. Has periods of fatigue. Taking medications as prescribed. Had a virtual visit with his PCP yesterday. No questions or concerns at this time. Has good support system. Will continue to follow. Care Transitions Subsequent and Final Call    Subsequent and Final Calls  Do you have any ongoing symptoms?:  No  Have your medications changed?:  No  Do you have any questions related to your medications?:  No  Do you currently have any active services?:  No  Do you have any needs or concerns that I can assist you with?:  No  Identified Barriers:  None  Care Transitions Interventions  Other Interventions:             Follow Up  Future Appointments   Date Time Provider Sylvia Spain   2020  8:30 AM Claudia Stern MD [facility]   2020  8:45 AM SCHEDULE, L MED ONC MA L MED ONC Michelle [facility]   2021 11:00 AM Natividad Shah MD [facility] Cardio [facility]       Manjinder Hodgson LPN

## 2020-12-17 ENCOUNTER — TELEPHONE (OUTPATIENT)
Dept: CARDIOLOGY | Age: 70
End: 2020-12-17

## 2020-12-17 NOTE — PROGRESS NOTES
Boogiechris Gage   1950 12/18/2020     Chief Complaint   Patient presents with    Follow-up     06/18/2020 Malignant neoplasm of right kidney, except renal pelvis (HCC)         INTERVAL HISTORY/HISTORY OF PRESENT ILLNESS:  Diagnosis   Papillary renal cell carcinoma, August 2018  High-grade  Left kidney cyst  CKD stage III  Treatment summary  2/21/2019right radical nephrectomy @ [facility]  Interval history  The patient presents today for a surveillance follow-up visit. He denies any GI symptoms. He denies any respiratory symptoms today. The patient was recently hospitalized with COVID-19. He had severe infection. He is feeling much better. He is off oxygen. Cancer history  Mr Arlene Ortega was referred for a diagnosis of daily mass concerning for RCC. He is currently being seen by Dr. Bernardo Zee at [facility] with a planned surgery. He presented initially with right flank pain. 8/11/2018CT abdomen without contrast showed a 6.6 cm hyperdense right renal lesion. An exophytic left renal lesion noted and measured 1.9 cm. 10/9/2018ultrasound showed a 6 cm heterogeneous solid appearing lesion at the lower pole of the right kidney. 10/17/2018CT abdomen and pelvis with contrast showed a 6 x 6 x 6.5 cm cystic mass arising from the lower right renal pole. 3 small cysts of the left kidney, largest measuring 2.1 cm.  12/18/2018MRI of the abdomen with contrast at [facility] showed a right lower pole renal mass extending to the hilar line measuring 6.7 x 6.4 x 7.2 cm, suspicious for cystic renal cell carcinoma. 12/19/2018Dr. Bernardo Zee recommended surgery scheduled for February 21, 2019.   1/27/2019she was first seen by me.    2/6/2019CT chest without contrast was unremarkable, except for granulomatous disease.  2/21/2019right radical nephrectomy by Dr. Bernardo Zee at [facility] revealing a 6.4 cm with multifocal, high-grade (grade 3), papillary renal cell carcinoma with marked central cystic changes and FLX DX W/COLLJ SPEC WHEN PFRMD N/A 10/4/2018    Dr Jaja Solorio-Diverticular disease, 10 yr recall    SKIN BIOPSY          SOCIAL HISTORY:  Social History     Socioeconomic History    Marital status:      Spouse name: None    Number of children: None    Years of education: None    Highest education level: None   Occupational History    None   Social Needs    Financial resource strain: None    Food insecurity     Worry: None     Inability: None    Transportation needs     Medical: None     Non-medical: None   Tobacco Use    Smoking status: Former Smoker     Packs/day: 2.00     Years: 32.00     Pack years: 64.00     Start date:      Quit date: 10/5/1998     Years since quittin.2    Smokeless tobacco: Former User   Substance and Sexual Activity    Alcohol use: Yes     Comment: Very Little    Drug use: Never    Sexual activity: None   Lifestyle    Physical activity     Days per week: None     Minutes per session: None    Stress: None   Relationships    Social connections     Talks on phone: None     Gets together: None     Attends Quaker service: None     Active member of club or organization: None     Attends meetings of clubs or organizations: None     Relationship status: None    Intimate partner violence     Fear of current or ex partner: None     Emotionally abused: None     Physically abused: None     Forced sexual activity: None   Other Topics Concern    None   Social History Narrative    None       FAMILY HISTORY:  Family History   Problem Relation Age of Onset    Cancer Mother         lung    Coronary Art Dis Father     Stroke Father     Hypertension Father     High Blood Pressure Brother     Coronary Art Dis Paternal Grandmother     Coronary Art Dis Paternal Grandfather         Current Outpatient Medications   Medication Sig Dispense Refill    budesonide-formoterol (SYMBICORT) 80-4.5 MCG/ACT AERO Inhale 2 puffs into the lungs 2 times daily for 15 days 1 Inhaler 0    zinc sulfate (ZINCATE) 220 (50 Zn) MG capsule Take 1 capsule by mouth daily 30 capsule 0    vitamin C (VITAMIN C) 1000 MG tablet Take 1 tablet by mouth daily 30 tablet 0    atorvastatin (LIPITOR) 20 MG tablet TAKE 1 TABLET BY MOUTH DAILY 90 tablet 2    Respiratory Therapy Supplies (NEBULIZER/TUBING/MOUTHPIECE) KIT 1 kit by Does not apply route daily as needed (persistant cough, sob, or wheezing) 1 kit 0    carvedilol (COREG) 3.125 MG tablet Take 3.125 mg by mouth 2 times daily (with meals)      amLODIPine (NORVASC) 5 MG tablet Take 5 mg by mouth daily      Semaglutide,0.25 or 0.5MG/DOS, (OZEMPIC, 0.25 OR 0.5 MG/DOSE,) 2 MG/1.5ML SOPN INJECT 0.25MG UNDER THE SKIN ONCE WEEKLY FOR 4 WEEK 1.5 mL 1    allopurinol (ZYLOPRIM) 100 MG tablet TAKE ONE TABLET BY MOUTH TWO TIMES DAILY 60 tablet 2    lisinopril (PRINIVIL;ZESTRIL) 20 MG tablet Take 20 mg by mouth daily       ranolazine (RANEXA) 500 MG extended release tablet Take 1 tablet by mouth 2 times daily 60 tablet 3    famotidine (PEPCID) 20 MG tablet Take 1 tablet by mouth 2 times daily 60 tablet 5    isosorbide mononitrate (IMDUR) 60 MG extended release tablet Take 1 tablet by mouth nightly 30 tablet 5    nitroGLYCERIN (NITROSTAT) 0.4 MG SL tablet Place 1 tablet under the tongue every 5 minutes as needed for Chest pain 25 tablet 3    Omega-3 Fatty Acids (FISH OIL) 1200 MG CAPS Take 1,000 mg by mouth 2 times daily       Cyanocobalamin 5000 MCG CAPS Take 5,000 mcg by mouth every morning       aspirin 81 MG tablet Take 81 mg by mouth nightly       Cholecalciferol (VITAMIN D3) 2000 units TABS Take 2,000 Units by mouth 2 times daily       albuterol (PROVENTIL) (2.5 MG/3ML) 0.083% nebulizer solution Take 3 mLs by nebulization every 6 hours as needed for Wheezing or Shortness of Breath (Patient not taking: Reported on 12/11/2020) 120 each 0     No current facility-administered medications for this visit.          REVIEW OF SYSTEMS:    Constitutional: no fever, no night sweats, fatigue;   HEENT: no blurring of vision, no double vision, no hearing difficulty, no tinnitus,no ulceration, no dysphagia  Lungs: no cough, no shortness of breath, no wheeze;   CVS: no palpitation, no chest pain, no shortness of breath;  GI: no abdominal pain, no nausea , no vomiting, no constipation;   RAY: no dysuria, frequency and urgency, no hematuria, no kidney stones;   Musculoskeletal: no joint pain, swelling , stiffness;   Endocrine: no polyuria, polydypsia, no cold or heat intolerence; Hematology/lymphatic: no easy brusing or bleeding, no hx of clotting disorder; no peripheral adenopathy. Dermatology: no skin rash, no eczema, no pruritis;   Psychiatry: no depression, no anxiety,no panic attacks, no suicide ideation; Neurology: no syncope, no seizures, no numbness or tingling of hands, no numbness or tingling of feet, no paresis;     PHYSICAL EXAM:    Vitals signs:  /80   Pulse 86   Temp 96.8 °F (36 °C)   Ht 5' 10\" (1.778 m)   Wt 234 lb 1.6 oz (106.2 kg)   SpO2 95%   BMI 33.59 kg/m²    Pain scale:  Pain Score:   0 - No pain     CONSTITUTIONAL: Alert, appropriate, no acute distress,   EYES: Non icteric, EOM intact, pupils equal round and reactive to light and accommodation. ENT: Oral mucus membranes moist, no oral pharyngeal lesions. External inspection of ears and nose are normal.   NECK: Supple, no masses. No palpable thyroid mass    CHEST/LUNGS: CTA bilaterally, normal respiratory effort   CARDIOVASCULAR: RRR, no murmurs. No lower extremity edema   ABDOMEN: soft non-tender, active bowel sounds, no hepatosplenomegaly. No palpable masses. EXTREMITIES: warm, Full ROM of all fours extremities. No focal weakness. SKIN: warm, dry with no rashes or lesions  LYMPH: No cervical, clavicular, axillary, or inguinal lymphadenopathy  NEUROLOGIC: follows commands, non focal.   PSYCH: mood and affect appropriate. Alert and oriented to time and place and person.     Relevant Lab CONTRAST     Standing Status:   Future     Standing Expiration Date:   12/18/2021      Shabana Schaeffer was seen today for follow-up. Diagnoses and all orders for this visit:    Malignant neoplasm of right kidney, except renal pelvis ([address]  -     CT CHEST WO CONTRAST; Future    Care plan discussed with patient    History of 2019 novel coronavirus disease (COVID-19)       Papillary renal cell carcinoma stage I Feb 2019  S/p right nephrectomy at [facility] by Dr. Aly Calvillo on 2/21/2019  As per NCCN guidelines:  H&P every 6 months for 2 years, then annually after 5 years  CT abdomen within 3-12 months of surgery, then at the physician's discretion  CT chest annually up to 3 years (next June 2021)    Chronic kidney diseasesecondary to prior nephrectomy. The patient also has a history of hypertension, diabetes. He was counseled regarding avoiding NSAIDs any street control of his hypertension and diabetes. He follows up with nephrology. Last Cr 2.0->1.6    CKD related anemiahemoglobin 12.5. We will continue to watch. Hypertension-controlled. Follow up with primary care physician    Diabetes-stable follow-up with primary care physician. Mild normocytic anemiahemoglobin 12.5    Health maintenance  The patient is to follow-up with primary care for further recommendation regarding age appropriate screening, well-being visit, follow-up and treatment of other medical comorbidities. Plan:  CT chest w/o June 2021  RTC after CT      Follow Up:     Return in about 6 months (around 6/18/2021) for Appointment with Dr. Shelby Cullen after CT. CT Chest for June 2021     [employer] [place], Brodie Soulier, beverley scribing for Joan Taylor MD. Electronically signed by Brodie Soulier, RN on 12/18/2020 at 8:57 AM CST. I, Dr González Miranda, personally performed the services described in this documentation as scribed by Brodie Soulier, RN in my presence and is both accurate and complete.     I have seen, examined and reviewed this patient medication list, appropriate labs and imaging studies. I reviewed relevant medical records and others physicians notes. I discussed the plans of care with the patient. I answered all the questions to the patients satisfaction  (Please note that portions of this note were completed with a voice recognition program. Efforts were made to edit the dictations but occasionally words are mis-transcribed.)    Over 50% of the total visit time of 15 minutes in face to face encounter with the patient, out of which more than 50% of the time was spent in counseling patient or family and coordination of care. Counseling included but was not limited to time spent reviewing labs, imaging studies/ treatment plan and answering questions.

## 2020-12-17 NOTE — TELEPHONE ENCOUNTER
Dr Fer Campos is recommending they establish care with a general cardiologist. 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  Called & left msg for pt to ret call & rsd to another md.

## 2020-12-18 ENCOUNTER — HOSPITAL ENCOUNTER (OUTPATIENT)
Dept: INFUSION THERAPY | Age: 70
Discharge: HOME | End: 2020-12-18
Payer: MEDICARE

## 2020-12-18 ENCOUNTER — CARE COORDINATION (OUTPATIENT)
Dept: CARE COORDINATION | Age: 70
End: 2020-12-18

## 2020-12-18 ENCOUNTER — OFFICE VISIT (OUTPATIENT)
Dept: HEMATOLOGY | Age: 70
End: 2020-12-18
Payer: MEDICARE

## 2020-12-18 VITALS
HEIGHT: 70 IN | OXYGEN SATURATION: 95 % | DIASTOLIC BLOOD PRESSURE: 80 MMHG | SYSTOLIC BLOOD PRESSURE: 128 MMHG | BODY MASS INDEX: 33.52 KG/M2 | HEART RATE: 86 BPM | TEMPERATURE: 96.8 F | WEIGHT: 234.1 LBS

## 2020-12-18 DIAGNOSIS — C64.1 MALIGNANT NEOPLASM OF RIGHT KIDNEY, EXCEPT RENAL PELVIS (HCC): ICD-10-CM

## 2020-12-18 LAB
BASOPHILS ABSOLUTE: 0.06 K/UL (ref 0.01–0.08)
BASOPHILS RELATIVE PERCENT: 0.9 % (ref 0.1–1.2)
EOSINOPHILS ABSOLUTE: 0.24 K/UL (ref 0.04–0.54)
EOSINOPHILS RELATIVE PERCENT: 3.4 % (ref 0.7–7)
HCT VFR BLD CALC: 36.1 % (ref 40.1–51)
HEMOGLOBIN: 11.6 G/DL (ref 13.7–17.5)
LYMPHOCYTES ABSOLUTE: 0.86 K/UL (ref 1.18–3.74)
LYMPHOCYTES RELATIVE PERCENT: 12.2 % (ref 19.3–53.1)
MCH RBC QN AUTO: 31.5 PG (ref 25.7–32.2)
MCHC RBC AUTO-ENTMCNC: 32.1 G/DL (ref 32.3–36.5)
MCV RBC AUTO: 98.1 FL (ref 79–92.2)
MONOCYTES ABSOLUTE: 0.9 K/UL (ref 0.24–0.82)
MONOCYTES RELATIVE PERCENT: 12.8 % (ref 4.7–12.5)
NEUTROPHILS ABSOLUTE: 4.97 K/UL (ref 1.56–6.13)
NEUTROPHILS RELATIVE PERCENT: 70.7 % (ref 34–71.1)
PDW BLD-RTO: 13.3 % (ref 11.6–14.4)
PLATELET # BLD: 95 K/UL (ref 163–337)
PMV BLD AUTO: 11.6 FL (ref 7.4–10.4)
RBC # BLD: 3.68 M/UL (ref 4.63–6.08)
WBC # BLD: 7.03 K/UL (ref 4.23–9.07)

## 2020-12-18 PROCEDURE — 99211 OFF/OP EST MAY X REQ PHY/QHP: CPT

## 2020-12-18 PROCEDURE — 1123F ACP DISCUSS/DSCN MKR DOCD: CPT | Performed by: INTERNAL MEDICINE

## 2020-12-18 PROCEDURE — 85025 COMPLETE CBC W/AUTO DIFF WBC: CPT

## 2020-12-18 PROCEDURE — 4040F PNEUMOC VAC/ADMIN/RCVD: CPT | Performed by: INTERNAL MEDICINE

## 2020-12-18 PROCEDURE — G8428 CUR MEDS NOT DOCUMENT: HCPCS | Performed by: INTERNAL MEDICINE

## 2020-12-18 PROCEDURE — 99213 OFFICE O/P EST LOW 20 MIN: CPT | Performed by: INTERNAL MEDICINE

## 2020-12-18 PROCEDURE — G8484 FLU IMMUNIZE NO ADMIN: HCPCS | Performed by: INTERNAL MEDICINE

## 2020-12-18 PROCEDURE — 1111F DSCHRG MED/CURRENT MED MERGE: CPT | Performed by: INTERNAL MEDICINE

## 2020-12-18 PROCEDURE — G8417 CALC BMI ABV UP PARAM F/U: HCPCS | Performed by: INTERNAL MEDICINE

## 2020-12-18 PROCEDURE — 3017F COLORECTAL CA SCREEN DOC REV: CPT | Performed by: INTERNAL MEDICINE

## 2020-12-18 PROCEDURE — 1036F TOBACCO NON-USER: CPT | Performed by: INTERNAL MEDICINE

## 2020-12-29 ENCOUNTER — HOSPITAL ENCOUNTER (OUTPATIENT)
Dept: GENERAL RADIOLOGY | Age: 70
Discharge: HOME | End: 2020-12-29
Payer: MEDICARE

## 2020-12-29 ENCOUNTER — HOSPITAL ENCOUNTER (OUTPATIENT)
Dept: LAB | Age: 70
Discharge: HOME | End: 2020-12-29
Payer: MEDICARE

## 2020-12-29 DIAGNOSIS — E11.8 TYPE 2 DIABETES MELLITUS WITH COMPLICATION, WITHOUT LONG-TERM CURRENT USE OF INSULIN (HCC): ICD-10-CM

## 2020-12-29 LAB
ALBUMIN SERPL-MCNC: 4.3 G/DL (ref 3.5–5.2)
ALP BLD-CCNC: 79 U/L (ref 40–130)
ALT SERPL-CCNC: 23 U/L (ref 5–41)
ANION GAP SERPL CALCULATED.3IONS-SCNC: 9 MMOL/L (ref 7–19)
AST SERPL-CCNC: 20 U/L (ref 5–40)
BILIRUB SERPL-MCNC: 0.3 MG/DL (ref 0.2–1.2)
BUN BLDV-MCNC: 25 MG/DL (ref 8–23)
CALCIUM SERPL-MCNC: 9.2 MG/DL (ref 8.8–10.2)
CHLORIDE BLD-SCNC: 109 MMOL/L (ref 98–111)
CO2: 26 MMOL/L (ref 22–29)
CREAT SERPL-MCNC: 1.7 MG/DL (ref 0.5–1.2)
GFR AFRICAN AMERICAN: 48
GFR NON-AFRICAN AMERICAN: 40
GLUCOSE BLD-MCNC: 116 MG/DL (ref 74–109)
POTASSIUM SERPL-SCNC: 4.6 MMOL/L (ref 3.5–5)
SODIUM BLD-SCNC: 144 MMOL/L (ref 136–145)
TOTAL PROTEIN: 7 G/DL (ref 6.6–8.7)

## 2020-12-29 PROCEDURE — 71046 X-RAY EXAM CHEST 2 VIEWS: CPT

## 2021-01-18 ENCOUNTER — CARE COORDINATION (OUTPATIENT)
Dept: CASE MANAGEMENT | Age: 71
End: 2021-01-18

## 2021-01-18 NOTE — CARE COORDINATION
[facility] 45 Transitions Follow Up Call    2021    Patient: Nancy Mendoza  Patient : 1950   MRN: [id number]  Reason for Admission: Viral PNA  Discharge Date: 20 RARS: Readmission Risk Score: 22         Spoke with: Marycarmen Yolandagosia stated he \"couldn't be better\". Stated he plans on going up to hospital and participating in cardiac rehab three times a week. Stated he lost 10 # in hospital and then put 15 # on after discharge, lost the extra 5# recently and wants to keep it off. Stated he doesn't think he is due for Diabetes check up. Pt was sent message on 21 for reminder to follow up. No other concerns at this time. Care Transitions Subsequent and Final Call    Subsequent and Final Calls  Care Transitions Interventions  Other Interventions:            Follow Up  Future Appointments   Date Time Provider Sylvia Spain   2021  2:45 PM [facility] Sharath Ayers DO N LPS Cardio [facility]   2021  9:20 AM [facility] LMP CT RM 1 [facility] L[facility] CT [facility] Rad   2021  8:30 AM MD NANI Ayala PAD HEMONC [facility]   2021  8:45 AM SCHEDULE, [facility] MED ONC MA [facility] MED ONC Michelle Albright RN

## 2021-01-19 RX ORDER — RANOLAZINE 500 MG/1
500 TABLET, EXTENDED RELEASE ORAL 2 TIMES DAILY
Qty: 60 TABLET | Refills: 3 | Status: SHIPPED | OUTPATIENT
Start: 2021-01-19 | End: 2021-05-24

## 2021-02-01 DIAGNOSIS — E79.0 ELEVATED BLOOD URIC ACID LEVEL: ICD-10-CM

## 2021-02-01 DIAGNOSIS — M10.9 GOUT, UNSPECIFIED CAUSE, UNSPECIFIED CHRONICITY, UNSPECIFIED SITE: ICD-10-CM

## 2021-02-01 RX ORDER — ALLOPURINOL 100 MG/1
TABLET ORAL
Qty: 30 TABLET | Refills: 2 | Status: SHIPPED | OUTPATIENT
Start: 2021-02-01 | End: 2021-03-24

## 2021-02-02 ENCOUNTER — CARE COORDINATION (OUTPATIENT)
Dept: CASE MANAGEMENT | Age: 71
End: 2021-02-02

## 2021-02-02 NOTE — CARE COORDINATION
[facility] Transitions Follow Up Call    2021    Patient: Ton Lai  Patient : 1950   MRN: [id number]  Reason for Admission:   Discharge Date: 20 RARS: Readmission Risk Score: 25         Spoke with: Ewa Ponce, patient    Care Transitions Subsequent and Final Call    Subsequent and Final Calls  Do you have any ongoing symptoms?: No  Have your medications changed?: No  Do you have any questions related to your medications?: No  Do you currently have any active services?: No  Do you have any needs or concerns that I can assist you with?: No  Identified Barriers: None  Care Transitions Interventions  Other Interventions: Follow Up:  Contacted patient for BPCI-A follow up. Spoke very briefly with patient. Mian Ashby stated that he is doing great. Reports that he is back to normal.  No c/o chest pain/discomfort, shortness of breath, cough ,fever, chills. He stated his appetite is \"too good\" and was told by his doctor he needs to back down a bit. He stated he is aware of when to contact his doctor with any new or worsening symptoms. Mian Ashby also stated that he will be starting cardiac rehab on Thursday, 21. No questions or concerns at this time. Will continue to follow.       Future Appointments   Date Time Provider Sylvia Spain   2021  9:00 AM L CARDIAC REHAB PHASE II PROVIDER CLASS L CARDIAC [facility]   2021  2:45 PM Lorenzo Urban DO N LPS Cardio [facility]   2021  9:20 AM L LMP CT RM 1 [facility] LMP CT [facility] Rad   2021  8:30 AM Arianda Salazar MD N PAD HEMONC [facility]   2021  8:45 AM SCHEDULE, [facility] MED ONC MA [facility] Michelle Martínez RN

## 2021-02-04 ENCOUNTER — HOSPITAL ENCOUNTER (OUTPATIENT)
Dept: CARDIAC REHAB | Age: 71
Setting detail: THERAPIES SERIES
Discharge: HOME | End: 2021-02-04
Payer: MEDICARE

## 2021-02-05 ENCOUNTER — HOSPITAL ENCOUNTER (OUTPATIENT)
Dept: CARDIAC REHAB | Age: 71
Setting detail: THERAPIES SERIES
Discharge: HOME | End: 2021-02-05
Payer: MEDICARE

## 2021-02-05 PROCEDURE — 93798 PHYS/QHP OP CAR RHAB W/ECG: CPT

## 2021-02-08 ENCOUNTER — HOSPITAL ENCOUNTER (OUTPATIENT)
Dept: CARDIAC REHAB | Age: 71
Setting detail: THERAPIES SERIES
Discharge: HOME | End: 2021-02-08
Payer: MEDICARE

## 2021-02-08 PROCEDURE — 93798 PHYS/QHP OP CAR RHAB W/ECG: CPT

## 2021-02-10 ENCOUNTER — TELEPHONE (OUTPATIENT)
Dept: CARDIOLOGY CLINIC | Age: 71
End: 2021-02-10

## 2021-02-10 ENCOUNTER — HOSPITAL ENCOUNTER (OUTPATIENT)
Dept: CARDIAC REHAB | Age: 71
Setting detail: THERAPIES SERIES
Discharge: HOME | End: 2021-02-10
Payer: MEDICARE

## 2021-02-10 PROCEDURE — 93798 PHYS/QHP OP CAR RHAB W/ECG: CPT

## 2021-02-10 NOTE — TELEPHONE ENCOUNTER
Rogers Smith requests that office return their call. The best time to reach him is Anytime. Patient needs get his 6 month NP appointment rescheduled in [facility] call patient. Thank you.

## 2021-02-12 ENCOUNTER — HOSPITAL ENCOUNTER (OUTPATIENT)
Dept: CARDIAC REHAB | Age: 71
Setting detail: THERAPIES SERIES
Discharge: HOME | End: 2021-02-12
Payer: MEDICARE

## 2021-02-12 PROCEDURE — 93798 PHYS/QHP OP CAR RHAB W/ECG: CPT

## 2021-02-18 ENCOUNTER — CARE COORDINATION (OUTPATIENT)
Dept: CASE MANAGEMENT | Age: 71
End: 2021-02-18

## 2021-02-18 RX ORDER — SEMAGLUTIDE 1.34 MG/ML
INJECTION, SOLUTION SUBCUTANEOUS
Qty: 1.5 ML | Refills: 1 | Status: SHIPPED | OUTPATIENT
Start: 2021-02-18 | End: 2021-04-16

## 2021-02-18 NOTE — CARE COORDINATION
[facility] Transitions Follow Up Call    2021    Patient: Jaqueline Mccracken  Patient : 1950   MRN: <[id number]>  Reason for Admission:   Discharge Date: 20 RARS: Readmission Risk Score: 22       Attempted to reach patient by phone regarding follow up; BPCI-A. Unable to reach patient; no answer. Will attempt outreach at a later time.        Karen Don RN

## 2021-02-22 ENCOUNTER — HOSPITAL ENCOUNTER (OUTPATIENT)
Dept: CARDIAC REHAB | Age: 71
Setting detail: THERAPIES SERIES
Discharge: HOME | End: 2021-02-22
Payer: MEDICARE

## 2021-02-22 PROCEDURE — 93798 PHYS/QHP OP CAR RHAB W/ECG: CPT

## 2021-02-24 ENCOUNTER — HOSPITAL ENCOUNTER (OUTPATIENT)
Dept: CARDIAC REHAB | Age: 71
Setting detail: THERAPIES SERIES
Discharge: HOME | End: 2021-02-24
Payer: MEDICARE

## 2021-02-24 PROCEDURE — 93798 PHYS/QHP OP CAR RHAB W/ECG: CPT

## 2021-02-25 ENCOUNTER — CARE COORDINATION (OUTPATIENT)
Dept: CASE MANAGEMENT | Age: 71
End: 2021-02-25

## 2021-02-25 NOTE — CARE COORDINATION
[facility] Transitions Bundled Payments for Care Improvement (BPCI) Follow Up Call  Qualifying Diagnosis related to Pulmonary Function and Health.    2/25/2021  Patient Name:  Ton Lai   YOB: 1950  Discharge Date:  12/4/20  RARS:  Readmission Risk Score: 22    PCP:  MISSY Ervin    States doing fine, is participating in cardiac rehab with [facility]. Getting better every day.   Care Transitions will continue to follow per BPCI Program.  Susan Roa RN, CTN      Future Appointments   Date Time Provider Sylvia Spain   2/26/2021  7:30 AM [facility] CARDIAC REHAB PHASE II PROVIDER CLASS [facility] CARDIAC [facility]   3/1/2021  7:30 AM [facility] CARDIAC REHAB PHASE II PROVIDER CLASS [facility] CARDIAC [facility]   3/3/2021  7:30 AM [facility] CARDIAC REHAB PHASE II PROVIDER CLASS [facility] CARDIAC [facility]   3/5/2021  7:30 AM [facility] CARDIAC REHAB PHASE II PROVIDER CLASS [facility] CARDIAC [facility]   3/8/2021  7:30 AM [facility] CARDIAC REHAB PHASE II PROVIDER CLASS [facility] CARDIAC [facility]   3/10/2021  7:30 AM [facility] CARDIAC REHAB PHASE II PROVIDER CLASS [facility] CARDIAC [facility]   3/12/2021  7:30 AM [facility] CARDIAC REHAB PHASE II PROVIDER CLASS [facility] CARDIAC [facility]   3/15/2021  7:30 AM [facility] CARDIAC REHAB PHASE II PROVIDER CLASS [facility] CARDIAC [facility]   3/17/2021  7:30 AM [facility] CARDIAC REHAB PHASE II PROVIDER CLASS [facility] CARDIAC [facility]   3/19/2021  7:30 AM [facility] CARDIAC REHAB PHASE II PROVIDER CLASS [facility] CARDIAC [facility]   3/22/2021  7:30 AM [facility] CARDIAC REHAB PHASE II PROVIDER CLASS [facility] CARDIAC [facility]   3/24/2021  7:30 AM [facility] CARDIAC REHAB PHASE II PROVIDER CLASS [facility]   3/26/2021  7:30 AM [facility] CARDIAC REHAB PHASE II PROVIDER CLASS [facility] CARD[facility]   3/29/2021  7:30 AM [facility] CARDIAC REHAB PHASE II PROVIDER CLASS [facility]   3/31/2021  7:30 AM [facility] CARDIAC REHAB PHASE II PROVIDER CLASS [facility] CARDIAC [facility]   4/2/2021  7:30 AM [facility] CARDIAC REHAB PHASE II PROVIDER CLASS [facility] CARDIAC [facility] 4/5/2021  7:30 AM [facility] CARDIAC REHAB PHASE II PROVIDER CLASS [facility] CARDI[facility]   4/6/2021  1:30 PM DO NANI Roberson LPS Cardio [facility]   4/7/2021  7:30 AM [facility] CARDIAC REHAB PHASE II PROVIDER CLASS [facility] CARDIAC [facility]   4/9/2021  7:30 AM [facility] CARDIAC REHAB PHASE II PROVIDER CLASS [facility]   4/12/2021  7:30 AM [facility] CARDIAC REHAB PHASE II PROVIDER CLASS [facility]   4/14/2021  7:30 AM [facility] CARDIAC REHAB PHASE II PROVIDER CLASS [facility]   4/16/2021  7:30 AM [facility] CARDIAC REHAB PHASE II PROVIDER CLASS [facility]   4/19/2021  7:30 AM [facility] CARDIAC REHAB PHASE II PROVIDER CLASS [facility]   4/21/2021  7:30 AM [facility] CARDIAC REHAB PHASE II PROVIDER CLASS [facility]   4/23/2021  7:30 AM [facility] CARDIAC REHAB PHASE II PROVIDER CLASS [facility]   4/26/2021  7:30 AM [facility] CARDIAC REHAB PHASE II PROVIDER CLASS [facility]   4/28/2021  7:30 AM [facility] CARDIAC REHAB PHASE II PROVIDER CLASS [facility]   4/30/2021  7:30 AM [facility] CARDIAC REHAB PHASE II PROVIDER CLASS [facility]   5/3/2021  7:30 AM [facility] CARDIAC REHAB PHASE II PROVIDER CLASS [facility]   5/5/2021  7:30 AM [facility] CARDIAC REHAB PHASE II PROVIDER CLASS [facility]   6/11/2021  9:20 AM [facility] LMP CT RM 1 [facility] LMP CT [facility] L[facility] Rad   6/18/2021  8:30 AM Osito Torres MD N PAD HEMONC [facility]   6/18/2021  8:45 AM SCHEDULE, [facility] MED ONC MA [facility] Michelle HOD

## 2021-02-26 ENCOUNTER — HOSPITAL ENCOUNTER (OUTPATIENT)
Dept: CARDIAC REHAB | Age: 71
Setting detail: THERAPIES SERIES
Discharge: HOME | End: 2021-02-26
Payer: MEDICARE

## 2021-02-26 PROCEDURE — 93798 PHYS/QHP OP CAR RHAB W/ECG: CPT

## 2021-03-04 ENCOUNTER — CARE COORDINATION (OUTPATIENT)
Dept: CASE MANAGEMENT | Age: 71
End: 2021-03-04

## 2021-03-04 NOTE — CARE COORDINATION
[facility] Transitions Follow Up Call    3/4/2021    Patient: Cassandra Horton  Patient : 1950   MRN: <[id number]>  Reason for Admission:   Discharge Date: 20 RARS: Readmission Risk Score: 22         Spoke with: Patient    Patient states he is doing very well. Just returning from [place]. No C/O SOB, wheezing, cough, chest pain, fatigue, fever, appetite good. Patient has no needs or concerns for writer at this time. Instructed patient that this is the Final [facility] Call (Patient has been followed for 90 days) and to call PCP/Specialist for any problems or issues that occur. Patient Expresses Understanding. Maeve Salinas LPN    [phone]  [facility] / [address] [place] [address] Transitions Subsequent and Final Call    Subsequent and Final Calls  Do you have any ongoing symptoms?: No  Have your medications changed?: No  Do you have any questions related to your medications?: No  Do you currently have any active services?: No  Do you have any needs or concerns that I can assist you with?: No  Identified Barriers: None  Care Transitions Interventions  Other Interventions:            Follow Up  Future Appointments   Date Time Provider Sylvia Spain   3/5/2021  7:30 AM [facility] CARDIAC REHAB PHASE II PROVIDER CLASS [facility] CARDIAC [facility]   3/8/2021  7:30 AM [facility] CARDIAC REHAB PHASE II PROVIDER CLASS [facility]   3/10/2021  7:30 AM [facility] CARDIAC REHAB PHASE II PROVIDER CLASS [facility]   3/12/2021  7:30 AM [facility] CARDIAC REHAB PHASE II PROVIDER CLASS [facility] C[facility]IAC [facility]   3/15/2021  7:30 AM [facility] CARDIAC REHAB PHASE II PROVIDER CLASS [facility]   3/17/2021  7:30 AM [facility] CARDIAC REHAB PHASE II PROVIDER CLASS [facility] CARDIAC [facility]   3/19/2021  7:30 AM [facility] CARDIAC REHAB PHASE II PROVIDER CLASS [facility] C[facility]IAC [facility]   3/22/2021  7:30 AM [facility] CARDIAC REHAB PHASE II PROVIDER CLASS [facility] C[facility]IAC [facility]   3/24/2021  7:30 AM [facility] CARDIAC REHAB PHASE II

## 2021-03-05 ENCOUNTER — HOSPITAL ENCOUNTER (OUTPATIENT)
Dept: CARDIAC REHAB | Age: 71
Setting detail: THERAPIES SERIES
Discharge: HOME | End: 2021-03-05
Payer: MEDICARE

## 2021-03-05 PROCEDURE — 93798 PHYS/QHP OP CAR RHAB W/ECG: CPT

## 2021-03-08 ENCOUNTER — HOSPITAL ENCOUNTER (OUTPATIENT)
Dept: CARDIAC REHAB | Age: 71
Setting detail: THERAPIES SERIES
Discharge: HOME | End: 2021-03-08
Payer: MEDICARE

## 2021-03-08 PROCEDURE — 93798 PHYS/QHP OP CAR RHAB W/ECG: CPT

## 2021-03-10 ENCOUNTER — HOSPITAL ENCOUNTER (OUTPATIENT)
Dept: CARDIAC REHAB | Age: 71
Setting detail: THERAPIES SERIES
Discharge: HOME | End: 2021-03-10
Payer: MEDICARE

## 2021-03-10 PROCEDURE — 93798 PHYS/QHP OP CAR RHAB W/ECG: CPT

## 2021-03-12 ENCOUNTER — HOSPITAL ENCOUNTER (OUTPATIENT)
Dept: CARDIAC REHAB | Age: 71
Setting detail: THERAPIES SERIES
Discharge: HOME | End: 2021-03-12
Payer: MEDICARE

## 2021-03-12 PROCEDURE — 93798 PHYS/QHP OP CAR RHAB W/ECG: CPT

## 2021-03-15 ENCOUNTER — HOSPITAL ENCOUNTER (OUTPATIENT)
Dept: CARDIAC REHAB | Age: 71
Setting detail: THERAPIES SERIES
Discharge: HOME | End: 2021-03-15
Payer: MEDICARE

## 2021-03-15 PROCEDURE — 93798 PHYS/QHP OP CAR RHAB W/ECG: CPT

## 2021-03-19 ENCOUNTER — HOSPITAL ENCOUNTER (OUTPATIENT)
Dept: CARDIAC REHAB | Age: 71
Setting detail: THERAPIES SERIES
Discharge: HOME | End: 2021-03-19
Payer: MEDICARE

## 2021-03-19 PROCEDURE — 93798 PHYS/QHP OP CAR RHAB W/ECG: CPT

## 2021-03-22 ENCOUNTER — HOSPITAL ENCOUNTER (OUTPATIENT)
Dept: CARDIAC REHAB | Age: 71
Setting detail: THERAPIES SERIES
Discharge: HOME | End: 2021-03-22
Payer: MEDICARE

## 2021-03-22 PROCEDURE — 93798 PHYS/QHP OP CAR RHAB W/ECG: CPT

## 2021-03-24 ENCOUNTER — HOSPITAL ENCOUNTER (OUTPATIENT)
Dept: CARDIAC REHAB | Age: 71
Setting detail: THERAPIES SERIES
Discharge: HOME | End: 2021-03-24
Payer: MEDICARE

## 2021-03-24 DIAGNOSIS — M10.9 GOUT, UNSPECIFIED CAUSE, UNSPECIFIED CHRONICITY, UNSPECIFIED SITE: ICD-10-CM

## 2021-03-24 DIAGNOSIS — E79.0 ELEVATED BLOOD URIC ACID LEVEL: ICD-10-CM

## 2021-03-24 PROCEDURE — 93798 PHYS/QHP OP CAR RHAB W/ECG: CPT

## 2021-03-24 RX ORDER — ALLOPURINOL 100 MG/1
TABLET ORAL
Qty: 30 TABLET | Refills: 2 | Status: SHIPPED | OUTPATIENT
Start: 2021-03-24 | End: 2021-05-13

## 2021-03-31 ENCOUNTER — HOSPITAL ENCOUNTER (OUTPATIENT)
Dept: CARDIAC REHAB | Age: 71
Setting detail: THERAPIES SERIES
Discharge: HOME | End: 2021-03-31
Payer: MEDICARE

## 2021-03-31 PROCEDURE — 93798 PHYS/QHP OP CAR RHAB W/ECG: CPT

## 2021-04-02 ENCOUNTER — HOSPITAL ENCOUNTER (OUTPATIENT)
Dept: CARDIAC REHAB | Age: 71
Setting detail: THERAPIES SERIES
Discharge: HOME | End: 2021-04-02
Payer: MEDICARE

## 2021-04-02 PROCEDURE — 93798 PHYS/QHP OP CAR RHAB W/ECG: CPT

## 2021-04-05 ENCOUNTER — HOSPITAL ENCOUNTER (OUTPATIENT)
Dept: CARDIAC REHAB | Age: 71
Setting detail: THERAPIES SERIES
Discharge: HOME | End: 2021-04-05
Payer: MEDICARE

## 2021-04-05 PROCEDURE — 93798 PHYS/QHP OP CAR RHAB W/ECG: CPT

## 2021-04-06 ENCOUNTER — OFFICE VISIT (OUTPATIENT)
Dept: CARDIOLOGY CLINIC | Age: 71
End: 2021-04-06
Payer: MEDICARE

## 2021-04-06 VITALS
HEIGHT: 70 IN | OXYGEN SATURATION: 96 % | DIASTOLIC BLOOD PRESSURE: 78 MMHG | BODY MASS INDEX: 34.36 KG/M2 | SYSTOLIC BLOOD PRESSURE: 130 MMHG | WEIGHT: 240 LBS | HEART RATE: 78 BPM

## 2021-04-06 DIAGNOSIS — E11.8 TYPE 2 DIABETES MELLITUS WITH COMPLICATION, WITHOUT LONG-TERM CURRENT USE OF INSULIN (HCC): ICD-10-CM

## 2021-04-06 DIAGNOSIS — E78.00 HYPERCHOLESTEREMIA: ICD-10-CM

## 2021-04-06 DIAGNOSIS — I25.10 CORONARY ARTERY DISEASE INVOLVING NATIVE CORONARY ARTERY OF NATIVE HEART WITHOUT ANGINA PECTORIS: Primary | ICD-10-CM

## 2021-04-06 DIAGNOSIS — I10 HYPERTENSION, ESSENTIAL, BENIGN: ICD-10-CM

## 2021-04-06 PROCEDURE — G8417 CALC BMI ABV UP PARAM F/U: HCPCS | Performed by: INTERNAL MEDICINE

## 2021-04-06 PROCEDURE — 3017F COLORECTAL CA SCREEN DOC REV: CPT | Performed by: INTERNAL MEDICINE

## 2021-04-06 PROCEDURE — 99214 OFFICE O/P EST MOD 30 MIN: CPT | Performed by: INTERNAL MEDICINE

## 2021-04-06 PROCEDURE — 3046F HEMOGLOBIN A1C LEVEL >9.0%: CPT | Performed by: INTERNAL MEDICINE

## 2021-04-06 PROCEDURE — G8427 DOCREV CUR MEDS BY ELIG CLIN: HCPCS | Performed by: INTERNAL MEDICINE

## 2021-04-06 PROCEDURE — 4040F PNEUMOC VAC/ADMIN/RCVD: CPT | Performed by: INTERNAL MEDICINE

## 2021-04-06 PROCEDURE — 2022F DILAT RTA XM EVC RTNOPTHY: CPT | Performed by: INTERNAL MEDICINE

## 2021-04-06 PROCEDURE — 1036F TOBACCO NON-USER: CPT | Performed by: INTERNAL MEDICINE

## 2021-04-06 PROCEDURE — 1123F ACP DISCUSS/DSCN MKR DOCD: CPT | Performed by: INTERNAL MEDICINE

## 2021-04-06 RX ORDER — CLOPIDOGREL BISULFATE 75 MG/1
75 TABLET ORAL DAILY
COMMUNITY
Start: 2021-01-12 | End: 2022-01-12

## 2021-04-06 RX ORDER — PSEUDOEPHEDRINE HCL 30 MG
100 TABLET ORAL 2 TIMES DAILY
COMMUNITY
End: 2021-04-14

## 2021-04-06 ASSESSMENT — ENCOUNTER SYMPTOMS
BLOOD IN STOOL: 0
SHORTNESS OF BREATH: 0
ANAL BLEEDING: 0
COUGH: 0

## 2021-04-06 NOTE — PROGRESS NOTES
Office Visit  Zaida Pack is a 70 y.o. male; who present today for 6 Month Follow-Up (NTP)      HPI  I am seeing this 70-year-old white male in routine follow-up but it is the first time I am seeing him as a new cardiologist.  He had four-vessel bypass graft surgery in 2008 and his most 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 This was a protected vessel with known patent left internal mammary graft to LAD and right internal mammary graft to the diagonal artery. LV contractility was normal.  This patient states that his symptoms are probably marginally improved and he is a good historian and relates that he gets anginal type discomfort in cold weather only such as in the evening. It resolves after several minutes of getting back into the warm air where if he uses nitroglycerin which she does not use much off. Other than that he can be fairly active during the day in warmer or moderate temperature without any chest discomfort. The discomfort he experiences painful in the left chest with no radiation and no associated dyspnea. It has been consistent this way for quite some time. He denies shortness of breath with his daily activities and denies any significant palpitations, no presyncope or syncope.   Current Outpatient Medications   Medication Sig Dispense Refill    clopidogrel (PLAVIX) 75 MG tablet Take 75 mg by mouth daily      docusate (COLACE, DULCOLAX) 100 MG CAPS Take 100 mg by mouth 2 times daily      allopurinol (ZYLOPRIM) 100 MG tablet TAKE ONE TABLET BY MOUTH TWO TIMES DAILY 30 tablet 2    OZEMPIC, 0.25 OR 0.5 MG/DOSE, 2 MG/1.5ML SOPN INJECT 0.25MG UNDER THE SKIN ONCE WEEKLY FOR 4 WEEK 1.5 mL 1    ranolazine (RANEXA) 500 MG extended release tablet TAKE 1 TABLET BY MOUTH 2 TIMES DAILY 60 tablet 3    atorvastatin (LIPITOR) 20 MG tablet TAKE 1 TABLET BY MOUTH DAILY (Patient taking differently: Take 40 mg by mouth daily ) 90 tablet 2    carvedilol (COREG) 3.125 MG tablet Take 3.125 mg by mouth 2 times daily (with meals)      amLODIPine (NORVASC) 10 MG tablet Take 10 mg by mouth daily       lisinopril (PRINIVIL;ZESTRIL) 20 MG tablet Take 20 mg by mouth daily       isosorbide mononitrate (IMDUR) 60 MG extended release tablet Take 1 tablet by mouth nightly (Patient taking differently: Take 120 mg by mouth daily Takes 2 tablets daily) 30 tablet 5    nitroGLYCERIN (NITROSTAT) 0.4 MG SL tablet Place 1 tablet under the tongue every 5 minutes as needed for Chest pain 25 tablet 3    Omega-3 Fatty Acids (FISH OIL) 1200 MG CAPS Take 1,000 mg by mouth 2 times daily       Cyanocobalamin 5000 MCG CAPS Take 5,000 mcg by mouth every morning       aspirin 81 MG tablet Take 81 mg by mouth nightly       Cholecalciferol (VITAMIN D3) 2000 units TABS Take 2,000 Units by mouth 2 times daily       budesonide-formoterol (SYMBICORT) 80-4.5 MCG/ACT AERO Inhale 2 puffs into the lungs 2 times daily for 15 days 1 Inhaler 0    zinc sulfate (ZINCATE) 220 (50 Zn) MG capsule Take 1 capsule by mouth daily 30 capsule 0    vitamin C (VITAMIN C) 1000 MG tablet Take 1 tablet by mouth daily 30 tablet 0    Respiratory Therapy Supplies (NEBULIZER/TUBING/MOUTHPIECE) KIT 1 kit by Does not apply route daily as needed (persistant cough, sob, or wheezing) 1 kit 0    albuterol (PROVENTIL) (2.5 MG/3ML) 0.083% nebulizer solution Take 3 mLs by nebulization every 6 hours as needed for Wheezing or Shortness of Breath (Patient not taking: Reported on 12/11/2020) 120 each 0    famotidine (PEPCID) 20 MG tablet Take 1 tablet by mouth 2 times daily 60 tablet 5     No current facility-administered medications for this visit. There are no discontinued medications.      No Known Allergies    Past Medical History:   Diagnosis Date    Acute bilateral low back pain with bilateral sciatica 11/26/2019    Body mass index (bmi) 33.0-33.9, adult     CAD (coronary artery disease), native coronary artery     Class 2 obesity due to excess calories in adult 2/1/2018    Diabetes mellitus (HCC)     GERD (gastroesophageal reflux disease)     Hypercholesteremia     Hypertension     Malignant neoplasm of right kidney, except renal pelvis (HCC)     Malignant neoplasm of right kidney, except renal pelvis (HCC)      Negative - Past Medical History for  Past Medical History Pertinent Negatives:   Diagnosis Date Noted    ADHD (attention deficit hyperactivity disorder) 07/30/2018    Allergic rhinitis 07/30/2018    Anticoagulant long-term use 07/30/2018    Anxiety 07/30/2018    Asthma 07/30/2018    Atrial fibrillation ([address] 07/30/2018    Bipolar disorder ([address] 07/30/2018    Carotid artery stenosis 07/30/2018    Cerebrovascular disease 07/30/2018    CHF (congestive heart failure) ([address] 07/30/2018    Congenital heart disease 07/30/2018    COPD (chronic obstructive pulmonary disease) ([address] 07/30/2018    Depression 07/30/2018    Emphysema of lung ([address] 07/30/2018    Erectile dysfunction 07/30/2018    Fibromyalgia 07/30/2018    Headache 07/30/2018    Hyperthyroidism 07/30/2018    Hypothyroidism 07/30/2018    Irritable bowel syndrome 07/30/2018    Liver disease 07/30/2018    Neuropathy 07/30/2018    Osteoarthritis 07/30/2018    Peripheral vascular disease ([address] 07/30/2018    PONV (postoperative nausea and vomiting) 10/04/2018    Restless legs syndrome 07/30/2018    Seizures ([address] 07/30/2018    Sleep apnea 07/30/2018    Substance abuse ([address] 07/30/2018    Urinary incontinence 07/30/2018       Past Surgical History:   Procedure Laterality Date    CARD[facility]AC [facility]HETERIZATION 07, [facility]    Left heart cath    CARDIAC CATHETERIZATION  07, [facility]    Left heart cath    CARDIOVASCULAR STRESS TEST  2009, [facility]    Stress Echo    CARDIOVASCULAR STRESS TEST  07, [facility]    Stress Echo    CORONARY ARTERY BYPASS GRAFT      X4 utilizing the left MOLINA to the LAD, right MOLINA to the second diagonal, and vein grafts to the obtuse marinal and PLOM.  CORONARY ARTERY BYPASS GRAFT  08, [facility] CABG placing the LIMA to the LAD, MIGUELANGEL to the second diagonal branch and SVBG to the OMB and PMB.  DIAGNOSTIC CARDIAC CATH LAB PROCEDURE      2021    KIDNEY REMOVAL Right 2019    KIDNEY SURGERY      Right Kidney removed    MN COLONOSCOPY FLX DX W/COLLJ SPEC WHEN PFRMD N/A 10/4/2018    Dr KASSY Solorio-Diverticular disease, 10 yr recall    SKIN BIOPSY       Social History     Occupational History    Not on file   Tobacco Use    Smoking status: Former Smoker     Packs/day: 2.00     Years: 32.00     Pack years: 64.00     Start date:      Quit date: 10/5/1998     Years since quittin.5    Smokeless tobacco: Former User   Substance and Sexual Activity    Alcohol use: Yes     Comment: Very Little    Drug use: Never    Sexual activity: Not on file        Family History   Problem Relation Age of Onset    Cancer Mother         lung    Coronary Art Dis Father     Stroke Father     Hypertension Father     High Blood Pressure Brother     Coronary Art Dis Paternal Grandmother     Coronary Art Dis Paternal Grandfather        Review of Systems  Review of Systems   Constitutional: Negative for fatigue and fever. Respiratory: Negative for cough and shortness of breath. Cardiovascular: Positive for chest pain and leg swelling. Negative for palpitations. Gastrointestinal: Negative for anal bleeding and blood in stool. Neurological: Negative.           Physical Exam  /78   Pulse 78   Ht 5' 10\" (1.778 m)   Wt 240 lb (108.9 kg)   SpO2 96%   BMI 34.44 kg/m² Physical Exam  Constitutional:       Appearance: Normal appearance. He is obese. Cardiovascular:      Rate and Rhythm: Normal rate and regular rhythm. Heart sounds: Normal heart sounds. No gallop. Pulmonary:      Effort: Pulmonary effort is normal.      Breath sounds: Normal breath sounds. Abdominal:      General: Abdomen is flat. Bowel sounds are normal.      Palpations: Abdomen is soft. Musculoskeletal:         General: No swelling. Right lower le+ Pitting Edema present. Left lower le+ Pitting Edema present. Skin:     General: Skin is warm and dry. Neurological:      Mental Status: He is alert. Assessment/Plan    EKG Findings:  Not performed today    Problem List Items Addressed This Visit        Cardiology Problems    Hypercholesteremia    CAD (coronary artery disease), native coronary artery - Primary    Hypertension, essential, benign       Other    Type 2 diabetes mellitus with complication, without long-term current use of insulin ([facility])           Diagnosis Orders   1. Coronary artery disease involving native coronary artery of native heart without angina pectoris      CABG x4,  (LIMA-LAD, MIGUELANGEL-diagonal, VG-OM, VG-PL OM). Occluded VG-OM, stent left main, 2021. Cold air related angina, stable   2. Type 2 diabetes mellitus with complication, without long-term current use of insulin ([facility]     3. Hypertension, essential, benign     4. Hypercholesteremia         Recommendations:   Diet: ADA, low-sodium, low-fat, calorie reduced  Activity: Moderate activities  Medication Changes: Wean isosorbide back to 60 mg daily, continue other medications as prescribed. I see acknowledge then as I stressed, he absolutely cannot miss any doses of aspirin and Plavix. He was asking if he could come back on some of his medications and I believe it would be reasonable to cut isosorbide back and he will do so gradually.     No orders of the defined types were placed in this encounter. No orders of the defined types were placed in this encounter. Return in about 6 months (around 10/6/2021) for Edward Flow, routine.

## 2021-04-07 ENCOUNTER — HOSPITAL ENCOUNTER (OUTPATIENT)
Dept: CARDIAC REHAB | Age: 71
Setting detail: THERAPIES SERIES
Discharge: HOME | End: 2021-04-07
Payer: MEDICARE

## 2021-04-07 PROCEDURE — 93798 PHYS/QHP OP CAR RHAB W/ECG: CPT

## 2021-04-09 ENCOUNTER — HOSPITAL ENCOUNTER (OUTPATIENT)
Dept: CARDIAC REHAB | Age: 71
Setting detail: THERAPIES SERIES
Discharge: HOME | End: 2021-04-09
Payer: MEDICARE

## 2021-04-09 PROCEDURE — 93798 PHYS/QHP OP CAR RHAB W/ECG: CPT

## 2021-04-12 ENCOUNTER — HOSPITAL ENCOUNTER (OUTPATIENT)
Dept: CARDIAC REHAB | Age: 71
Setting detail: THERAPIES SERIES
Discharge: HOME | End: 2021-04-12
Payer: MEDICARE

## 2021-04-12 PROCEDURE — 93798 PHYS/QHP OP CAR RHAB W/ECG: CPT

## 2021-04-14 ENCOUNTER — HOSPITAL ENCOUNTER (OUTPATIENT)
Dept: CARDIAC REHAB | Age: 71
Setting detail: THERAPIES SERIES
Discharge: HOME | End: 2021-04-14
Payer: MEDICARE

## 2021-04-14 PROCEDURE — 93798 PHYS/QHP OP CAR RHAB W/ECG: CPT

## 2021-04-14 RX ORDER — DOCUSATE SODIUM 100 MG/1
CAPSULE, LIQUID FILLED ORAL
Qty: 60 CAPSULE | Refills: 5 | Status: SHIPPED | OUTPATIENT
Start: 2021-04-14 | End: 2021-10-06

## 2021-04-16 ENCOUNTER — HOSPITAL ENCOUNTER (OUTPATIENT)
Dept: CARDIAC REHAB | Age: 71
Setting detail: THERAPIES SERIES
Discharge: HOME | End: 2021-04-16
Payer: MEDICARE

## 2021-04-16 PROCEDURE — 93798 PHYS/QHP OP CAR RHAB W/ECG: CPT

## 2021-04-16 RX ORDER — SEMAGLUTIDE 1.34 MG/ML
INJECTION, SOLUTION SUBCUTANEOUS
Qty: 1.5 ML | Refills: 1 | Status: SHIPPED | OUTPATIENT
Start: 2021-04-16 | End: 2021-10-13

## 2021-04-19 ENCOUNTER — HOSPITAL ENCOUNTER (OUTPATIENT)
Dept: CARDIAC REHAB | Age: 71
Setting detail: THERAPIES SERIES
Discharge: HOME | End: 2021-04-19
Payer: MEDICARE

## 2021-04-19 PROCEDURE — 93798 PHYS/QHP OP CAR RHAB W/ECG: CPT

## 2021-04-21 ENCOUNTER — HOSPITAL ENCOUNTER (OUTPATIENT)
Dept: CARDIAC REHAB | Age: 71
Setting detail: THERAPIES SERIES
Discharge: HOME | End: 2021-04-21
Payer: MEDICARE

## 2021-04-21 PROCEDURE — 93798 PHYS/QHP OP CAR RHAB W/ECG: CPT

## 2021-04-23 ENCOUNTER — HOSPITAL ENCOUNTER (OUTPATIENT)
Dept: CARDIAC REHAB | Age: 71
Setting detail: THERAPIES SERIES
Discharge: HOME | End: 2021-04-23
Payer: MEDICARE

## 2021-04-23 PROCEDURE — 93798 PHYS/QHP OP CAR RHAB W/ECG: CPT

## 2021-04-26 ENCOUNTER — HOSPITAL ENCOUNTER (OUTPATIENT)
Dept: CARDIAC REHAB | Age: 71
Setting detail: THERAPIES SERIES
Discharge: HOME | End: 2021-04-26
Payer: MEDICARE

## 2021-04-26 PROCEDURE — 93798 PHYS/QHP OP CAR RHAB W/ECG: CPT

## 2021-04-28 ENCOUNTER — HOSPITAL ENCOUNTER (OUTPATIENT)
Dept: CARDIAC REHAB | Age: 71
Setting detail: THERAPIES SERIES
Discharge: HOME | End: 2021-04-28
Payer: MEDICARE

## 2021-04-28 PROCEDURE — 93798 PHYS/QHP OP CAR RHAB W/ECG: CPT

## 2021-04-30 ENCOUNTER — HOSPITAL ENCOUNTER (OUTPATIENT)
Dept: CARDIAC REHAB | Age: 71
Setting detail: THERAPIES SERIES
Discharge: HOME | End: 2021-04-30
Payer: MEDICARE

## 2021-04-30 PROCEDURE — 93798 PHYS/QHP OP CAR RHAB W/ECG: CPT

## 2021-05-03 ENCOUNTER — HOSPITAL ENCOUNTER (OUTPATIENT)
Dept: CARDIAC REHAB | Age: 71
Setting detail: THERAPIES SERIES
Discharge: HOME | End: 2021-05-03
Payer: MEDICARE

## 2021-05-03 LAB
ALBUMIN SERPL-MCNC: 4.5 G/DL (ref 3.5–5.2)
ALP BLD-CCNC: 86 U/L (ref 40–130)
ALT SERPL-CCNC: 17 U/L (ref 5–41)
ANION GAP SERPL CALCULATED.3IONS-SCNC: 10 MMOL/L (ref 7–19)
AST SERPL-CCNC: 17 U/L (ref 5–40)
BASOPHILS ABSOLUTE: 0 K/UL (ref 0–0.2)
BASOPHILS RELATIVE PERCENT: 0.5 % (ref 0–1)
BILIRUB SERPL-MCNC: 0.4 MG/DL (ref 0.2–1.2)
BILIRUBIN URINE: NEGATIVE
BLOOD, URINE: NEGATIVE
BUN BLDV-MCNC: 24 MG/DL (ref 8–23)
CALCIUM SERPL-MCNC: 9.2 MG/DL (ref 8.8–10.2)
CHLORIDE BLD-SCNC: 106 MMOL/L (ref 98–111)
CLARITY: CLEAR
CO2: 26 MMOL/L (ref 22–29)
COLOR: YELLOW
CREAT SERPL-MCNC: 2.1 MG/DL (ref 0.5–1.2)
CREATININE URINE: 114.7 MG/DL (ref 4.2–622)
EOSINOPHILS ABSOLUTE: 0.5 K/UL (ref 0–0.6)
EOSINOPHILS RELATIVE PERCENT: 7.2 % (ref 0–5)
GFR AFRICAN AMERICAN: 38
GFR NON-AFRICAN AMERICAN: 31
GLUCOSE BLD-MCNC: 114 MG/DL (ref 74–109)
GLUCOSE URINE: NEGATIVE MG/DL
HCT VFR BLD CALC: 40.8 % (ref 42–52)
HEMOGLOBIN: 13.2 G/DL (ref 14–18)
IMMATURE GRANULOCYTES #: 0 K/UL
KETONES, URINE: NEGATIVE MG/DL
LEUKOCYTE ESTERASE, URINE: NEGATIVE
LYMPHOCYTES ABSOLUTE: 1 K/UL (ref 1.1–4.5)
LYMPHOCYTES RELATIVE PERCENT: 13.9 % (ref 20–40)
MAGNESIUM: 2 MG/DL (ref 1.6–2.4)
MCH RBC QN AUTO: 30.3 PG (ref 27–31)
MCHC RBC AUTO-ENTMCNC: 32.4 G/DL (ref 33–37)
MCV RBC AUTO: 93.8 FL (ref 80–94)
MONOCYTES ABSOLUTE: 0.9 K/UL (ref 0–0.9)
MONOCYTES RELATIVE PERCENT: 11.8 % (ref 0–10)
NEUTROPHILS ABSOLUTE: 4.9 K/UL (ref 1.5–7.5)
NEUTROPHILS RELATIVE PERCENT: 66.2 % (ref 50–65)
NITRITE, URINE: NEGATIVE
PARATHYROID HORMONE INTACT: 85.6 PG/ML (ref 15–65)
PDW BLD-RTO: 13.4 % (ref 11.5–14.5)
PH UA: 5 (ref 5–8)
PHOSPHORUS: 2.9 MG/DL (ref 2.5–4.5)
PLATELET # BLD: 160 K/UL (ref 130–400)
PMV BLD AUTO: 12.1 FL (ref 9.4–12.4)
POTASSIUM SERPL-SCNC: 4.4 MMOL/L (ref 3.5–5)
PROTEIN PROTEIN: 8 MG/DL (ref 15–45)
PROTEIN UA: NEGATIVE MG/DL
RBC # BLD: 4.35 M/UL (ref 4.7–6.1)
SODIUM BLD-SCNC: 142 MMOL/L (ref 136–145)
SPECIFIC GRAVITY UA: 1.02 (ref 1–1.03)
TOTAL PROTEIN: 7.2 G/DL (ref 6.6–8.7)
URIC ACID, SERUM: 6.3 MG/DL (ref 3.4–7)
UROBILINOGEN, URINE: 0.2 E.U./DL
VITAMIN D 25-HYDROXY: 63.9 NG/ML
WBC # BLD: 7.5 K/UL (ref 4.8–10.8)

## 2021-05-03 PROCEDURE — 93798 PHYS/QHP OP CAR RHAB W/ECG: CPT

## 2021-05-05 ENCOUNTER — HOSPITAL ENCOUNTER (OUTPATIENT)
Dept: CARDIAC REHAB | Age: 71
Setting detail: THERAPIES SERIES
Discharge: HOME | End: 2021-05-05
Payer: MEDICARE

## 2021-05-05 PROCEDURE — 93798 PHYS/QHP OP CAR RHAB W/ECG: CPT

## 2021-05-07 ENCOUNTER — HOSPITAL ENCOUNTER (OUTPATIENT)
Dept: CARDIAC REHAB | Age: 71
Setting detail: THERAPIES SERIES
Discharge: HOME | End: 2021-05-07
Payer: MEDICARE

## 2021-05-07 PROCEDURE — 93798 PHYS/QHP OP CAR RHAB W/ECG: CPT

## 2021-05-10 ENCOUNTER — HOSPITAL ENCOUNTER (OUTPATIENT)
Dept: CARDIAC REHAB | Age: 71
Setting detail: THERAPIES SERIES
Discharge: HOME | End: 2021-05-10
Payer: MEDICARE

## 2021-05-10 PROCEDURE — 93798 PHYS/QHP OP CAR RHAB W/ECG: CPT

## 2021-05-12 ENCOUNTER — HOSPITAL ENCOUNTER (OUTPATIENT)
Dept: CARDIAC REHAB | Age: 71
Setting detail: THERAPIES SERIES
Discharge: HOME | End: 2021-05-12
Payer: MEDICARE

## 2021-05-12 DIAGNOSIS — M10.9 GOUT, UNSPECIFIED CAUSE, UNSPECIFIED CHRONICITY, UNSPECIFIED SITE: ICD-10-CM

## 2021-05-12 DIAGNOSIS — E79.0 ELEVATED BLOOD URIC ACID LEVEL: ICD-10-CM

## 2021-05-12 PROCEDURE — 93798 PHYS/QHP OP CAR RHAB W/ECG: CPT

## 2021-05-13 RX ORDER — ALLOPURINOL 100 MG/1
TABLET ORAL
Qty: 30 TABLET | Refills: 2 | Status: SHIPPED | OUTPATIENT
Start: 2021-05-13 | End: 2021-06-25

## 2021-05-14 ENCOUNTER — HOSPITAL ENCOUNTER (OUTPATIENT)
Dept: CARDIAC REHAB | Age: 71
Setting detail: THERAPIES SERIES
Discharge: HOME | End: 2021-05-14
Payer: MEDICARE

## 2021-05-14 PROCEDURE — 93798 PHYS/QHP OP CAR RHAB W/ECG: CPT

## 2021-05-17 ENCOUNTER — HOSPITAL ENCOUNTER (OUTPATIENT)
Dept: CARDIAC REHAB | Age: 71
Setting detail: THERAPIES SERIES
Discharge: HOME | End: 2021-05-17
Payer: MEDICARE

## 2021-05-17 PROCEDURE — 93798 PHYS/QHP OP CAR RHAB W/ECG: CPT

## 2021-05-24 RX ORDER — RANOLAZINE 500 MG/1
500 TABLET, EXTENDED RELEASE ORAL 2 TIMES DAILY
Qty: 60 TABLET | Refills: 5 | Status: SHIPPED | OUTPATIENT
Start: 2021-05-24 | End: 2021-10-06

## 2021-06-11 ENCOUNTER — HOSPITAL ENCOUNTER (OUTPATIENT)
Dept: CT IMAGING | Age: 71
Discharge: HOME | End: 2021-06-11
Payer: MEDICARE

## 2021-06-11 DIAGNOSIS — C64.1 MALIGNANT NEOPLASM OF RIGHT KIDNEY, EXCEPT RENAL PELVIS (HCC): ICD-10-CM

## 2021-06-11 PROCEDURE — 71250 CT THORAX DX C-: CPT

## 2021-06-25 DIAGNOSIS — M10.9 GOUT, UNSPECIFIED CAUSE, UNSPECIFIED CHRONICITY, UNSPECIFIED SITE: ICD-10-CM

## 2021-06-25 DIAGNOSIS — E79.0 ELEVATED BLOOD URIC ACID LEVEL: ICD-10-CM

## 2021-06-25 RX ORDER — ALLOPURINOL 100 MG/1
TABLET ORAL
Qty: 30 TABLET | Refills: 2 | Status: SHIPPED | OUTPATIENT
Start: 2021-06-25 | End: 2021-08-11

## 2021-07-10 NOTE — PROGRESS NOTES
MEDICAL ONCOLOGY PROGRESS NOTE      Ameya Chilel   1950 7/14/2021     Chief Complaint   Patient presents with    Follow-up     Malignant neoplasm of right kidney, except renal pelvis ([address]        INTERVAL HISTORY/HISTORY OF PRESENT ILLNESS:  Diagnosis   Papillary renal cell carcinoma, August 2018  High-grade  Left kidney cyst  CKD stage III    Treatment summary  2/21/2019right radical nephrectomy @ [facility]    Interval history  The patient presents today for a surveillance follow-up visit. He denies any GI symptoms. He denies any respiratory symptoms today. Denies any other constitutional complaints. No hematuria. No weight loss. Cancer history  Mr Mariel Queen was referred for a diagnosis of daily mass concerning for RCC. He is currently being seen by Dr. Musa Haddad at [facility] with a planned surgery. He presented initially with right flank pain. 8/11/2018CT abdomen without contrast showed a 6.6 cm hyperdense right renal lesion. An exophytic left renal lesion noted and measured 1.9 cm. 10/9/2018ultrasound showed a 6 cm heterogeneous solid appearing lesion at the lower pole of the right kidney. 10/17/2018CT abdomen and pelvis with contrast showed a 6 x 6 x 6.5 cm cystic mass arising from the lower right renal pole. 3 small cysts of the left kidney, largest measuring 2.1 cm.  12/18/2018MRI of the abdomen with contrast at [facility] showed a right lower pole renal mass extending to the hilar line measuring 6.7 x 6.4 x 7.2 cm, suspicious for cystic renal cell carcinoma. 12/19/2018Dr. Musa Haddad recommended surgery scheduled for February 21, 2019.   1/27/2019she was first seen by me. 2/6/2019CT chest without contrast was unremarkable, except for granulomatous disease.  2/21/2019right radical nephrectomy by Dr. Musa Haddad at [facility] revealing a 6.4 cm with multifocal, high-grade (grade 3), papillary renal cell carcinoma with marked central cystic changes and hemorrhage.  Final pathologic staging [id number], stage I.  3/11/2019recommended surveillance follow-up as per NCCN guidelines. Recommended CT chest annually and CT abdomen every 6 months x3 years. 6/15/2020 CT Abdomen Pelvis Wo Contrast A stable CT scan of the chest. No evidence of a neoplastic/metastatic disease. Evidence of a previous right nephrectomy without local complication. A stable low-density nodule in the left kidney which is incompletely evaluated in this study. Please correlate with sonography. CT Chest Wo Contrast A stable CT scan of the chest. Small partially calcified nodule in the left upper lobe represent a granuloma. 6/11/21 CT CHEST WO CONTRAST  No evidence of metastatic disease in the chest. Unchanged 4 mm LEFT upper lobe pulmonary nodule. PAST MEDICAL HISTORY:   Past Medical History:   Diagnosis Date    Acute bilateral low back pain with bilateral sciatica 11/26/2019    Body mass index (bmi) 33.0-33.9, adult     CAD (coronary artery disease), native coronary artery     Class 2 obesity due to excess calories in adult 2/1/2018    Diabetes mellitus ([address]     GERD (gastroesophageal reflux disease)     Hypercholesteremia     Hypertension     Malignant neoplasm of right kidney, except renal pelvis (HCC)     Malignant neoplasm of right kidney, except renal pelvis ([address]           PAST SURGICAL HISTORY:  Past Surgical History:   Procedure Laterality Date    CARDIAC CATHETERIZATION  12/31/07, JDT    Left heart cath    CARDIAC CATHETERIZATION  12/31/07, JDT    Left heart cath    CARDIOVASCULAR STRESS TEST  06/11/2009, JDT    Stress Echo    CARDIOVASCULAR STRESS TEST  12/31/07, JDT    Stress Echo    CORONARY ARTERY BYPASS GRAFT      X4 utilizing the left MOLINA to the LAD, right MOLINA to the second diagonal, and vein grafts to the obtuse marinal and PLOM.     CORONARY ARTERY BYPASS GRAFT  01/02/08, Mercedes Fus CABG placing the LIMA to the LAD, MIGUELANGEL to the second diagonal branch and SVBG to the OMB and PMB.  DIAGNOSTIC CARDIAC CATH LAB PROCEDURE      2021    KIDNEY REMOVAL Right 2019    KIDNEY SURGERY      Right Kidney removed    [place] COLONOSCOPY FLX DX W/COLLJ SPEC WHEN PFRMD N/A 10/4/2018    Dr KASSY Solorio-Diverticular disease, 10 yr recall    SKIN BIOPSY          SOCIAL HISTORY:  Social History     Socioeconomic History    Marital status:      Spouse name: None    Number of children: None    Years of education: None    Highest education level: None   Occupational History    None   Tobacco Use    Smoking status: Former Smoker     Packs/day: 2.00     Years: 32.00     Pack years: 64.00     Start date:      Quit date: 10/5/1998     Years since quittin.7    Smokeless tobacco: Former User   Vaping Use    Vaping Use: Never used   Substance and Sexual Activity    Alcohol use: Yes     Comment: Very Little    Drug use: Never    Sexual activity: None   Other Topics Concern    None   Social History Narrative    None     Social Determinants of Health     Financial Resource Strain:     Difficulty of Paying Living Expenses:    Food Insecurity:     Worried About Running Out of Food in the Last Year:     Ran Out of Food in the Last Year:    Transportation Needs:     Lack of Transportation (Medical):      Lack of Transportation (Non-Medical):    Physical Activity:     Days of Exercise per Week:     Minutes of Exercise per Session:    Stress:     Feeling of Stress :    Social Connections:     Frequency of Communication with Friends and Family:     Frequency of Social Gatherings with Friends and Family:     Attends Yarsani Services:     Active Member of Clubs or Organizations:     Attends Club or Organization Meetings:     Marital Status:    Intimate Partner Violence:     Fear of Current or Ex-Partner:     Emotionally Abused:     Physically Abused:     Sexually Abused:        FAMILY HISTORY:  Family History   Problem Relation Age of Onset    Cancer Mother         lung  Coronary Art Dis Father     Stroke Father     Hypertension Father     High Blood Pressure Brother     Coronary Art Dis Paternal Grandmother     Coronary Art Dis Paternal Grandfather         Current Outpatient Medications   Medication Sig Dispense Refill    Multiple Vitamins-Minerals (THERAPEUTIC MULTIVITAMIN-MINERALS) tablet Take 1 tablet by mouth daily      allopurinol (ZYLOPRIM) 100 MG tablet TAKE ONE TABLET BY MOUTH TWO TIMES DAILY 30 tablet 2    ranolazine (RANEXA) 500 MG extended release tablet TAKE 1 TABLET BY MOUTH 2 TIMES DAILY 60 tablet 5    OZEMPIC, 0.25 OR 0.5 MG/DOSE, 2 MG/1.5ML SOPN INJECT 0.25MG UNDER THE SKIN ONCE WEEKLY FOR 4 WEEK 1.5 mL 1    clopidogrel (PLAVIX) 75 MG tablet Take 75 mg by mouth daily      atorvastatin (LIPITOR) 20 MG tablet TAKE 1 TABLET BY MOUTH DAILY (Patient taking differently: Take 40 mg by mouth daily ) 90 tablet 2    carvedilol (COREG) 3.125 MG tablet Take 3.125 mg by mouth 2 times daily (with meals)      amLODIPine (NORVASC) 10 MG tablet Take 10 mg by mouth daily       lisinopril (PRINIVIL;ZESTRIL) 20 MG tablet Take 20 mg by mouth daily       famotidine (PEPCID) 20 MG tablet Take 1 tablet by mouth 2 times daily 60 tablet 5    isosorbide mononitrate (IMDUR) 60 MG extended release tablet Take 1 tablet by mouth nightly (Patient taking differently: Take 120 mg by mouth daily Takes 2 tablets daily) 30 tablet 5    nitroGLYCERIN (NITROSTAT) 0.4 MG SL tablet Place 1 tablet under the tongue every 5 minutes as needed for Chest pain 25 tablet 3    Omega-3 Fatty Acids (FISH OIL) 1200 MG CAPS Take 1,000 mg by mouth 2 times daily       aspirin 81 MG tablet Take 81 mg by mouth nightly        MG capsule TAKE 1 CAPSULE BY MOUTH 2 TIMES DAILY (Patient not taking: Reported on 7/14/2021) 60 capsule 5    budesonide-formoterol (SYMBICORT) 80-4.5 MCG/ACT AERO Inhale 2 puffs into the lungs 2 times daily for 15 days 1 Inhaler 0    zinc sulfate (ZINCATE) 220 (50 Zn) MG capsule Take 1 capsule by mouth daily 30 capsule 0    vitamin C (VITAMIN C) 1000 MG tablet Take 1 tablet by mouth daily 30 tablet 0    Respiratory Therapy Supplies (NEBULIZER/TUBING/MOUTHPIECE) KIT 1 kit by Does not apply route daily as needed (persistant cough, sob, or wheezing) 1 kit 0    albuterol (PROVENTIL) (2.5 MG/3ML) 0.083% nebulizer solution Take 3 mLs by nebulization every 6 hours as needed for Wheezing or Shortness of Breath (Patient not taking: Reported on 12/11/2020) 120 each 0    Cyanocobalamin 5000 MCG CAPS Take 5,000 mcg by mouth every morning  (Patient not taking: Reported on 7/14/2021)      Cholecalciferol (VITAMIN D3) 2000 units TABS Take 2,000 Units by mouth 2 times daily  (Patient not taking: Reported on 7/14/2021)       No current facility-administered medications for this visit. REVIEW OF SYSTEMS:    Constitutional: no fever, no night sweats, fatigue;   HEENT: no blurring of vision, no double vision, no hearing difficulty, no tinnitus,no ulceration, no dysphagia  Lungs: no cough, no shortness of breath, no wheeze;   CVS: no palpitation, no chest pain, no shortness of breath;  GI: no abdominal pain, no nausea , no vomiting, no constipation;   RAY: no dysuria, frequency and urgency, no hematuria, no kidney stones;   Musculoskeletal: no joint pain, swelling , stiffness;   Endocrine: no polyuria, polydypsia, no cold or heat intolerence; Hematology/lymphatic: no easy brusing or bleeding, no hx of clotting disorder; no peripheral adenopathy. Dermatology: no skin rash, no eczema, no pruritis;   Psychiatry: no depression, no anxiety,no panic attacks, no suicide ideation;    Neurology: no syncope, no seizures, no numbness or tingling of hands, no numbness or tingling of feet, no paresis;     PHYSICAL EXAM:    Vitals signs:  BP (!) 140/86   Pulse 75   Ht 5' 10\" (1.778 m)   Wt 237 lb (107.5 kg)   SpO2 95%   BMI 34.01 kg/m²    Pain scale:  Pain Score:   0 - No pain     CONSTITUTIONAL: Alert, appropriate, no acute distress,   EYES: Non icteric, EOM intact, pupils equal round and reactive to light and accommodation. ENT: Oral mucus membranes moist, no oral pharyngeal lesions. External inspection of ears and nose are normal.   NECK: Supple, no masses. No palpable thyroid mass    CHEST/LUNGS: CTA bilaterally, normal respiratory effort   CARDIOVASCULAR: RRR, no murmurs. No lower extremity edema   ABDOMEN: soft non-tender, active bowel sounds, no hepatosplenomegaly. No palpable masses. EXTREMITIES: warm, Full ROM of all fours extremities. No focal weakness. SKIN: warm, dry with no rashes or lesions  LYMPH: No cervical, clavicular, axillary, or inguinal lymphadenopathy  NEUROLOGIC: follows commands, non focal.   PSYCH: mood and affect appropriate. Alert and oriented to time and place and person. Relevant Lab findings/reviewed by me:  Lab Results   Component Value Date    WBC 7.08 07/14/2021    HGB 13.7 07/14/2021    HCT 41.6 07/14/2021    MCV 93.9 (H) 07/14/2021     07/14/2021     Lab Results   Component Value Date    NEUTROABS 4.72 07/14/2021         Relevant Imaging studies/reviewed by me:  CT CHEST WO CONTRAST    Result Date: 6/11/2021  1. No evidence of metastatic disease in the chest. 2.  Unchanged 4 mm LEFT upper lobe pulmonary nodule. Signed by Dr Blanca Ramírez      My assessmentno evidence of metastatic disease. Stable 4 mm pulmonary nodule in the left upper lobe. ASSESSMENT    Orders Placed This Encounter   Procedures    CT CHEST WO CONTRAST     Standing Status:   Future     Standing Expiration Date:   1/14/2023     Scheduling Instructions:      sched 1 year     Order Specific Question:   Reason for exam:     Answer:   yearly f/u      Vi Mitchell was seen today for follow-up. Diagnoses and all orders for this visit:    Malignant neoplasm of right kidney, except renal pelvis ([address]  -     CT CHEST WO CONTRAST;  Future    Care plan discussed with patient    Pulmonary nodule  - CT CHEST WO CONTRAST; Future       Papillary renal cell carcinoma stage I Feb 2019  S/p right nephrectomy at [facility] by Dr. Namon Dance on 2/21/2019  As per NCCN guidelines:  H&P every 6 months for 2 years, then annually up to 5 years  CT abdomen within 3-12 months of surgery, then at the physician's discretion  CT chest annually up to 3 years (Last June 2022)    Chronic kidney diseasesecondary to prior nephrectomy. The patient also has a history of hypertension, diabetes. He was counseled regarding avoiding NSAIDs any street control of his hypertension and diabetes. He follows up with nephrology. Last Cr 2.0->1.6    CKD related anemiahemoglobin 13.7. We will continue to watch. Hypertension-mild elevated. Follow up with primary care physician    Diabetes-stable follow-up with primary care physician. Health maintenance  The patient is to follow-up with primary care for further recommendation regarding age appropriate screening, well-being visit, follow-up and treatment of other medical comorbidities. Plan:  CT chest w/o June 2022  Continue follow-up with nephrology/Dr Riky Allred  [facility] after CT       Follow Up:     Return in about 1 year (around 7/14/2022) for CBC, Appointment with Dr. Jey Lomeli. CT chest 1 year     I, Luther Mart, am scribing for Jose Luis Jauregui MD. Electronically signed by Luther Mart RN on 7/14/2021 at 1:41 PM CDT. I, Dr Orpha Kanner, personally performed the services described in this documentation as scribed by Luther Mart RN in my presence and is both accurate and complete. I have seen, examined and reviewed this patient medication list, appropriate labs and imaging studies. I reviewed relevant medical records and others physicians notes. I discussed the plans of care with the patient.  I answered all the questions to the patients satisfaction  (Please note that portions of this note were completed with a voice recognition program. Efforts were made to edit the dictations but occasionally words are mis-transcribed.)

## 2021-07-14 ENCOUNTER — HOSPITAL ENCOUNTER (OUTPATIENT)
Dept: INFUSION THERAPY | Age: 71
Discharge: HOME | End: 2021-07-14
Payer: MEDICARE

## 2021-07-14 ENCOUNTER — OFFICE VISIT (OUTPATIENT)
Dept: HEMATOLOGY | Age: 71
End: 2021-07-14
Payer: MEDICARE

## 2021-07-14 VITALS
WEIGHT: 237 LBS | OXYGEN SATURATION: 95 % | HEART RATE: 75 BPM | BODY MASS INDEX: 33.93 KG/M2 | SYSTOLIC BLOOD PRESSURE: 140 MMHG | HEIGHT: 70 IN | DIASTOLIC BLOOD PRESSURE: 86 MMHG

## 2021-07-14 DIAGNOSIS — Z71.89 CARE PLAN DISCUSSED WITH PATIENT: ICD-10-CM

## 2021-07-14 DIAGNOSIS — C64.1 MALIGNANT NEOPLASM OF RIGHT KIDNEY, EXCEPT RENAL PELVIS (HCC): ICD-10-CM

## 2021-07-14 DIAGNOSIS — R91.1 PULMONARY NODULE: ICD-10-CM

## 2021-07-14 DIAGNOSIS — C64.1 MALIGNANT NEOPLASM OF RIGHT KIDNEY, EXCEPT RENAL PELVIS (HCC): Primary | ICD-10-CM

## 2021-07-14 LAB
BASOPHILS ABSOLUTE: 0.03 K/UL (ref 0.01–0.08)
BASOPHILS RELATIVE PERCENT: 0.4 % (ref 0.1–1.2)
EOSINOPHILS ABSOLUTE: 0.36 K/UL (ref 0.04–0.54)
EOSINOPHILS RELATIVE PERCENT: 5.1 % (ref 0.7–7)
HCT VFR BLD CALC: 41.6 % (ref 40.1–51)
HEMOGLOBIN: 13.7 G/DL (ref 13.7–17.5)
LYMPHOCYTES ABSOLUTE: 1.08 K/UL (ref 1.18–3.74)
LYMPHOCYTES RELATIVE PERCENT: 15.3 % (ref 19.3–53.1)
MCH RBC QN AUTO: 30.9 PG (ref 25.7–32.2)
MCHC RBC AUTO-ENTMCNC: 32.9 G/DL (ref 32.3–36.5)
MCV RBC AUTO: 93.9 FL (ref 79–92.2)
MONOCYTES ABSOLUTE: 0.89 K/UL (ref 0.24–0.82)
MONOCYTES RELATIVE PERCENT: 12.6 % (ref 4.7–12.5)
NEUTROPHILS ABSOLUTE: 4.72 K/UL (ref 1.56–6.13)
NEUTROPHILS RELATIVE PERCENT: 66.6 % (ref 34–71.1)
PDW BLD-RTO: 13.1 % (ref 11.6–14.4)
PLATELET # BLD: 163 K/UL (ref 163–337)
PMV BLD AUTO: 11.5 FL (ref 7.4–10.4)
RBC # BLD: 4.43 M/UL (ref 4.63–6.08)
WBC # BLD: 7.08 K/UL (ref 4.23–9.07)

## 2021-07-14 PROCEDURE — 1123F ACP DISCUSS/DSCN MKR DOCD: CPT | Performed by: INTERNAL MEDICINE

## 2021-07-14 PROCEDURE — 85025 COMPLETE CBC W/AUTO DIFF WBC: CPT

## 2021-07-14 PROCEDURE — 99212 OFFICE O/P EST SF 10 MIN: CPT

## 2021-07-14 PROCEDURE — 3017F COLORECTAL CA SCREEN DOC REV: CPT | Performed by: INTERNAL MEDICINE

## 2021-07-14 PROCEDURE — 1036F TOBACCO NON-USER: CPT | Performed by: INTERNAL MEDICINE

## 2021-07-14 PROCEDURE — G8417 CALC BMI ABV UP PARAM F/U: HCPCS | Performed by: INTERNAL MEDICINE

## 2021-07-14 PROCEDURE — G8427 DOCREV CUR MEDS BY ELIG CLIN: HCPCS | Performed by: INTERNAL MEDICINE

## 2021-07-14 PROCEDURE — 4040F PNEUMOC VAC/ADMIN/RCVD: CPT | Performed by: INTERNAL MEDICINE

## 2021-07-14 PROCEDURE — 99213 OFFICE O/P EST LOW 20 MIN: CPT | Performed by: INTERNAL MEDICINE

## 2021-07-14 RX ORDER — M-VIT,TX,IRON,MINS/CALC/FOLIC 27MG-0.4MG
1 TABLET ORAL DAILY
COMMUNITY
End: 2022-03-28 | Stop reason: ALTCHOICE

## 2021-08-11 DIAGNOSIS — E79.0 ELEVATED BLOOD URIC ACID LEVEL: ICD-10-CM

## 2021-08-11 DIAGNOSIS — M10.9 GOUT, UNSPECIFIED CAUSE, UNSPECIFIED CHRONICITY, UNSPECIFIED SITE: ICD-10-CM

## 2021-08-11 RX ORDER — ALLOPURINOL 100 MG/1
TABLET ORAL
Qty: 30 TABLET | Refills: 2 | Status: SHIPPED | OUTPATIENT
Start: 2021-08-11 | End: 2021-09-23

## 2021-08-25 PROBLEM — J32.9 SINUSITIS: Status: ACTIVE | Noted: 2021-08-25

## 2021-08-25 PROBLEM — Z20.822 CLOSE EXPOSURE TO COVID-19 VIRUS: Status: ACTIVE | Noted: 2021-08-25

## 2021-08-25 PROCEDURE — U0004 COV-19 TEST NON-CDC HGH THRU: HCPCS | Performed by: NURSE PRACTITIONER

## 2021-08-25 PROCEDURE — U0005 INFEC AGEN DETEC AMPLI PROBE: HCPCS | Performed by: NURSE PRACTITIONER

## 2021-08-27 RX ORDER — METHYLPREDNISOLONE 4 MG/1
TABLET ORAL
Qty: 1 KIT | Refills: 0 | Status: SHIPPED | OUTPATIENT
Start: 2021-08-27 | End: 2021-09-02

## 2021-09-23 DIAGNOSIS — M10.9 GOUT, UNSPECIFIED CAUSE, UNSPECIFIED CHRONICITY, UNSPECIFIED SITE: ICD-10-CM

## 2021-09-23 DIAGNOSIS — E79.0 ELEVATED BLOOD URIC ACID LEVEL: ICD-10-CM

## 2021-09-23 RX ORDER — ALLOPURINOL 100 MG/1
TABLET ORAL
Qty: 30 TABLET | Refills: 2 | Status: SHIPPED | OUTPATIENT
Start: 2021-09-23 | End: 2021-12-28

## 2021-09-24 PROBLEM — J32.9 SINUSITIS: Status: RESOLVED | Noted: 2021-08-25 | Resolved: 2021-09-24

## 2021-10-06 ENCOUNTER — OFFICE VISIT (OUTPATIENT)
Dept: CARDIOLOGY CLINIC | Age: 71
End: 2021-10-06
Payer: MEDICARE

## 2021-10-06 VITALS
DIASTOLIC BLOOD PRESSURE: 70 MMHG | HEART RATE: 70 BPM | WEIGHT: 248 LBS | SYSTOLIC BLOOD PRESSURE: 120 MMHG | OXYGEN SATURATION: 97 % | HEIGHT: 70 IN | BODY MASS INDEX: 35.5 KG/M2

## 2021-10-06 DIAGNOSIS — I25.10 CORONARY ARTERY DISEASE INVOLVING NATIVE CORONARY ARTERY OF NATIVE HEART WITHOUT ANGINA PECTORIS: Primary | ICD-10-CM

## 2021-10-06 DIAGNOSIS — I10 ESSENTIAL HYPERTENSION: ICD-10-CM

## 2021-10-06 DIAGNOSIS — E78.00 HYPERCHOLESTEREMIA: ICD-10-CM

## 2021-10-06 PROCEDURE — 1123F ACP DISCUSS/DSCN MKR DOCD: CPT | Performed by: CLINICAL NURSE SPECIALIST

## 2021-10-06 PROCEDURE — G8417 CALC BMI ABV UP PARAM F/U: HCPCS | Performed by: CLINICAL NURSE SPECIALIST

## 2021-10-06 PROCEDURE — 99214 OFFICE O/P EST MOD 30 MIN: CPT | Performed by: CLINICAL NURSE SPECIALIST

## 2021-10-06 PROCEDURE — 93000 ELECTROCARDIOGRAM COMPLETE: CPT | Performed by: CLINICAL NURSE SPECIALIST

## 2021-10-06 PROCEDURE — G8427 DOCREV CUR MEDS BY ELIG CLIN: HCPCS | Performed by: CLINICAL NURSE SPECIALIST

## 2021-10-06 PROCEDURE — G8484 FLU IMMUNIZE NO ADMIN: HCPCS | Performed by: CLINICAL NURSE SPECIALIST

## 2021-10-06 PROCEDURE — 4040F PNEUMOC VAC/ADMIN/RCVD: CPT | Performed by: CLINICAL NURSE SPECIALIST

## 2021-10-06 PROCEDURE — 3017F COLORECTAL CA SCREEN DOC REV: CPT | Performed by: CLINICAL NURSE SPECIALIST

## 2021-10-06 PROCEDURE — 1036F TOBACCO NON-USER: CPT | Performed by: CLINICAL NURSE SPECIALIST

## 2021-10-06 RX ORDER — LISINOPRIL 40 MG/1
40 TABLET ORAL DAILY
COMMUNITY
End: 2022-05-11 | Stop reason: SDUPTHER

## 2021-10-06 RX ORDER — AMLODIPINE BESYLATE 5 MG/1
5 TABLET ORAL DAILY
COMMUNITY
End: 2022-09-06 | Stop reason: SDUPTHER

## 2021-10-06 RX ORDER — ATORVASTATIN CALCIUM 40 MG/1
40 TABLET, FILM COATED ORAL DAILY
COMMUNITY
Start: 2021-01-12 | End: 2022-02-02 | Stop reason: SDUPTHER

## 2021-10-06 NOTE — PROGRESS NOTES
[facility]  [address] [place]  [id number]  Phone: [phone]  Fax: [phone]    OFFICE VISIT:  10/6/2021    Margaret Adams - : 1950    Reason For Visit:  Felix Burrell is a 70 y.o. male who is here for 6 Month Follow-Up (Patient denies any cardiac symptoms. ), Coronary Artery Disease, and Hypertension  History of CAD CABG in , hypertension, hyperlipidemia, chronic renal insufficiency, type 2 diabetes and previous smoker  2021 (heart catheterization at [facility])  1. Severe calcified left main and ostial LAD 95% stenosis. 2. Successful complex PCI of the distal left main into proximal LAD with orbital atherectomy and 3.5 x 15 mm resolute willem BERRY proximally optimized to 4.5 mm.     He participated in cardiac rehab. Is followed with our practice as well as cardiologist at [facility] last visit there was an July    He has had a couple of episodes of angina with activi ty in the evening   About the time imdur wears off. He has actually taken an extra nitro prior to in activity he knows is going to cause angina with success. He is staying pretty active but no regular walking or exercise routine    Subjective  Felix Burrell denies exertional chest pain, shortness of breath, orthopnea, paroxysmal nocturnal dyspnea, syncope, presyncope, arrhythmia, edema and fatigue. The patient denies numbness or weakness to suggest cerebrovascular accident or transient ischemic attack. Follows with DR Tisha Pennington for CKD - next apt in [facility] is PCP and follows labs .   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 ([address] 05/29/2020    Dyspnea 05/29/2020    Pulmonary nodule 01/23/2020    Body mass index (bmi) 33.0-33.9, adult     Body mass index (bmi) 34.0-34.9, adult  11/26/2019    Dysphagia 11/26/2019    Constipation 11/26/2019    H/O unilateral nephrectomy 11/26/2019    Acute bilateral low back pain with bilateral sciatica 11/26/2019    Need for prophylactic vaccination against Streptococcus pneumoniae (pneumococcus) 11/26/2019    Need for immunization against influenza 11/26/2019    Hypertension, essential, benign 01/13/2019    Bradycardia 01/13/2019    Vascular calcification 12/02/2018    Arthritis 12/02/2018    Renal mass 10/15/2018    Chronic kidney disease, stage 3 05/11/2018    Type 2 diabetes mellitus with complication, without long-term current use of insulin ([address] 05/11/2018    Hypercalcemia 05/11/2018    Gout 04/23/2018    Increased creatine kinase level 04/23/2018    Decreased GFR 04/23/2018    Decreased pedal pulses 04/23/2018    Elevated blood uric acid level 04/23/2018    Morbidly obese ([address] 02/01/2018    Hypercholesteremia     CAD (coronary artery disease), native coronary artery     GERD (gastroesophageal reflux disease)      Past Medical History:   Diagnosis Date    Acute bilateral low back pain with bilateral sciatica 11/26/2019    Body mass index (bmi) 33.0-33.9, adult     CAD (coronary artery disease), native coronary artery     Class 2 obesity due to excess calories in adult 2/1/2018    Diabetes mellitus ([address]     GERD (gastroesophageal reflux disease)     Hypercholesteremia     Hypertension     Malignant neoplasm of right kidney, except renal pelvis ([address]     Malignant neoplasm of right kidney, except renal pelvis [facility])      Past Surgical History:   Procedure Laterality Date    CARDIAC CATHETERIZATION  12/31/07, JDT    Left heart cath    CARDIAC CATHETERIZATION  12/31/07, JDT    Left heart cath    CARDIOVASCULAR STRESS TEST  2009, JDT    Stress Echo    CARDIOVASCULAR STRESS TEST  07, JDT    Stress Echo    CORONARY ARTERY BYPASS GRAFT      X4 utilizing the left MOLINA to the LAD, right MOLINA to the second diagonal, and vein grafts to the obtuse marinal and PLOM.  CORONARY ARTERY BYPASS GRAFT  08, Jess Ortiz CABG placing the LIMA to the LAD, MIGUELANGEL to the second diagonal branch and SVBG to the OMB and PMB.     DIAGNOSTIC CARDIAC CATH LAB PROCEDURE      2021    KIDNEY REMOVAL Right 2019    KIDNEY SURGERY      Right Kidney removed    [place] COLONOSCOPY FLX DX W/COLLJ SPEC WHEN PFRMD N/A 10/4/2018    Dr KASSY Solorio-Diverticular disease, 8 yr recall    SKIN BIOPSY       Family History   Problem Relation Age of Onset   [de-identified] Cancer Mother         lung    Coronary Art Dis Father     Stroke Father     Hypertension Father     High Blood Pressure Brother     Coronary Art Dis Paternal Grandmother     Coronary Art Dis Paternal Grandfather      Social History     Tobacco Use    Smoking status: Former Smoker     Packs/day: 2.00     Years: 32.00     Pack years: 64.00     Start date:      Quit date: 10/5/1998     Years since quittin.0    Smokeless tobacco: Former User   Substance Use Topics    Alcohol use: Yes     Comment: Very Little      Current Outpatient Medications   Medication Sig Dispense Refill    atorvastatin (LIPITOR) 40 MG tablet Take 40 mg by mouth daily      amLODIPine (NORVASC) 5 MG tablet Take 5 mg by mouth daily      lisinopril (PRINIVIL;ZESTRIL) 40 MG tablet Take 40 mg by mouth daily      Calcium Polycarbophil (FIBER-CAPS PO) Take by mouth 2 times daily      allopurinol (ZYLOPRIM) 100 MG tablet TAKE ONE TABLET BY MOUTH TWO TIMES DAILY (Patient taking differently: Take 100 mg by mouth daily ) 30 tablet 2    Multiple Vitamins-Minerals (THERAPEUTIC MULTIVITAMIN-MINERALS) tablet Take 1 tablet by mouth daily      OZEMPIC, 0.25 OR 0.5 MG/DOSE, 2 MG/1.5ML SOPN INJECT 0.25MG UNDER THE SKIN ONCE WEEKLY FOR 4 WEEK 1.5 mL 1    clopidogrel (PLAVIX) 75 MG tablet Take 75 mg by mouth daily      zinc sulfate (ZINCATE) 220 (50 Zn) MG capsule Take 1 capsule by mouth daily 30 capsule 0    Respiratory Therapy Supplies (NEBULIZER/TUBING/MOUTHPIECE) KIT 1 kit by Does not apply route daily as needed (persistant cough, sob, or wheezing) 1 kit 0    carvedilol (COREG) 3.125 MG tablet Take 3.125 mg by mouth 2 times daily (with meals)      famotidine (PEPCID) 20 MG tablet Take 1 tablet by mouth 2 times daily 60 tablet 5    isosorbide mononitrate (IMDUR) 60 MG extended release tablet Take 1 tablet by mouth nightly (Patient taking differently: Take 60 mg by mouth daily ) 30 tablet 5    nitroGLYCERIN (NITROSTAT) 0.4 MG SL tablet Place 1 tablet under the tongue every 5 minutes as needed for Chest pain 25 tablet 3    Omega-3 Fatty Acids (FISH OIL) 1200 MG CAPS Take 1,000 mg by mouth 2 times daily       aspirin 81 MG tablet Take 81 mg by mouth nightly       budesonide-formoterol (SYMBICORT) 80-4.5 MCG/ACT AERO Inhale 2 puffs into the lungs 2 times daily for 15 days 1 Inhaler 0    vitamin C (VITAMIN C) 1000 MG tablet Take 1 tablet by mouth daily 30 tablet 0    Cyanocobalamin 5000 MCG CAPS Take 5,000 mcg by mouth every morning  (Patient not taking: Reported on 7/14/2021)       No current facility-administered medications for this visit. Allergies: Patient has no known allergies. Review of Systems  Constitutional - no significant activity change, appetite change, or unexpected weight change. No fever, chills or diaphoresis. No fatigue. HEENT - no significant rhinorrhea or epistaxis. No tinnitus or significant hearing loss. Eyes - no sudden vision change or amaurosis. Respiratory - no significant wheezing, stridor, apnea or cough. No dyspnea on exertion or shortness of breath. Cardiovascular - no exertional chest pain, orthopnea or PND.   No sensation of native coronary artery of native heart without angina pectoris     2. Essential hypertension  EKG 12 lead   3. Hypercholesteremia       Data:  BP Readings from Last 3 Encounters:   10/06/21 120/70   07/14/21 (!) 140/86   04/06/21 130/78    Pulse Readings from Last 3 Encounters:   10/06/21 70   07/14/21 75   04/06/21 78        Wt Readings from Last 3 Encounters:   10/06/21 248 lb (112.5 kg)   07/14/21 237 lb (107.5 kg)   04/06/21 240 lb (108.9 kg)   EKG today shows sinus rhythm with a rate of 68. 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 Medical manage includes beta-blocker, ACE inhibitor, CCB, statin and DAPT with aspirin and Plavix. Will need to continue 1 year from last stent    Has fairly good activity tolerance. Notices occasional exertional angina in the evening times after his isosorbide has worn off. We discussed when and how to take nitroglycerin or pretreat with activities he knows will cause some exertional angina  He states these symptoms tend to be worse in the wintertime  Reviewed note from cardiologist that he saw in [address] at [facility]  reviewed PCP recent notes   Reviewed recent labs     States taking medications as prescribed  Stable cardiovascular status. No evidence of overt heart failure, angina or dysrhythmia. 30 minutes were spent preparing, reviewing and seeing patient. All questions answered    Plan  Do not stop or hold your Aspirin or Plavix for year from stenting   Follow up in 6 mos With Dr. Zak Harden  Call with any questions or concerns  Follow up with MISSY Kelly for non cardiac problems  Report any new problems  Cardiovascular Fitness-Exercise as tolerated. Strive for 30 minutes of exercise most days of the week.     Cardiac / Healthy Diet  Continue current medications as directed  Continue plan of treatment  It is always recommended that you bring your medications bottles with you to each visit - this is for your safety! MISSY Adams/transcription disclaimer: Much of this encounter note is electronic transcription/translation of spoken language to printed tach. Electronic translation of spoken language may be erroneous, or at times, nonsensical words or phrases may be inadvertently transcribed.  Although, I have reviewed the note for such errors, some may still exist.

## 2021-10-06 NOTE — PATIENT INSTRUCTIONS
Plan  Do not stop or hold your Aspirin or Plavix for year from stenting   Follow up in 6 mos With Dr. Kimberlee Stinson  Call with any questions or concerns  Follow up with MISSY Dsouza for non cardiac problems  Report any new problems  Cardiovascular Fitness-Exercise as tolerated. Strive for 30 minutes of exercise most days of the week. Cardiac / Healthy Diet  Continue current medications as directed  Continue plan of treatment  It is always recommended that you bring your medications bottles with you to each visit - this is for your safety!

## 2021-10-13 RX ORDER — SEMAGLUTIDE 1.34 MG/ML
INJECTION, SOLUTION SUBCUTANEOUS
Qty: 1.5 ML | Refills: 1 | Status: SHIPPED | OUTPATIENT
Start: 2021-10-13 | End: 2022-02-02 | Stop reason: SDUPTHER

## 2021-10-13 NOTE — TELEPHONE ENCOUNTER
Masha Clarkehew called to request a refill on his medication.       Last office visit : 8/25/2021   Next office visit : Visit date not found     Requested Prescriptions     Pending Prescriptions Disp Refills    OZEMPIC, 0.25 OR 0.5 MG/DOSE, 2 MG/1.5ML SOPN [Pharmacy Med Name: OZEMPIC 0.25 OR 0.5 MG/DOSE 2 Solution Pen-injector] 1.5 mL 1     Sig: INJECT 0.25MG UNDER THE SKIN ONCE WEEKLY FOR [address] Martina Conde, [address] [facility]

## 2021-11-04 LAB
ALBUMIN SERPL-MCNC: 4.7 G/DL (ref 3.5–5.2)
ALP BLD-CCNC: 80 U/L (ref 40–130)
ALT SERPL-CCNC: 19 U/L (ref 5–41)
ANION GAP SERPL CALCULATED.3IONS-SCNC: 9 MMOL/L (ref 7–19)
AST SERPL-CCNC: 20 U/L (ref 5–40)
BASOPHILS ABSOLUTE: 0 K/UL (ref 0–0.2)
BASOPHILS RELATIVE PERCENT: 0.6 % (ref 0–1)
BILIRUB SERPL-MCNC: 0.6 MG/DL (ref 0.2–1.2)
BILIRUBIN URINE: NEGATIVE
BLOOD, URINE: NEGATIVE
BUN BLDV-MCNC: 19 MG/DL (ref 8–23)
CALCIUM SERPL-MCNC: 10.1 MG/DL (ref 8.8–10.2)
CHLORIDE BLD-SCNC: 108 MMOL/L (ref 98–111)
CLARITY: CLEAR
CO2: 29 MMOL/L (ref 22–29)
COLOR: YELLOW
CREAT SERPL-MCNC: 1.8 MG/DL (ref 0.5–1.2)
CREATININE URINE: 58.9 MG/DL (ref 4.2–622)
EOSINOPHILS ABSOLUTE: 0.5 K/UL (ref 0–0.6)
EOSINOPHILS RELATIVE PERCENT: 8 % (ref 0–5)
GFR AFRICAN AMERICAN: 45
GFR NON-AFRICAN AMERICAN: 37
GLUCOSE BLD-MCNC: 104 MG/DL (ref 74–109)
GLUCOSE URINE: NEGATIVE MG/DL
HCT VFR BLD CALC: 43.6 % (ref 42–52)
HEMOGLOBIN: 13.5 G/DL (ref 14–18)
IMMATURE GRANULOCYTES #: 0 K/UL
KETONES, URINE: NEGATIVE MG/DL
LEUKOCYTE ESTERASE, URINE: NEGATIVE
LYMPHOCYTES ABSOLUTE: 1.1 K/UL (ref 1.1–4.5)
LYMPHOCYTES RELATIVE PERCENT: 16.4 % (ref 20–40)
MAGNESIUM: 2.1 MG/DL (ref 1.6–2.4)
MCH RBC QN AUTO: 29.7 PG (ref 27–31)
MCHC RBC AUTO-ENTMCNC: 31 G/DL (ref 33–37)
MCV RBC AUTO: 95.8 FL (ref 80–94)
MONOCYTES ABSOLUTE: 0.8 K/UL (ref 0–0.9)
MONOCYTES RELATIVE PERCENT: 12.7 % (ref 0–10)
NEUTROPHILS ABSOLUTE: 4 K/UL (ref 1.5–7.5)
NEUTROPHILS RELATIVE PERCENT: 62 % (ref 50–65)
NITRITE, URINE: NEGATIVE
PARATHYROID HORMONE INTACT: 64.4 PG/ML (ref 15–65)
PDW BLD-RTO: 12.7 % (ref 11.5–14.5)
PH UA: 7 (ref 5–8)
PHOSPHORUS: 3.5 MG/DL (ref 2.5–4.5)
PLATELET # BLD: 154 K/UL (ref 130–400)
PMV BLD AUTO: 11.3 FL (ref 9.4–12.4)
POTASSIUM SERPL-SCNC: 5.4 MMOL/L (ref 3.5–5)
PROTEIN PROTEIN: 5 MG/DL (ref 15–45)
PROTEIN UA: NEGATIVE MG/DL
RBC # BLD: 4.55 M/UL (ref 4.7–6.1)
SODIUM BLD-SCNC: 146 MMOL/L (ref 136–145)
SPECIFIC GRAVITY UA: 1.01 (ref 1–1.03)
TOTAL PROTEIN: 6.8 G/DL (ref 6.6–8.7)
URIC ACID, SERUM: 6.5 MG/DL (ref 3.4–7)
UROBILINOGEN, URINE: 0.2 E.U./DL
VITAMIN D 25-HYDROXY: 57.1 NG/ML
WBC # BLD: 6.5 K/UL (ref 4.8–10.8)

## 2021-12-02 ENCOUNTER — HOSPITAL ENCOUNTER (EMERGENCY)
Age: 71
Discharge: HOME | End: 2021-12-02
Attending: EMERGENCY MEDICINE
Payer: MEDICARE

## 2021-12-02 VITALS
RESPIRATION RATE: 16 BRPM | HEIGHT: 70 IN | OXYGEN SATURATION: 99 % | TEMPERATURE: 98.6 F | WEIGHT: 248 LBS | SYSTOLIC BLOOD PRESSURE: 152 MMHG | BODY MASS INDEX: 35.5 KG/M2 | HEART RATE: 76 BPM | DIASTOLIC BLOOD PRESSURE: 87 MMHG

## 2021-12-02 DIAGNOSIS — A04.2 INTESTINAL INFECTION DUE TO ENTEROINVASIVE E. COLI: Primary | ICD-10-CM

## 2021-12-02 DIAGNOSIS — A09 DIARRHEA OF INFECTIOUS ORIGIN: ICD-10-CM

## 2021-12-02 LAB
ADENOVIRUS F 40 41 PCR: NOT DETECTED
ALBUMIN SERPL-MCNC: 4.8 G/DL (ref 3.5–5.2)
ALP BLD-CCNC: 84 U/L (ref 40–130)
ALT SERPL-CCNC: 17 U/L (ref 5–41)
ANION GAP SERPL CALCULATED.3IONS-SCNC: 13 MMOL/L (ref 7–19)
AST SERPL-CCNC: 18 U/L (ref 5–40)
ASTROVIRUS PCR: NOT DETECTED
BASOPHILS ABSOLUTE: 0.1 K/UL (ref 0–0.2)
BASOPHILS RELATIVE PERCENT: 0.4 % (ref 0–1)
BILIRUB SERPL-MCNC: 0.8 MG/DL (ref 0.2–1.2)
BILIRUBIN URINE: NEGATIVE
BLOOD, URINE: NEGATIVE
BUN BLDV-MCNC: 32 MG/DL (ref 8–23)
CALCIUM SERPL-MCNC: 11.3 MG/DL (ref 8.8–10.2)
CAMPYLOBACTER PCR: NOT DETECTED
CHLORIDE BLD-SCNC: 103 MMOL/L (ref 98–111)
CLARITY: CLEAR
CLOSTRIDIUM DIFFICILE, PCR: NOT DETECTED
CO2: 19 MMOL/L (ref 22–29)
COLOR: YELLOW
CREAT SERPL-MCNC: 2.1 MG/DL (ref 0.5–1.2)
CRYPTOSPORIDIUM PCR: NOT DETECTED
CYCLOSPORA CAYETANENSIS PCR: NOT DETECTED
E COLI ENTEROAGGREGATIVE PCR: DETECTED
E COLI ENTEROPATHOGENIC PCR: NOT DETECTED
E COLI ENTEROTOXIGENIC PCR: NOT DETECTED
E COLI SHIGELLA/ENTEROINVASIVE PCR: NOT DETECTED
ENTAMOEBA HISTOLYTICA PCR: NOT DETECTED
EOSINOPHILS ABSOLUTE: 0.2 K/UL (ref 0–0.6)
EOSINOPHILS RELATIVE PERCENT: 1.4 % (ref 0–5)
GFR AFRICAN AMERICAN: 38
GFR NON-AFRICAN AMERICAN: 31
GIARDIA LAMBLIA PCR: NOT DETECTED
GLUCOSE BLD-MCNC: 129 MG/DL (ref 74–109)
GLUCOSE URINE: NEGATIVE MG/DL
HCT VFR BLD CALC: 48 % (ref 42–52)
HEMOGLOBIN: 15.4 G/DL (ref 14–18)
IMMATURE GRANULOCYTES #: 0.1 K/UL
INR BLD: 0.96 (ref 0.88–1.18)
KETONES, URINE: NEGATIVE MG/DL
LEUKOCYTE ESTERASE, URINE: NEGATIVE
LIPASE: 66 U/L (ref 13–60)
LYMPHOCYTES ABSOLUTE: 0.9 K/UL (ref 1.1–4.5)
LYMPHOCYTES RELATIVE PERCENT: 6.6 % (ref 20–40)
MCH RBC QN AUTO: 29.7 PG (ref 27–31)
MCHC RBC AUTO-ENTMCNC: 32.1 G/DL (ref 33–37)
MCV RBC AUTO: 92.7 FL (ref 80–94)
MONOCYTES ABSOLUTE: 0.9 K/UL (ref 0–0.9)
MONOCYTES RELATIVE PERCENT: 6.6 % (ref 0–10)
NEUTROPHILS ABSOLUTE: 11.1 K/UL (ref 1.5–7.5)
NEUTROPHILS RELATIVE PERCENT: 84.5 % (ref 50–65)
NITRITE, URINE: NEGATIVE
NOROVIRUS GI GII PCR: NOT DETECTED
PDW BLD-RTO: 12.8 % (ref 11.5–14.5)
PH UA: 5 (ref 5–8)
PLATELET # BLD: 170 K/UL (ref 130–400)
PLESIOMONAS SHIGELLOIDES PCR: NOT DETECTED
PMV BLD AUTO: 11.3 FL (ref 9.4–12.4)
POTASSIUM SERPL-SCNC: 4.2 MMOL/L (ref 3.5–5)
PROTEIN UA: NEGATIVE MG/DL
PROTHROMBIN TIME: 13 SEC (ref 12–14.6)
RBC # BLD: 5.18 M/UL (ref 4.7–6.1)
ROTAVIRUS A PCR: NOT DETECTED
SALMONELLA PCR: NOT DETECTED
SAPOVIRUS PCR: NOT DETECTED
SHIGA-LIKE TOXIN-PRODUCING E. COLI (STEC) STX1/STX2: NOT DETECTED
SODIUM BLD-SCNC: 135 MMOL/L (ref 136–145)
SPECIFIC GRAVITY UA: 1.01 (ref 1–1.03)
TOTAL PROTEIN: 7.6 G/DL (ref 6.6–8.7)
UROBILINOGEN, URINE: 0.2 E.U./DL
VIBRIO CHOLERAE PCR: NOT DETECTED
VIBRIO PCR: NOT DETECTED
WBC # BLD: 13.2 K/UL (ref 4.8–10.8)
YERSINIA ENTEROCOLITICA PCR: NOT DETECTED

## 2021-12-02 PROCEDURE — 36415 COLL VENOUS BLD VENIPUNCTURE: CPT

## 2021-12-02 PROCEDURE — 81003 URINALYSIS AUTO W/O SCOPE: CPT

## 2021-12-02 PROCEDURE — 2580000003 HC RX 258: Performed by: EMERGENCY MEDICINE

## 2021-12-02 PROCEDURE — 83690 ASSAY OF LIPASE: CPT

## 2021-12-02 PROCEDURE — 85025 COMPLETE CBC W/AUTO DIFF WBC: CPT

## 2021-12-02 PROCEDURE — 0097U HC GI PTHGN MULT REV TRANS & AMP PRB TECH 22 TRGT: CPT

## 2021-12-02 PROCEDURE — 85610 PROTHROMBIN TIME: CPT

## 2021-12-02 PROCEDURE — 80053 COMPREHEN METABOLIC PANEL: CPT

## 2021-12-02 PROCEDURE — 93005 ELECTROCARDIOGRAM TRACING: CPT | Performed by: EMERGENCY MEDICINE

## 2021-12-02 PROCEDURE — 99282 EMERGENCY DEPT VISIT SF MDM: CPT

## 2021-12-02 RX ORDER — 0.9 % SODIUM CHLORIDE 0.9 %
500 INTRAVENOUS SOLUTION INTRAVENOUS ONCE
Status: COMPLETED | OUTPATIENT
Start: 2021-12-02 | End: 2021-12-02

## 2021-12-02 RX ORDER — METRONIDAZOLE 500 MG/1
500 TABLET ORAL 3 TIMES DAILY
Qty: 21 TABLET | Refills: 0 | Status: SHIPPED | OUTPATIENT
Start: 2021-12-02 | End: 2021-12-09

## 2021-12-02 RX ADMIN — SODIUM CHLORIDE 500 ML: 9 INJECTION, SOLUTION INTRAVENOUS at 09:10

## 2021-12-02 RX ADMIN — SODIUM CHLORIDE 500 ML: 9 INJECTION, SOLUTION INTRAVENOUS at 10:33

## 2021-12-02 ASSESSMENT — ENCOUNTER SYMPTOMS
SHORTNESS OF BREATH: 0
BACK PAIN: 0
COUGH: 0
ABDOMINAL PAIN: 1
DIARRHEA: 1
NAUSEA: 1

## 2021-12-02 ASSESSMENT — PAIN SCALES - GENERAL: PAINLEVEL_OUTOF10: 5

## 2021-12-02 NOTE — ED PROVIDER NOTES
[facility]  eMERGENCY dEPARTMENT eNCOUnter      Pt Name: Cayetano Valdez  MRN: [id number]  Birthdate 1950  Date of evaluation: 12/2/2021  Provider: Teddy Flores MD    CHIEF COMPLAINT       Chief Complaint   Patient presents with    Diarrhea     pt presents to ED with c/o diarrhea x 4 days.  Abdominal Pain         HISTORY OF PRESENT ILLNESS   (Location/Symptom, Timing/Onset,Context/Setting, Quality, Duration, Modifying Factors, Severity)  Note limiting factors. Cayetano Valdez is a 70 y.o. male who presents to the emergency department with diarrhea x4 days. Is just water there is no blood. He denies any fevers. This morning he had some crampy abdominal pain currently he is at a 0 out of 10. The patient's concern is not really urinating more than maybe once yesterday. The patient has a history of a right radical nephrectomy in 2019 at [facility]. For renal cell carcinoma. The patient otherwise is cancer free. The patient does have a cardiac history including CABG and stent. The patient denies any chest pain he does have a little bit of nausea associated with his diarrhea. He states this heartburn-like feeling is not his heart it is all GI upset he currently is no abdominal pain no fevers. The patient was concerned about his volume status given his 1 kidney, he is drinking a lot of Gatorade still. He is not been on any antibiotics recently. He did feel a little dizzy on standing today. Patient tells me multiple other family members are sick. They were sick before he was they did not really eat the same thing. The history is provided by the patient and medical records. NursingNotes were reviewed. REVIEW OF SYSTEMS    (2-9 systems for level 4, 10 or more for level 5)     Review of Systems   Constitutional: Negative for fever. Respiratory: Negative for cough and shortness of breath. Cardiovascular: Negative for chest pain.    Gastrointestinal: Positive for abdominal pain, diarrhea and nausea. Genitourinary: Negative for dysuria. Musculoskeletal: Negative for back pain. Neurological: Negative for seizures and syncope. Psychiatric/Behavioral: Negative for confusion. A complete review of systems was performed and is negative except as noted above in the HPI. PAST MEDICAL HISTORY     Past Medical History:   Diagnosis Date    Acute bilateral low back pain with bilateral sciatica 11/26/2019    Acute bilateral low back pain with bilateral sciatica     Body mass index (bmi) 33.0-33.9, adult     CAD (coronary artery disease), native coronary artery     Class 2 obesity due to excess calories in adult 2/1/2018    Diabetes mellitus [facility] [address]     GERD (gastroesophageal reflux disease)     Hypercholesteremia     Hypertension     Malignant neoplasm of right kidney, except renal pelvis (HCC)     Malignant neoplasm of right kidney, except renal pelvis ([facility] [address]          SURGICAL HISTORY       Past Surgical History:   Procedure Laterality Date    CARDIAC CATHETERIZATION  12/31/07, JDT    Left heart cath    CARDIAC CATHETERIZATION  12/31/07, JDT    Left heart cath    CARDIOVASCULAR STRESS TEST  06/11/2009, JDT    Stress Echo    CARDIOVASCULAR STRESS TEST  12/31/07, JDT    Stress Echo    CORONARY ARTERY BYPASS GRAFT      X4 utilizing the left MOLINA to the LAD, right MOLINA to the second diagonal, and vein grafts to the obtuse marinal and PLOM.  CORONARY ARTERY BYPASS GRAFT  01/02/08, Magdy Carlson CABG placing the LIMA to the LAD, MIGUELANGEL to the second diagonal branch and SVBG to the OMB and PMB.     DIAGNOSTIC CARDIAC CATH LAB PROCEDURE      02/2021    KIDNEY REMOVAL Right 02/2019    KIDNEY SURGERY      Right Kidney removed    NH COLONOSCOPY FLX DX W/COLLJ SPEC WHEN PFRMD N/A 10/4/2018    Dr KASSY Solorio-Diverticular disease, 10 yr recall    SKIN BIOPSY           CURRENT MEDICATIONS       Discharge Medication List as of 12/2/2021 12:44 PM      CONTINUE these medications which have NOT CHANGED    Details   OZEMPIC, 0.25 OR 0.5 MG/DOSE, 2 MG/1.5ML SOPN INJECT 0.25MG UNDER THE SKIN ONCE WEEKLY FOR 4 WEEK, Disp-1.5 mL, R-1Normal      atorvastatin (LIPITOR) 40 MG tablet Take 40 mg by mouth dailyHistorical Med      amLODIPine (NORVASC) 5 MG tablet Take 5 mg by mouth dailyHistorical Med      lisinopril (PRINIVIL;ZESTRIL) 40 MG tablet Take 40 mg by mouth dailyHistorical Med      Calcium Polycarbophil (FIBER-CAPS PO) Take by mouth 2 times dailyHistorical Med      allopurinol (ZYLOPRIM) 100 MG tablet TAKE ONE TABLET BY MOUTH TWO TIMES DAILY, Disp-30 tablet, R-2Normal      Multiple Vitamins-Minerals (THERAPEUTIC MULTIVITAMIN-MINERALS) tablet Take 1 tablet by mouth dailyHistorical Med      clopidogrel (PLAVIX) 75 MG tablet Take 75 mg by mouth dailyHistorical Med      budesonide-formoterol (SYMBICORT) 80-4.5 MCG/ACT AERO Inhale 2 puffs into the lungs 2 times daily for 15 days, Disp-1 Inhaler,R-0Normal      zinc sulfate (ZINCATE) 220 (50 Zn) MG capsule Take 1 capsule by mouth daily, Disp-30 capsule, R-0Normal      vitamin C (VITAMIN C) 1000 MG tablet Take 1 tablet by mouth daily, Disp-30 tablet,R-0Normal      Respiratory Therapy Supplies (NEBULIZER/TUBING/MOUTHPIECE) KIT DAILY PRN Starting Mon 11/23/2020, Until Wed 10/6/2021 at 2359, Disp-1 kit, R-0, Normal      carvedilol (COREG) 3.125 MG tablet Take 3.125 mg by mouth 2 times daily (with meals)Historical Med      famotidine (PEPCID) 20 MG tablet Take 1 tablet by mouth 2 times daily, Disp-60 tablet, R-5Normal      isosorbide mononitrate (IMDUR) 60 MG extended release tablet Take 1 tablet by mouth nightly, Disp-30 tablet, R-5Normal      nitroGLYCERIN (NITROSTAT) 0.4 MG SL tablet Place 1 tablet under the tongue every 5 minutes as needed for Chest pain, Disp-25 tablet, R-3Normal      Omega-3 Fatty Acids (FISH OIL) 1200 MG CAPS Take 1,000 mg by mouth 2 times daily Historical Med      Cyanocobalamin 5000 MCG CAPS Take 5,000 mcg by mouth every morning Historical Med      aspirin 81 MG tablet Take 81 mg by mouth nightly Historical Med             ALLERGIES     Patient has no known allergies. FAMILY HISTORY       Family History   Problem Relation Age of Onset    Cancer Mother         lung    Coronary Art Dis Father     Stroke Father     Hypertension Father     High Blood Pressure Brother     Coronary Art Dis Paternal Grandmother     Coronary Art Dis Paternal Grandfather           SOCIAL HISTORY       Social History     Socioeconomic History    Marital status:      Spouse name: Not on file    Number of children: Not on file    Years of education: Not on file    Highest education level: Not on file   Occupational History    Not on file   Tobacco Use    Smoking status: Former Smoker     Packs/day: 2.00     Years: 32.00     Pack years: 64.00     Start date:      Quit date: 10/5/1998     Years since quittin.1    Smokeless tobacco: Former User   Vaping Use    Vaping Use: Never used   Substance and Sexual Activity    Alcohol use: Yes     Comment: Very Little    Drug use: Never    Sexual activity: Not on file   Other Topics Concern    Not on file   Social History Narrative    Not on file     Social Determinants of Health     Financial Resource Strain:     Difficulty of Paying Living Expenses: Not on file   Food Insecurity:     Worried About [address] in the Last Year: Not on file    [address] in the Last Year: Not on file   Transportation Needs:     Lack of Transportation (Medical): Not on file    Lack of Transportation (Non-Medical):  Not on file   Physical Activity:     Days of Exercise per Week: Not on file    Minutes of Exercise per Session: Not on file   Stress:     Feeling of Stress : Not on file   Social Connections:     Frequency of Communication with Friends and Family: Not on file    Frequency of Social Gatherings with Friends and Family: Not on file    Attends [employer] Services: Not on file   [facility] DIAGNOSTIC RESULTS     EKG: All EKG's are interpreted by the Emergency Department Physician who either signs or Co-signs this chart in the absence of a cardiologist.        RADIOLOGY:   Non-plain film images such as CT, Ultrasound and MRI are read by the radiologist. Alicia Sand images are visualized and preliminarily interpreted by the emergency physician with the below findings:        Interpretation per the Radiologist below, if available at the time of this note:    No orders to display         ED BEDSIDE ULTRASOUND:   Performed by ED Physician - none    LABS:  Labs Reviewed   GASTROINTESTINAL PANEL, MOLECULAR - Abnormal; Notable for the following components:       Result Value    E Coli Enteroaggregative PCR DETECTED (*)     All other components within normal limits    Narrative:     Griffin Williamson tel. ,  Results called to [address] [place] [address] ER, 12/02/2021 11:30, by [facility]  Results called to [address] [place] [address] ER, 12/02/2021 11:29, by Kahlil Joseph - Abnormal; Notable for the following components:    Sodium 135 (*)     CO2 19 (*)     Glucose 129 (*)     BUN 32 (*)     CREATININE 2.1 (*)     GFR Non- 31 (*)     GFR  38 (*)     Calcium 11.3 (*)     All other components within normal limits   CBC WITH AUTO DIFFERENTIAL - Abnormal; Notable for the following components:    WBC 13.2 (*)     MCHC 32.1 (*)     Neutrophils % 84.5 (*)     Lymphocytes % 6.6 (*)     Neutrophils Absolute 11.1 (*)     Lymphocytes Absolute 0.9 (*)     All other components within normal limits   LIPASE - Abnormal; Notable for the following components:    Lipase 66 (*)     All other components within normal limits   PROTIME-INR   URINE RT REFLEX TO CULTURE       All other labs were within normal range or not returned as of this dictation.     EMERGENCY DEPARTMENT COURSE and DIFFERENTIALDIAGNOSIS/MDM:   Vitals:    Vitals:    12/02/21 0826   BP: (!) 152/87   Pulse: 76   Resp: 16 Temp: 98.6 °F (37 °C)   TempSrc: Oral   SpO2: 99%   Weight: 248 lb (112.5 kg)   Height: 5' 10\" (1.778 m)       MDM  Number of Diagnoses or Management Options  Diarrhea of infectious origin  Intestinal infection due to enteroinvasive E. coli  Diagnosis management comments: Patient given 1000 cc fluid bolus. His creatinine is at 2.1 which is within his normal range 1.8-2.1 based on review of the records from the year. 12:13 PM  Has urinated yellow 500 cc of urine at this time. Discussed the findings on the GI panel with GI. Recommended Flagyl hold off on Cipro or azithromycin. Return for any worsening. I discussed this with the patient he appears well at this time he is making good urine I think he is hydrated off he is drinking fluids well he has no abdominal pain. Stable for discharge discussed all the findings with him and thought process as well as return precautions. Amount and/or Complexity of Data Reviewed  Clinical lab tests: ordered and reviewed  Discuss the patient with other providers: yes    Patient Progress  Patient progress: stable        CONSULTS:  None    PROCEDURES:  Unless otherwise notedbelow, none     Procedures    FINAL IMPRESSION     1. Intestinal infection due to enteroinvasive E. coli    2.  Diarrhea of infectious origin          DISPOSITION/PLAN   DISPOSITION Decision To Discharge 12/02/2021 12:23:25 PM      PATIENT REFERRED TO:  [address] [place] [phone]    Schedule an appointment as soon as possible for a visit in 1 week        DISCHARGE MEDICATIONS:  Discharge Medication List as of 12/2/2021 12:44 PM      START taking these medications    Details   metroNIDAZOLE (FLAGYL) 500 MG tablet Take 1 tablet by mouth 3 times daily for 7 days, Disp-21 tablet, R-0Normal                (Please note that portions of this note were completed with a voice recognition program.  Efforts were made to edit the dictations butoccasionally words are mis-transcribed.)    Miguel Ángel Zimmerman MD (electronically signed)  AttendingEmergency Physician         Miguel Ángel Zimmerman MD  12/02/21 [id number]

## 2021-12-03 LAB
EKG P AXIS: 47 DEGREES
EKG P-R INTERVAL: 200 MS
EKG Q-T INTERVAL: 422 MS
EKG QRS DURATION: 166 MS
EKG QTC CALCULATION (BAZETT): 446 MS
EKG T AXIS: 58 DEGREES

## 2021-12-21 DIAGNOSIS — Z13.9 SCREENING DUE: Primary | ICD-10-CM

## 2021-12-21 DIAGNOSIS — E11.8 TYPE 2 DIABETES MELLITUS WITH COMPLICATION, WITHOUT LONG-TERM CURRENT USE OF INSULIN (HCC): Primary | ICD-10-CM

## 2021-12-28 DIAGNOSIS — M10.9 GOUT, UNSPECIFIED CAUSE, UNSPECIFIED CHRONICITY, UNSPECIFIED SITE: ICD-10-CM

## 2021-12-28 DIAGNOSIS — E11.8 TYPE 2 DIABETES MELLITUS WITH COMPLICATION, WITHOUT LONG-TERM CURRENT USE OF INSULIN (HCC): ICD-10-CM

## 2021-12-28 DIAGNOSIS — Z13.9 SCREENING DUE: ICD-10-CM

## 2021-12-28 DIAGNOSIS — E79.0 ELEVATED BLOOD URIC ACID LEVEL: ICD-10-CM

## 2021-12-28 LAB
CHOLESTEROL, TOTAL: 144 MG/DL (ref 160–199)
HBA1C MFR BLD: 6 % (ref 4–6)
HDLC SERPL-MCNC: 48 MG/DL (ref 55–121)
LDL CHOLESTEROL CALCULATED: 82 MG/DL
TRIGL SERPL-MCNC: 68 MG/DL (ref 0–149)

## 2021-12-28 RX ORDER — ALLOPURINOL 100 MG/1
TABLET ORAL
Qty: 30 TABLET | Refills: 2 | Status: SHIPPED | OUTPATIENT
Start: 2021-12-28 | End: 2022-01-05 | Stop reason: SDUPTHER

## 2021-12-28 NOTE — TELEPHONE ENCOUNTER
Kimberly Frost called to request a refill on his medication.       Last office visit : 8/25/2021   Next office visit : 1/5/2022     Requested Prescriptions     Pending Prescriptions Disp Refills    allopurinol (ZYLOPRIM) 100 MG tablet [Pharmacy Med Name: ALLOPURINOL 100 MG TABS 100 Tablet] 30 tablet 2     Sig: TAKE ONE TABLET BY MOUTH TWO TIMES DAILY            Chuck Camargo

## 2022-01-05 ENCOUNTER — OFFICE VISIT (OUTPATIENT)
Dept: PRIMARY CARE CLINIC | Age: 72
End: 2022-01-05
Payer: MEDICARE

## 2022-01-05 VITALS
TEMPERATURE: 98.7 F | HEIGHT: 70 IN | BODY MASS INDEX: 35.53 KG/M2 | SYSTOLIC BLOOD PRESSURE: 150 MMHG | HEART RATE: 76 BPM | DIASTOLIC BLOOD PRESSURE: 80 MMHG | WEIGHT: 248.2 LBS | OXYGEN SATURATION: 96 %

## 2022-01-05 DIAGNOSIS — E79.0 ELEVATED BLOOD URIC ACID LEVEL: ICD-10-CM

## 2022-01-05 DIAGNOSIS — N18.30 STAGE 3 CHRONIC KIDNEY DISEASE, UNSPECIFIED WHETHER STAGE 3A OR 3B CKD (HCC): ICD-10-CM

## 2022-01-05 DIAGNOSIS — M10.9 GOUT, UNSPECIFIED CAUSE, UNSPECIFIED CHRONICITY, UNSPECIFIED SITE: ICD-10-CM

## 2022-01-05 DIAGNOSIS — Z00.00 ROUTINE GENERAL MEDICAL EXAMINATION AT A HEALTH CARE FACILITY: Primary | ICD-10-CM

## 2022-01-05 DIAGNOSIS — E83.52 HYPERCALCEMIA: ICD-10-CM

## 2022-01-05 DIAGNOSIS — B35.4 TINEA CORPORIS: ICD-10-CM

## 2022-01-05 DIAGNOSIS — E11.8 TYPE 2 DIABETES MELLITUS WITH COMPLICATION, WITHOUT LONG-TERM CURRENT USE OF INSULIN (HCC): ICD-10-CM

## 2022-01-05 DIAGNOSIS — I25.118 CORONARY ARTERY DISEASE OF NATIVE ARTERY OF NATIVE HEART WITH STABLE ANGINA PECTORIS (HCC): ICD-10-CM

## 2022-01-05 DIAGNOSIS — E66.01 SEVERE OBESITY (BMI 35.0-35.9 WITH COMORBIDITY) (HCC): ICD-10-CM

## 2022-01-05 PROBLEM — M54.42 ACUTE BILATERAL LOW BACK PAIN WITH BILATERAL SCIATICA: Status: RESOLVED | Noted: 2019-11-26 | Resolved: 2022-01-05

## 2022-01-05 PROBLEM — M54.41 ACUTE BILATERAL LOW BACK PAIN WITH BILATERAL SCIATICA: Status: RESOLVED | Noted: 2019-11-26 | Resolved: 2022-01-05

## 2022-01-05 PROBLEM — J96.01 ACUTE RESPIRATORY FAILURE WITH HYPOXIA (HCC): Status: RESOLVED | Noted: 2020-11-27 | Resolved: 2022-01-05

## 2022-01-05 PROCEDURE — G0439 PPPS, SUBSEQ VISIT: HCPCS | Performed by: NURSE PRACTITIONER

## 2022-01-05 PROCEDURE — 3046F HEMOGLOBIN A1C LEVEL >9.0%: CPT | Performed by: NURSE PRACTITIONER

## 2022-01-05 PROCEDURE — G8427 DOCREV CUR MEDS BY ELIG CLIN: HCPCS | Performed by: NURSE PRACTITIONER

## 2022-01-05 PROCEDURE — G8417 CALC BMI ABV UP PARAM F/U: HCPCS | Performed by: NURSE PRACTITIONER

## 2022-01-05 PROCEDURE — 1036F TOBACCO NON-USER: CPT | Performed by: NURSE PRACTITIONER

## 2022-01-05 PROCEDURE — 1123F ACP DISCUSS/DSCN MKR DOCD: CPT | Performed by: NURSE PRACTITIONER

## 2022-01-05 PROCEDURE — 4040F PNEUMOC VAC/ADMIN/RCVD: CPT | Performed by: NURSE PRACTITIONER

## 2022-01-05 PROCEDURE — G8484 FLU IMMUNIZE NO ADMIN: HCPCS | Performed by: NURSE PRACTITIONER

## 2022-01-05 PROCEDURE — 99213 OFFICE O/P EST LOW 20 MIN: CPT | Performed by: NURSE PRACTITIONER

## 2022-01-05 PROCEDURE — 2022F DILAT RTA XM EVC RTNOPTHY: CPT | Performed by: NURSE PRACTITIONER

## 2022-01-05 PROCEDURE — 3017F COLORECTAL CA SCREEN DOC REV: CPT | Performed by: NURSE PRACTITIONER

## 2022-01-05 RX ORDER — ALLOPURINOL 100 MG/1
TABLET ORAL
Qty: 60 TABLET | Refills: 2 | Status: SHIPPED | OUTPATIENT
Start: 2022-01-05 | End: 2022-05-11 | Stop reason: SDUPTHER

## 2022-01-05 RX ORDER — NYSTATIN 100000 [USP'U]/G
POWDER TOPICAL
Qty: 1 EACH | Refills: 0 | Status: SHIPPED | OUTPATIENT
Start: 2022-01-05 | End: 2022-03-31 | Stop reason: ALTCHOICE

## 2022-01-05 SDOH — ECONOMIC STABILITY: FOOD INSECURITY: WITHIN THE PAST 12 MONTHS, THE FOOD YOU BOUGHT JUST DIDN'T LAST AND YOU DIDN'T HAVE MONEY TO GET MORE.: NEVER TRUE

## 2022-01-05 SDOH — ECONOMIC STABILITY: FOOD INSECURITY: WITHIN THE PAST 12 MONTHS, YOU WORRIED THAT YOUR FOOD WOULD RUN OUT BEFORE YOU GOT MONEY TO BUY MORE.: NEVER TRUE

## 2022-01-05 ASSESSMENT — LIFESTYLE VARIABLES
AUDIT TOTAL SCORE: 1
HOW OFTEN DURING THE LAST YEAR HAVE YOU HAD A FEELING OF GUILT OR REMORSE AFTER DRINKING: 0
HOW OFTEN DO YOU HAVE SIX OR MORE DRINKS ON ONE OCCASION: 0
HOW OFTEN DURING THE LAST YEAR HAVE YOU BEEN UNABLE TO REMEMBER WHAT HAPPENED THE NIGHT BEFORE BECAUSE YOU HAD BEEN DRINKING: 0
HAVE YOU OR SOMEONE ELSE BEEN INJURED AS A RESULT OF YOUR DRINKING: 0
HOW MANY STANDARD DRINKS CONTAINING ALCOHOL DO YOU HAVE ON A TYPICAL DAY: 0
HOW OFTEN DURING THE LAST YEAR HAVE YOU FAILED TO DO WHAT WAS NORMALLY EXPECTED FROM YOU BECAUSE OF DRINKING: 0
HOW OFTEN DO YOU HAVE A DRINK CONTAINING ALCOHOL: 1
AUDIT-C TOTAL SCORE: 1
HOW OFTEN DURING THE LAST YEAR HAVE YOU FOUND THAT YOU WERE NOT ABLE TO STOP DRINKING ONCE YOU HAD STARTED: 0
HAS A RELATIVE, FRIEND, DOCTOR, OR ANOTHER HEALTH PROFESSIONAL EXPRESSED CONCERN ABOUT YOUR DRINKING OR SUGGESTED YOU CUT DOWN: 0
HOW OFTEN DURING THE LAST YEAR HAVE YOU NEEDED AN ALCOHOLIC DRINK FIRST THING IN THE MORNING TO GET YOURSELF GOING AFTER A NIGHT OF HEAVY DRINKING: 0

## 2022-01-05 ASSESSMENT — PATIENT HEALTH QUESTIONNAIRE - PHQ9
SUM OF ALL RESPONSES TO PHQ9 QUESTIONS 1 & 2: 0
SUM OF ALL RESPONSES TO PHQ QUESTIONS 1-9: 0
SUM OF ALL RESPONSES TO PHQ QUESTIONS 1-9: 0
2. FEELING DOWN, DEPRESSED OR HOPELESS: 0
1. LITTLE INTEREST OR PLEASURE IN DOING THINGS: 0
SUM OF ALL RESPONSES TO PHQ QUESTIONS 1-9: 0
SUM OF ALL RESPONSES TO PHQ QUESTIONS 1-9: 0

## 2022-01-05 ASSESSMENT — SOCIAL DETERMINANTS OF HEALTH (SDOH): HOW HARD IS IT FOR YOU TO PAY FOR THE VERY BASICS LIKE FOOD, HOUSING, MEDICAL CARE, AND HEATING?: NOT HARD AT ALL

## 2022-01-05 ASSESSMENT — ENCOUNTER SYMPTOMS
VOICE CHANGE: 0
RHINORRHEA: 0
SHORTNESS OF BREATH: 0
CONSTIPATION: 0
BLOOD IN STOOL: 0
NAUSEA: 0
WHEEZING: 0
DIARRHEA: 0
EYE REDNESS: 0
VOMITING: 0
CHEST TIGHTNESS: 0
COUGH: 0
ABDOMINAL PAIN: 0
TROUBLE SWALLOWING: 0
SORE THROAT: 0

## 2022-01-05 NOTE — PROGRESS NOTES
Medicare Annual Wellness Visit  Name: Konstantin Diaz Date: 2022   MRN: [id number] Sex: Male   Age: 70 y.o. Ethnicity: Non- / Non    : 1950 Race: White (non-)      Edel Simon is here for Medicare AWV and Discuss Medications (pt has questions about his allopurinol )    Screenings for behavioral, psychosocial and functional/safety risks, and cognitive dysfunction are all negative except as indicated below. These results, as well as other patient data from the [address] form, are documented in Flowsheets linked to this Encounter. No Known Allergies    Prior to Visit Medications    Medication Sig Taking?  Authorizing Provider   allopurinol (ZYLOPRIM) 100 MG tablet TAKE ONE TABLET BY MOUTH TWO TIMES DAILY Yes MISSY Carmen   OZEMPIC, 0.25 OR 0.5 MG/DOSE, 2 MG/1.5ML SOPN INJECT 0.25MG UNDER THE SKIN ONCE WEEKLY FOR 4 WEEK Yes MISSY Carmen   atorvastatin (LIPITOR) 40 MG tablet Take 40 mg by mouth daily Yes Historical Provider, MD   amLODIPine (NORVASC) 5 MG tablet Take 5 mg by mouth daily Yes Historical Provider, MD   lisinopril (PRINIVIL;ZESTRIL) 40 MG tablet Take 40 mg by mouth daily Yes Historical Provider, MD   Calcium Polycarbophil (FIBER-CAPS PO) Take by mouth 2 times daily Yes Historical Provider, MD   Multiple Vitamins-Minerals (THERAPEUTIC MULTIVITAMIN-MINERALS) tablet Take 1 tablet by mouth daily Yes Historical Provider, MD   clopidogrel (PLAVIX) 75 MG tablet Take 75 mg by mouth daily Yes Historical Provider, MD   zinc sulfate (ZINCATE) 220 (50 Zn) MG capsule Take 1 capsule by mouth daily Yes Joan Arzola MD   vitamin C (VITAMIN C) 1000 MG tablet Take 1 tablet by mouth daily Yes Joan Arzola MD   carvedilol (COREG) 3.125 MG tablet Take 3.125 mg by mouth 2 times daily (with meals) Yes Historical Provider, MD   famotidine (PEPCID) 20 MG tablet Take 1 tablet by mouth 2 times daily  Patient taking differently: Take 20 mg by mouth as needed  Yes MISSY Puentes   isosorbide mononitrate (IMDUR) 60 MG extended release tablet Take 1 tablet by mouth nightly  Patient taking differently: Take 60 mg by mouth daily  Yes MISSY Puentes   nitroGLYCERIN (NITROSTAT) 0.4 MG SL tablet Place 1 tablet under the tongue every 5 minutes as needed for Chest pain Yes MISSY Puentes   Omega-3 Fatty Acids (FISH OIL) 1200 MG CAPS Take 1,000 mg by mouth 2 times daily  Yes Historical Provider, MD   aspirin 81 MG tablet Take 81 mg by mouth nightly  Yes Historical Provider, MD   budesonide-formoterol (SYMBICORT) 80-4.5 MCG/ACT AERO Inhale 2 puffs into the lungs 2 times daily for 15 days  Patient not taking: Reported on 1/5/2022  Anitha Fowler MD   Respiratory Therapy Supplies (NEBULIZER/TUBING/MOUTHPIECE) KIT 1 kit by Does not apply route daily as needed (persistant cough, sob, or wheezing)  Patient not taking: Reported on 1/5/2022  MISSY Puentes   Cyanocobalamin 5000 MCG CAPS Take 5,000 mcg by mouth every morning   Patient not taking: Reported on 7/14/2021  Historical Provider, MD       Past Medical History:   Diagnosis Date    Acute bilateral low back pain with bilateral sciatica 11/26/2019    Acute bilateral low back pain with bilateral sciatica     Acute respiratory failure with hypoxia ([address] 11/27/2020    Body mass index (bmi) 33.0-33.9, adult     CAD (coronary artery disease), native coronary artery     Class 2 obesity due to excess calories in adult 2/1/2018    Diabetes mellitus ([address]     GERD (gastroesophageal reflux disease)     Hypercholesteremia     Hypertension     Malignant neoplasm of right kidney, except renal pelvis ([address]     Malignant neoplasm of right kidney, except renal pelvis [facility])        Past Surgical History:   Procedure Laterality Date    CARDIAC CATHETERIZATION  12/31/07, JDT    Left heart cath   [place] CARDIAC CATHETERIZATION  12/31/07, JDT    Left heart cath   [place] CARDIOVASCULAR STRESS TEST  06/11/2009, JDT    Stress Echo    CARDIOVASCULAR STRESS TEST  12/31/07, JDT    Stress Echo    CORONARY ARTERY BYPASS GRAFT      X4 utilizing the left MOLINA to the LAD, right MOLINA to the second diagonal, and vein grafts to the obtuse marinal and PLOM.  CORONARY ARTERY BYPASS GRAFT  01/02/08, Raynelle Overall CABG placing the LIMA to the LAD, MIGUELANGEL to the second diagonal branch and SVBG to the OMB and PMB.  DIAGNOSTIC CARDIAC CATH LAB PROCEDURE      02/2021    KIDNEY REMOVAL Right 02/2019    KIDNEY SURGERY      Right Kidney removed    [place] COLONOSCOPY FLX DX W/COLLJ SPEC WHEN PFRMD N/A 10/4/2018    Dr KASSY Solorio-Diverticular disease, 8 yr recall    SKIN BIOPSY         Family History   Problem Relation Age of Onset   Segundo Cancer Mother         lung    Coronary Art Dis Father     Stroke Father     Hypertension Father     High Blood Pressure Brother     Coronary Art Dis Paternal Grandmother     Coronary Art Dis Paternal Grandfather        CareTeam (Including outside providers/suppliers regularly involved in providing care):   Patient Care Team:  MISSY Woodward as PCP - General (Family Nurse Practitioner)  MISSY Woodward as PCP - [facility]  Oscar Pineda MD as Consulting Physician (Cardiothoracic Surgery)  Angela Jiménez MD as Consulting Physician (Cardiology)    Wt Readings from Last 3 Encounters:   01/05/22 248 lb 3.2 oz (112.6 kg)   12/02/21 248 lb (112.5 kg)   10/06/21 248 lb (112.5 kg)     Vitals:    01/05/22 1415   BP: (!) 150/80   Pulse: 76   Temp: 98.7 °F (37.1 °C)   TempSrc: Temporal   SpO2: 96%   Weight: 248 lb 3.2 oz (112.6 kg)   Height: 5' 10\" (1.778 m)     Body mass index is 35.61 kg/m². Based upon direct observation of the patient, evaluation of cognition reveals recent and remote memory intact.     General Appearance: alert and oriented to person, place and time, well developed and well- nourished, in no acute distress  Skin: warm and dry, no rash or erythema  Head: normocephalic and atraumatic  Eyes: pupils equal, round, and reactive to light, extraocular eye movements intact, conjunctivae normal  ENT: tympanic membrane, external ear and ear canal normal bilaterally, nose without deformity, nasal mucosa and turbinates normal without polyps  Neck: supple and non-tender without mass, no thyromegaly or thyroid nodules, no cervical lymphadenopathy  Pulmonary/Chest: clear to auscultation bilaterally- no wheezes, rales or rhonchi, normal air movement, no respiratory distress  Cardiovascular: normal rate, regular rhythm, normal S1 and S2, no murmurs, rubs, clicks, or gallops, distal pulses intact, no carotid bruits  Abdomen: soft, non-tender, non-distended, normal bowel sounds, no masses or organomegaly  Extremities: no cyanosis, clubbing or edema  Musculoskeletal: normal range of motion, no joint swelling, deformity or tenderness  Neurologic: reflexes normal and symmetric, no cranial nerve deficit, gait, coordination and speech normal    Patient's complete Health Risk Assessment and screening values have been reviewed and are found in Flowsheets. The following problems were reviewed today and where indicated follow up appointments were made and/or referrals ordered. Positive Risk Factor Screenings with Interventions:            General Health and ACP:  General  In general, how would you say your health is?: Good  In the past 7 days, have you experienced any of the following?  New or Increased Pain, New or Increased Fatigue, Loneliness, Social Isolation, Stress or Anger?: None of These  Do you get the social and emotional support that you need?: Yes  Do you have a Living Will?: Yes  Advance Directives     Power of  Living Will ACP-Advance Directive ACP-Power of     Not on File Filed on 03/06/20 Filed Not on File      General Health Risk Interventions:  · No Living Will: Patient declines ACP discussion/assistance    Health Habits/Nutrition:  Health Habits/Nutrition  Do you exercise for at least 20 minutes 2-3 times per week?: (!) No  Have you lost any weight without trying in the past 3 months?: No  Do you eat only one meal per day?: No  Have you seen the dentist within the past year?: Yes  Body mass index: (!) 35.61  Health Habits/Nutrition Interventions:  · Inadequate physical activity:  patient is not ready to increase his/her physical activity level at this time     Safety:  Safety  Do you have working smoke detectors?: Yes  Have all throw rugs been removed or fastened?: (!) No  Do you have non-slip mats or surfaces in all bathtubs/showers?: (!) No  Do all of your stairways have a railing or banister? :  (pt has no stairs)  Are your doorways, halls and stairs free of clutter?: Yes  Do you always fasten your seatbelt when you are in a car?: Yes  Safety Interventions:  · Home safety tips provided       Personalized Preventive Plan   Current Health Maintenance Status  Immunization History   Administered Date(s) Administered    COVID-19, J&J, PF, 0.5 mL 03/29/2021    Influenza Virus Vaccine 11/15/2018    Influenza, High Dose (Fluzone 65 yrs and older) 11/15/2018    Influenza, Triv, inactivated, subunit, adjuvanted, IM (Fluad 65 yrs and older) 11/22/2019    Pneumococcal Conjugate 13-valent ([id number]) 05/11/2018    Pneumococcal Polysaccharide ([id number]) 11/22/2019    Tdap (Boostrix, Adacel) 05/11/2018    Zoster Live (Zostavax) 03/15/2018    Zoster Recombinant (Shingrix) 02/05/2019        Health Maintenance   Topic Date Due    Depression Screen  Never done    Diabetic retinal exam  Never done    Diabetic foot exam  10/05/2019    Annual Wellness Visit (AWV)  11/22/2020    Flu vaccine (1) 12/21/2022 (Originally 9/1/2021)    COVID-19 Vaccine (2 - Booster for Opal series) 01/01/2023 (Originally 5/24/2021)    Shingles Vaccine (3 of 3) 01/16/2060 (Originally 4/2/2019)    Potassium monitoring  12/02/2022    Creatinine monitoring  12/02/2022    A1C test (Diabetic or Prediabetic)  12/28/2022    Lipid screen  12/28/2022    DTaP/Tdap/Td vaccine (2 - Td or Tdap) 05/11/2028    Colon cancer screen colonoscopy  10/04/2028    Pneumococcal 65+ years Vaccine  Completed    AAA screen  Completed    Hepatitis C screen  Completed    Hepatitis A vaccine  Aged Out    Hib vaccine  Aged Out    Meningococcal (ACWY) vaccine  Aged Out     Recommendations for [employer] Due: see orders and patient instructions/AVS.  . Recommended screening schedule for the next 5-10 years is provided to the patient in written form: see Patient Stef Du was seen today for medicare awv and discuss medications. Diagnoses and all orders for this visit:    Routine general medical examination at a health care facility  -     [address] [place] [address], [address] [place] [address] [[id number]]    Elevated blood uric acid level    Gout, unspecified cause, unspecified chronicity, unspecified site    Type 2 diabetes mellitus with complication, without long-term current use of insulin (HCC)    Hypercalcemia    Stage 3 chronic kidney disease, unspecified whether stage 3a or 3b CKD ([facility]    Coronary artery disease of native artery of native heart with stable angina pectoris ([facility]    Severe obesity (BMI 35.0-35.9 with comorbidity) [place] [address]  Dept: [phone]  Dept Fax: [phone]  Loc: [phone]        Sasha Wei is a 70 y.o. male who presents today for his medical conditions/ complaints as noted below. Sasha Wei is c/o Medicare AWV and Discuss Medications (pt has questions about his allopurinol )        Chief Complaint   Patient presents with    Medicare AWV    Discuss Medications     pt has questions about his allopurinol        HPI:     HPI    Hypertension:  1/5/2022: b/p elevated today. Reports that he thinks that it is due to walking, and coffee this morning. Denies any chest pain, sob, or excessive fatigue. 8/18/2020: Patient states that blood pressure has improved. Patient states that his blood pressure has been running 130s over 84. Patient states that he followed with cardiology last week and blood pressure was 130/84. Patient states that he is not able to withstand activity that he was previously and he becomes sob and developes chest pain with vigorous activity. Pt reports understanding he has blockages that are in operable. He states that he was transitioned over to a new cardiologist Dr. Corry Cuevas most recently who told the patient that surgery might be an option for him. Pt is unsure of who to believe and request another opinion. Pt reports regimen of Imdur  60 mg daily, lisinopril 10 mg twice daily, and Procardia 60 mg daily.     Transposed Cardiologist Dr. Topher Li note below  \"#1 coronary status post CABG, recent cath showed patent LIMA grafts to the LAD, patent SVG to the RCA and occluded OM graft  Surgical opinion was taken for redo CABG and patient was turned down because of high risk and referral for optimizing medical therapy. \"    DM:   1/5/2022: Well controlled HA1C 6.0.             8/18/2020: Patient reports that he is using Ozempic 0.5 mg once weekly. He does note that having issues with constipation. Patient also notes issues with acid reflux on this dosage. Patient wishes to decrease Ozempic dosage to 0.25 mg to see if this would help with his constipation and acid reflux. Patient has a decreased in his weight. Patient's weight down to 234. Patient's blood pressure has also declined due to patient's weight loss. Patient notes a total of 16 pound weight loss. Patient reports that they have been compliant with taking medications as directed.      Past Medical History:   Diagnosis Date    Acute bilateral low back pain with bilateral sciatica 11/26/2019    Acute bilateral low back pain with bilateral sciatica     Acute respiratory failure with hypoxia ([facility] 2020    Body mass index (bmi) 33.0-33.9, adult     CAD (coronary artery disease), native coronary artery     Class 2 obesity due to excess calories in adult 2018    Diabetes mellitus ([facility]     GERD (gastroesophageal reflux disease)     Hypercholesteremia     Hypertension     Malignant neoplasm of right kidney, except renal pelvis (HCC)     Malignant neoplasm of right kidney, except renal pelvis ([facility]        Past Surgical History:   Procedure Laterality Date    CARDIAC CATHETERIZATION  07, JDT    Left heart cath    CARDIAC CATHETERIZATION  07, JDT    Left heart cath    CARDIOVASCULAR STRESS TEST  2009, JDT    Stress Echo    CARDIOVASCULAR STRESS TEST  07, JDT    Stress Echo    CORONARY ARTERY BYPASS GRAFT      X4 utilizing the left MOLINA to the LAD, right MOLINA to the second diagonal, and vein grafts to the obtuse marinal and PLOM.  CORONARY ARTERY BYPASS GRAFT  08, Caridad Sy CABG placing the LIMA to the LAD, MIGUELANGEL to the second diagonal branch and SVBG to the OMB and PMB.  DIAGNOSTIC CARDIAC CATH LAB PROCEDURE      2021    KIDNEY REMOVAL Right 2019    KIDNEY SURGERY      Right Kidney removed    OR COLONOSCOPY FLX DX W/COLLJ SPEC WHEN PFRMD N/A 10/4/2018    Dr KASSY Solorio-Diverticular disease, 10 yr recall    SKIN BIOPSY         Social History     Socioeconomic History    Marital status:      Spouse name: None    Number of children: None    Years of education: None    Highest education level: None   Occupational History    None   Tobacco Use    Smoking status: Former Smoker     Packs/day: 2.00     Years: 32.00     Pack years: 64.00     Start date:      Quit date: 10/5/1998     Years since quittin.2    Smokeless tobacco: Former User   Vaping Use    Vaping Use: Never used   Substance and Sexual Activity    Alcohol use:  Yes Comment: Very Little    Drug use: Never    Sexual activity: None   Other Topics Concern    None   Social History Narrative    None     Social Determinants of Health     Financial Resource Strain: Low Risk     Difficulty of Paying Living Expenses: Not hard at all   Food Insecurity: No Food Insecurity    Worried About Running Out of Food in the Last Year: Never true    Gila of Food in the Last Year: Never true   Transportation Needs:     Lack of Transportation (Medical): Not on file    Lack of Transportation (Non-Medical): Not on file   Physical Activity:     Days of Exercise per Week: Not on file    Minutes of Exercise per Session: Not on file   Stress:     Feeling of Stress : Not on file   Social Connections:     Frequency of Communication with Friends and Family: Not on file    Frequency of Social Gatherings with Friends and Family: Not on file    Attends Rastafarian Services: Not on file    Active Member of [address] [place] [address] [employer] or Organizations: Not on file    Attends Club or Organization Meetings: Not on file    Marital Status: Not on file   Intimate Partner Violence:     Fear of Current or Ex-Partner: Not on file    Emotionally Abused: Not on file    Physically Abused: Not on file    Sexually Abused: Not on file   Housing Stability:     Unable to Pay for Housing in the Last Year: Not on file    Number of [place] in the Last Year: Not on file    Unstable Housing in the Last Year: Not on file       Family History   Problem Relation Age of Onset    Cancer Mother         lung    Coronary Art Dis Father     Stroke Father     Hypertension Father     High Blood Pressure Brother     Coronary Art Dis Paternal Grandmother     Coronary Art Dis Paternal Grandfather        Current Outpatient Medications   Medication Sig Dispense Refill    nystatin (MYCOSTATIN) 195464 UNIT/GM powder Apply 3 times daily.  1 each 0    allopurinol (ZYLOPRIM) 100 MG tablet Take twice daily 60 tablet 2    OZEMPIC, 0.25 OR 0.5 MG/DOSE, 2 MG/1.5ML SOPN INJECT 0.25MG UNDER THE SKIN ONCE WEEKLY FOR 4 WEEK 1.5 mL 1    atorvastatin (LIPITOR) 40 MG tablet Take 40 mg by mouth daily      amLODIPine (NORVASC) 5 MG tablet Take 5 mg by mouth daily      lisinopril (PRINIVIL;ZESTRIL) 40 MG tablet Take 40 mg by mouth daily      Calcium Polycarbophil (FIBER-CAPS PO) Take by mouth 2 times daily      Multiple Vitamins-Minerals (THERAPEUTIC MULTIVITAMIN-MINERALS) tablet Take 1 tablet by mouth daily      clopidogrel (PLAVIX) 75 MG tablet Take 75 mg by mouth daily      zinc sulfate (ZINCATE) 220 (50 Zn) MG capsule Take 1 capsule by mouth daily 30 capsule 0    vitamin C (VITAMIN C) 1000 MG tablet Take 1 tablet by mouth daily 30 tablet 0    carvedilol (COREG) 3.125 MG tablet Take 3.125 mg by mouth 2 times daily (with meals)      famotidine (PEPCID) 20 MG tablet Take 1 tablet by mouth 2 times daily (Patient taking differently: Take 20 mg by mouth as needed ) 60 tablet 5    isosorbide mononitrate (IMDUR) 60 MG extended release tablet Take 1 tablet by mouth nightly (Patient taking differently: Take 60 mg by mouth daily ) 30 tablet 5    nitroGLYCERIN (NITROSTAT) 0.4 MG SL tablet Place 1 tablet under the tongue every 5 minutes as needed for Chest pain 25 tablet 3    Omega-3 Fatty Acids (FISH OIL) 1200 MG CAPS Take 1,000 mg by mouth 2 times daily       aspirin 81 MG tablet Take 81 mg by mouth nightly       budesonide-formoterol (SYMBICORT) 80-4.5 MCG/ACT AERO Inhale 2 puffs into the lungs 2 times daily for 15 days (Patient not taking: Reported on 1/5/2022) 1 Inhaler 0    Respiratory Therapy Supplies (NEBULIZER/TUBING/MOUTHPIECE) KIT 1 kit by Does not apply route daily as needed (persistant cough, sob, or wheezing) (Patient not taking: Reported on 1/5/2022) 1 kit 0    Cyanocobalamin 5000 MCG CAPS Take 5,000 mcg by mouth every morning  (Patient not taking: Reported on 7/14/2021)       No current facility-administered medications for this visit. No Known Allergies    Lab Review not applicable  notapplicable    Subjective:   Review of Systems   Constitutional: Negative for activity change, appetite change, fatigue, fever and unexpected weight change. HENT: Negative for congestion, ear pain, nosebleeds, rhinorrhea, sore throat, trouble swallowing and voice change. Eyes: Negative for redness and visual disturbance. Respiratory: Negative for cough, chest tightness, shortness of breath and wheezing. Cardiovascular: Negative for chest pain, palpitations and leg swelling. Gastrointestinal: Negative for abdominal pain, blood in stool, constipation, diarrhea, nausea and vomiting. Endocrine: Negative for polydipsia, polyphagia and polyuria. Genitourinary: Negative for dysuria, frequency and urgency. Musculoskeletal: Negative for myalgias. Skin: Negative for rash and wound. Neurological: Negative for dizziness, speech difficulty, light-headedness and headaches. Psychiatric/Behavioral: Negative for agitation, confusion, self-injury and suicidal ideas. The patient is not nervous/anxious. Objective:     Physical Exam  Vitals and nursing note reviewed. Constitutional:       General: He is not in acute distress. Appearance: He is well-developed. He is not diaphoretic. HENT:      Head: Normocephalic and atraumatic. Right Ear: External ear normal.      Left Ear: External ear normal.      Nose: Nose normal.      Mouth/Throat:      Pharynx: No oropharyngeal exudate. Eyes:      General:         Right eye: No discharge. Left eye: No discharge. Conjunctiva/sclera: Conjunctivae normal.      Pupils: Pupils are equal, round, and reactive to light. Cardiovascular:      Rate and Rhythm: Normal rate and regular rhythm. Heart sounds: Normal heart sounds. No murmur heard. Pulmonary:      Effort: Pulmonary effort is normal. No respiratory distress. Breath sounds: Normal breath sounds. No stridor.  No 9. Tinea corporis  nystatin (MYCOSTATIN) 887207 UNIT/GM powder     No results found for this visit on 01/05/22. Plan:     1. Routine general medical examination at a health care facility    2. Elevated blood uric acid level    3. Gout, unspecified cause, unspecified chronicity, unspecified site    4. Type 2 diabetes mellitus with complication, without long-term current use of insulin ([facility]    5. Hypercalcemia    6. Stage 3 chronic kidney disease, unspecified whether stage 3a or 3b CKD ([facility]    7. Coronary artery disease of native artery of native heart with stable angina pectoris ([facility]    8. Severe obesity (BMI 35.0-35.9 with comorbidity) ([facility]    9. Tinea corporis      Over 50% of the total visit time of 30 minutes was spent on counseling and or coordination of care of routine gen medical exam, elevated blood uric acid level, gout, type 2 DM, hypercalcemia, stage 3 chronic kidney disease, coronary artery disease, severe obesity, and tinea corporis. Return in 3 months (on 4/5/2022) for Medicare Annual Wellness Visit in 1 year. Orders Placed This Encounter   Procedures    CBC Auto Differential     Standing Status:   Future     Standing Expiration Date:   1/5/2023    Comprehensive Metabolic Panel     Standing Status:   Future     Standing Expiration Date:   1/5/2023    TSH without Reflex     Standing Status:   Future     Standing Expiration Date:   1/5/2023    T4, Free     Standing Status:   Future     Standing Expiration Date:   1/5/2023    Microalbumin / Creatinine Urine Ratio     Standing Status:   Future     Standing Expiration Date:   1/5/2023    OR ADVANCED CARE PLAN FACE TO FACE, 1ST 30MIN [[id number]]     Orders Placed This Encounter   Medications    nystatin (MYCOSTATIN) 625287 UNIT/GM powder     Sig: Apply 3 times daily.      Dispense:  1 each     Refill:  0    allopurinol (ZYLOPRIM) 100 MG tablet     Sig: Take twice daily     Dispense:  60 tablet     Refill:  2       Patient Instructions Have additional labs on the 4th floor. Nystatin powder four times daily. Learning About Living [place]  What is a living will? A living will, also called a declaration, is a legal form. It tells your family and your doctor your wishes when you can't speak for yourself. It's used by the health professionals who will treat you as you near the end of your life or if you get seriously hurt or ill. If you put your wishes in writing, your loved ones and others will know what kind of care you want. They won't need to guess. This can ease your mind and be helpful to others. And you can change or cancel your living will at any time. A living will is not the same as an estate or property will. An estate will explains what you want to happen with your money and property after you die. How do you use it? A living will is used to describe the kinds of treatment or life support you want as you near the end of your life or if you get seriously hurt or ill. Keep these facts in mind about living meadows. · Your living will is used only if you can't speak or make decisions for yourself. Most often, one or more doctors must certify that you can't speak or decide for yourself before your living will takes effect. · If you get better and can speak for yourself again, you can accept or refuse any treatment. It doesn't matter what you said in your living will. · Some states may limit your right to refuse treatment in certain cases. For example, you may need to clearly state in your living will that you don't want artificial hydration and nutrition, such as being fed through a tube. Is a living will a legal document? A living will is a legal document. Each state has its own laws about living meadows. And a living will may be called something else in your state. Here are some things to know about living meadows. · You don't need an  to complete a living will.  But legal advice can be helpful if your state's laws are unclear. It can also help if your health history is complicated or your family can't agree on what should be in your living will. · You can change your living will at any time. Some people find that their wishes about end-of-life care change as their health changes. If you make big changes to your living will, complete a new form. · If you move to another state, make sure that your living will is legal in the state where you now live. In most cases, doctors will respect your wishes even if you have a form from a different state. · You might use a universal form that has been approved by many states. This kind of form can sometimes be filled out and stored online. Your digital copy will then be available wherever you have a connection to the internet. The doctors and nurses who need to treat you can find it right away. · Your state may offer an online registry. This is another place where you can store your living will online. · It's a good idea to get your living will notarized. This means using a person called a  to watch two people sign, or witness, your living will. What should you know when you create a living will? Here are some questions to ask yourself as you make your living will:  · Do you know enough about life support methods that might be used? If not, talk to your doctor so you know what might be done if you can't breathe on your own, your heart stops, or you can't swallow. · What things would you still want to be able to do after you receive life-support methods? Would you want to be able to walk? To speak? To eat on your own? To live without the help of machines? · Do you want certain Baptism practices performed if you become very ill? · If you have a choice, where do you want to be cared for? In your home? At a hospital or nursing home? · If you have a choice at the end of your life, where would you prefer to die? At home? In a hospital or nursing home?  Somewhere else?  · Would you prefer to be buried or cremated? · Do you want your organs to be donated after you die? What should you do with your living will? · Make sure that your family members and your health care agent have copies of your living will (also called a declaration). · Give your doctor a copy of your living will. Ask him or her to keep it as part of your medical record. If you have more than one doctor, make sure that each one has a copy. · Put a copy of your living will where it can be easily found. For example, some people may put a copy on their refrigerator door. If you are using a digital copy, be sure your doctor, family members, and health care agent know how to find and access it. Where can you learn more? Go to [email or web address]://[email or web address] and sign in to your [employer] account. Enter C162 in the Earth Sky box to learn more about \"Learning About Living Yesi Marcus. \"     If you do not have an account, please click on the \"Sign Up Now\" link. Current as of: March 17, 2021               Content Version: 13.1  © 2828-3782 [employer]. Care instructions adapted under license by [facility] ([facility]). If you have questions about a medical condition or this instruction, always ask your healthcare professional. [address] any warranty or liability for your use of this information. Body Mass Index: Care Instructions  Your Care Instructions     Body mass index (BMI) can help you see if your weight is raising your risk for health problems. It uses a formula to compare how much you weigh with how tall you are. · A BMI lower than 18.5 is considered underweight. · A BMI between 18.5 and 24.9 is considered healthy. · A BMI between 25 and 29.9 is considered overweight. A BMI of 30 or higher is considered obese. If your BMI is in the normal range, it means that you have a lower risk for weight-related health problems.  If your BMI is in the overweight or obese range, you may be at increased risk for weight-related health problems, such as high blood pressure, heart disease, stroke, arthritis or joint pain, and diabetes. If your BMI is in the underweight range, you may be at increased risk for health problems such as fatigue, lower protection (immunity) against illness, muscle loss, bone loss, hair loss, and hormone problems. BMI is just one measure of your risk for weight-related health problems. You may be at higher risk for health problems if you are not active, you eat an unhealthy diet, or you drink too much alcohol or use tobacco products. Follow-up care is a key part of your treatment and safety. Be sure to make and go to all appointments, and call your doctor if you are having problems. It's also a good idea to know your test results and keep a list of the medicines you take. How can you care for yourself at home? · Practice healthy eating habits. This includes eating plenty of fruits, vegetables, whole grains, lean protein, and low-fat dairy. · If your doctor recommends it, get more exercise. Walking is a good choice. Bit by bit, increase the amount you walk every day. Try for at least 30 minutes on most days of the week. · Do not smoke. Smoking can increase your risk for health problems. If you need help quitting, talk to your doctor about stop-smoking programs and medicines. These can increase your chances of quitting for good. · Limit alcohol to 2 drinks a day for men and 1 drink a day for women. Too much alcohol can cause health problems. If you have a BMI higher than 25  · Your doctor may do other tests to check your risk for weight-related health problems. This may include measuring the distance around your waist. A waist measurement of more than 40 inches in men or 35 inches in women can increase the risk of weight-related health problems. · Talk with your doctor about steps you can take to stay healthy or improve your health.  You may need to make lifestyle changes to lose weight and stay healthy, such as changing your diet and getting regular exercise. If you have a BMI lower than 18.5  · Your doctor may do other tests to check your risk for health problems. · Talk with your doctor about steps you can take to stay healthy or improve your health. You may need to make lifestyle changes to gain or maintain weight and stay healthy, such as getting more healthy foods in your diet and doing exercises to build muscle. Where can you learn more? Go to [email or web address]://Procore [email or web address].[email or web address] org and sign in to your [employer] account. Enter S176 in the Miscota box to learn more about \"Body Mass Index: Care Instructions. \"     If you do not have an account, please click on the \"Sign Up Now\" link. Current as of: March 17, 2021               Content Version: 13.1  © 6560-3755 Healthwise, [employer]. Care instructions adapted under license by [facility] ([facility]). If you have questions about a medical condition or this instruction, always ask your healthcare professional. [address] any warranty or liability for your use of this information. Personalized Preventive Plan for Heladio Eaton - 1/5/2022  Medicare offers a range of preventive health benefits. Some of the tests and screenings are paid in full while other may be subject to a deductible, co-insurance, and/or copay. Some of these benefits include a comprehensive review of your medical history including lifestyle, illnesses that may run in your family, and various assessments and screenings as appropriate. After reviewing your medical record and screening and assessments performed today your provider may have ordered immunizations, labs, imaging, and/or referrals for you. A list of these orders (if applicable) as well as your Preventive Care list are included within your After Visit Summary for your review.     Other Preventive Recommendations:    · A preventive eye exam performed by an eye specialist is recommended every 1-2 years to screen for glaucoma; cataracts, macular degeneration, and other eye disorders. · A preventive dental visit is recommended every 6 months. · Try to get at least 150 minutes of exercise per week or 10,000 steps per day on a pedometer . · Order or download the FREE \"Exercise & Physical Activity: Your Everyday Guide\" from The [employer]. Call 2-970.759.7974 or search The [employer] on Aging online. · You need 8930-3565 mg of calcium and 7485-0584 IU of vitamin D per day. It is possible to meet your calcium requirement with diet alone, but a vitamin D supplement is usually necessary to meet this goal.  · When exposed to the sun, use a sunscreen that protects against both UVA and UVB radiation with an SPF of 30 or greater. Reapply every 2 to 3 hours or after sweating, drying off with a towel, or swimming. · Always wear a seat belt when traveling in a car. Always wear a helmet when riding a bicycle or motorcycle.      /family given educational materials - see patient instructions. Discussed use, benefit, and side effects of prescribed medications. All patient/family questions answered and voiced understanding. Instructed to continue current medications, diet and exercise. Pt/family agreed with treatment plan. Follow up as directed and sooner if needed. Patient/ family instructed that is symptoms worsen or persist they are to contact office or report to nearest ER. They voice understanding and agreement with this plan.   signed by MISSY Matamoros on 1/10/2022 at 1:17 PM CST    This dictation was generated by voice recognition computer software. Although all attempts are made to edit the dictation for accuracy, there may be errors in the transcription that are not intended.

## 2022-01-05 NOTE — PATIENT INSTRUCTIONS
Have additional labs on the 4th floor. Nystatin powder four times daily. Learning About Living Octaviano  What is a living will? A living will, also called a declaration, is a legal form. It tells your family and your doctor your wishes when you can't speak for yourself. It's used by the health professionals who will treat you as you near the end of your life or if you get seriously hurt or ill. If you put your wishes in writing, your loved ones and others will know what kind of care you want. They won't need to guess. This can ease your mind and be helpful to others. And you can change or cancel your living will at any time. A living will is not the same as an estate or property will. An estate will explains what you want to happen with your money and property after you die. How do you use it? A living will is used to describe the kinds of treatment or life support you want as you near the end of your life or if you get seriously hurt or ill. Keep these facts in mind about living meadows. · Your living will is used only if you can't speak or make decisions for yourself. Most often, one or more doctors must certify that you can't speak or decide for yourself before your living will takes effect. · If you get better and can speak for yourself again, you can accept or refuse any treatment. It doesn't matter what you said in your living will. · Some states may limit your right to refuse treatment in certain cases. For example, you may need to clearly state in your living will that you don't want artificial hydration and nutrition, such as being fed through a tube. Is a living will a legal document? A living will is a legal document. Each state has its own laws about living meadows. And a living will may be called something else in your state. Here are some things to know about living meadows. · You don't need an  to complete a living will.  But legal advice can be helpful if your state's laws are unclear. It can also help if your health history is complicated or your family can't agree on what should be in your living will. · You can change your living will at any time. Some people find that their wishes about end-of-life care change as their health changes. If you make big changes to your living will, complete a new form. · If you move to another state, make sure that your living will is legal in the state where you now live. In most cases, doctors will respect your wishes even if you have a form from a different state. · You might use a universal form that has been approved by many states. This kind of form can sometimes be filled out and stored online. Your digital copy will then be available wherever you have a connection to the internet. The doctors and nurses who need to treat you can find it right away. · Your state may offer an online registry. This is another place where you can store your living will online. · It's a good idea to get your living will notarized. This means using a person called a  to watch two people sign, or witness, your living will. What should you know when you create a living will? Here are some questions to ask yourself as you make your living will:  · Do you know enough about life support methods that might be used? If not, talk to your doctor so you know what might be done if you can't breathe on your own, your heart stops, or you can't swallow. · What things would you still want to be able to do after you receive life-support methods? Would you want to be able to walk? To speak? To eat on your own? To live without the help of machines? · Do you want certain Anabaptism practices performed if you become very ill? · If you have a choice, where do you want to be cared for? In your home? At a hospital or nursing home? · If you have a choice at the end of your life, where would you prefer to die? At home? In a hospital or nursing home?  Somewhere else?  · Would you prefer to be buried or cremated? · Do you want your organs to be donated after you die? What should you do with your living will? · Make sure that your family members and your health care agent have copies of your living will (also called a declaration). · Give your doctor a copy of your living will. Ask him or her to keep it as part of your medical record. If you have more than one doctor, make sure that each one has a copy. · Put a copy of your living will where it can be easily found. For example, some people may put a copy on their refrigerator door. If you are using a digital copy, be sure your doctor, family members, and health care agent know how to find and access it. Where can you learn more? Go to [email or web address] [email or web address] and sign in to your [employer] account. Enter O285 in the OnSwipe box to learn more about \"Learning About Living Perroy. \"     If you do not have an account, please click on the \"Sign Up Now\" link. Current as of: March 17, 2021               Content Version: 13.1  © 0413-4467 [employer]. Care instructions adapted under license by [facility] ([facility]). If you have questions about a medical condition or this instruction, always ask your healthcare professional. [address] any warranty or liability for your use of this information. Body Mass Index: Care Instructions  Your Care Instructions     Body mass index (BMI) can help you see if your weight is raising your risk for health problems. It uses a formula to compare how much you weigh with how tall you are. · A BMI lower than 18.5 is considered underweight. · A BMI between 18.5 and 24.9 is considered healthy. · A BMI between 25 and 29.9 is considered overweight. A BMI of 30 or higher is considered obese. If your BMI is in the normal range, it means that you have a lower risk for weight-related health problems.  If your BMI is in the overweight to make lifestyle changes to lose weight and stay healthy, such as changing your diet and getting regular exercise. If you have a BMI lower than 18.5  · Your doctor may do other tests to check your risk for health problems. · Talk with your doctor about steps you can take to stay healthy or improve your health. You may need to make lifestyle changes to gain or maintain weight and stay healthy, such as getting more healthy foods in your diet and doing exercises to build muscle. Where can you learn more? Go to [email or web address]://NWIXpeEvoz.LiveGO. org and sign in to your [employer] account. Enter S176 in the Audicus box to learn more about \"Body Mass Index: Care Instructions. \"     If you do not have an account, please click on the \"Sign Up Now\" link. Current as of: March 17, 2021               Content Version: 13.1  © 9962-2302 Healthwise, CoolClouds. Care instructions adapted under license by [facility] ([facility]). If you have questions about a medical condition or this instruction, always ask your healthcare professional. [address] any warranty or liability for your use of this information. Personalized Preventive Plan for Marv Childress - 1/5/2022  Medicare offers a range of preventive health benefits. Some of the tests and screenings are paid in full while other may be subject to a deductible, co-insurance, and/or copay. Some of these benefits include a comprehensive review of your medical history including lifestyle, illnesses that may run in your family, and various assessments and screenings as appropriate. After reviewing your medical record and screening and assessments performed today your provider may have ordered immunizations, labs, imaging, and/or referrals for you. A list of these orders (if applicable) as well as your Preventive Care list are included within your After Visit Summary for your review.     Other Preventive Recommendations:    · A preventive eye exam performed by an eye specialist is recommended every 1-2 years to screen for glaucoma; cataracts, macular degeneration, and other eye disorders. · A preventive dental visit is recommended every 6 months. · Try to get at least 150 minutes of exercise per week or 10,000 steps per day on a pedometer . · Order or download the FREE \"Exercise & Physical Activity: Your Everyday Guide\" from The [employer]. Call 6-321.492.9364 or search The [employer] Data [employer] Aging online. · You need 6711-8604 mg of calcium and 4025-1598 IU of vitamin D per day. It is possible to meet your calcium requirement with diet alone, but a vitamin D supplement is usually necessary to meet this goal.  · When exposed to the sun, use a sunscreen that protects against both UVA and UVB radiation with an SPF of 30 or greater. Reapply every 2 to 3 hours or after sweating, drying off with a towel, or swimming. · Always wear a seat belt when traveling in a car. Always wear a helmet when riding a bicycle or motorcycle.

## 2022-01-12 DIAGNOSIS — J06.9 UPPER RESPIRATORY TRACT INFECTION, UNSPECIFIED TYPE: Primary | ICD-10-CM

## 2022-01-12 RX ORDER — AZITHROMYCIN 250 MG/1
250 TABLET, FILM COATED ORAL SEE ADMIN INSTRUCTIONS
Qty: 6 TABLET | Refills: 0 | Status: SHIPPED | OUTPATIENT
Start: 2022-01-12 | End: 2022-01-17

## 2022-01-12 RX ORDER — CLOPIDOGREL BISULFATE 75 MG/1
TABLET ORAL
Qty: 90 TABLET | Refills: 2 | Status: SHIPPED | OUTPATIENT
Start: 2022-01-12 | End: 2022-05-11 | Stop reason: SDUPTHER

## 2022-02-02 DIAGNOSIS — I25.10 CORONARY ARTERY DISEASE INVOLVING NATIVE CORONARY ARTERY OF NATIVE HEART WITHOUT ANGINA PECTORIS: ICD-10-CM

## 2022-02-02 RX ORDER — ATORVASTATIN CALCIUM 40 MG/1
40 TABLET, FILM COATED ORAL DAILY
Qty: 30 TABLET | Refills: 11 | Status: SHIPPED | OUTPATIENT
Start: 2022-02-02 | End: 2022-09-06 | Stop reason: SDUPTHER

## 2022-02-02 RX ORDER — SEMAGLUTIDE 1.34 MG/ML
INJECTION, SOLUTION SUBCUTANEOUS
Qty: 1.5 ML | Refills: 1 | Status: SHIPPED | OUTPATIENT
Start: 2022-02-02 | End: 2022-05-15

## 2022-02-02 RX ORDER — NITROGLYCERIN 0.4 MG/1
0.4 TABLET SUBLINGUAL EVERY 5 MIN PRN
Qty: 25 TABLET | Refills: 3 | Status: SHIPPED | OUTPATIENT
Start: 2022-02-02

## 2022-02-02 NOTE — TELEPHONE ENCOUNTER
Deborah Cervantes called to request a refill on his medication.       Last office visit : 1/5/2022   Next office visit : 4/5/2022     Requested Prescriptions     Pending Prescriptions Disp Refills    atorvastatin (LIPITOR) 40 MG tablet 30 tablet 11     Sig: Take 1 tablet by mouth daily    Semaglutide,0.25 or 0.5MG/DOS, (OZEMPIC, 0.25 OR 0.5 MG/DOSE,) 2 MG/1.5ML SOPN 1.5 mL 1     Sig: INJECT 0.25MG UNDER THE SKIN ONCE WEEKLY FOR 4 WEEK    nitroGLYCERIN (NITROSTAT) 0.4 MG SL tablet 25 tablet 3     Sig: Place 1 tablet under the tongue every 5 minutes as needed for Chest pain            Paolo Sheppard

## 2022-02-11 DIAGNOSIS — U07.1 PNEUMONIA DUE TO COVID-19 VIRUS: ICD-10-CM

## 2022-02-11 DIAGNOSIS — J12.82 PNEUMONIA DUE TO COVID-19 VIRUS: ICD-10-CM

## 2022-02-11 DIAGNOSIS — J06.9 UPPER RESPIRATORY TRACT INFECTION, UNSPECIFIED TYPE: Primary | ICD-10-CM

## 2022-02-11 RX ORDER — BUDESONIDE AND FORMOTEROL FUMARATE DIHYDRATE 80; 4.5 UG/1; UG/1
2 AEROSOL RESPIRATORY (INHALATION) 2 TIMES DAILY
Qty: 1 EACH | Refills: 0 | Status: SHIPPED | OUTPATIENT
Start: 2022-02-11 | End: 2022-03-15

## 2022-02-11 RX ORDER — METHYLPREDNISOLONE 4 MG/1
TABLET ORAL
Qty: 1 KIT | Refills: 0 | Status: SHIPPED | OUTPATIENT
Start: 2022-02-11 | End: 2022-02-17

## 2022-02-11 RX ORDER — AZITHROMYCIN 250 MG/1
250 TABLET, FILM COATED ORAL SEE ADMIN INSTRUCTIONS
Qty: 6 TABLET | Refills: 0 | Status: SHIPPED | OUTPATIENT
Start: 2022-02-11 | End: 2022-02-16

## 2022-03-15 DIAGNOSIS — U07.1 PNEUMONIA DUE TO COVID-19 VIRUS: ICD-10-CM

## 2022-03-15 DIAGNOSIS — J12.82 PNEUMONIA DUE TO COVID-19 VIRUS: ICD-10-CM

## 2022-03-15 RX ORDER — DILTIAZEM HYDROCHLORIDE 60 MG/1
TABLET, FILM COATED ORAL
Qty: 10.2 G | Refills: 0 | Status: SHIPPED | OUTPATIENT
Start: 2022-03-15 | End: 2022-03-31 | Stop reason: ALTCHOICE

## 2022-03-28 ENCOUNTER — TELEPHONE (OUTPATIENT)
Dept: PRIMARY CARE CLINIC | Age: 72
End: 2022-03-28

## 2022-03-28 DIAGNOSIS — E87.5 HYPERKALEMIA: Primary | ICD-10-CM

## 2022-03-28 DIAGNOSIS — E83.52 HYPERCALCEMIA: ICD-10-CM

## 2022-03-28 DIAGNOSIS — E11.8 TYPE 2 DIABETES MELLITUS WITH COMPLICATION, WITHOUT LONG-TERM CURRENT USE OF INSULIN (HCC): ICD-10-CM

## 2022-03-28 LAB
ALBUMIN SERPL-MCNC: 5.2 G/DL (ref 3.5–5.2)
ALP BLD-CCNC: 80 U/L (ref 40–130)
ALT SERPL-CCNC: 20 U/L (ref 5–41)
ANION GAP SERPL CALCULATED.3IONS-SCNC: 14 MMOL/L (ref 7–19)
AST SERPL-CCNC: 19 U/L (ref 5–40)
BASOPHILS ABSOLUTE: 0 K/UL (ref 0–0.2)
BASOPHILS RELATIVE PERCENT: 0.5 % (ref 0–1)
BILIRUB SERPL-MCNC: 0.5 MG/DL (ref 0.2–1.2)
BUN BLDV-MCNC: 26 MG/DL (ref 8–23)
CALCIUM SERPL-MCNC: 10.3 MG/DL (ref 8.8–10.2)
CHLORIDE BLD-SCNC: 107 MMOL/L (ref 98–111)
CO2: 25 MMOL/L (ref 22–29)
CREAT SERPL-MCNC: 1.9 MG/DL (ref 0.5–1.2)
CREATININE URINE: 53.8 MG/DL (ref 4.2–622)
EOSINOPHILS ABSOLUTE: 0.5 K/UL (ref 0–0.6)
EOSINOPHILS RELATIVE PERCENT: 5.8 % (ref 0–5)
GFR AFRICAN AMERICAN: 42
GFR NON-AFRICAN AMERICAN: 35
GLUCOSE BLD-MCNC: 109 MG/DL (ref 74–109)
HCT VFR BLD CALC: 45.3 % (ref 42–52)
HEMOGLOBIN: 14.2 G/DL (ref 14–18)
IMMATURE GRANULOCYTES #: 0 K/UL
LYMPHOCYTES ABSOLUTE: 1.2 K/UL (ref 1.1–4.5)
LYMPHOCYTES RELATIVE PERCENT: 15.1 % (ref 20–40)
MCH RBC QN AUTO: 29.6 PG (ref 27–31)
MCHC RBC AUTO-ENTMCNC: 31.3 G/DL (ref 33–37)
MCV RBC AUTO: 94.6 FL (ref 80–94)
MICROALBUMIN UR-MCNC: 3.1 MG/DL (ref 0–19)
MICROALBUMIN/CREAT UR-RTO: 57.6 MG/G
MONOCYTES ABSOLUTE: 0.9 K/UL (ref 0–0.9)
MONOCYTES RELATIVE PERCENT: 11.3 % (ref 0–10)
NEUTROPHILS ABSOLUTE: 5.2 K/UL (ref 1.5–7.5)
NEUTROPHILS RELATIVE PERCENT: 66.9 % (ref 50–65)
PDW BLD-RTO: 13.1 % (ref 11.5–14.5)
PLATELET # BLD: 172 K/UL (ref 130–400)
PMV BLD AUTO: 11.9 FL (ref 9.4–12.4)
POTASSIUM SERPL-SCNC: 5.3 MMOL/L (ref 3.5–5)
RBC # BLD: 4.79 M/UL (ref 4.7–6.1)
SODIUM BLD-SCNC: 146 MMOL/L (ref 136–145)
T4 FREE: 1.22 NG/DL (ref 0.93–1.7)
TOTAL PROTEIN: 7.3 G/DL (ref 6.6–8.7)
TSH SERPL DL<=0.05 MIU/L-ACNC: 0.87 UIU/ML (ref 0.27–4.2)
WBC # BLD: 7.7 K/UL (ref 4.8–10.8)

## 2022-03-28 RX ORDER — SODIUM POLYSTYRENE SULFONATE 15 G/60ML
15 SUSPENSION ORAL; RECTAL ONCE
Qty: 240 ML | Refills: 0 | Status: SHIPPED | OUTPATIENT
Start: 2022-03-28 | End: 2022-05-11 | Stop reason: ALTCHOICE

## 2022-03-28 NOTE — RESULT ENCOUNTER NOTE
Results are abnormal. Pt needs to increase fluids. His creat is 1.9. BUN is 26, he must increase his water intake. Pt potassium is also elevated at 5.3. Pt will need one dose of kayexalate to bring down potassium. I will send to the pharmacy and repeat K+ in one week. Please have pt stop any over the counter suppliment that contains K+. Additionally, when pt has his K+ level rechecked, he also needs vit d level.

## 2022-03-29 DIAGNOSIS — E11.8 TYPE 2 DIABETES MELLITUS WITH COMPLICATION, WITHOUT LONG-TERM CURRENT USE OF INSULIN (HCC): ICD-10-CM

## 2022-03-29 DIAGNOSIS — I10 HYPERTENSION, ESSENTIAL, BENIGN: ICD-10-CM

## 2022-03-29 DIAGNOSIS — Z13.21 ENCOUNTER FOR VITAMIN DEFICIENCY SCREENING: ICD-10-CM

## 2022-03-29 DIAGNOSIS — Z01.89 ROUTINE LAB DRAW: ICD-10-CM

## 2022-03-29 DIAGNOSIS — E78.00 HYPERCHOLESTEREMIA: Primary | ICD-10-CM

## 2022-03-29 DIAGNOSIS — N18.30 STAGE 3 CHRONIC KIDNEY DISEASE, UNSPECIFIED WHETHER STAGE 3A OR 3B CKD (HCC): ICD-10-CM

## 2022-03-31 ENCOUNTER — OFFICE VISIT (OUTPATIENT)
Dept: CARDIOLOGY CLINIC | Age: 72
End: 2022-03-31
Payer: MEDICARE

## 2022-03-31 VITALS
WEIGHT: 246 LBS | HEART RATE: 78 BPM | DIASTOLIC BLOOD PRESSURE: 72 MMHG | SYSTOLIC BLOOD PRESSURE: 122 MMHG | BODY MASS INDEX: 35.22 KG/M2 | OXYGEN SATURATION: 96 % | HEIGHT: 70 IN

## 2022-03-31 DIAGNOSIS — E78.00 HYPERCHOLESTEREMIA: ICD-10-CM

## 2022-03-31 DIAGNOSIS — I10 ESSENTIAL HYPERTENSION: ICD-10-CM

## 2022-03-31 DIAGNOSIS — I20.9 ANGINA PECTORIS (HCC): Primary | ICD-10-CM

## 2022-03-31 DIAGNOSIS — I25.10 CORONARY ARTERY DISEASE INVOLVING NATIVE CORONARY ARTERY OF NATIVE HEART WITHOUT ANGINA PECTORIS: ICD-10-CM

## 2022-03-31 DIAGNOSIS — E11.8 TYPE 2 DIABETES MELLITUS WITH COMPLICATION, WITHOUT LONG-TERM CURRENT USE OF INSULIN (HCC): ICD-10-CM

## 2022-03-31 PROCEDURE — 2022F DILAT RTA XM EVC RTNOPTHY: CPT | Performed by: INTERNAL MEDICINE

## 2022-03-31 PROCEDURE — 99214 OFFICE O/P EST MOD 30 MIN: CPT | Performed by: INTERNAL MEDICINE

## 2022-03-31 PROCEDURE — 3046F HEMOGLOBIN A1C LEVEL >9.0%: CPT | Performed by: INTERNAL MEDICINE

## 2022-03-31 PROCEDURE — G8427 DOCREV CUR MEDS BY ELIG CLIN: HCPCS | Performed by: INTERNAL MEDICINE

## 2022-03-31 PROCEDURE — 1123F ACP DISCUSS/DSCN MKR DOCD: CPT | Performed by: INTERNAL MEDICINE

## 2022-03-31 PROCEDURE — G8484 FLU IMMUNIZE NO ADMIN: HCPCS | Performed by: INTERNAL MEDICINE

## 2022-03-31 PROCEDURE — 3017F COLORECTAL CA SCREEN DOC REV: CPT | Performed by: INTERNAL MEDICINE

## 2022-03-31 PROCEDURE — 4040F PNEUMOC VAC/ADMIN/RCVD: CPT | Performed by: INTERNAL MEDICINE

## 2022-03-31 PROCEDURE — G8417 CALC BMI ABV UP PARAM F/U: HCPCS | Performed by: INTERNAL MEDICINE

## 2022-03-31 PROCEDURE — 1036F TOBACCO NON-USER: CPT | Performed by: INTERNAL MEDICINE

## 2022-03-31 RX ORDER — ISOSORBIDE DINITRATE 40 MG/1
60 TABLET ORAL 2 TIMES DAILY
COMMUNITY
End: 2022-09-06 | Stop reason: SDUPTHER

## 2022-03-31 ASSESSMENT — ENCOUNTER SYMPTOMS
SHORTNESS OF BREATH: 1
ANAL BLEEDING: 0
COUGH: 0
BLOOD IN STOOL: 0

## 2022-03-31 NOTE — PROGRESS NOTES
Office Visit  Victor M Kaminski is a 67 y.o. male; who present today for 6 Month Follow-Up      HPI  I am seeing this 75-year-old white male in routine follow-up who has a history of coronary artery disease with prior bypass surgery, stenting of the left main artery and stable angina as a result of a totally occluded right coronary artery that gets collaterals from the circumflex distribution. Ported to me today that he still sees cardiology at [facility] in [place] and actually saw cardiology there several days ago. He continues to have anginal chest discomfort in the evening which is worse with colder weather but clearly exertion related, no discomfort at rest and the pattern has not changed. He adjust his activities to avoid angina such as decreased activity and trying to complete his tasks fairly quickly, otherwise if he exerts himself for more than 5 or 10 minutes he may get anginal chest pain. He has chronic exertional dyspnea that has not changed, no palpitations, no presyncope or syncope. Because of the symptoms the cardiology department at [facility] changed his isosorbide mononitrate to isosorbide dinitrate about 8-hour, maintaining a nitroglycerin holiday in the evening to prevent resistance. He made this change only 2 days ago and cannot really make a statement as to whether this made a difference. On further review he is on a very low-dose of carvedilol and his dose of Ranexa is on the low side.   Current Outpatient Medications   Medication Sig Dispense Refill    isosorbide dinitrate (ISORDIL) 40 MG tablet Take 60 mg by mouth in the morning and at bedtime      sodium polystyrene (SPS) 15 GM/60ML suspension Take 60 mLs by mouth once for 1 dose 240 mL 0    atorvastatin (LIPITOR) 40 MG tablet Take 1 tablet by mouth daily 30 tablet 11    Semaglutide,0.25 or 0.5MG/DOS, (OZEMPIC, 0.25 OR 0.5 MG/DOSE,) 2 MG/1.5ML SOPN INJECT 0.25MG UNDER THE SKIN ONCE WEEKLY FOR 4 WEEK 1.5 mL 1    nitroGLYCERIN (NITROSTAT) 0.4 MG SL tablet Place 1 tablet under the tongue every 5 minutes as needed for Chest pain 25 tablet 3    clopidogrel (PLAVIX) 75 MG tablet TAKE ONE TABLET BY MOUTH ONCE DAILY 90 tablet 2    allopurinol (ZYLOPRIM) 100 MG tablet Take twice daily 60 tablet 2    amLODIPine (NORVASC) 5 MG tablet Take 5 mg by mouth daily      lisinopril (PRINIVIL;ZESTRIL) 40 MG tablet Take 40 mg by mouth daily      Calcium Polycarbophil (FIBER-CAPS PO) Take by mouth 2 times daily      carvedilol (COREG) 3.125 MG tablet Take 3.125 mg by mouth 2 times daily (with meals)      Omega-3 Fatty Acids (FISH OIL) 1200 MG CAPS Take 1,000 mg by mouth 2 times daily       aspirin 81 MG tablet Take 81 mg by mouth nightly       zinc sulfate (ZINCATE) 220 (50 Zn) MG capsule Take 1 capsule by mouth daily 30 capsule 0    vitamin C (VITAMIN C) 1000 MG tablet Take 1 tablet by mouth daily 30 tablet 0    Respiratory Therapy Supplies (NEBULIZER/TUBING/MOUTHPIECE) KIT 1 kit by Does not apply route daily as needed (persistant cough, sob, or wheezing) (Patient not taking: Reported on 1/5/2022) 1 kit 0     No current facility-administered medications for this visit.       Medications Discontinued During This Encounter   Medication Reason    isosorbide mononitrate (IMDUR) 60 MG extended release tablet DISCONTINUED BY ANOTHER CLINICIAN    Cyanocobalamin 5000 MCG CAPS DISCONTINUED BY ANOTHER CLINICIAN    famotidine (PEPCID) 20 MG tablet DISCONTINUED BY ANOTHER CLINICIAN    nystatin (MYCOSTATIN) 775483 UNIT/GM powder DISCONTINUED BY ANOTHER CLINICIAN    SYMBICORT 80-4.5 MCG/ACT AERO DISCONTINUED BY ANOTHER CLINICIAN        No Known Allergies    Past Medical History:   Diagnosis Date    Acute bilateral low back pain with bilateral sciatica 11/26/2019    Acute bilateral low back pain with bilateral sciatica     Acute respiratory failure with hypoxia (HCC) 11/27/2020    Body mass index (bmi) 33.0-33.9, adult     CAD (coronary artery disease), native coronary artery     Class 2 obesity due to excess calories in adult 2/1/2018    Diabetes mellitus ([address]     GERD (gastroesophageal reflux disease)     Hypercholesteremia     Hypertension     Malignant neoplasm of right kidney, except renal pelvis (HCC)     Malignant neoplasm of right kidney, except renal pelvis (HCC)      Negative - Past Medical History for  Past Medical History Pertinent Negatives:   Diagnosis Date Noted    ADHD (attention deficit hyperactivity disorder) 07/30/2018    Allergic rhinitis 07/30/2018    Anticoagulant long-term use 07/30/2018    Anxiety 07/30/2018    Asthma 07/30/2018    Atrial fibrillation ([address] 07/30/2018    Bipolar disorder ([address] 07/30/2018    Carotid artery stenosis 07/30/2018    Cerebrovascular disease 07/30/2018    CHF (congestive heart failure) ([address] 07/30/2018    Congenital heart disease 07/30/2018    COPD (chronic obstructive pulmonary disease) ([address] 07/30/2018    Depression 07/30/2018    Emphysema of lung ([address] 07/30/2018    Erectile dysfunction 07/30/2018    Fibromyalgia 07/30/2018    Headache 07/30/2018    Hyperthyroidism 07/30/2018    Hypothyroidism 07/30/2018    Irritable bowel syndrome 07/30/2018    Liver disease 07/30/2018    Osteoarthritis 07/30/2018    Peripheral vascular disease ([address] 07/30/2018    PONV (postoperative nausea and vomiting) 10/04/2018    Restless legs syndrome 07/30/2018    Seizures ([address] 07/30/2018    Sleep apnea 07/30/2018    Substance abuse ([address] 07/30/2018    Urinary incontinence 07/30/2018       Past Surgical History:   Procedure Laterality Date    CARDIAC CATHETERIZATION  12/31/07, JDT    Left heart cath    CARDIAC CATHETERIZATION  12/31/07, JDT    Left heart cath    CARDIOVASCULAR STRESS TEST  06/11/2009, JDT    Stress Echo    CARDIOVASCULAR STRESS TEST  12/31/07, JDT    Stress Echo    CORONARY ARTERY BYPASS GRAFT      X4 utilizing the left MOLINA to the LAD, right MOLINA to the second diagonal, and vein grafts to the obtuse marinal and PLOM.  CORONARY ARTERY BYPASS GRAFT  08, Rhonda Raza CABG placing the LIMA to the LAD, MIGUELANGEL to the second diagonal branch and SVBG to the OMB and PMB.  DIAGNOSTIC CARDIAC CATH LAB PROCEDURE      2021    KIDNEY REMOVAL Right 2019    KIDNEY SURGERY      Right Kidney removed    IN COLONOSCOPY FLX DX W/COLLJ SPEC WHEN PFRMD N/A 10/4/2018    Dr KASSY Solorio-Diverticular disease, 10 yr recall    SKIN BIOPSY       Social History     Occupational History    Not on file   Tobacco Use    Smoking status: Former Smoker     Packs/day: 2.00     Years: 32.00     Pack years: 64.00     Start date:      Quit date: 10/5/1998     Years since quittin.5    Smokeless tobacco: Former User   Vaping Use    Vaping Use: Never used   Substance and Sexual Activity    Alcohol use: Yes     Comment: Very Little    Drug use: Never    Sexual activity: Not on file        Family History   Problem Relation Age of Onset    Cancer Mother         lung    Coronary Art Dis Father     Stroke Father     Hypertension Father     High Blood Pressure Brother     Coronary Art Dis Paternal Grandmother     Coronary Art Dis Paternal Grandfather        Review of Systems  Review of Systems   Constitutional: Negative for fatigue and fever. Respiratory: Positive for shortness of breath. Negative for cough. Cardiovascular: Positive for chest pain. Negative for palpitations and leg swelling. Gastrointestinal: Negative for anal bleeding and blood in stool. Neurological: Negative for syncope, facial asymmetry and speech difficulty. Physical Exam  /72   Pulse 78   Ht 5' 10\" (1.778 m)   Wt 246 lb (111.6 kg)   SpO2 96%   BMI 35.30 kg/m²    Physical Exam  Constitutional:       Appearance: Normal appearance. He is morbidly obese. Cardiovascular:      Rate and Rhythm: Normal rate and regular rhythm. Heart sounds: Normal heart sounds.  No gallop. Pulmonary:      Effort: Pulmonary effort is normal.      Breath sounds: Normal breath sounds. Abdominal:      General: Abdomen is flat. Bowel sounds are normal.      Palpations: Abdomen is soft. Musculoskeletal:         General: No swelling. Skin:     General: Skin is warm and dry. Neurological:      Mental Status: He is alert. Assessment/Plan    EKG Findings:  Not performed today    Problem List Items Addressed This Visit        Cardiology Problems    Hypercholesteremia    Relevant Medications    isosorbide dinitrate (ISORDIL) 40 MG tablet    Coronary artery disease involving native coronary artery of native heart without angina pectoris    Relevant Medications    isosorbide dinitrate (ISORDIL) 40 MG tablet    Essential hypertension    Angina pectoris (HCC) - Primary       Other    Type 2 diabetes mellitus with complication, without long-term current use of insulin ([address]           Diagnosis Orders   1. Angina pectoris ([address]      Stable   2. Coronary artery disease involving native coronary artery of native heart without angina pectoris      CABG x4, 2008 (LIMA-LAD, MIGUELANGEL-diagonal, VG-OM, VG-PL OM). Occluded VG-OM, stent left main, February 2021. Cold air related angina, stable   3. Type 2 diabetes mellitus with complication, without long-term current use of insulin ([address]     4. Essential hypertension     5. Hypercholesteremia         Recommendations:   Diet: ADA, low-sodium, low-fat, calorie reduced diet to lose weight  Activity: Moderate activities, check blood pressure regularly and report if elevated  Medication Changes: Continue same medications    As I indicated above cardiology at [facility] changed his nitroglycerin to a higher dose but also changed from mononitrate and dinitrate by explained above. As the patient knows, if his angina gets worse and in particular if it occurs with low levels of activity or rest hemodialysis immediately.   Additionally, if this change has not helped and consideration needs to be given to increase his beta-blockers with an alternate plan of increasing Ranexa. He has not been checking his blood pressure regularly and he states it was elevated when he went to [address] but attributed it to the car ride stress. He needs to check it regularly and if it is running elevated needs to be treated which would also potentially help his angina. No orders of the defined types were placed in this encounter. No orders of the defined types were placed in this encounter. Return in about 6 months (around 9/30/2022) for johnny Reeder.

## 2022-04-08 RX ORDER — DOCUSATE SODIUM 100 MG/1
CAPSULE, LIQUID FILLED ORAL
Qty: 60 CAPSULE | Refills: 5 | OUTPATIENT
Start: 2022-04-08

## 2022-04-19 ENCOUNTER — TELEPHONE (OUTPATIENT)
Dept: PRIMARY CARE CLINIC | Age: 72
End: 2022-04-19

## 2022-05-04 LAB
ALBUMIN SERPL-MCNC: 4.6 G/DL (ref 3.5–5.2)
ALP BLD-CCNC: 80 U/L (ref 40–130)
ALT SERPL-CCNC: 21 U/L (ref 5–41)
ANION GAP SERPL CALCULATED.3IONS-SCNC: 13 MMOL/L (ref 7–19)
AST SERPL-CCNC: 19 U/L (ref 5–40)
BASOPHILS ABSOLUTE: 0.1 K/UL (ref 0–0.2)
BASOPHILS RELATIVE PERCENT: 0.8 % (ref 0–1)
BILIRUB SERPL-MCNC: 0.5 MG/DL (ref 0.2–1.2)
BILIRUBIN URINE: NEGATIVE
BLOOD, URINE: NEGATIVE
BUN BLDV-MCNC: 31 MG/DL (ref 8–23)
CALCIUM SERPL-MCNC: 9.9 MG/DL (ref 8.8–10.2)
CHLORIDE BLD-SCNC: 106 MMOL/L (ref 98–111)
CLARITY: CLEAR
CO2: 25 MMOL/L (ref 22–29)
COLOR: YELLOW
CREAT SERPL-MCNC: 2.1 MG/DL (ref 0.5–1.2)
CREATININE URINE: 76 MG/DL (ref 4.2–622)
EOSINOPHILS ABSOLUTE: 0.4 K/UL (ref 0–0.6)
EOSINOPHILS RELATIVE PERCENT: 5.8 % (ref 0–5)
GFR AFRICAN AMERICAN: 38
GFR NON-AFRICAN AMERICAN: 31
GLUCOSE BLD-MCNC: 109 MG/DL (ref 74–109)
GLUCOSE URINE: NEGATIVE MG/DL
HCT VFR BLD CALC: 41.2 % (ref 42–52)
HEMOGLOBIN: 13.3 G/DL (ref 14–18)
IMMATURE GRANULOCYTES #: 0 K/UL
KETONES, URINE: NEGATIVE MG/DL
LEUKOCYTE ESTERASE, URINE: NEGATIVE
LYMPHOCYTES ABSOLUTE: 1 K/UL (ref 1.1–4.5)
LYMPHOCYTES RELATIVE PERCENT: 15.1 % (ref 20–40)
MAGNESIUM: 2 MG/DL (ref 1.6–2.4)
MCH RBC QN AUTO: 30.6 PG (ref 27–31)
MCHC RBC AUTO-ENTMCNC: 32.3 G/DL (ref 33–37)
MCV RBC AUTO: 94.7 FL (ref 80–94)
MONOCYTES ABSOLUTE: 0.9 K/UL (ref 0–0.9)
MONOCYTES RELATIVE PERCENT: 13.5 % (ref 0–10)
NEUTROPHILS ABSOLUTE: 4.2 K/UL (ref 1.5–7.5)
NEUTROPHILS RELATIVE PERCENT: 64.3 % (ref 50–65)
NITRITE, URINE: NEGATIVE
PARATHYROID HORMONE INTACT: 31.9 PG/ML (ref 15–65)
PDW BLD-RTO: 13 % (ref 11.5–14.5)
PH UA: 6.5 (ref 5–8)
PHOSPHORUS: 3.9 MG/DL (ref 2.5–4.5)
PLATELET # BLD: 148 K/UL (ref 130–400)
PMV BLD AUTO: 11.6 FL (ref 9.4–12.4)
POTASSIUM SERPL-SCNC: 4.7 MMOL/L (ref 3.5–5)
PROTEIN PROTEIN: 6 MG/DL (ref 15–45)
PROTEIN UA: NEGATIVE MG/DL
RBC # BLD: 4.35 M/UL (ref 4.7–6.1)
SODIUM BLD-SCNC: 144 MMOL/L (ref 136–145)
SPECIFIC GRAVITY UA: 1.01 (ref 1–1.03)
TOTAL PROTEIN: 6.7 G/DL (ref 6.6–8.7)
URIC ACID, SERUM: 7 MG/DL (ref 3.4–7)
UROBILINOGEN, URINE: 0.2 E.U./DL
VITAMIN D 25-HYDROXY: 68.3 NG/ML
WBC # BLD: 6.5 K/UL (ref 4.8–10.8)

## 2022-05-11 ENCOUNTER — OFFICE VISIT (OUTPATIENT)
Dept: PRIMARY CARE CLINIC | Age: 72
End: 2022-05-11
Payer: MEDICARE

## 2022-05-11 VITALS
DIASTOLIC BLOOD PRESSURE: 78 MMHG | SYSTOLIC BLOOD PRESSURE: 138 MMHG | TEMPERATURE: 98.4 F | OXYGEN SATURATION: 95 % | HEIGHT: 70 IN | WEIGHT: 246.4 LBS | HEART RATE: 71 BPM | BODY MASS INDEX: 35.28 KG/M2

## 2022-05-11 DIAGNOSIS — E66.01 MORBIDLY OBESE (HCC): ICD-10-CM

## 2022-05-11 DIAGNOSIS — M10.9 GOUT, UNSPECIFIED CAUSE, UNSPECIFIED CHRONICITY, UNSPECIFIED SITE: ICD-10-CM

## 2022-05-11 DIAGNOSIS — E78.00 HYPERCHOLESTEREMIA: Primary | ICD-10-CM

## 2022-05-11 DIAGNOSIS — G89.29 CHRONIC BILATERAL LOW BACK PAIN WITH BILATERAL SCIATICA: ICD-10-CM

## 2022-05-11 DIAGNOSIS — M54.42 CHRONIC BILATERAL LOW BACK PAIN WITH BILATERAL SCIATICA: ICD-10-CM

## 2022-05-11 DIAGNOSIS — I25.10 CORONARY ARTERY DISEASE INVOLVING NATIVE CORONARY ARTERY OF NATIVE HEART WITHOUT ANGINA PECTORIS: ICD-10-CM

## 2022-05-11 DIAGNOSIS — I10 HYPERTENSION, ESSENTIAL, BENIGN: ICD-10-CM

## 2022-05-11 DIAGNOSIS — N18.30 STAGE 3 CHRONIC KIDNEY DISEASE, UNSPECIFIED WHETHER STAGE 3A OR 3B CKD (HCC): ICD-10-CM

## 2022-05-11 DIAGNOSIS — M54.41 CHRONIC BILATERAL LOW BACK PAIN WITH BILATERAL SCIATICA: ICD-10-CM

## 2022-05-11 DIAGNOSIS — E11.8 TYPE 2 DIABETES MELLITUS WITH COMPLICATION, WITHOUT LONG-TERM CURRENT USE OF INSULIN (HCC): ICD-10-CM

## 2022-05-11 DIAGNOSIS — E79.0 ELEVATED BLOOD URIC ACID LEVEL: ICD-10-CM

## 2022-05-11 PROCEDURE — 3017F COLORECTAL CA SCREEN DOC REV: CPT | Performed by: NURSE PRACTITIONER

## 2022-05-11 PROCEDURE — 4040F PNEUMOC VAC/ADMIN/RCVD: CPT | Performed by: NURSE PRACTITIONER

## 2022-05-11 PROCEDURE — 99214 OFFICE O/P EST MOD 30 MIN: CPT | Performed by: NURSE PRACTITIONER

## 2022-05-11 PROCEDURE — G8417 CALC BMI ABV UP PARAM F/U: HCPCS | Performed by: NURSE PRACTITIONER

## 2022-05-11 PROCEDURE — 1036F TOBACCO NON-USER: CPT | Performed by: NURSE PRACTITIONER

## 2022-05-11 PROCEDURE — 2022F DILAT RTA XM EVC RTNOPTHY: CPT | Performed by: NURSE PRACTITIONER

## 2022-05-11 PROCEDURE — 1123F ACP DISCUSS/DSCN MKR DOCD: CPT | Performed by: NURSE PRACTITIONER

## 2022-05-11 PROCEDURE — 3046F HEMOGLOBIN A1C LEVEL >9.0%: CPT | Performed by: NURSE PRACTITIONER

## 2022-05-11 PROCEDURE — G8427 DOCREV CUR MEDS BY ELIG CLIN: HCPCS | Performed by: NURSE PRACTITIONER

## 2022-05-11 RX ORDER — ALLOPURINOL 100 MG/1
TABLET ORAL
Qty: 180 TABLET | Refills: 2 | Status: SHIPPED | OUTPATIENT
Start: 2022-05-11

## 2022-05-11 RX ORDER — LISINOPRIL 40 MG/1
40 TABLET ORAL DAILY
Qty: 90 TABLET | Refills: 1 | Status: SHIPPED | OUTPATIENT
Start: 2022-05-11 | End: 2022-09-06 | Stop reason: SDUPTHER

## 2022-05-11 RX ORDER — CLOPIDOGREL BISULFATE 75 MG/1
TABLET ORAL
Qty: 90 TABLET | Refills: 1 | Status: SHIPPED | OUTPATIENT
Start: 2022-05-11 | End: 2022-09-06 | Stop reason: SDUPTHER

## 2022-05-11 ASSESSMENT — ENCOUNTER SYMPTOMS
RHINORRHEA: 0
EYE REDNESS: 0
WHEEZING: 0
VOICE CHANGE: 0
SHORTNESS OF BREATH: 0
ABDOMINAL PAIN: 0
TROUBLE SWALLOWING: 0
COUGH: 0
CHEST TIGHTNESS: 0
DIARRHEA: 0
CONSTIPATION: 0
NAUSEA: 0
VOMITING: 0
BLOOD IN STOOL: 0
SORE THROAT: 0

## 2022-05-11 NOTE — PROGRESS NOTES
[address] [facility]  [facility]  [id number]  [address] [id number]  Dept: [phone]  Dept Fax: [phone]  Loc: [phone]        Elvin Sanchez is a 67 y.o. male who presents today for his medical conditions/ complaints as noted below. Elvin Sanchez is c/o Back Pain (pt would like a referral to [facility] )        Chief Complaint   Patient presents with    Back Pain     pt would like a referral to [facility]        HPI:     HPI    Chronic low back pain:   Pt request referral to [facility]. Labs:   Pt would also like to discuss labs. Patient reports that they have been compliant with taking medications as directed. Past Medical History:   Diagnosis Date    Acute bilateral low back pain with bilateral sciatica 11/26/2019    Acute bilateral low back pain with bilateral sciatica     Acute respiratory failure with hypoxia ([address] 11/27/2020    Body mass index (bmi) 33.0-33.9, adult     CAD (coronary artery disease), native coronary artery     Class 2 obesity due to excess calories in adult 2/1/2018    Diabetes mellitus ([address]     GERD (gastroesophageal reflux disease)     Hypercholesteremia     Hypertension     Malignant neoplasm of right kidney, except renal pelvis (HCC)     Malignant neoplasm of right kidney, except renal pelvis ([address]        Past Surgical History:   Procedure Laterality Date    CARDIAC CATHETERIZATION  12/31/07, JDT    Left heart cath    CARDIAC CATHETERIZATION  12/31/07, JDT    Left heart cath    CARDIOVASCULAR STRESS TEST  06/11/2009, JDT    Stress Echo    CARDIOVASCULAR STRESS TEST  12/31/07, JDT    Stress Echo    CORONARY ARTERY BYPASS GRAFT      X4 utilizing the left MOLINA to the LAD, right MOLINA to the second diagonal, and vein grafts to the obtuse marinal and PLOM.     CORONARY ARTERY BYPASS GRAFT  01/02/08, Suze Amaya CABG placing the LIMA to the LAD, MIGUELANGEL to the second diagonal branch and SVBG to the OMB and PMB.  DIAGNOSTIC CARDIAC CATH LAB PROCEDURE      2021    KIDNEY REMOVAL Right 2019    KIDNEY SURGERY      Right Kidney removed    MI COLONOSCOPY FLX DX W/COLLJ SPEC WHEN PFRMD N/A 10/4/2018    Dr KASSY Solorio-Diverticular disease, 10 yr recall    SKIN BIOPSY         Social History     Socioeconomic History    Marital status:      Spouse name: None    Number of children: None    Years of education: None    Highest education level: None   Occupational History    None   Tobacco Use    Smoking status: Former Smoker     Packs/day: 2.00     Years: 32.00     Pack years: 64.00     Start date:      Quit date: 10/5/1998     Years since quittin.6    Smokeless tobacco: Former User   Vaping Use    Vaping Use: Never used   Substance and Sexual Activity    Alcohol use: Yes     Comment: Very Little    Drug use: Never    Sexual activity: None   Other Topics Concern    None   Social History Narrative    None     Social Determinants of Health     Financial Resource Strain: Low Risk     Difficulty of Paying Living Expenses: Not hard at all   Food Insecurity: No Food Insecurity    Worried About [address] [employer] in the Last Year: Never true    [facility] in the Last Year: Never true   Transportation Needs:     Lack of Transportation (Medical): Not on file    Lack of Transportation (Non-Medical):  Not on file   Physical Activity:     Days of Exercise per Week: Not on file    Minutes of Exercise per Session: Not on file   Stress:     Feeling of Stress : Not on file   Social Connections:     Frequency of Communication with Friends and Family: Not on file    Frequency of Social Gatherings with Friends and Family: Not on file    Attends [employer] Services: Not on file    Active Member of Clubs or Organizations: Not on file    Attends Club or Organization Meetings: Not on file    Marital Status: Not on file   Intimate Partner Violence:     Fear of Current or Ex-Partner: Not on file    Emotionally Abused: Not on file    Physically Abused: Not on file    Sexually Abused: Not on file   Housing Stability:     Unable to Pay for Housing in the Last Year: Not on file    Number of Places Lived in the Last Year: Not on file    Unstable Housing in the Last Year: Not on file       Family History   Problem Relation Age of Onset    Cancer Mother         lung    Coronary Art Dis Father     Stroke Father     Hypertension Father     High Blood Pressure Brother     Coronary Art Dis Paternal Grandmother     Coronary Art Dis Paternal Grandfather        Current Outpatient Medications   Medication Sig Dispense Refill    Multiple Vitamin (MULTIVITAMIN ADULT PO) Take by mouth daily      allopurinol (ZYLOPRIM) 100 MG tablet Take twice daily 180 tablet 2    clopidogrel (PLAVIX) 75 MG tablet TAKE ONE TABLET BY MOUTH ONCE DAILY 90 tablet 1    lisinopril (PRINIVIL;ZESTRIL) 40 MG tablet Take 1 tablet by mouth daily 90 tablet 1    isosorbide dinitrate (ISORDIL) 40 MG tablet Take 60 mg by mouth in the morning and at bedtime      atorvastatin (LIPITOR) 40 MG tablet Take 1 tablet by mouth daily 30 tablet 11    nitroGLYCERIN (NITROSTAT) 0.4 MG SL tablet Place 1 tablet under the tongue every 5 minutes as needed for Chest pain 25 tablet 3    amLODIPine (NORVASC) 5 MG tablet Take 5 mg by mouth daily      Calcium Polycarbophil (FIBER-CAPS PO) Take by mouth 2 times daily      zinc sulfate (ZINCATE) 220 (50 Zn) MG capsule Take 1 capsule by mouth daily 30 capsule 0    vitamin C (VITAMIN C) 1000 MG tablet Take 1 tablet by mouth daily 30 tablet 0    carvedilol (COREG) 3.125 MG tablet Take 3.125 mg by mouth 2 times daily (with meals)      Omega-3 Fatty Acids (FISH OIL) 1200 MG CAPS Take 1,000 mg by mouth 2 times daily       aspirin 81 MG tablet Take 81 mg by mouth nightly       OZEMPIC, 0.25 OR 0.5 MG/DOSE, 2 MG/1.5ML SOPN INJECT 0.25MG UNDER THE SKIN ONCE WEEKLY FOR 4 WEEK 1.5 mL 3  Respiratory Therapy Supplies (NEBULIZER/TUBING/MOUTHPIECE) KIT 1 kit by Does not apply route daily as needed (persistant cough, sob, or wheezing) (Patient not taking: Reported on 1/5/2022) 1 kit 0     No current facility-administered medications for this visit. No Known Allergies    Lab Review notapplicable    Subjective:   Review of Systems   Constitutional: Negative for activity change, appetite change, fatigue, fever and unexpected weight change. HENT: Negative for congestion, ear pain, nosebleeds, rhinorrhea, sore throat, trouble swallowing and voice change. Eyes: Negative for redness and visual disturbance. Respiratory: Negative for cough, chest tightness, shortness of breath and wheezing. Cardiovascular: Negative for chest pain, palpitations and leg swelling. Gastrointestinal: Negative for abdominal pain, blood in stool, constipation, diarrhea, nausea and vomiting. Endocrine: Negative for polydipsia, polyphagia and polyuria. Genitourinary: Negative for dysuria, frequency and urgency. Musculoskeletal: Positive for back pain and myalgias. Skin: Negative for rash and wound. Neurological: Negative for dizziness, speech difficulty, light-headedness and headaches. Psychiatric/Behavioral: Negative for agitation, confusion, self-injury and suicidal ideas. The patient is not nervous/anxious. Objective:     Physical Exam  Vitals and nursing note reviewed. Constitutional:       General: He is not in acute distress. Appearance: He is well-developed. He is obese. He is not diaphoretic. HENT:      Head: Normocephalic and atraumatic. Right Ear: External ear normal.      Left Ear: External ear normal.      Nose: Nose normal.      Mouth/Throat:      Pharynx: No oropharyngeal exudate. Eyes:      General:         Right eye: No discharge. Left eye: No discharge.       Conjunctiva/sclera: Conjunctivae normal.      Pupils: Pupils are equal, round, and reactive to light. Cardiovascular:      Rate and Rhythm: Normal rate and regular rhythm. Heart sounds: Normal heart sounds. No murmur heard. Pulmonary:      Effort: Pulmonary effort is normal. No respiratory distress. Breath sounds: Normal breath sounds. No stridor. No wheezing or rales. Chest:      Chest wall: No tenderness. Breasts:      Right: No inverted nipple, mass, nipple discharge, skin change or tenderness. Left: No inverted nipple, mass, nipple discharge, skin change or tenderness. Abdominal:      General: Bowel sounds are normal. There is no distension. Palpations: Abdomen is soft. Tenderness: There is no abdominal tenderness. Musculoskeletal:         General: No tenderness or deformity. Normal range of motion. Cervical back: Normal range of motion and neck supple. Back:    Skin:     General: Skin is warm and dry. Findings: No erythema or rash. Neurological:      Mental Status: He is alert and oriented to person, place, and time. Cranial Nerves: No cranial nerve deficit. Coordination: Coordination normal.      Deep Tendon Reflexes: Reflexes are normal and symmetric. Psychiatric:         Behavior: Behavior normal.         Thought Content: Thought content normal.       /78   Pulse 71   Temp 98.4 °F (36.9 °C) (Temporal)   Ht 5' 10\" (1.778 m)   Wt 246 lb 6.4 oz (111.8 kg)   SpO2 95%   BMI 35.35 kg/m²     Assessment:      Diagnosis Orders   1. Hypercholesteremia     2. Elevated blood uric acid level  allopurinol (ZYLOPRIM) 100 MG tablet   3. Gout, unspecified cause, unspecified chronicity, unspecified site  allopurinol (ZYLOPRIM) 100 MG tablet   4. Hypertension, essential, benign     5. Stage 3 chronic kidney disease, unspecified whether stage 3a or 3b CKD ([facility]     6.  Coronary artery disease involving native coronary artery of native heart without angina pectoris  clopidogrel (PLAVIX) 75 MG tablet    lisinopril (PRINIVIL;ZESTRIL) 40 MG tablet   7. Chronic bilateral low back pain with bilateral sciatica  External Referral To Pain Clinic   8. Type 2 diabetes mellitus with complication, without long-term current use of insulin ([facility]     9. Morbidly obese ([address]       No results found for this visit on 05/11/22. Plan:     1. Hypercholesteremia    2. Elevated blood uric acid level    3. Gout, unspecified cause, unspecified chronicity, unspecified site    4. Hypertension, essential, benign    5. Stage 3 chronic kidney disease, unspecified whether stage 3a or 3b CKD ([facility]    6. Coronary artery disease involving native coronary artery of native heart without angina pectoris    7. Chronic bilateral low back pain with bilateral sciatica    8. Type 2 diabetes mellitus with complication, without long-term current use of insulin ([facility]    9. Morbidly obese ([address]      Over 50% of the total visit time of 30 minutes was spent on counseling and or coordination of care of hypercholesterolemia, elevated uric acid, HTN, stage 3 CKD, CAD, chronic bilateral low back pain, DM2, [place]bid [place] [address] 10min, 37493 20min, 33003 30 min, 64771 45 min, 69752 60 min    [place]. [address] 5 min, 19915 10 min, 07142 15 min, 93771 25min, 66998 40min    Return in about 3 months (around 8/11/2022).   Orders Placed This Encounter   Procedures    External Referral To Pain Clinic     Referral Priority:   Routine     Referral Type:   Eval and Treat     Referral Reason:   Specialty Services Required     Requested Specialty:   Pain Management     Number of Visits Requested:   1     Orders Placed This Encounter   Medications    allopurinol (ZYLOPRIM) 100 MG tablet     Sig: Take twice daily     Dispense:  180 tablet     Refill:  2    clopidogrel (PLAVIX) 75 MG tablet     Sig: TAKE ONE TABLET BY MOUTH ONCE DAILY     Dispense:  90 tablet     Refill:  1    lisinopril (PRINIVIL;ZESTRIL) 40 MG tablet     Sig: Take 1 tablet by mouth daily     Dispense:  90 tablet     Refill:  1 Patient Instructions     Increase vit C in your diet. Add iron supplement. Ferrous sulfate 325 mg daily or equivalent. Increase allopurinol to twice daily. Increase water to three bottles per day.                 Alicia Chemung given educational materials - see patient instructions. Discussed use, benefit, and side effects of prescribed medications. All patient/family questions answered and voiced understanding. Instructed to continue current medications, diet and exercise. Pt/family agreed with treatment plan. Follow up as directed and sooner if needed. Patient/ family instructed that is symptoms worsen or persist they are to contact office or report to nearest ER. They voice understanding and agreement with this plan.   signed by MISSY Jean Baptiste on 5/16/2022 at 4:03 PM CDT    This dictation was generated by voice recognition computer software. Although all attempts are made to edit the dictation for accuracy, there may be errors in the transcription that are not intended.

## 2022-05-11 NOTE — PATIENT INSTRUCTIONS
Increase vit C in your diet. Add iron supplement. Ferrous sulfate 325 mg daily or equivalent. Increase allopurinol to twice daily. Increase water to three bottles per day.

## 2022-05-15 RX ORDER — SEMAGLUTIDE 1.34 MG/ML
INJECTION, SOLUTION SUBCUTANEOUS
Qty: 1.5 ML | Refills: 3 | Status: SHIPPED | OUTPATIENT
Start: 2022-05-15

## 2022-05-16 ASSESSMENT — ENCOUNTER SYMPTOMS: BACK PAIN: 1

## 2022-06-13 ENCOUNTER — HOSPITAL ENCOUNTER (OUTPATIENT)
Dept: CT IMAGING | Age: 72
Discharge: HOME | End: 2022-06-13
Payer: MEDICARE

## 2022-06-13 DIAGNOSIS — C64.1 MALIGNANT NEOPLASM OF RIGHT KIDNEY, EXCEPT RENAL PELVIS (HCC): ICD-10-CM

## 2022-06-13 DIAGNOSIS — R91.1 PULMONARY NODULE: ICD-10-CM

## 2022-06-13 PROCEDURE — G1010 CDSM STANSON: HCPCS | Performed by: RADIOLOGY

## 2022-06-13 PROCEDURE — 71250 CT THORAX DX C-: CPT | Performed by: RADIOLOGY

## 2022-06-13 PROCEDURE — 71250 CT THORAX DX C-: CPT

## 2022-07-11 NOTE — PROGRESS NOTES
MEDICAL ONCOLOGY PROGRESS NOTE      Maurene Heimlich   1950 7/13/2022     Chief Complaint   Patient presents with    Follow-up     Malignant neoplasm of right kidney, except renal pelvis ([address]        INTERVAL HISTORY/HISTORY OF PRESENT ILLNESS:  Diagnosis   · Papillary renal cell carcinoma, August 2018  · High-grade  · Left kidney cyst  · CKD stage III    Treatment summary  · 2/21/2019-right radical nephrectomy @ [facility]    Interval history  The patient presents today for a surveillance follow-up visit. He denies any GI symptoms. He denies any respiratory symptoms today. Denies any hematuria, no weight loss. Denies any respiratory symptoms. Denies any abdominal symptoms. He feels well otherwise. He is followed with nephrology for chronic kidney disease status post nephrectomy. His creatinine is stable. Cancer history  Mr Luis Griffith was referred for a diagnosis of daily mass concerning for RCC. He is currently being seen by Dr. Manolo Son at [facility] with a planned surgery. He presented initially with right flank pain. · 8/11/2018-CT abdomen without contrast showed a 6.6 cm hyperdense right renal lesion. An exophytic left renal lesion noted and measured 1.9 cm. · 10/9/2018-ultrasound showed a 6 cm heterogeneous solid appearing lesion at the lower pole of the right kidney. · 10/17/2018-CT abdomen and pelvis with contrast showed a 6 x 6 x 6.5 cm cystic mass arising from the lower right renal pole. 3 small cysts of the left kidney, largest measuring 2.1 cm. · 12/18/2018-MRI of the abdomen with contrast at [facility] showed a right lower pole renal mass extending to the hilar line measuring 6.7 x 6.4 x 7.2 cm, suspicious for cystic renal cell carcinoma. · 12/19/2018-Dr. Manolo Son recommended surgery scheduled for February 21, 2019. · 1/27/2019-she was first seen by me. · 2/6/2019-CT chest without contrast was unremarkable, except for granulomatous disease.   · 2/21/2019-right radical nephrectomy by Dr. Zakia Vergara at [facility] revealing a 6.4 cm with multifocal, high-grade (grade 3), papillary renal cell carcinoma with marked central cystic changes and hemorrhage. Final pathologic staging [id number], stage I.  · 3/11/2019-recommended surveillance follow-up as per NCCN guidelines. Recommended CT chest annually and CT abdomen every 6 months x3 years. · 6/15/2020 CT Abdomen Pelvis Wo Contrast A stable CT scan of the chest. No evidence of a neoplastic/metastatic disease. Evidence of a previous right nephrectomy without local complication. A stable low-density nodule in the left kidney which is incompletely evaluated in this study. Please correlate with sonography. ·  CT Chest Wo Contrast A stable CT scan of the chest. Small partially calcified nodule in the left upper lobe represent a granuloma. · 6/11/21 CT CHEST WO CONTRAST  No evidence of metastatic disease in the chest. Unchanged 4 mm LEFT upper lobe pulmonary nodule. · 6/13/2022 CT Chest WO Contrast No acute cardiopulmonary abnormality. No parenchymal infiltrate or pleural effusion. No evidence of mass. A few tiny atelectatic band.     PAST MEDICAL HISTORY:   Past Medical History:   Diagnosis Date    Acute bilateral low back pain with bilateral sciatica 11/26/2019    Acute bilateral low back pain with bilateral sciatica     Acute respiratory failure with hypoxia ([address] 11/27/2020    Body mass index (bmi) 33.0-33.9, adult     CAD (coronary artery disease), native coronary artery     Class 2 obesity due to excess calories in adult 2/1/2018    Diabetes mellitus ([address]     GERD (gastroesophageal reflux disease)     Hypercholesteremia     Hypertension     Malignant neoplasm of right kidney, except renal pelvis (HCC)     Malignant neoplasm of right kidney, except renal pelvis ([address]           PAST SURGICAL HISTORY:  Past Surgical History:   Procedure Laterality Date    CARDIAC CATHETERIZATION  12/31/07, JDT    Left heart cath  CARDIAC CATHETERIZATION  07, JDT    Left heart cath    CARDIOVASCULAR STRESS TEST  2009, JDT    Stress Echo    CARDIOVASCULAR STRESS TEST  07, JDT    Stress Echo    CORONARY ARTERY BYPASS GRAFT      X4 utilizing the left MOLINA to the LAD, right MOLINA to the second diagonal, and vein grafts to the obtuse marinal and PLOM.  CORONARY ARTERY BYPASS GRAFT  08, Edilson Butt CABG placing the LIMA to the LAD, MIGUELANGEL to the second diagonal branch and SVBG to the OMB and PMB.  DIAGNOSTIC CARDIAC CATH LAB PROCEDURE      2021    KIDNEY REMOVAL Right 2019    KIDNEY SURGERY      Right Kidney removed    SC COLONOSCOPY FLX DX W/COLLJ SPEC WHEN PFRMD N/A 10/4/2018    Dr KASSY Solorio-Diverticular disease, 10 yr recall    SKIN BIOPSY          SOCIAL HISTORY:  Social History     Socioeconomic History    Marital status:      Spouse name: None    Number of children: None    Years of education: None    Highest education level: None   Occupational History    None   Tobacco Use    Smoking status: Former Smoker     Packs/day: 2.00     Years: 32.00     Pack years: 64.00     Start date:      Quit date: 10/5/1998     Years since quittin.7    Smokeless tobacco: Former User   Vaping Use    Vaping Use: Never used   Substance and Sexual Activity    Alcohol use: Yes     Comment: Very Little    Drug use: Never    Sexual activity: None   Other Topics Concern    None   Social History Narrative    None     Social Determinants of Health     Financial Resource Strain: Low Risk     Difficulty of Paying Living Expenses: Not hard at all   Food Insecurity: No Food Insecurity    Worried About [address] in the Last Year: Never true    [address] in the Last Year: Never true   Transportation Needs:     Lack of Transportation (Medical): Not on file    Lack of Transportation (Non-Medical):  Not on file   Physical Activity:     Days of Exercise per Week: Not on file    Minutes of Exercise per Session: Not on file   Stress:     Feeling of Stress : Not on file   Social Connections:     Frequency of Communication with Friends and Family: Not on file    Frequency of Social Gatherings with Friends and Family: Not on file    Attends Sabianist Services: Not on file    Active Member of [address] [place] [address] or Organizations: Not on file    Attends Club or Organization Meetings: Not on file    Marital Status: Not on file   Intimate Partner Violence:     Fear of Current or Ex-Partner: Not on file    Emotionally Abused: Not on file    Physically Abused: Not on file    Sexually Abused: Not on file   Housing Stability:     Unable to Pay for Housing in the Last Year: Not on file    Number of [place] in the Last Year: Not on file    Unstable Housing in the Last Year: Not on file       FAMILY HISTORY:  Family History   Problem Relation Age of Onset    Cancer Mother         lung    Coronary Art Dis Father     Stroke Father     Hypertension Father     High Blood Pressure Brother     Coronary Art Dis Paternal Grandmother     Coronary Art Dis Paternal Grandfather         Current Outpatient Medications   Medication Sig Dispense Refill    OZEMPIC, 0.25 OR 0.5 MG/DOSE, 2 MG/1.5ML SOPN INJECT 0.25MG UNDER THE SKIN ONCE WEEKLY FOR 4 WEEK 1.5 mL 3    Multiple Vitamin (MULTIVITAMIN ADULT PO) Take by mouth daily      allopurinol (ZYLOPRIM) 100 MG tablet Take twice daily 180 tablet 2    clopidogrel (PLAVIX) 75 MG tablet TAKE ONE TABLET BY MOUTH ONCE DAILY 90 tablet 1    lisinopril (PRINIVIL;ZESTRIL) 40 MG tablet Take 1 tablet by mouth daily 90 tablet 1    isosorbide dinitrate (ISORDIL) 40 MG tablet Take 60 mg by mouth in the morning and at bedtime      atorvastatin (LIPITOR) 40 MG tablet Take 1 tablet by mouth daily 30 tablet 11    nitroGLYCERIN (NITROSTAT) 0.4 MG SL tablet Place 1 tablet under the tongue every 5 minutes as needed for Chest pain 25 tablet 3    amLODIPine (NORVASC) 5 MG tablet Take 5 mg by mouth daily      Calcium Polycarbophil (FIBER-CAPS PO) Take by mouth 2 times daily      carvedilol (COREG) 3.125 MG tablet Take 3.125 mg by mouth 2 times daily (with meals)      Omega-3 Fatty Acids (FISH OIL) 1200 MG CAPS Take 1,000 mg by mouth 2 times daily       aspirin 81 MG tablet Take 81 mg by mouth nightly       zinc sulfate (ZINCATE) 220 (50 Zn) MG capsule Take 1 capsule by mouth daily 30 capsule 0    vitamin C (VITAMIN C) 1000 MG tablet Take 1 tablet by mouth daily 30 tablet 0    Respiratory Therapy Supplies (NEBULIZER/TUBING/MOUTHPIECE) KIT 1 kit by Does not apply route daily as needed (persistant cough, sob, or wheezing) (Patient not taking: Reported on 1/5/2022) 1 kit 0     No current facility-administered medications for this visit. REVIEW OF SYSTEMS:    Constitutional: no fever, no night sweats, fatigue;   HEENT: no blurring of vision, no double vision, no hearing difficulty, no tinnitus,no ulceration, no dysphagia  Lungs: no cough, no shortness of breath, no wheeze;   CVS: no palpitation, no chest pain, no shortness of breath;  GI: no abdominal pain, no nausea , no vomiting, no constipation;   RAY: no dysuria, frequency and urgency, no hematuria, no kidney stones;   Musculoskeletal: no joint pain, swelling , stiffness;   Endocrine: no polyuria, polydypsia, no cold or heat intolerence; Hematology/lymphatic: no easy brusing or bleeding, no hx of clotting disorder; no peripheral adenopathy. Dermatology: no skin rash, no eczema, no pruritis;   Psychiatry: no depression, no anxiety,no panic attacks, no suicide ideation;    Neurology: no syncope, no seizures, no numbness or tingling of hands, no numbness or tingling of feet, no paresis;      Vitals signs:  /62   Pulse 72   Ht 5' 10\" (1.778 m)   Wt 246 lb (111.6 kg)   SpO2 95%   BMI 35.30 kg/m²    Pain scale:  Pain Score:   0 - No pain   PHYSICAL EXAM:   CONSTITUTIONAL: Alert, appropriate, no acute distress, EYES: Non icteric, EOM intact, pupils equal round and reactive to light and accommodation. ENT: Oral mucus membranes moist, no oral pharyngeal lesions. External inspection of ears and nose are normal.   NECK: Supple, no masses. No palpable thyroid mass    CHEST/LUNGS: CTA bilaterally, normal respiratory effort   CARDIOVASCULAR: RRR, no murmurs. No lower extremity edema   ABDOMEN: soft non-tender, active bowel sounds, no hepatosplenomegaly. No palpable masses. EXTREMITIES: warm, Full ROM of all fours extremities. No focal weakness. SKIN: warm, dry with no rashes or lesions  LYMPH: No cervical, clavicular, axillary, or inguinal lymphadenopathy  NEUROLOGIC: follows commands, non focal.   PSYCH: mood and affect appropriate. Alert and oriented to time and place and person. Relevant Lab findings/reviewed by me:  Lab Results   Component Value Date    WBC 7.35 07/13/2022    HGB 14.0 07/13/2022    HCT 44.0 07/13/2022    MCV 95.0 (H) 07/13/2022     (L) 07/13/2022     Lab Results   Component Value Date    NEUTROABS 5.09 07/13/2022     Lab Results   Component Value Date     05/04/2022    K 4.7 05/04/2022     05/04/2022    CO2 25 05/04/2022    BUN 31 (H) 05/04/2022    CREATININE 2.1 (H) 05/04/2022    GLUCOSE 109 05/04/2022    CALCIUM 9.9 05/04/2022    PROT 6.7 05/04/2022    LABALBU 4.6 05/04/2022    BILITOT 0.5 05/04/2022    ALKPHOS 80 05/04/2022    AST 19 05/04/2022    ALT 21 05/04/2022    LABGLOM 31 (A) 05/04/2022    GFRAA 38 (L) 05/04/2022         Relevant Imaging studies/reviewed by me:  6/13/2022 CT Chest WO Contrast No acute cardiopulmonary abnormality. No parenchymal infiltrate or pleural effusion. No evidence of mass. A few tiny atelectatic band.     ASSESSMENT    Orders Placed This Encounter   Procedures    XR CHEST (2 VW)     Standing Status:   Future     Standing Expiration Date:   1/13/2024     Scheduling Instructions:      do 1 year     Order Specific Question:   Reason for exam: CDT.    Vinny Mckeon am scribing for Alfie Leal MD. Electronically signed by Concepción Hernandez RN on 7/13/2022 at 9:22 AM CDT. I, Dr Lesli Glez, personally performed the services described in this documentation as scribed by Concepción Hernandez RN in my presence and is both accurate and complete. I have seen, examined and reviewed this patient medication list, appropriate labs and imaging studies. I reviewed relevant medical records and others physicians notes. I discussed the plans of care with the patient. I answered all the questions to the patients satisfaction. I have also reviewed the chief complaint (CC) and part of the history (History of Present Illness (HPI), Past Family Social History [facility]), or Review of Systems (ROS) and made changes when appropriated.        (Please note that portions of this note were completed with a voice recognition program. Efforts were made to edit the dictations but occasionally words are mis-transcribed.)    Electronically signed by Alfie Leal MD on 7/13/2022 at 9:29 AM

## 2022-07-13 ENCOUNTER — HOSPITAL ENCOUNTER (OUTPATIENT)
Dept: INFUSION THERAPY | Age: 72
Discharge: HOME | End: 2022-07-13
Payer: MEDICARE

## 2022-07-13 ENCOUNTER — OFFICE VISIT (OUTPATIENT)
Dept: HEMATOLOGY | Age: 72
End: 2022-07-13
Payer: MEDICARE

## 2022-07-13 VITALS
OXYGEN SATURATION: 95 % | SYSTOLIC BLOOD PRESSURE: 112 MMHG | HEIGHT: 70 IN | DIASTOLIC BLOOD PRESSURE: 62 MMHG | WEIGHT: 246 LBS | BODY MASS INDEX: 35.22 KG/M2 | HEART RATE: 72 BPM

## 2022-07-13 DIAGNOSIS — C64.1 MALIGNANT NEOPLASM OF RIGHT KIDNEY, EXCEPT RENAL PELVIS (HCC): Primary | ICD-10-CM

## 2022-07-13 DIAGNOSIS — C64.1 MALIGNANT NEOPLASM OF RIGHT KIDNEY, EXCEPT RENAL PELVIS (HCC): ICD-10-CM

## 2022-07-13 DIAGNOSIS — Z71.89 CARE PLAN DISCUSSED WITH PATIENT: ICD-10-CM

## 2022-07-13 DIAGNOSIS — R91.1 PULMONARY NODULE: ICD-10-CM

## 2022-07-13 DIAGNOSIS — N18.32 STAGE 3B CHRONIC KIDNEY DISEASE (HCC): ICD-10-CM

## 2022-07-13 LAB
BASOPHILS ABSOLUTE: 0.04 K/UL (ref 0.01–0.08)
BASOPHILS RELATIVE PERCENT: 0.5 % (ref 0.1–1.2)
EOSINOPHILS ABSOLUTE: 0.47 K/UL (ref 0.04–0.54)
EOSINOPHILS RELATIVE PERCENT: 6.4 % (ref 0.7–7)
HCT VFR BLD CALC: 44 % (ref 40.1–51)
HEMOGLOBIN: 14 G/DL (ref 13.7–17.5)
LYMPHOCYTES ABSOLUTE: 0.9 K/UL (ref 1.18–3.74)
LYMPHOCYTES RELATIVE PERCENT: 12.2 % (ref 19.3–53.1)
MCH RBC QN AUTO: 30.2 PG (ref 25.7–32.2)
MCHC RBC AUTO-ENTMCNC: 31.8 G/DL (ref 32.3–36.5)
MCV RBC AUTO: 95 FL (ref 79–92.2)
MONOCYTES ABSOLUTE: 0.83 K/UL (ref 0.24–0.82)
MONOCYTES RELATIVE PERCENT: 11.3 % (ref 4.7–12.5)
NEUTROPHILS ABSOLUTE: 5.09 K/UL (ref 1.56–6.13)
NEUTROPHILS RELATIVE PERCENT: 69.3 % (ref 34–71.1)
PDW BLD-RTO: 13.1 % (ref 11.6–14.4)
PLATELET # BLD: 159 K/UL (ref 163–337)
PMV BLD AUTO: 11.7 FL (ref 7.4–10.4)
RBC # BLD: 4.63 M/UL (ref 4.63–6.08)
WBC # BLD: 7.35 K/UL (ref 4.23–9.07)

## 2022-07-13 PROCEDURE — 99213 OFFICE O/P EST LOW 20 MIN: CPT | Performed by: INTERNAL MEDICINE

## 2022-07-13 PROCEDURE — 1123F ACP DISCUSS/DSCN MKR DOCD: CPT | Performed by: INTERNAL MEDICINE

## 2022-07-13 PROCEDURE — 1036F TOBACCO NON-USER: CPT | Performed by: INTERNAL MEDICINE

## 2022-07-13 PROCEDURE — G8417 CALC BMI ABV UP PARAM F/U: HCPCS | Performed by: INTERNAL MEDICINE

## 2022-07-13 PROCEDURE — 99212 OFFICE O/P EST SF 10 MIN: CPT

## 2022-07-13 PROCEDURE — 3017F COLORECTAL CA SCREEN DOC REV: CPT | Performed by: INTERNAL MEDICINE

## 2022-07-13 PROCEDURE — 85025 COMPLETE CBC W/AUTO DIFF WBC: CPT

## 2022-07-13 PROCEDURE — G8427 DOCREV CUR MEDS BY ELIG CLIN: HCPCS | Performed by: INTERNAL MEDICINE

## 2022-09-06 ENCOUNTER — OFFICE VISIT (OUTPATIENT)
Dept: PRIMARY CARE CLINIC | Age: 72
End: 2022-09-06
Payer: MEDICARE

## 2022-09-06 VITALS
HEIGHT: 70 IN | TEMPERATURE: 97.6 F | OXYGEN SATURATION: 98 % | HEART RATE: 70 BPM | WEIGHT: 242 LBS | BODY MASS INDEX: 34.65 KG/M2 | DIASTOLIC BLOOD PRESSURE: 70 MMHG | SYSTOLIC BLOOD PRESSURE: 138 MMHG

## 2022-09-06 DIAGNOSIS — I20.9 ANGINA PECTORIS (HCC): ICD-10-CM

## 2022-09-06 DIAGNOSIS — E66.01 MORBIDLY OBESE (HCC): ICD-10-CM

## 2022-09-06 DIAGNOSIS — M10.9 GOUT, UNSPECIFIED CAUSE, UNSPECIFIED CHRONICITY, UNSPECIFIED SITE: ICD-10-CM

## 2022-09-06 DIAGNOSIS — I10 ESSENTIAL HYPERTENSION: ICD-10-CM

## 2022-09-06 DIAGNOSIS — E11.8 TYPE 2 DIABETES MELLITUS WITH COMPLICATION, WITHOUT LONG-TERM CURRENT USE OF INSULIN (HCC): Primary | ICD-10-CM

## 2022-09-06 DIAGNOSIS — I50.22 CHRONIC SYSTOLIC (CONGESTIVE) HEART FAILURE (HCC): ICD-10-CM

## 2022-09-06 DIAGNOSIS — E83.52 HYPERCALCEMIA: ICD-10-CM

## 2022-09-06 DIAGNOSIS — M54.50 CHRONIC LOW BACK PAIN, UNSPECIFIED BACK PAIN LATERALITY, UNSPECIFIED WHETHER SCIATICA PRESENT: ICD-10-CM

## 2022-09-06 DIAGNOSIS — N18.30 STAGE 3 CHRONIC KIDNEY DISEASE, UNSPECIFIED WHETHER STAGE 3A OR 3B CKD (HCC): ICD-10-CM

## 2022-09-06 DIAGNOSIS — G89.29 CHRONIC LOW BACK PAIN, UNSPECIFIED BACK PAIN LATERALITY, UNSPECIFIED WHETHER SCIATICA PRESENT: ICD-10-CM

## 2022-09-06 DIAGNOSIS — I25.10 CORONARY ARTERY DISEASE INVOLVING NATIVE CORONARY ARTERY OF NATIVE HEART WITHOUT ANGINA PECTORIS: ICD-10-CM

## 2022-09-06 LAB — HBA1C MFR BLD: 5.6 %

## 2022-09-06 PROCEDURE — 99214 OFFICE O/P EST MOD 30 MIN: CPT | Performed by: NURSE PRACTITIONER

## 2022-09-06 PROCEDURE — 2022F DILAT RTA XM EVC RTNOPTHY: CPT | Performed by: NURSE PRACTITIONER

## 2022-09-06 PROCEDURE — 83036 HEMOGLOBIN GLYCOSYLATED A1C: CPT | Performed by: NURSE PRACTITIONER

## 2022-09-06 PROCEDURE — 3017F COLORECTAL CA SCREEN DOC REV: CPT | Performed by: NURSE PRACTITIONER

## 2022-09-06 PROCEDURE — 3044F HG A1C LEVEL LT 7.0%: CPT | Performed by: NURSE PRACTITIONER

## 2022-09-06 PROCEDURE — 1036F TOBACCO NON-USER: CPT | Performed by: NURSE PRACTITIONER

## 2022-09-06 PROCEDURE — G8417 CALC BMI ABV UP PARAM F/U: HCPCS | Performed by: NURSE PRACTITIONER

## 2022-09-06 PROCEDURE — G8427 DOCREV CUR MEDS BY ELIG CLIN: HCPCS | Performed by: NURSE PRACTITIONER

## 2022-09-06 PROCEDURE — 1123F ACP DISCUSS/DSCN MKR DOCD: CPT | Performed by: NURSE PRACTITIONER

## 2022-09-06 RX ORDER — ATORVASTATIN CALCIUM 40 MG/1
40 TABLET, FILM COATED ORAL DAILY
Qty: 90 TABLET | Refills: 1 | Status: SHIPPED | OUTPATIENT
Start: 2022-09-06 | End: 2022-12-05

## 2022-09-06 RX ORDER — CLOPIDOGREL BISULFATE 75 MG/1
TABLET ORAL
Qty: 90 TABLET | Refills: 1 | Status: SHIPPED | OUTPATIENT
Start: 2022-09-06

## 2022-09-06 RX ORDER — AMLODIPINE BESYLATE 5 MG/1
5 TABLET ORAL DAILY
Qty: 90 TABLET | Refills: 1 | Status: SHIPPED | OUTPATIENT
Start: 2022-09-06 | End: 2022-12-05

## 2022-09-06 RX ORDER — ISOSORBIDE DINITRATE 30 MG/1
30 TABLET ORAL 2 TIMES DAILY
Qty: 180 TABLET | Refills: 1 | Status: SHIPPED | OUTPATIENT
Start: 2022-09-06 | End: 2022-12-05

## 2022-09-06 RX ORDER — LISINOPRIL 40 MG/1
40 TABLET ORAL DAILY
Qty: 90 TABLET | Refills: 1 | Status: SHIPPED | OUTPATIENT
Start: 2022-09-06 | End: 2022-12-05

## 2022-09-06 ASSESSMENT — ENCOUNTER SYMPTOMS
WHEEZING: 0
EYE REDNESS: 0
DIARRHEA: 0
ABDOMINAL PAIN: 0
NAUSEA: 0
SHORTNESS OF BREATH: 0
VOICE CHANGE: 0
BLOOD IN STOOL: 0
CHEST TIGHTNESS: 0
TROUBLE SWALLOWING: 0
VOMITING: 0
COUGH: 0
CONSTIPATION: 0
RHINORRHEA: 0
BACK PAIN: 1
SORE THROAT: 0

## 2022-09-06 NOTE — PROGRESS NOTES
[facility]  [facility]  [id number]  [address] [address]  Dept: [phone]  Dept Fax: [phone]  Loc: [phone]        Tanner Quiroga is a 67 y.o. male who presents today for his medical conditions/ complaints as noted below. Tanner Quiroga is c/o 3 Month Follow-Up        Chief Complaint   Patient presents with    3 Month Follow-Up       HPI:     HPI    DM 2:   9/6/2022: Ozempic 0.25mg weekly. Due for HA1C, POC HA1C 5.6. Chronic chest pain:   9/6/2022: Stable no issues. Not having to use nitro. Recently visited [place] and had no issues with chest pain r/t colder temps. Chronic low back pain:   9/6/2022: Pt request referral to [facility]. Resolved per pt report. Patient reports that they have been compliant with taking medications as directed.      Past Medical History:   Diagnosis Date    Acute bilateral low back pain with bilateral sciatica 11/26/2019    Acute bilateral low back pain with bilateral sciatica     Acute respiratory failure with hypoxia (HCC) 11/27/2020    Body mass index (bmi) 33.0-33.9, adult     CAD (coronary artery disease), native coronary artery     Chronic systolic (congestive) heart failure 9/6/2022    Class 2 obesity due to excess calories in adult 2/1/2018    Diabetes mellitus ([facility] [address]     GERD (gastroesophageal reflux disease)     Hypercholesteremia     Hypertension     Malignant neoplasm of right kidney, except renal pelvis ([facility] [address]     Malignant neoplasm of right kidney, except renal pelvis [facility])        Past Surgical History:   Procedure Laterality Date    CARDIAC CATHETERIZATION  12/31/07, JDT    Left heart cath    CARDIAC CATHETERIZATION  12/31/07, JDT    Left heart cath    CARDIOVASCULAR STRESS TEST  06/11/2009, JDT    Stress Echo    CARDIOVASCULAR STRESS TEST  12/31/07, JDT    Stress Echo    CORONARY ARTERY BYPASS GRAFT      X4 utilizing the left MOLINA to the LAD, right MOLINA to the second diagonal, and vein grafts to the obtuse marinal and PLOM. CORONARY ARTERY BYPASS GRAFT  08, Del Labrador CABG placing the LIMA to the LAD, MIGUELANGEL to the second diagonal branch and SVBG to the OMB and PMB.     DIAGNOSTIC CARDIAC CATH LAB PROCEDURE      2021    KIDNEY REMOVAL Right 2019    KIDNEY SURGERY      Right Kidney removed    [place] COLONOSCOPY FLX DX W/COLLJ SPEC WHEN PFRMD N/A 10/4/2018    Dr KASSY Solorio-Diverticular disease, 10 yr recall    SKIN BIOPSY         Social History     Socioeconomic History    Marital status:    Tobacco Use    Smoking status: Former     Packs/day: 2.00     Years: 32.00     Pack years: 64.00     Types: Cigarettes     Start date:      Quit date: 10/5/1998     Years since quittin.9    Smokeless tobacco: Former   Vaping Use    Vaping Use: Never used   Substance and Sexual Activity    Alcohol use: Yes     Comment: Very Little    Drug use: Never     Social Determinants of Health     Financial Resource Strain: Low Risk     Difficulty of Paying Living Expenses: Not hard at all   Food Insecurity: No Food Insecurity    Worried About Running Out of Food in the Last Year: Never true    Ran Out of Food in the Last Year: Never true       Family History   Problem Relation Age of Onset    Cancer Mother         lung    Coronary Art Dis Father     Stroke Father     Hypertension Father     High Blood Pressure Brother     Coronary Art Dis Paternal Grandmother     Coronary Art Dis Paternal Grandfather        Current Outpatient Medications   Medication Sig Dispense Refill    clopidogrel (PLAVIX) 75 MG tablet TAKE ONE TABLET BY MOUTH ONCE DAILY 90 tablet 1    amLODIPine (NORVASC) 5 MG tablet Take 1 tablet by mouth daily 90 tablet 1    lisinopril (PRINIVIL;ZESTRIL) 40 MG tablet Take 1 tablet by mouth daily 90 tablet 1    isosorbide dinitrate (ISORDIL) 30 MG tablet Take 1 tablet by mouth in the morning and at bedtime 180 tablet 1    atorvastatin (LIPITOR) 40 MG tablet Take 1 tablet by mouth daily 90 tablet 1    OZEMPIC, 0.25 OR 0.5 MG/DOSE, 2 MG/1.5ML SOPN INJECT 0.25MG UNDER THE SKIN ONCE WEEKLY FOR 4 WEEK 1.5 mL 3    Multiple Vitamin (MULTIVITAMIN ADULT PO) Take by mouth daily      allopurinol (ZYLOPRIM) 100 MG tablet Take twice daily 180 tablet 2    nitroGLYCERIN (NITROSTAT) 0.4 MG SL tablet Place 1 tablet under the tongue every 5 minutes as needed for Chest pain 25 tablet 3    Calcium Polycarbophil (FIBER-CAPS PO) Take by mouth 2 times daily      zinc sulfate (ZINCATE) 220 (50 Zn) MG capsule Take 1 capsule by mouth daily 30 capsule 0    vitamin C (VITAMIN C) 1000 MG tablet Take 1 tablet by mouth daily 30 tablet 0    carvedilol (COREG) 3.125 MG tablet Take 3.125 mg by mouth 2 times daily (with meals)      aspirin 81 MG tablet Take 81 mg by mouth nightly       Respiratory Therapy Supplies (NEBULIZER/TUBING/MOUTHPIECE) KIT 1 kit by Does not apply route daily as needed (persistant cough, sob, or wheezing) (Patient not taking: No sig reported) 1 kit 0    Omega-3 Fatty Acids (FISH OIL) 1200 MG CAPS Take 1,000 mg by mouth 2 times daily  (Patient not taking: Reported on 9/6/2022)       No current facility-administered medications for this visit. No Known Allergies    Lab Review notapplicable    Subjective:   Review of Systems   Constitutional:  Negative for activity change, appetite change, fatigue, fever and unexpected weight change. HENT:  Negative for congestion, ear pain, nosebleeds, rhinorrhea, sore throat, trouble swallowing and voice change. Eyes:  Negative for redness and visual disturbance. Respiratory:  Negative for cough, chest tightness, shortness of breath and wheezing. Cardiovascular:  Negative for chest pain, palpitations and leg swelling. Gastrointestinal:  Negative for abdominal pain, blood in stool, constipation, diarrhea, nausea and vomiting. Endocrine: Negative for polydipsia, polyphagia and polyuria. Genitourinary:  Negative for dysuria, frequency and urgency. Musculoskeletal:  Positive for back pain and myalgias. Skin:  Negative for rash and wound. Neurological:  Negative for dizziness, speech difficulty, light-headedness and headaches. Psychiatric/Behavioral:  Negative for agitation, confusion, self-injury and suicidal ideas. The patient is not nervous/anxious. Objective:     Physical Exam  Vitals and nursing note reviewed. Constitutional:       General: He is not in acute distress. Appearance: He is well-developed. He is obese. He is not diaphoretic. HENT:      Head: Normocephalic and atraumatic. Right Ear: External ear normal.      Left Ear: External ear normal.      Nose: Nose normal.      Mouth/Throat:      Pharynx: No oropharyngeal exudate. Eyes:      General:         Right eye: No discharge. Left eye: No discharge. Conjunctiva/sclera: Conjunctivae normal.      Pupils: Pupils are equal, round, and reactive to light. Cardiovascular:      Rate and Rhythm: Normal rate and regular rhythm. Heart sounds: Normal heart sounds. No murmur heard. Pulmonary:      Effort: Pulmonary effort is normal. No respiratory distress. Breath sounds: Normal breath sounds. No stridor. No wheezing or rales. Chest:      Chest wall: No tenderness. Breasts:     Right: No inverted nipple, mass, nipple discharge, skin change or tenderness. Left: No inverted nipple, mass, nipple discharge, skin change or tenderness. Abdominal:      General: Bowel sounds are normal. There is no distension. Palpations: Abdomen is soft. Tenderness: There is no abdominal tenderness. Musculoskeletal:         General: No tenderness or deformity. Normal range of motion. Cervical back: Normal range of motion and neck supple. Skin:     General: Skin is warm and dry. Findings: No erythema or rash. Neurological:      Mental Status: He is alert and oriented to person, place, and time. Cranial Nerves: No cranial nerve deficit. Coordination: Coordination normal.      Deep Tendon Reflexes: Reflexes are normal and symmetric. Psychiatric:         Behavior: Behavior normal.         Thought Content: Thought content normal.     /70   Pulse 70   Temp 97.6 °F (36.4 °C)   Ht 5' 10\" (1.778 m)   Wt 242 lb (109.8 kg)   SpO2 98%   BMI 34.72 kg/m²     Assessment:      Diagnosis Orders   1. Type 2 diabetes mellitus with complication, without long-term current use of insulin ([facility])  POCT glycosylated hemoglobin (Hb A1C)      2. Stage 3 chronic kidney disease, unspecified whether stage 3a or 3b CKD ([address]        3. Chronic systolic (congestive) heart failure        4. Coronary artery disease involving native coronary artery of native heart without angina pectoris  clopidogrel (PLAVIX) 75 MG tablet    lisinopril (PRINIVIL;ZESTRIL) 40 MG tablet      5. Angina pectoris ([address]        6. Morbidly obese ([address]        7. Essential hypertension        8. Hypercalcemia        9. Gout, unspecified cause, unspecified chronicity, unspecified site        10. Chronic low back pain, unspecified back pain laterality, unspecified whether sciatica present          No results found for this visit on 09/06/22. Plan:     1. Type 2 diabetes mellitus with complication, without long-term current use of insulin ([facility])    2. Stage 3 chronic kidney disease, unspecified whether stage 3a or 3b CKD ([address]    3. Chronic systolic (congestive) heart failure    4. Coronary artery disease involving native coronary artery of native heart without angina pectoris    5. Angina pectoris ([address]    6. Morbidly obese ([address]    7. Essential hypertension    8. Hypercalcemia    9. Gout, unspecified cause, unspecified chronicity, unspecified site    10.  Chronic low back pain, unspecified back pain laterality, unspecified whether sciatica present      Over 50% of the total visit time of 30 minutes was spent on counseling and or coordination of care of   DM2, stage 3 CKD, CHF, CAD, angina pectoris, morbidly obese, HTN, hypercalcemia, gout, chronic low back pain. No follow-ups on file. Orders Placed This Encounter   Procedures    POCT glycosylated hemoglobin (Hb A1C)       Orders Placed This Encounter   Medications    clopidogrel (PLAVIX) 75 MG tablet     Sig: TAKE ONE TABLET BY MOUTH ONCE DAILY     Dispense:  90 tablet     Refill:  1    amLODIPine (NORVASC) 5 MG tablet     Sig: Take 1 tablet by mouth daily     Dispense:  90 tablet     Refill:  1    lisinopril (PRINIVIL;ZESTRIL) 40 MG tablet     Sig: Take 1 tablet by mouth daily     Dispense:  90 tablet     Refill:  1    isosorbide dinitrate (ISORDIL) 30 MG tablet     Sig: Take 1 tablet by mouth in the morning and at bedtime     Dispense:  180 tablet     Refill:  1    atorvastatin (LIPITOR) 40 MG tablet     Sig: Take 1 tablet by mouth daily     Dispense:  90 tablet     Refill:  1         Patient Instructions   Follow up 3 months.         Chris Raman given educational materials - see patient instructions. Discussed use, benefit, and side effects of prescribed medications. All patient/family questions answered and voiced understanding. Instructed to continue current medications, diet and exercise. Pt/family agreed with treatment plan. Follow up as directed and sooner if needed. Patient/ family instructed that is symptoms worsen or persist they are to contact office or report to nearest ER. They voice understanding and agreement with this plan.   signed by MISSY Roman on 9/6/2022 at 4:03 PM CDT    This dictation was generated by voice recognition computer software. Although all attempts are made to edit the dictation for accuracy, there may be errors in the transcription that are not intended.

## 2022-09-23 ENCOUNTER — OFFICE VISIT (OUTPATIENT)
Dept: CARDIOLOGY CLINIC | Age: 72
End: 2022-09-23
Payer: MEDICARE

## 2022-09-23 VITALS
HEIGHT: 70 IN | HEART RATE: 78 BPM | OXYGEN SATURATION: 95 % | DIASTOLIC BLOOD PRESSURE: 74 MMHG | SYSTOLIC BLOOD PRESSURE: 128 MMHG | WEIGHT: 244 LBS | BODY MASS INDEX: 34.93 KG/M2

## 2022-09-23 DIAGNOSIS — E78.00 HYPERCHOLESTEREMIA: ICD-10-CM

## 2022-09-23 DIAGNOSIS — I25.118 CORONARY ARTERY DISEASE OF NATIVE ARTERY OF NATIVE HEART WITH STABLE ANGINA PECTORIS (HCC): Primary | ICD-10-CM

## 2022-09-23 DIAGNOSIS — I10 ESSENTIAL HYPERTENSION: ICD-10-CM

## 2022-09-23 PROCEDURE — 3017F COLORECTAL CA SCREEN DOC REV: CPT | Performed by: CLINICAL NURSE SPECIALIST

## 2022-09-23 PROCEDURE — G8417 CALC BMI ABV UP PARAM F/U: HCPCS | Performed by: CLINICAL NURSE SPECIALIST

## 2022-09-23 PROCEDURE — G8427 DOCREV CUR MEDS BY ELIG CLIN: HCPCS | Performed by: CLINICAL NURSE SPECIALIST

## 2022-09-23 PROCEDURE — 1036F TOBACCO NON-USER: CPT | Performed by: CLINICAL NURSE SPECIALIST

## 2022-09-23 PROCEDURE — 1123F ACP DISCUSS/DSCN MKR DOCD: CPT | Performed by: CLINICAL NURSE SPECIALIST

## 2022-09-23 PROCEDURE — 99214 OFFICE O/P EST MOD 30 MIN: CPT | Performed by: CLINICAL NURSE SPECIALIST

## 2022-09-23 NOTE — PROGRESS NOTES
[facility]  [facility] [address]  [id number]  Phone: [phone]  Fax: [phone]    OFFICE VISIT:  2022    Mathieu Hunt - : 1950    Reason For Visit:  Tiago Glez is a 67 y.o. male who is here for 6 Month Follow-Up (Pt has had elbow pain and took a Nitro) and Coronary Artery Disease  History of coronary disease with CABG in . 2021 had complex PCI to the distal main and LAD. Also has hypertension, hyperlipidemia, CKD, renal cell cancer with right nephrectomy, diabetes and previous smoker. His last office visit here was in March with Dr. Giovanni Hughes and saw cardiologist in  at [facility] in [place]    He is here today I follow up. Working. He is active and doing well    He had one episode of angina that is always felt in his elbows, last night at rest  he took a nitro with relief. Other than that he has not noticed any exertional problems. Refugio Gallagher denies exertional chest pain, shortness of breath, orthopnea, paroxysmal nocturnal dyspnea, syncope, presyncope, arrhythmia, edema and fatigue. The patient denies numbness or weakness to suggest cerebrovascular accident or transient ischemic attack. MISSY Pritchett is PCP and follows labs including lipids.   He follows with Dr. Nieves Nash his nephrologist.  Next follow-up in November  Mathieu Hunt has the following history as recorded in [facility]:    Patient Active Problem List    Diagnosis Date Noted    Abnormal nuclear cardiac imaging test     Chronic systolic (congestive) heart failure 2022    Chronic low back pain 2022    Chronic renal disease, stage III [facility]) [[id number]] 2022    Close exposure to COVID-19 virus 2021    Pneumonia due to COVID-19 virus 2020    Angina pectoris ([facility] [address] 2020    Coronary artery disease involving left main coronary artery 2020    Blockage of coronary artery bypass vein graft ([facility] [address] 2020    Dyspnea 05/29/2020    Pulmonary nodule 01/23/2020    Body mass index (bmi) 33.0-33.9, adult     Body mass index (bmi) 34.0-34.9, adult  11/26/2019    Dysphagia 11/26/2019    Constipation 11/26/2019    H/O unilateral nephrectomy 11/26/2019    Need for prophylactic vaccination against Streptococcus pneumoniae (pneumococcus) 11/26/2019    Need for immunization against influenza 11/26/2019    Essential hypertension 01/13/2019    Bradycardia 01/13/2019    Vascular calcification 12/02/2018    Arthritis 12/02/2018    Renal mass 10/15/2018    Chronic kidney disease, stage 3 05/11/2018    Type 2 diabetes mellitus with complication, without long-term current use of insulin ([address] 05/11/2018    Hypercalcemia 05/11/2018    Gout 04/23/2018    Increased creatine kinase level 04/23/2018    Decreased GFR 04/23/2018    Decreased pedal pulses 04/23/2018    Elevated blood uric acid level 04/23/2018    Morbidly obese ([address] 02/01/2018    Hypercholesteremia     Coronary artery disease involving native coronary artery of native heart without angina pectoris     GERD (gastroesophageal reflux disease)      Past Medical History:   Diagnosis Date    Acute bilateral low back pain with bilateral sciatica 11/26/2019    Acute bilateral low back pain with bilateral sciatica     Acute respiratory failure with hypoxia (HCC) 11/27/2020    Body mass index (bmi) 33.0-33.9, adult     CAD (coronary artery disease), native coronary artery     Chronic systolic (congestive) heart failure 9/6/2022    Class 2 obesity due to excess calories in adult 2/1/2018    Diabetes mellitus ([address]     GERD (gastroesophageal reflux disease)     Hypercholesteremia     Hypertension     Malignant neoplasm of right kidney, except renal pelvis ([address]     Malignant neoplasm of right kidney, except renal pelvis [facility])      Past Surgical History:   Procedure Laterality Date    CARDIAC CATHETERIZATION  12/31/07, JDT    Left heart cath    CARDIAC CATHETERIZATION  12/31/07, JDT    Left heart cath    CARDIOVASCULAR STRESS TEST  2009, JDT    Stress Echo    CARDIOVASCULAR STRESS TEST  07, JDT    Stress Echo    CORONARY ARTERY BYPASS GRAFT      X4 utilizing the left MOLINA to the LAD, right MOLINA to the second diagonal, and vein grafts to the obtuse marinal and PLOM. CORONARY ARTERY BYPASS GRAFT  08, Karlee Cantu CABG placing the LIMA to the LAD, MIGUELANGEL to the second diagonal branch and SVBG to the OMB and PMB.     DIAGNOSTIC CARDIAC CATH LAB PROCEDURE      2021    KIDNEY REMOVAL Right 2019    KIDNEY SURGERY      Right Kidney removed    [place] COLONOSCOPY FLX DX W/COLLJ SPEC WHEN PFRMD N/A 10/4/2018    Dr KASSY Solorio-Diverticular disease, 10 yr recall    SKIN BIOPSY       Family History   Problem Relation Age of Onset    Cancer Mother         lung    Coronary Art Dis Father     Stroke Father     Hypertension Father     High Blood Pressure Brother     Coronary Art Dis Paternal Grandmother     Coronary Art Dis Paternal Grandfather      Social History     Tobacco Use    Smoking status: Former     Packs/day: 2.00     Years: 32.00     Pack years: 64.00     Types: Cigarettes     Start date:      Quit date: 10/5/1998     Years since quittin.9    Smokeless tobacco: Former   Substance Use Topics    Alcohol use: Yes     Comment: Very Little      Current Outpatient Medications   Medication Sig Dispense Refill    clopidogrel (PLAVIX) 75 MG tablet TAKE ONE TABLET BY MOUTH ONCE DAILY 90 tablet 1    amLODIPine (NORVASC) 5 MG tablet Take 1 tablet by mouth daily 90 tablet 1    lisinopril (PRINIVIL;ZESTRIL) 40 MG tablet Take 1 tablet by mouth daily 90 tablet 1    isosorbide dinitrate (ISORDIL) 30 MG tablet Take 1 tablet by mouth in the morning and at bedtime (Patient taking differently: Take 30 mg by mouth daily Takes 2 once a day) 180 tablet 1    atorvastatin (LIPITOR) 40 MG tablet Take 1 tablet by mouth daily 90 tablet 1    OZEMPIC, 0.25 OR 0.5 MG/DOSE, 2 MG/1.5ML SOPN INJECT 0.25MG UNDER THE SKIN ONCE WEEKLY FOR 4 WEEK 1.5 mL 3    Multiple Vitamin (MULTIVITAMIN ADULT PO) Take by mouth daily      allopurinol (ZYLOPRIM) 100 MG tablet Take twice daily 180 tablet 2    nitroGLYCERIN (NITROSTAT) 0.4 MG SL tablet Place 1 tablet under the tongue every 5 minutes as needed for Chest pain 25 tablet 3    Calcium Polycarbophil (FIBER-CAPS PO) Take by mouth 2 times daily      zinc sulfate (ZINCATE) 220 (50 Zn) MG capsule Take 1 capsule by mouth daily 30 capsule 0    vitamin C (VITAMIN C) 1000 MG tablet Take 1 tablet by mouth daily 30 tablet 0    carvedilol (COREG) 3.125 MG tablet Take 3.125 mg by mouth 2 times daily (with meals)      aspirin 81 MG tablet Take 81 mg by mouth nightly       Respiratory Therapy Supplies (NEBULIZER/TUBING/MOUTHPIECE) KIT 1 kit by Does not apply route daily as needed (persistant cough, sob, or wheezing) (Patient not taking: No sig reported) 1 kit 0    Omega-3 Fatty Acids (FISH OIL) 1200 MG CAPS Take 1,000 mg by mouth 2 times daily  (Patient not taking: No sig reported)       No current facility-administered medications for this visit. Allergies: Patient has no known allergies. Review of Systems  Constitutional - no significant activity change, appetite change, or unexpected weight change. No fever, chills or diaphoresis. No fatigue. HEENT - no significant rhinorrhea or epistaxis. No tinnitus or significant hearing loss. Eyes - no sudden vision change or amaurosis. Respiratory - no significant wheezing, stridor, apnea or cough. No dyspnea on exertion or shortness of breath. Cardiovascular - no exertional chest pain, orthopnea or PND. No sensation of arrhythmia or slow heart rate. No claudication or leg edema. Gastrointestinal - no abdominal swelling or pain. No blood in stool. No severe constipation, diarrhea, nausea, or vomiting. Genitourinary - no difficulty urinating, dysuria, frequency, or urgency. No flank pain or hematuria.    Musculoskeletal - no back pain, gait disturbance, or myalgia. Skin - no color change or rash. No pallor. No new surgical incision. Neurologic - no speech difficulty, facial asymmetry or lateralizing weakness. No seizures, presyncope, syncope, or significant dizziness. Hematologic - no easy bruising or excessive bleeding. Psychiatric - no severe anxiety or insomnia. No confusion. All other review of systems are negative. Objective  Vital Signs - /74   Pulse 78   Ht 5' 10\" (1.778 m)   Wt 244 lb (110.7 kg)   SpO2 95%   BMI 35.01 kg/m²   General - Sridhar Arcos is alert, cooperative, and pleasant. Well groomed. No acute distress. Body habitus is obese. HEENT - The head is normocephalic. No circumoral cyanosis. Dentition is normal.   EYES -  No Xanthelasma, no arcus senilis, no conjunctival hemorrhages or discharge. Neck - Supple, without increased jugular venous pressures. No carotid bruits. No mass. Respiratory - Lungs are clear bilaterally. No wheezes or rales. Normal effort without use of accessory muscles. Cardiovascular - Heart has regular rhythm and rate. No murmurs, rubs or gallops. + pedal pulses and no varicosities. Abdominal -  Soft, nontender, nondistended. Bowel sounds are intact. Extremities - No clubbing, cyanosis, or  edema. Musculoskeletal -  No clubbing . No Osler's nodes. Gait normal .  No kyphosis or scoliosis. Skin - no statis ulcers or dermatitis. Neurological - No focal signs are identified. Oriented to person, place and time. Psychiatric -  Appropriate affect and mood. Assessment:     Diagnosis Orders   1. Coronary artery disease of native artery of native heart with stable angina pectoris ([address]        2. Essential hypertension        3.  Hypercholesteremia          Data:  BP Readings from Last 3 Encounters:   09/23/22 128/74   09/06/22 138/70   07/13/22 112/62    Pulse Readings from Last 3 Encounters:   09/23/22 78   09/06/22 70   07/13/22 72        Wt Readings from Last 3 Encounters:   09/23/22 244 lb (110.7 kg)   09/06/22 242 lb (109.8 kg)   07/13/22 246 lb (111.6 kg)   Blood pressure heart rate controlled. Medical management includes low-dose beta-blocker, ACE, CCB. He takes Isordil daily  Remains on DAPT with aspirin and Plavix along with his statin   Reviewed PCP recent notes    Reviewed records from cardiology at [facility] in [facility] from June   Reviewed recent labs  Recent hemoglobin A1c 5.6   States taking medications as prescribed    Patient continues to be active and is still working. He is not had any exertional changes of symptoms or angina. He had 1 episode of angina at rest last night relieved with 1 nitroglycerin. We discussed signs and symptoms to report. He has his follow-up coming up soon in [address] [place] [address]. He follows up in November with nephrology. Stable cardiovascular status. No evidence of overt heart failure, angina or dysrhythmia. 30 minutes were spent preparing, reviewing and seeing patient. All questions answered    Plan    Follow up in 6 mos   Call with any questions or concerns  Follow up with MISSY Baez for non cardiac problems  Report any new problems  Cardiovascular Fitness-Exercise as tolerated. Strive for 30 minutes of exercise most days of the week. Cardiac / Healthy Diet  Continue current medications as directed  Continue plan of treatment  It is always recommended that you bring your medications bottles with you to each visit - this is for your safety! MISSY Liu    EMR dragon/transcription disclaimer: Much of this encounter note is electronic transcription/translation of spoken language to printed tach. Electronic translation of spoken language may be erroneous, or at times, nonsensical words or phrases may be inadvertently transcribed.  Although, I have reviewed the note for such errors, some may still exist.

## 2022-09-23 NOTE — PATIENT INSTRUCTIONS
Follow up in 6 mos   Call with any questions or concerns  Follow up with MISSY Payan for non cardiac problems  Report any new problems  Cardiovascular Fitness-Exercise as tolerated. Strive for 30 minutes of exercise most days of the week. Cardiac / Healthy Diet  Continue current medications as directed  Continue plan of treatment  It is always recommended that you bring your medications bottles with you to each visit - this is for your safety!

## 2022-11-03 LAB
ALBUMIN SERPL-MCNC: 5 G/DL (ref 3.5–5.2)
ALP BLD-CCNC: 85 U/L (ref 40–130)
ALT SERPL-CCNC: 17 U/L (ref 5–41)
ANION GAP SERPL CALCULATED.3IONS-SCNC: 14 MMOL/L (ref 7–19)
AST SERPL-CCNC: 18 U/L (ref 5–40)
BASOPHILS ABSOLUTE: 0.1 K/UL (ref 0–0.2)
BASOPHILS RELATIVE PERCENT: 0.7 % (ref 0–1)
BILIRUB SERPL-MCNC: 0.5 MG/DL (ref 0.2–1.2)
BILIRUBIN URINE: NEGATIVE
BLOOD, URINE: NEGATIVE
BUN BLDV-MCNC: 31 MG/DL (ref 8–23)
CALCIUM SERPL-MCNC: 9.7 MG/DL (ref 8.8–10.2)
CHLORIDE BLD-SCNC: 108 MMOL/L (ref 98–111)
CLARITY: CLEAR
CO2: 24 MMOL/L (ref 22–29)
COLOR: YELLOW
CREAT SERPL-MCNC: 2.2 MG/DL (ref 0.5–1.2)
CREATININE URINE: 140.9 MG/DL (ref 4.2–622)
EOSINOPHILS ABSOLUTE: 0.4 K/UL (ref 0–0.6)
EOSINOPHILS RELATIVE PERCENT: 6 % (ref 0–5)
GFR SERPL CREATININE-BSD FRML MDRD: 31 ML/MIN/{1.73_M2}
GLUCOSE BLD-MCNC: 112 MG/DL (ref 74–109)
GLUCOSE URINE: NEGATIVE MG/DL
HCT VFR BLD CALC: 43.1 % (ref 42–52)
HEMOGLOBIN: 13.9 G/DL (ref 14–18)
IMMATURE GRANULOCYTES #: 0 K/UL
KETONES, URINE: NEGATIVE MG/DL
LEUKOCYTE ESTERASE, URINE: NEGATIVE
LYMPHOCYTES ABSOLUTE: 1 K/UL (ref 1.1–4.5)
LYMPHOCYTES RELATIVE PERCENT: 13.4 % (ref 20–40)
MAGNESIUM: 2.1 MG/DL (ref 1.6–2.4)
MCH RBC QN AUTO: 31.3 PG (ref 27–31)
MCHC RBC AUTO-ENTMCNC: 32.3 G/DL (ref 33–37)
MCV RBC AUTO: 97.1 FL (ref 80–94)
MONOCYTES ABSOLUTE: 0.8 K/UL (ref 0–0.9)
MONOCYTES RELATIVE PERCENT: 11.6 % (ref 0–10)
NEUTROPHILS ABSOLUTE: 4.9 K/UL (ref 1.5–7.5)
NEUTROPHILS RELATIVE PERCENT: 68 % (ref 50–65)
NITRITE, URINE: NEGATIVE
PARATHYROID HORMONE INTACT: 60.2 PG/ML (ref 15–65)
PDW BLD-RTO: 13 % (ref 11.5–14.5)
PH UA: 5.5 (ref 5–8)
PHOSPHORUS: 3.8 MG/DL (ref 2.5–4.5)
PLATELET # BLD: 161 K/UL (ref 130–400)
PMV BLD AUTO: 12.1 FL (ref 9.4–12.4)
POTASSIUM SERPL-SCNC: 4.7 MMOL/L (ref 3.5–5)
PROTEIN PROTEIN: 10 MG/DL (ref 15–45)
PROTEIN UA: NEGATIVE MG/DL
RBC # BLD: 4.44 M/UL (ref 4.7–6.1)
SODIUM BLD-SCNC: 146 MMOL/L (ref 136–145)
SPECIFIC GRAVITY UA: 1.02 (ref 1–1.03)
TOTAL PROTEIN: 6.9 G/DL (ref 6.6–8.7)
URIC ACID, SERUM: 5.7 MG/DL (ref 3.4–7)
UROBILINOGEN, URINE: 0.2 E.U./DL
VITAMIN D 25-HYDROXY: 73.1 NG/ML
WBC # BLD: 7.2 K/UL (ref 4.8–10.8)

## 2022-11-09 ENCOUNTER — OFFICE VISIT (OUTPATIENT)
Dept: PRIMARY CARE CLINIC | Age: 72
End: 2022-11-09
Payer: MEDICARE

## 2022-11-09 VITALS
HEIGHT: 70 IN | BODY MASS INDEX: 34.04 KG/M2 | HEART RATE: 75 BPM | WEIGHT: 237.8 LBS | TEMPERATURE: 97.4 F | DIASTOLIC BLOOD PRESSURE: 66 MMHG | OXYGEN SATURATION: 96 % | SYSTOLIC BLOOD PRESSURE: 120 MMHG

## 2022-11-09 DIAGNOSIS — I50.22 CHRONIC SYSTOLIC (CONGESTIVE) HEART FAILURE (HCC): ICD-10-CM

## 2022-11-09 DIAGNOSIS — K21.9 GASTROESOPHAGEAL REFLUX DISEASE, UNSPECIFIED WHETHER ESOPHAGITIS PRESENT: ICD-10-CM

## 2022-11-09 DIAGNOSIS — E11.8 TYPE 2 DIABETES MELLITUS WITH COMPLICATION, WITHOUT LONG-TERM CURRENT USE OF INSULIN (HCC): Primary | ICD-10-CM

## 2022-11-09 DIAGNOSIS — R71.8 ELEVATED MCV: ICD-10-CM

## 2022-11-09 DIAGNOSIS — R71.8 HIGH MEAN CORPUSCULAR HEMOGLOBIN CONCENTRATION (MCHC): ICD-10-CM

## 2022-11-09 DIAGNOSIS — I25.10 CORONARY ARTERY DISEASE INVOLVING NATIVE CORONARY ARTERY OF NATIVE HEART WITHOUT ANGINA PECTORIS: ICD-10-CM

## 2022-11-09 DIAGNOSIS — N18.30 STAGE 3 CHRONIC KIDNEY DISEASE, UNSPECIFIED WHETHER STAGE 3A OR 3B CKD (HCC): ICD-10-CM

## 2022-11-09 PROCEDURE — 3017F COLORECTAL CA SCREEN DOC REV: CPT | Performed by: NURSE PRACTITIONER

## 2022-11-09 PROCEDURE — G8427 DOCREV CUR MEDS BY ELIG CLIN: HCPCS | Performed by: NURSE PRACTITIONER

## 2022-11-09 PROCEDURE — 1123F ACP DISCUSS/DSCN MKR DOCD: CPT | Performed by: NURSE PRACTITIONER

## 2022-11-09 PROCEDURE — 99214 OFFICE O/P EST MOD 30 MIN: CPT | Performed by: NURSE PRACTITIONER

## 2022-11-09 PROCEDURE — 2022F DILAT RTA XM EVC RTNOPTHY: CPT | Performed by: NURSE PRACTITIONER

## 2022-11-09 PROCEDURE — 3078F DIAST BP <80 MM HG: CPT | Performed by: NURSE PRACTITIONER

## 2022-11-09 PROCEDURE — 3044F HG A1C LEVEL LT 7.0%: CPT | Performed by: NURSE PRACTITIONER

## 2022-11-09 PROCEDURE — 3074F SYST BP LT 130 MM HG: CPT | Performed by: NURSE PRACTITIONER

## 2022-11-09 PROCEDURE — G8417 CALC BMI ABV UP PARAM F/U: HCPCS | Performed by: NURSE PRACTITIONER

## 2022-11-09 PROCEDURE — G8484 FLU IMMUNIZE NO ADMIN: HCPCS | Performed by: NURSE PRACTITIONER

## 2022-11-09 PROCEDURE — 1036F TOBACCO NON-USER: CPT | Performed by: NURSE PRACTITIONER

## 2022-11-09 RX ORDER — FAMOTIDINE 20 MG/1
20 TABLET, FILM COATED ORAL 2 TIMES DAILY
Qty: 90 TABLET | Refills: 2 | Status: SHIPPED | OUTPATIENT
Start: 2022-11-09 | End: 2023-02-07

## 2022-11-09 ASSESSMENT — ENCOUNTER SYMPTOMS
SORE THROAT: 0
NAUSEA: 0
EYE REDNESS: 0
COUGH: 0
ABDOMINAL PAIN: 0
CHEST TIGHTNESS: 0
WHEEZING: 0
DIARRHEA: 0
VOMITING: 0
TROUBLE SWALLOWING: 0
BLOOD IN STOOL: 0
BACK PAIN: 1
SHORTNESS OF BREATH: 0
RHINORRHEA: 0
VOICE CHANGE: 0
CONSTIPATION: 0

## 2022-11-09 NOTE — PROGRESS NOTES
[address] [facility]  [address] [place], [place] [address]  [place] [address]  Dept: [phone]  Dept Fax: [phone]  Loc: [phone]        Lc Law is a 67 y.o. male who presents today for his medical conditions/ complaints as noted below. Lc Law is c/o Follow-up and Medication Refill (Famotidine 20mg bid , )        Chief Complaint   Patient presents with    Follow-up    Medication Refill     Famotidine 20mg bid ,        HPI:     Medication Refill  Associated symptoms include myalgias. Pertinent negatives include no abdominal pain, chest pain, congestion, coughing, fatigue, fever, headaches, nausea, rash, sore throat or vomiting. DM 2:   11/09/2022: pt reports compliance with drug regimen HA1C 5.6 9/6/2022.   9/6/2022: Ozempic 0.25mg weekly. Due for HA1C, POC HA1C 5.6. Chronic chest pain:   11/09/2022: pt reports he is taking imdur to 30 mg in the morning and sometimes 30mg at bedtime/ afternoon. 9/6/2022: Stable no issues. Not having to use nitro. Recently visited [place] and had no issues with chest pain r/t colder temps. Chronic low back pain:   11/09/2022: pt reports he hasn't had to have any injections yet. Pt reports lifestyle modifications that have helped. 9/6/2022: Pt request referral to [facility]. Resolved per pt report. Patient reports that they have been compliant with taking medications as directed.      Past Medical History:   Diagnosis Date    Acute bilateral low back pain with bilateral sciatica 11/26/2019    Acute bilateral low back pain with bilateral sciatica     Acute respiratory failure with hypoxia (HCC) 11/27/2020    Body mass index (bmi) 33.0-33.9, adult     CAD (coronary artery disease), native coronary artery     Chronic systolic (congestive) heart failure 9/6/2022    Class 2 obesity due to excess calories in adult 2/1/2018    Diabetes mellitus (HCC)     GERD (gastroesophageal reflux disease) Was not able to reach patient on both numbers to make appointment for month for medications refills Hypercholesteremia     Hypertension     Malignant neoplasm of right kidney, except renal pelvis ([facility] [address]     Malignant neoplasm of right kidney, except renal pelvis [facility])        Past Surgical History:   Procedure Laterality Date    CARDIAC CATHETERIZATION  07, JDT    Left heart cath    CARDIAC CATHETERIZATION  07, JDT    Left heart cath    CARDIOVASCULAR STRESS TEST  2009, JDT    Stress Echo    CARDIOVASCULAR STRESS TEST  07, JDT    Stress Echo    CORONARY ARTERY BYPASS GRAFT      X4 utilizing the left MOLINA to the LAD, right MOLINA to the second diagonal, and vein grafts to the obtuse marinal and PLOM. CORONARY ARTERY BYPASS GRAFT  08, Gwenevere Good CABG placing the LIMA to the LAD, MIGUELANGEL to the second diagonal branch and SVBG to the OMB and PMB.     DIAGNOSTIC CARDIAC CATH LAB PROCEDURE      2021    KIDNEY REMOVAL Right 2019    KIDNEY SURGERY      Right Kidney removed    WI COLONOSCOPY FLX DX W/COLLJ SPEC WHEN PFRMD N/A 10/4/2018    Dr KASSY Solorio-Diverticular disease, 10 yr recall    SKIN BIOPSY         Social History     Socioeconomic History    Marital status:      Spouse name: None    Number of children: None    Years of education: None    Highest education level: None   Tobacco Use    Smoking status: Former     Packs/day: 2.00     Years: 32.00     Pack years: 64.00     Types: Cigarettes     Start date:      Quit date: 10/5/1998     Years since quittin.1    Smokeless tobacco: Former   Vaping Use    Vaping Use: Never used   Substance and Sexual Activity    Alcohol use: Yes     Comment: Very Little    Drug use: Never     Social Determinants of Health     Financial Resource Strain: Low Risk     Difficulty of Paying Living Expenses: Not hard at all   Food Insecurity: No Food Insecurity    Worried About Running Out of Food in the Last Year: Never true    Ran Out of Food in the Last Year: Never true       Family History   Problem Relation Age of Onset    Cancer Mother lung    Coronary Art Dis Father     Stroke Father     Hypertension Father     High Blood Pressure Brother     Coronary Art Dis Paternal Grandmother     Coronary Art Dis Paternal Grandfather        Current Outpatient Medications   Medication Sig Dispense Refill    famotidine (PEPCID) 20 MG tablet Take 1 tablet by mouth 2 times daily 90 tablet 2    clopidogrel (PLAVIX) 75 MG tablet TAKE ONE TABLET BY MOUTH ONCE DAILY 90 tablet 1    amLODIPine (NORVASC) 5 MG tablet Take 1 tablet by mouth daily 90 tablet 1    lisinopril (PRINIVIL;ZESTRIL) 40 MG tablet Take 1 tablet by mouth daily 90 tablet 1    isosorbide dinitrate (ISORDIL) 30 MG tablet Take 1 tablet by mouth in the morning and at bedtime (Patient taking differently: Take 30 mg by mouth daily Takes 2 once a day) 180 tablet 1    atorvastatin (LIPITOR) 40 MG tablet Take 1 tablet by mouth daily 90 tablet 1    OZEMPIC, 0.25 OR 0.5 MG/DOSE, 2 MG/1.5ML SOPN INJECT 0.25MG UNDER THE SKIN ONCE WEEKLY FOR 4 WEEK 1.5 mL 3    Multiple Vitamin (MULTIVITAMIN ADULT PO) Take by mouth daily      allopurinol (ZYLOPRIM) 100 MG tablet Take twice daily 180 tablet 2    nitroGLYCERIN (NITROSTAT) 0.4 MG SL tablet Place 1 tablet under the tongue every 5 minutes as needed for Chest pain 25 tablet 3    carvedilol (COREG) 3.125 MG tablet Take 3.125 mg by mouth 2 times daily (with meals)      Calcium Polycarbophil (FIBER-CAPS PO) Take by mouth 2 times daily (Patient not taking: Reported on 11/9/2022)      zinc sulfate (ZINCATE) 220 (50 Zn) MG capsule Take 1 capsule by mouth daily (Patient not taking: Reported on 11/9/2022) 30 capsule 0    vitamin C (VITAMIN C) 1000 MG tablet Take 1 tablet by mouth daily (Patient not taking: Reported on 11/9/2022) 30 tablet 0    aspirin 81 MG tablet Take 81 mg by mouth nightly  (Patient not taking: Reported on 11/9/2022)       No current facility-administered medications for this visit.        No Known Allergies    Lab Review notapplicable    Subjective: Review of Systems   Constitutional:  Negative for activity change, appetite change, fatigue, fever and unexpected weight change. HENT:  Negative for congestion, ear pain, nosebleeds, rhinorrhea, sore throat, trouble swallowing and voice change. Eyes:  Negative for redness and visual disturbance. Respiratory:  Negative for cough, chest tightness, shortness of breath and wheezing. Cardiovascular:  Negative for chest pain, palpitations and leg swelling. Gastrointestinal:  Negative for abdominal pain, blood in stool, constipation, diarrhea, nausea and vomiting. Endocrine: Negative for polydipsia, polyphagia and polyuria. Genitourinary:  Negative for dysuria, frequency and urgency. Musculoskeletal:  Positive for back pain and myalgias. Skin:  Negative for rash and wound. Neurological:  Negative for dizziness, speech difficulty, light-headedness and headaches. Psychiatric/Behavioral:  Negative for agitation, confusion, self-injury and suicidal ideas. The patient is not nervous/anxious. Objective:     Physical Exam  Vitals and nursing note reviewed. Constitutional:       General: He is not in acute distress. Appearance: He is well-developed. He is obese. He is not diaphoretic. HENT:      Head: Normocephalic and atraumatic. Right Ear: External ear normal.      Left Ear: External ear normal.      Nose: Nose normal.      Mouth/Throat:      Pharynx: No oropharyngeal exudate. Eyes:      General:         Right eye: No discharge. Left eye: No discharge. Conjunctiva/sclera: Conjunctivae normal.      Pupils: Pupils are equal, round, and reactive to light. Cardiovascular:      Rate and Rhythm: Normal rate and regular rhythm. Heart sounds: Normal heart sounds. No murmur heard. Pulmonary:      Effort: Pulmonary effort is normal. No respiratory distress. Breath sounds: Normal breath sounds. No stridor. No wheezing or rales. Chest:      Chest wall: No tenderness. Breasts:     Right: No inverted nipple, mass, nipple discharge, skin change or tenderness. Left: No inverted nipple, mass, nipple discharge, skin change or tenderness. Abdominal:      General: Bowel sounds are normal. There is no distension. Palpations: Abdomen is soft. Tenderness: There is no abdominal tenderness. Musculoskeletal:         General: No tenderness or deformity. Normal range of motion. Cervical back: Normal range of motion and neck supple. Skin:     General: Skin is warm and dry. Findings: No erythema or rash. Neurological:      Mental Status: He is alert and oriented to person, place, and time. Cranial Nerves: No cranial nerve deficit. Coordination: Coordination normal.      Deep Tendon Reflexes: Reflexes are normal and symmetric. Psychiatric:         Behavior: Behavior normal.         Thought Content: Thought content normal.     /66 (Site: Left Upper Arm, Position: Sitting)   Pulse 75   Temp 97.4 °F (36.3 °C) (Temporal)   Ht 5' 10\" (1.778 m)   Wt 237 lb 12.8 oz (107.9 kg)   SpO2 96%   BMI 34.12 kg/m²     Assessment:      Diagnosis Orders   1. Type 2 diabetes mellitus with complication, without long-term current use of insulin (HCC)        2. Elevated MCV  Vitamin B6    Vitamin B12      3. High mean corpuscular hemoglobin concentration (MCHC)  Vitamin B6    Vitamin B12      4. Gastroesophageal reflux disease, unspecified whether esophagitis present        5. Stage 3 chronic kidney disease, unspecified whether stage 3a or 3b CKD ([facility] [address]        6. Chronic systolic (congestive) heart failure        7. Coronary artery disease involving native coronary artery of native heart without angina pectoris            No results found for this visit on 11/09/22. Plan:     1. Type 2 diabetes mellitus with complication, without long-term current use of insulin (HCC)    2. Elevated MCV    3. High mean corpuscular hemoglobin concentration (MCHC)    4. Gastroesophageal reflux disease, unspecified whether esophagitis present    5. Stage 3 chronic kidney disease, unspecified whether stage 3a or 3b CKD ([facility] [address]    6. Chronic systolic (congestive) heart failure    7. Coronary artery disease involving native coronary artery of native heart without angina pectoris        Over 50% of the total visit time of 30 minutes was spent on counseling and or coordination of care of   DM2, elevated MCV, high mean corpuscular hemoglobin, gerd, stage 3 CKD, chronic systolic heart failure, and CAD. Return in about 3 months (around 2/9/2023). Orders Placed This Encounter   Procedures    Vitamin B6     Standing Status:   Future     Standing Expiration Date:   11/9/2023    Vitamin B12     Standing Status:   Future     Standing Expiration Date:   11/9/2023         Orders Placed This Encounter   Medications    famotidine (PEPCID) 20 MG tablet     Sig: Take 1 tablet by mouth 2 times daily     Dispense:  90 tablet     Refill:  2           Patient Instructions   Begin taking Aspirin 81mg every other night.         /family given educational materials - see patient instructions. Discussed use, benefit, and side effects of prescribed medications. All patient/family questions answered and voiced understanding. Instructed to continue current medications, diet and exercise. Pt/family agreed with treatment plan. Follow up as directed and sooner if needed. Patient/ family instructed that is symptoms worsen or persist they are to contact office or report to nearest ER. They voice understanding and agreement with this plan.   signed by MISSY Garcia on 11/9/2022 at 4:03 PM CDT    This dictation was generated by voice recognition computer software. Although all attempts are made to edit the dictation for accuracy, there may be errors in the transcription that are not intended.

## 2023-01-17 RX ORDER — SEMAGLUTIDE 1.34 MG/ML
INJECTION, SOLUTION SUBCUTANEOUS
Qty: 1.5 ML | Refills: 3 | OUTPATIENT
Start: 2023-01-17

## 2023-01-23 LAB
ALBUMIN SERPL-MCNC: 4.4 G/DL (ref 3.5–5.2)
ALP BLD-CCNC: 83 U/L (ref 40–130)
ALT SERPL-CCNC: 18 U/L (ref 5–41)
ANION GAP SERPL CALCULATED.3IONS-SCNC: 13 MMOL/L (ref 7–19)
AST SERPL-CCNC: 16 U/L (ref 5–40)
BILIRUB SERPL-MCNC: 0.5 MG/DL (ref 0.2–1.2)
BUN BLDV-MCNC: 27 MG/DL (ref 8–23)
CALCIUM SERPL-MCNC: 9.5 MG/DL (ref 8.8–10.2)
CHLORIDE BLD-SCNC: 108 MMOL/L (ref 98–111)
CHOLESTEROL, TOTAL: 121 MG/DL (ref 160–199)
CO2: 25 MMOL/L (ref 22–29)
CREAT SERPL-MCNC: 1.8 MG/DL (ref 0.5–1.2)
GFR SERPL CREATININE-BSD FRML MDRD: 39 ML/MIN/{1.73_M2}
GLUCOSE BLD-MCNC: 114 MG/DL (ref 74–109)
HDLC SERPL-MCNC: 45 MG/DL (ref 55–121)
LDL CHOLESTEROL CALCULATED: 64 MG/DL
POTASSIUM SERPL-SCNC: 4.6 MMOL/L (ref 3.5–5)
SODIUM BLD-SCNC: 146 MMOL/L (ref 136–145)
TOTAL PROTEIN: 6.9 G/DL (ref 6.6–8.7)
TRIGL SERPL-MCNC: 60 MG/DL (ref 0–149)

## 2023-01-23 RX ORDER — SEMAGLUTIDE 1.34 MG/ML
INJECTION, SOLUTION SUBCUTANEOUS
Qty: 1.5 ML | Refills: 3 | OUTPATIENT
Start: 2023-01-23

## 2023-01-25 PROBLEM — R94.4 DECREASED GFR: Status: RESOLVED | Noted: 2018-04-23 | Resolved: 2023-01-25

## 2023-01-25 PROBLEM — E83.52 HYPERCALCEMIA: Status: RESOLVED | Noted: 2018-05-11 | Resolved: 2023-01-25

## 2023-01-25 PROBLEM — Z86.16 HISTORY OF 2019 NOVEL CORONAVIRUS DISEASE (COVID-19): Status: ACTIVE | Noted: 2020-11-27

## 2023-01-25 PROBLEM — E79.0 ELEVATED BLOOD URIC ACID LEVEL: Status: RESOLVED | Noted: 2018-04-23 | Resolved: 2023-01-25

## 2023-01-25 PROBLEM — N18.30 STAGE 3 CHRONIC KIDNEY DISEASE (HCC): Status: RESOLVED | Noted: 2022-05-11 | Resolved: 2023-01-25

## 2023-01-25 PROBLEM — N25.81 SECONDARY HYPERPARATHYROIDISM (HCC): Status: ACTIVE | Noted: 2023-01-25

## 2023-01-25 PROBLEM — N28.89 RENAL MASS: Status: RESOLVED | Noted: 2018-10-15 | Resolved: 2023-01-25

## 2023-01-25 PROBLEM — T82.898A BLOCKAGE OF CORONARY ARTERY BYPASS VEIN GRAFT (HCC): Status: RESOLVED | Noted: 2020-05-29 | Resolved: 2023-01-25

## 2023-01-25 PROBLEM — R00.1 BRADYCARDIA: Status: RESOLVED | Noted: 2019-01-13 | Resolved: 2023-01-25

## 2023-01-25 PROBLEM — R91.1 PULMONARY NODULE: Status: RESOLVED | Noted: 2020-01-23 | Resolved: 2023-01-25

## 2023-01-26 ENCOUNTER — OFFICE VISIT (OUTPATIENT)
Dept: PRIMARY CARE CLINIC | Age: 73
End: 2023-01-26
Payer: MEDICARE

## 2023-01-26 ENCOUNTER — OFFICE VISIT (OUTPATIENT)
Dept: PRIMARY CARE CLINIC | Age: 73
End: 2023-01-26

## 2023-01-26 VITALS
TEMPERATURE: 97.9 F | OXYGEN SATURATION: 97 % | WEIGHT: 242 LBS | HEIGHT: 70 IN | BODY MASS INDEX: 34.65 KG/M2 | RESPIRATION RATE: 20 BRPM | HEART RATE: 70 BPM | DIASTOLIC BLOOD PRESSURE: 64 MMHG | SYSTOLIC BLOOD PRESSURE: 130 MMHG

## 2023-01-26 VITALS
HEIGHT: 70 IN | BODY MASS INDEX: 34.65 KG/M2 | WEIGHT: 242 LBS | OXYGEN SATURATION: 97 % | DIASTOLIC BLOOD PRESSURE: 64 MMHG | TEMPERATURE: 97.7 F | SYSTOLIC BLOOD PRESSURE: 130 MMHG | RESPIRATION RATE: 20 BRPM | HEART RATE: 70 BPM

## 2023-01-26 DIAGNOSIS — Z12.5 PROSTATE CANCER SCREENING: ICD-10-CM

## 2023-01-26 DIAGNOSIS — I25.118 CORONARY ARTERY DISEASE OF NATIVE ARTERY OF NATIVE HEART WITH STABLE ANGINA PECTORIS (HCC): ICD-10-CM

## 2023-01-26 DIAGNOSIS — I50.22 CHRONIC SYSTOLIC (CONGESTIVE) HEART FAILURE (HCC): ICD-10-CM

## 2023-01-26 DIAGNOSIS — E11.8 TYPE 2 DIABETES MELLITUS WITH COMPLICATION, WITHOUT LONG-TERM CURRENT USE OF INSULIN (HCC): ICD-10-CM

## 2023-01-26 DIAGNOSIS — N25.81 SECONDARY HYPERPARATHYROIDISM (HCC): ICD-10-CM

## 2023-01-26 DIAGNOSIS — Z13.0 SCREENING FOR DEFICIENCY ANEMIA: Primary | ICD-10-CM

## 2023-01-26 DIAGNOSIS — Z13.29 SCREENING FOR THYROID DISORDER: ICD-10-CM

## 2023-01-26 DIAGNOSIS — Z00.00 MEDICARE ANNUAL WELLNESS VISIT, SUBSEQUENT: Primary | ICD-10-CM

## 2023-01-26 DIAGNOSIS — I25.10 CORONARY ARTERY DISEASE INVOLVING LEFT MAIN CORONARY ARTERY: ICD-10-CM

## 2023-01-26 DIAGNOSIS — I25.10 CORONARY ARTERY DISEASE INVOLVING NATIVE CORONARY ARTERY OF NATIVE HEART WITHOUT ANGINA PECTORIS: ICD-10-CM

## 2023-01-26 DIAGNOSIS — Z85.528 PERSONAL HISTORY OF RENAL CANCER: ICD-10-CM

## 2023-01-26 DIAGNOSIS — N18.30 STAGE 3 CHRONIC KIDNEY DISEASE, UNSPECIFIED WHETHER STAGE 3A OR 3B CKD (HCC): ICD-10-CM

## 2023-01-26 PROCEDURE — 3046F HEMOGLOBIN A1C LEVEL >9.0%: CPT | Performed by: INTERNAL MEDICINE

## 2023-01-26 PROCEDURE — G8427 DOCREV CUR MEDS BY ELIG CLIN: HCPCS | Performed by: INTERNAL MEDICINE

## 2023-01-26 PROCEDURE — 99214 OFFICE O/P EST MOD 30 MIN: CPT | Performed by: INTERNAL MEDICINE

## 2023-01-26 PROCEDURE — 1123F ACP DISCUSS/DSCN MKR DOCD: CPT | Performed by: INTERNAL MEDICINE

## 2023-01-26 PROCEDURE — 3075F SYST BP GE 130 - 139MM HG: CPT | Performed by: INTERNAL MEDICINE

## 2023-01-26 PROCEDURE — 2022F DILAT RTA XM EVC RTNOPTHY: CPT | Performed by: INTERNAL MEDICINE

## 2023-01-26 PROCEDURE — 1036F TOBACCO NON-USER: CPT | Performed by: INTERNAL MEDICINE

## 2023-01-26 PROCEDURE — G8484 FLU IMMUNIZE NO ADMIN: HCPCS | Performed by: INTERNAL MEDICINE

## 2023-01-26 PROCEDURE — 3017F COLORECTAL CA SCREEN DOC REV: CPT | Performed by: INTERNAL MEDICINE

## 2023-01-26 PROCEDURE — G0439 PPPS, SUBSEQ VISIT: HCPCS | Performed by: INTERNAL MEDICINE

## 2023-01-26 PROCEDURE — 3078F DIAST BP <80 MM HG: CPT | Performed by: INTERNAL MEDICINE

## 2023-01-26 PROCEDURE — G8417 CALC BMI ABV UP PARAM F/U: HCPCS | Performed by: INTERNAL MEDICINE

## 2023-01-26 RX ORDER — ASPIRIN 81 MG/1
81 TABLET ORAL DAILY
COMMUNITY

## 2023-01-26 SDOH — ECONOMIC STABILITY: FOOD INSECURITY: WITHIN THE PAST 12 MONTHS, THE FOOD YOU BOUGHT JUST DIDN'T LAST AND YOU DIDN'T HAVE MONEY TO GET MORE.: NEVER TRUE

## 2023-01-26 SDOH — ECONOMIC STABILITY: FOOD INSECURITY: WITHIN THE PAST 12 MONTHS, YOU WORRIED THAT YOUR FOOD WOULD RUN OUT BEFORE YOU GOT MONEY TO BUY MORE.: NEVER TRUE

## 2023-01-26 ASSESSMENT — LIFESTYLE VARIABLES
HOW MANY STANDARD DRINKS CONTAINING ALCOHOL DO YOU HAVE ON A TYPICAL DAY: 1 OR 2
HOW OFTEN DO YOU HAVE A DRINK CONTAINING ALCOHOL: 2-4 TIMES A MONTH

## 2023-01-26 ASSESSMENT — ENCOUNTER SYMPTOMS
VOMITING: 0
WHEEZING: 0
RHINORRHEA: 0
VOICE CHANGE: 0
BACK PAIN: 1
SORE THROAT: 0
BLOOD IN STOOL: 0
CONSTIPATION: 0
CHEST TIGHTNESS: 0
SHORTNESS OF BREATH: 0
COUGH: 0
EYE REDNESS: 0
NAUSEA: 0
ABDOMINAL PAIN: 0
DIARRHEA: 0
TROUBLE SWALLOWING: 0

## 2023-01-26 ASSESSMENT — PATIENT HEALTH QUESTIONNAIRE - PHQ9
SUM OF ALL RESPONSES TO PHQ QUESTIONS 1-9: 0
2. FEELING DOWN, DEPRESSED OR HOPELESS: 0
1. LITTLE INTEREST OR PLEASURE IN DOING THINGS: 0
SUM OF ALL RESPONSES TO PHQ QUESTIONS 1-9: 0
SUM OF ALL RESPONSES TO PHQ9 QUESTIONS 1 & 2: 0
SUM OF ALL RESPONSES TO PHQ QUESTIONS 1-9: 0
SUM OF ALL RESPONSES TO PHQ QUESTIONS 1-9: 0

## 2023-01-26 ASSESSMENT — SOCIAL DETERMINANTS OF HEALTH (SDOH): HOW HARD IS IT FOR YOU TO PAY FOR THE VERY BASICS LIKE FOOD, HOUSING, MEDICAL CARE, AND HEATING?: NOT HARD AT ALL

## 2023-01-26 NOTE — PROGRESS NOTES
Gildardo Blas ( 1950) is a 67 y.o. male, NEW,  here for evaluation of the following chief complaint(s).   Established New Doctor      Patient was encouraged and advised to be compliant with all  medications leads an active lifestyle and promote maintaining a healthy weight, encouraged not to use cigarettes, laboratory results discussed and reviewed with patient's during this visit   ASSESSMENT/PLAN:  Problem List          Circulatory    Coronary artery disease involving native coronary artery of native heart without angina pectoris      Monitored by specialist- no acute findings meriting change in the plan         Relevant Medications    carvedilol (COREG) 3.125 MG tablet    nitroGLYCERIN (NITROSTAT) 0.4 MG SL tablet    clopidogrel (PLAVIX) 75 MG tablet    amLODIPine (NORVASC) 5 MG tablet    lisinopril (PRINIVIL;ZESTRIL) 40 MG tablet    isosorbide dinitrate (ISORDIL) 30 MG tablet    atorvastatin (LIPITOR) 40 MG tablet    aspirin 81 MG EC tablet    Coronary artery disease involving left main coronary artery    Relevant Medications    carvedilol (COREG) 3.125 MG tablet    nitroGLYCERIN (NITROSTAT) 0.4 MG SL tablet    amLODIPine (NORVASC) 5 MG tablet    lisinopril (PRINIVIL;ZESTRIL) 40 MG tablet    isosorbide dinitrate (ISORDIL) 30 MG tablet    atorvastatin (LIPITOR) 40 MG tablet    aspirin 81 MG EC tablet    Other Relevant Orders    Lipid Panel    Chronic systolic (congestive) heart failure    Relevant Medications    carvedilol (COREG) 3.125 MG tablet    nitroGLYCERIN (NITROSTAT) 0.4 MG SL tablet    amLODIPine (NORVASC) 5 MG tablet    lisinopril (PRINIVIL;ZESTRIL) 40 MG tablet    isosorbide dinitrate (ISORDIL) 30 MG tablet    atorvastatin (LIPITOR) 40 MG tablet    aspirin 81 MG EC tablet       Endocrine    Type 2 diabetes mellitus with complication, without long-term current use of insulin (HCC)    Relevant Orders    Comprehensive Metabolic Panel    Hemoglobin A1C    Microalbumin / Creatinine Urine Ratio    HM DIABETES FOOT EXAM (Completed)    Amb External Referral To Ophthalmology    Secondary hyperparathyroidism [facility])    Relevant Orders    Comprehensive Metabolic Panel       Genitourinary    Chronic kidney disease, stage 3    Relevant Orders    Comprehensive Metabolic Panel       Other    Personal history of renal cancer      Monitored by specialist- no acute findings meriting change in the plan   S/P R nephrectomy              No flowsheet data found. PHQ Scores 1/26/2023 1/5/2022 7/2/2020 1/22/2020 11/22/2019   PHQ2 Score 0 0 1 0 0   PHQ9 Score 0 0 1 0 0         Results for orders placed or performed in visit on 01/23/23   Lipid Panel   Result Value Ref Range    Cholesterol, Total 121 (L) 160 - 199 mg/dL    Triglycerides 60 0 - 149 mg/dL    HDL 45 (L) 55 - 121 mg/dL    LDL Calculated 64 <100 mg/dL       Return in about 4 months (around 5/26/2023). HPI  New to provider  71-year-old male who is presenting as a new patient  He has a history of abnormal stress test leading to CABG and stent placement followed by cardiology doing well no chest pain  Blood pressure in good control he has nephrosclerosis of his existing kidney  Patient has chronic kidney disease followed by nephrology  Status post nephrectomy due to renal cell CA and follows Dr. Ming Dover  History of impaired glucose tolerance on Ozempic with an A1c of 6.9    Review of Systems   Constitutional:  Negative for activity change, appetite change, fatigue, fever and unexpected weight change. HENT:  Negative for congestion, ear pain, nosebleeds, rhinorrhea, sore throat, trouble swallowing and voice change. Eyes:  Negative for redness and visual disturbance. Respiratory:  Negative for cough, chest tightness, shortness of breath and wheezing. Cardiovascular:  Negative for chest pain, palpitations and leg swelling. Gastrointestinal:  Negative for abdominal pain, blood in stool, constipation, diarrhea, nausea and vomiting.    Endocrine: Negative for polydipsia, polyphagia and polyuria. Genitourinary:  Negative for dysuria, frequency and urgency. Musculoskeletal:  Positive for back pain and myalgias. Skin:  Negative for rash and wound. Neurological:  Negative for dizziness, speech difficulty, light-headedness and headaches. Psychiatric/Behavioral:  Negative for agitation, confusion, self-injury and suicidal ideas. The patient is not nervous/anxious. Physical Exam  Vitals and nursing note reviewed. Constitutional:       General: He is not in acute distress. Appearance: He is well-developed. He is obese. He is not diaphoretic. HENT:      Head: Normocephalic and atraumatic. Right Ear: External ear normal.      Left Ear: External ear normal.      Nose: Nose normal.      Mouth/Throat:      Pharynx: No oropharyngeal exudate. Eyes:      General:         Right eye: No discharge. Left eye: No discharge. Conjunctiva/sclera: Conjunctivae normal.      Pupils: Pupils are equal, round, and reactive to light. Cardiovascular:      Rate and Rhythm: Normal rate and regular rhythm. Heart sounds: Normal heart sounds. No murmur heard. Pulmonary:      Effort: Pulmonary effort is normal. No respiratory distress. Breath sounds: Normal breath sounds. No stridor. No wheezing or rales. Chest:      Chest wall: No tenderness. Breasts:     Right: No inverted nipple, mass, nipple discharge, skin change or tenderness. Left: No inverted nipple, mass, nipple discharge, skin change or tenderness. Abdominal:      General: Bowel sounds are normal. There is no distension. Palpations: Abdomen is soft. Tenderness: There is no abdominal tenderness. Musculoskeletal:         General: No tenderness or deformity. Normal range of motion. Cervical back: Normal range of motion and neck supple. Skin:     General: Skin is warm and dry. Findings: No erythema or rash.    Neurological:      Mental Status: He is alert and oriented to person, place, and time. Cranial Nerves: No cranial nerve deficit. Coordination: Coordination normal.      Deep Tendon Reflexes: Reflexes are normal and symmetric. Psychiatric:         Behavior: Behavior normal.         Thought Content: Thought content normal.     Visual inspection:  Deformity/amputation: absent  Skin lesions/pre-ulcerative calluses: absent  Edema: right- negative, left- negative    Sensory exam:  Monofilament sensation: normal  (minimum of 5 random plantar locations tested, avoiding callused areas - > 1 area with absence of sensation is + for neuropathy)    Plus at least one of the following:  Pulses: normal,   Pinprick: Intact  Proprioception: Intact  Vibration (128 Hz):  Intact     (Time Documentation Optional [id number])    An electronic signaturewaas used to authenticate this note  -Jacob Puckett MD on 1/26/2023 at 12:33 PM

## 2023-01-26 NOTE — PATIENT INSTRUCTIONS
Personalized Preventive Plan for Cindia Cabot - 1/26/2023  Medicare offers a range of preventive health benefits. Some of the tests and screenings are paid in full while other may be subject to a deductible, co-insurance, and/or copay. Some of these benefits include a comprehensive review of your medical history including lifestyle, illnesses that may run in your family, and various assessments and screenings as appropriate. After reviewing your medical record and screening and assessments performed today your provider may have ordered immunizations, labs, imaging, and/or referrals for you. A list of these orders (if applicable) as well as your Preventive Care list are included within your After Visit Summary for your review. Other Preventive Recommendations:    A preventive eye exam performed by an eye specialist is recommended every 1-2 years to screen for glaucoma; cataracts, macular degeneration, and other eye disorders. A preventive dental visit is recommended every 6 months. Try to get at least 150 minutes of exercise per week or 10,000 steps per day on a pedometer . Order or download the FREE \"Exercise & Physical Activity: Your Everyday Guide\" from [employer]. Call 4-230.661.7926 or search [employer] online. You need 3160-7862 mg of calcium and 3968-5441 IU of vitamin D per day. It is possible to meet your calcium requirement with diet alone, but a vitamin D supplement is usually necessary to meet this goal.  When exposed to the sun, use a sunscreen that protects against both UVA and UVB radiation with an SPF of 30 or greater. Reapply every 2 to 3 hours or after sweating, drying off with a towel, or swimming. Always wear a seat belt when traveling in a car. Always wear a helmet when riding a bicycle or motorcycle.        Preventing Falls: Care Instructions  Overview     Getting around your home safely can be a challenge if you have injuries or health problems that make it easy for you to fall. Loose rugs and furniture in walkways are among the dangers for many older people who have problems walking or who have poor eyesight. People who have conditions such as arthritis, osteoporosis, or dementia also have to be careful not to fall.  You can make your home safer with a few simple measures.  Follow-up care is a key part of your treatment and safety. Be sure to make and go to all appointments, and call your doctor if you are having problems. It's also a good idea to know your test results and keep a list of the medicines you take.  How can you care for yourself at home?  Taking care of yourself  Exercise regularly to improve your strength, muscle tone, and balance. Walk if you can. Swimming may be a good choice if you cannot walk easily.  Have your vision and hearing checked each year or any time you notice a change. If you have trouble seeing and hearing, you might not be able to avoid objects and could lose your balance.  Know the side effects of the medicines you take. Ask your doctor or pharmacist whether the medicines you take can affect your balance. Sleeping pills or sedatives can affect your balance.  Limit the amount of alcohol you drink. Alcohol can impair your balance and other senses.  Ask your doctor whether calluses or corns on your feet need to be removed. If you wear loose-fitting shoes because of calluses or corns, you can lose your balance and fall.  Talk to your doctor if you have numbness in your feet.  You may get dizzy if you do not drink enough water. To prevent dehydration, drink plenty of fluids. Choose water and other clear liquids. If you have kidney, heart, or liver disease and have to limit fluids, talk with your doctor before you increase the amount of fluids you drink.  Preventing falls at home  Remove raised doorway thresholds, throw rugs, and clutter. Repair loose carpet or raised areas in the floor.  Move furniture and electrical  cords to keep them out of walking paths. Use nonskid floor wax, and wipe up spills right away, especially on ceramic tile floors. If you use a walker or cane, put rubber tips on it. If you use crutches, clean the bottoms of them regularly with an abrasive pad, such as steel wool. Keep your house well lit, especially stairways, porches, and outside walkways. Use night-lights in areas such as hallways and bathrooms. Add extra light switches or use remote switches (such as switches that go on or off when you clap your hands) to make it easier to turn lights on if you have to get up during the night. Install sturdy handrails on stairways. Move items in your cabinets so that the things you use a lot are on the lower shelves (about waist level). Keep a cordless phone and a flashlight with new batteries by your bed. If possible, put a phone in each of the main rooms of your house, or carry a cell phone in case you fall and cannot reach a phone. Or, you can wear a device around your neck or wrist. You push a button that sends a signal for help. Wear low-heeled shoes that fit well and give your feet good support. Use footwear with nonskid soles. Check the heels and soles of your shoes for wear. Repair or replace worn heels or soles. Do not wear socks without shoes on smooth floors, such as wood. Walk on the grass when the sidewalks are slippery. If you live in an area that gets snow and ice in the winter, sprinkle salt on slippery steps and sidewalks. Or ask a family member or friend to do this for you. Preventing falls in the bath  Install grab bars and nonskid mats inside and outside your shower or tub and near the toilet and sinks. Use shower chairs and bath benches. Use a hand-held shower head that will allow you to sit while showering.   Get into a tub or shower by putting the weaker leg in first. Get out of a tub or shower with your strong side first.  Repair loose toilet seats and consider installing a raised toilet seat to make getting on and off the toilet easier. Keep your bathroom door unlocked while you are in the shower. Where can you learn more? Go to http://www.schuster.com/ and enter G117 to learn more about \"Preventing Falls: Care Instructions. \"  Current as of: May 4, 2022               Content Version: 13.5  © 2006-2022 [employer]. Care instructions adapted under license by [facility] ([facility]). If you have questions about a medical condition or this instruction, always ask your healthcare professional. [place] [address] any warranty or liability for your use of this information. Learning About Being Active as an Older Adult  Why is being active important as you get older? Being active is one of the best things you can do for your health. And it's never too late to start. Being active--or getting active, if you aren't already--has definite benefits. It can:  Give you more energy,  Keep your mind sharp. Improve balance to reduce your risk of falls. Help you manage chronic illness with fewer medicines. No matter how old you are, how fit you are, or what health problems you have, there is a form of activity that will work for you. And the more physical activity you can do, the better your overall health will be. What kinds of activity can help you stay healthy? Being more active will make your daily activities easier. Physical activity includes planned exercise and things you do in daily life. There are four types of activity:  Aerobic. Doing aerobic activity makes your heart and lungs strong. Includes walking, dancing, and gardening. Aim for at least 2½ hours spread throughout the week. It improves your energy and can help you sleep better. Muscle-strengthening. This type of activity can help maintain muscle and strengthen bones. Includes climbing stairs, using resistance bands, and lifting or carrying heavy loads.   Aim for at least twice a week.  It can help protect the knees and other joints. Stretching. Stretching gives you better range of motion in joints and muscles. Includes upper arm stretches, calf stretches, and gentle yoga. Aim for at least twice a week, preferably after your muscles are warmed up from other activities. It can help you function better in daily life. Balancing. This helps you stay coordinated and have good posture. Includes heel-to-toe walking, queenie chi, and certain types of yoga. Aim for at least 3 days a week. It can reduce your risk of falling. Even if you have a hard time meeting the recommendations, it's better to be more active than less active. All activity done in each category counts toward your weekly total. You'd be surprised how daily things like carrying groceries, keeping up with grandchildren, and taking the stairs can add up. What keeps you from being active? If you've had a hard time being more active, you're not alone. Maybe you remember being able to do more. Or maybe you've never thought of yourself as being active. It's frustrating when you can't do the things you want. Being more active can help. What's holding you back? Getting started. Have a goal, but break it into easy tasks. Small steps build into big accomplishments. Staying motivated. If you feel like skipping your activity, remember your goal. Maybe you want to move better and stay independent. Every activity gets you one step closer. Not feeling your best.  Start with 5 minutes of an activity you enjoy. Prove to yourself you can do it. As you get comfortable, increase your time. You may not be where you want to be. But you're in the process of getting there. Everyone starts somewhere. How can you find safe ways to stay active? Talk with your doctor about any physical challenges you're facing. Make a plan with your doctor if you have a health problem or aren't sure how to get started with activity.   If you're already active, ask your doctor if there is anything you should change to stay safe as your body and health change. If you tend to feel dizzy after you take medicine, avoid activity at that time. Try being active before you take your medicine. This will reduce your risk of falls. If you plan to be active at home, make sure to clear your space before you get started. Remove things like TV cords, coffee tables, and throw rugs. It's safest to have plenty of space to move freely. The key to getting more active is to take it slow and steady. Try to improve only a little bit at a time. Pick just one area to improve on at first. And if an activity hurts, stop and talk to your doctor. Where can you learn more? Go to http://www.schuster.com/ and enter P600 to learn more about \"Learning About Being Active as an Older Adult. \"  Current as of: October 10, 2022               Content Version: 13.5  © 2006-2022 [employer]. Care instructions adapted under license by [facility] ([facility]). If you have questions about a medical condition or this instruction, always ask your healthcare professional. [place] [address] any warranty or liability for your use of this information. Hearing Loss: Care Instructions  Overview     Hearing loss is a sudden or slow decrease in how well you hear. It can range from mild to severe. Permanent hearing loss can occur with aging. It also can happen when you are exposed long-term to loud noise. Examples include listening to loud music, riding motorcycles, or being around other loud machines. Hearing loss can affect your work and home life. It can make you feel lonely or depressed. You may feel that you have lost your independence. But hearing aids and other devices can help you hear better and feel connected to others. Follow-up care is a key part of your treatment and safety. Be sure to make and go to all appointments, and call your doctor if you are having problems. It's also a good idea to know your test results and keep a list of the medicines you take. How can you care for yourself at home? Avoid loud noises whenever possible. This helps keep your hearing from getting worse. Always wear hearing protection around loud noises. Wear a hearing aid as directed. See a professional who can help you pick a hearing aid that fits you. Have hearing tests as your doctor suggests. They can show whether your hearing has changed. Your hearing aid may need to be adjusted. Use other devices as needed. These may include:  Telephone amplifiers and hearing aids that can connect to a television, stereo, radio, or microphone. Devices that use lights or vibrations. These alert you to the doorbell, a ringing telephone, or a baby monitor. Television closed-captioning. This shows the words at the bottom of the screen. Most new TVs can do this. TTY (text telephone). This lets you type messages back and forth on the telephone instead of talking or listening. These devices are also called TDD. When messages are typed on the keyboard, they are sent over the phone line to a receiving TTY. The message is shown on a monitor. Use text messaging, social media, and email if it is hard for you to communicate by telephone. Try to learn a listening technique called speechreading. It is not lipreading. You pay attention to people's gestures, expressions, posture, and tone of voice. These clues can help you understand what a person is saying. Face the person you are talking to, and have them face you. Make sure the lighting is good. You need to see the other person's face clearly. Think about counseling if you need help to adjust to your hearing loss. When should you call for help? Watch closely for changes in your health, and be sure to contact your doctor if:    You think your hearing is getting worse.     You have new symptoms, such as dizziness or nausea. Where can you learn more?   Go to http://Expanite.woods.com/ and enter R798 to learn more about \"Hearing Loss: Care Instructions. \"  Current as of: May 4, 2022               Content Version: 13.5  © 2006-2022 Health[employer]. Care instructions adapted under license by [facility] ([facility]). If you have questions about a medical condition or this instruction, always ask your healthcare professional. [address] any warranty or liability for your use of this information. Learning About Vision Tests  What are vision tests? The four most common vision tests are visual acuity tests, refraction, visual field tests, and color vision tests. Visual acuity (sharpness) tests  These tests are used: To see if you need glasses or contact lenses. To monitor an eye problem. To check an eye injury. Visual acuity tests are done as part of routine exams. You may also have this test when you get your 's license or apply for some types of jobs. Visual field tests  These tests are used: To check for vision loss in any area of your range of vision. To screen for certain eye diseases. To look for nerve damage after a stroke, head injury, or other problem that could reduce blood flow to the brain. Refraction and color tests  A refraction test is done to find the right prescription for glasses and contact lenses. A color vision test is done to check for color blindness. Color vision is often tested as part of a routine exam. You may also have this test when you apply for a job where recognizing different colors is important, such as , electronics, or the [employer]. How are vision tests done? Visual acuity test   You cover one eye at a time. You read aloud from a wall chart across the room. You read aloud from a small card that you hold in your hand. Refraction   You look into a special device.   The device puts lenses of different strengths in front of each eye to see how strong your glasses or contact lenses need to be. Visual field tests   Your doctor may have you look through special machines. Or your doctor may simply have you stare straight ahead while they move a finger into and out of your field of vision. Color vision test   You look at pieces of printed test patterns in various colors. You say what number or symbol you see. Your doctor may have you trace the number or symbol using a pointer. How do these tests feel? There is very little chance of having a problem from this test. If dilating drops are used for a vision test, they may make the eyes sting and cause a medicine taste in the mouth. Follow-up care is a key part of your treatment and safety. Be sure to make and go to all appointments, and call your doctor if you are having problems. It's also a good idea to know your test results and keep a list of the medicines you take. Where can you learn more? Go to http://www.schuster.com/ and enter G551 to learn more about \"Learning About Vision Tests. \"  Current as of: October 12, 2022               Content Version: 13.5  © 9705-4313 [employer]. Care instructions adapted under license by [facility] ([facility]). If you have questions about a medical condition or this instruction, always ask your healthcare professional. [place] [address] any warranty or liability for your use of this information. Advance Directives: Care Instructions  Overview  An advance directive is a legal way to state your wishes at the end of your life. It tells your family and your doctor what to do if you can't say what you want. There are two main types of advance directives. You can change them any time your wishes change. Living will. This form tells your family and your doctor your wishes about life support and other treatment. The form is also called a declaration. Medical power of .   This form lets you name a person to make treatment decisions for you when you can't speak for yourself. This person is called a health care agent (health care proxy, health care surrogate). The form is also called a durable power of  for health care. If you do not have an advance directive, decisions about your medical care may be made by a family member, or by a doctor or a  who doesn't know you. It may help to think of an advance directive as a gift to the people who care for you. If you have one, they won't have to make tough decisions by themselves. For more information, including forms for your state, see the [address] website (www.caringinfo.org/planning/advance-directives/). Follow-up care is a key part of your treatment and safety. Be sure to make and go to all appointments, and call your doctor if you are having problems. It's also a good idea to know your test results and keep a list of the medicines you take. What should you include in an advance directive? Many states have a unique advance directive form. (It may ask you to address specific issues.) Or you might use a universal form that's approved by many states. If your form doesn't tell you what to address, it may be hard to know what to include in your advance directive. Use the questions below to help you get started. Who do you want to make decisions about your medical care if you are not able to? What life-support measures do you want if you have a serious illness that gets worse over time or can't be cured? What are you most afraid of that might happen? (Maybe you're afraid of having pain, losing your independence, or being kept alive by machines.)  Where would you prefer to die? (Your home? A hospital? A nursing home?)  Do you want to donate your organs when you die? Do you want certain [employer]ist practices performed before you die? When should you call for help? Be sure to contact your doctor if you have any questions. Where can you learn more?   Go to http://www.woods.com/ and enter R264 to learn more about \"Advance Directives: Care Instructions. \"  Current as of: June 16, 2022               Content Version: 13.5  © 2006-2022 Healthwise, [employer]. Care instructions adapted under license by [facility] ([facility]). If you have questions about a medical condition or this instruction, always ask your healthcare professional. [facility] [address] any warranty or liability for your use of this information. Personalized Preventive Plan for Rica Flowers - 1/26/2023  Medicare offers a range of preventive health benefits. Some of the tests and screenings are paid in full while other may be subject to a deductible, co-insurance, and/or copay. Some of these benefits include a comprehensive review of your medical history including lifestyle, illnesses that may run in your family, and various assessments and screenings as appropriate. After reviewing your medical record and screening and assessments performed today your provider may have ordered immunizations, labs, imaging, and/or referrals for you. A list of these orders (if applicable) as well as your Preventive Care list are included within your After Visit Summary for your review. Other Preventive Recommendations:    A preventive eye exam performed by an eye specialist is recommended every 1-2 years to screen for glaucoma; cataracts, macular degeneration, and other eye disorders. A preventive dental visit is recommended every 6 months. Try to get at least 150 minutes of exercise per week or 10,000 steps per day on a pedometer . Order or download the FREE \"Exercise & Physical Activity: Your Everyday Guide\" from [employer]. Call 5-569.486.6096 or search [employer] online. You need 3111-6732 mg of calcium and 9852-7016 IU of vitamin D per day.  It is possible to meet your calcium requirement with diet alone, but a vitamin D supplement is usually necessary to meet this goal.  When exposed to the sun, use a sunscreen that protects against both UVA and UVB radiation with an SPF of 30 or greater. Reapply every 2 to 3 hours or after sweating, drying off with a towel, or swimming. Always wear a seat belt when traveling in a car. Always wear a helmet when riding a bicycle or motorcycle.

## 2023-01-26 NOTE — PROGRESS NOTES
Medicare Annual Wellness Visit    Juan Carlos Ulloa is here for Medicare AWV    Assessment & Plan   Medicare annual wellness visit, subsequent      Recommendations for Preventive Services Due: see orders and patient instructions/AVS.  Recommended screening schedule for the next 5-10 years is provided to the patient in written form: see Patient Instructions/AVS.     No follow-ups on file. Subjective   The following acute and/or chronic problems were also addressed today:       Patient's complete Health Risk Assessment and screening values have been reviewed and are found in Flowsheets. The following problems were reviewed today and where indicated follow up appointments were made and/or referrals ordered.     Positive Risk Factor Screenings with Interventions:    Fall Risk:  Do you feel unsteady or are you worried about falling? : no  2 or more falls in past year?: (!) yes (fell while pulling a hose)  Fall with injury in past year?: (!) yes (\"purple buttock\")     Interventions:    See comments above              Weight and Activity:  Physical Activity: Inactive    Days of Exercise per Week: 0 days    Minutes of Exercise per Session: 0 min     On average, how many days per week do you engage in moderate to strenuous exercise (like a brisk walk)?: 0 days  Have you lost any weight without trying in the past 3 months?: No  Body mass index: (!) 34.72      Inactivity Interventions:  See AVS for additional education material  Obesity Interventions:  See AVS for additional education material           Hearing Screen:  Do you or your family notice any trouble with your hearing that hasn't been managed with hearing aids?: (!) Yes (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    Interventions:   Patient encouraged to make appointment with their eye specialist      Advanced Directives:  Do you have a Living Will?: (!) No    Intervention:  has NO advanced directive - information provided                       Objective   Vitals:    01/26/23 0926   BP: 130/64   Pulse: 70   Resp: 20   Temp: 97.7 °F (36.5 °C)   TempSrc: Temporal   SpO2: 97%   Weight: 242 lb (109.8 kg)   Height: 5' 10\" (1.778 m)      Body mass index is 34.72 kg/m². No Known Allergies  Prior to Visit Medications    Medication Sig Taking?  Authorizing Provider   aspirin 81 MG EC tablet Take 81 mg by mouth daily Yes Historical Provider, MD   famotidine (PEPCID) 20 MG tablet Take 1 tablet by mouth 2 times daily Yes MISSY Martinez   clopidogrel (PLAVIX) 75 MG tablet TAKE ONE TABLET BY MOUTH ONCE DAILY Yes MISSY Martinez   amLODIPine (NORVASC) 5 MG tablet Take 1 tablet by mouth daily Yes MISSY Martinez   lisinopril (PRINIVIL;ZESTRIL) 40 MG tablet Take 1 tablet by mouth daily Yes MISSY Martinez   isosorbide dinitrate (ISORDIL) 30 MG tablet Take 1 tablet by mouth in the morning and at bedtime  Patient taking differently: Take 30 mg by mouth daily Takes 2 once a day Yes MISSY Martinez   atorvastatin (LIPITOR) 40 MG tablet Take 1 tablet by mouth daily Yes MISSY Martinez   allopurinol (ZYLOPRIM) 100 MG tablet Take twice daily Yes MISSY Martinez   nitroGLYCERIN (NITROSTAT) 0.4 MG SL tablet Place 1 tablet under the tongue every 5 minutes as needed for Chest pain Yes MISSY Martinez   carvedilol (COREG) 3.125 MG tablet Take 3.125 mg by mouth 2 times daily (with meals) Yes Historical Provider, MD   vitamin C (VITAMIN C) 1000 MG tablet Take 1 tablet by mouth daily  Patient not taking: Reported on 11/9/2022  MD Freddie Crawford (Including outside providers/suppliers regularly involved in providing care):   Patient Care Team:  Carmen Bajwa MD as PCP - General (Internal Medicine)  Carmen Bajwa MD as PCP - [facility] Empaneled Provider  Maverick Desir MD as Consulting Physician (Cardiothoracic Surgery)  Soni Ramirez MD as Consulting Physician (Cardiology)     Reviewed and updated this visit:  Tobacco  Allergies  Meds  Med Hx  Surg Hx  Soc Hx  Fam Hx        I, Ayesha Ellis LPN, 1/26/2023, performed the documented evaluation under the direct supervision of the attending physician.      Medicare Annual Wellness Visit    Sony Rodriguez is here for Medicare AWV    Assessment & Plan   Medicare annual wellness visit, subsequent      Recommendations for Preventive Services Due: see orders and patient instructions/AVS.  Recommended screening schedule for the next 5-10 years is provided to the patient in written form: see Patient Instructions/AVS.     Return for Medicare Annual Wellness Visit in 1 year.     Subjective       Patient's complete Health Risk Assessment and screening values have been reviewed and are found in Flowsheets. The following problems were reviewed today and where indicated follow up appointments were made and/or referrals ordered.    Positive Risk Factor Screenings with Interventions:    Fall Risk:  Do you feel unsteady or are you worried about falling? : no  2 or more falls in past year?: (!) yes (fell while pulling a hose)  Fall with injury in past year?: (!) yes (\"purple buttock\")     Interventions:    See AVS for additional education material              Weight and Activity:  Physical Activity: Inactive    Days of Exercise per Week: 0 days    Minutes of Exercise per Session: 0 min     On average, how many days per week do you engage in moderate to strenuous exercise (like a brisk walk)?: 0 days  Have you lost any weight without trying in the past 3 months?: No  Body mass index: (!) 34.72      Inactivity Interventions:  See AVS for additional education  material  Obesity Interventions:  See AVS for additional education material           Hearing Screen:  Do you or your family notice any trouble with your hearing that hasn't been managed with hearing aids?: (!) Yes (hearling loss for over 10 years)    Interventions:  See AVS for additional education material    Vision Screen:  Do you have difficulty driving, watching TV, or doing any of your daily activities because of your eyesight?: No  Have you had an eye exam within the past year?: (!) No  No results found. Interventions:   See AVS for additional education material      Advanced Directives:  Do you have a Living Will?: (!) No    Intervention:  has an advanced directive - a copy HAS NOT been provided. Objective   Vitals:    01/26/23 0926   BP: 130/64   Pulse: 70   Resp: 20   Temp: 97.7 °F (36.5 °C)   TempSrc: Temporal   SpO2: 97%   Weight: 242 lb (109.8 kg)   Height: 5' 10\" (1.778 m)      Body mass index is 34.72 kg/m². No Known Allergies  Prior to Visit Medications    Medication Sig Taking?  Authorizing Provider   aspirin 81 MG EC tablet Take 81 mg by mouth daily Yes Historical Provider, MD   famotidine (PEPCID) 20 MG tablet Take 1 tablet by mouth 2 times daily Yes MISSY Garcia   clopidogrel (PLAVIX) 75 MG tablet TAKE ONE TABLET BY MOUTH ONCE DAILY Yes MISSY Garcia   amLODIPine (NORVASC) 5 MG tablet Take 1 tablet by mouth daily Yes MISSY Garcia   lisinopril (PRINIVIL;ZESTRIL) 40 MG tablet Take 1 tablet by mouth daily Yes MISSY Garcia   isosorbide dinitrate (ISORDIL) 30 MG tablet Take 1 tablet by mouth in the morning and at bedtime  Patient taking differently: Take 30 mg by mouth daily Takes 2 once a day Yes MISSY Garcia   atorvastatin (LIPITOR) 40 MG tablet Take 1 tablet by mouth daily Yes MISSY Garcia   allopurinol (ZYLOPRIM) 100 MG tablet Take twice daily Yes Keisha Kirkland MISSY Centeno   nitroGLYCERIN (NITROSTAT) 0.4 MG SL tablet Place 1 tablet under the tongue every 5 minutes as needed for Chest pain Yes MISSY King   carvedilol (COREG) 3.125 MG tablet Take 3.125 mg by mouth 2 times daily (with meals) Yes Historical Provider, MD   vitamin C (VITAMIN C) 1000 MG tablet Take 1 tablet by mouth daily  Patient not taking: Reported on 11/9/2022  Yenny Pierre MD       [facility] (Including outside providers/suppliers regularly involved in providing care):   Patient Care Team:  Kraig Clemens MD as PCP - General (Internal Medicine)  Kraig Clemens MD as PCP - [facility] Provider  Reena Hou MD as Consulting Physician (Cardiothoracic Surgery)  Quinn Anthony MD as Consulting Physician (Cardiology)     Reviewed and updated this visit:  Tobacco  Allergies  Meds  Med Hx  Surg Hx  Soc Hx  Fam Hx

## 2023-01-26 NOTE — PROGRESS NOTES
Phil Boo ( 1950) is a 67 y.o. male, NEW,  here for evaluation of the following chief complaint(s). Established New Doctor      Patient was encouraged and advised to be compliant with all  medications leads an active lifestyle and promote maintaining a healthy weight, encouraged not to use cigarettes, laboratory results discussed and reviewed with patient's during this visit   ASSESSMENT/PLAN:  Problem List          Circulatory    Coronary artery disease involving left main coronary artery    Relevant Medications    carvedilol (COREG) 3.125 MG tablet    nitroGLYCERIN (NITROSTAT) 0.4 MG SL tablet    amLODIPine (NORVASC) 5 MG tablet    lisinopril (PRINIVIL;ZESTRIL) 40 MG tablet    isosorbide dinitrate (ISORDIL) 30 MG tablet    atorvastatin (LIPITOR) 40 MG tablet    aspirin 81 MG EC tablet    Other Relevant Orders    Lipid Panel    Chronic systolic (congestive) heart failure    Relevant Medications    carvedilol (COREG) 3.125 MG tablet    nitroGLYCERIN (NITROSTAT) 0.4 MG SL tablet    amLODIPine (NORVASC) 5 MG tablet    lisinopril (PRINIVIL;ZESTRIL) 40 MG tablet    isosorbide dinitrate (ISORDIL) 30 MG tablet    atorvastatin (LIPITOR) 40 MG tablet    aspirin 81 MG EC tablet       Endocrine    Type 2 diabetes mellitus with complication, without long-term current use of insulin (HCC)    Relevant Orders    Comprehensive Metabolic Panel    Hemoglobin A1C    Microalbumin / Creatinine Urine Ratio     DIABETES FOOT EXAM (Completed)    Amb External Referral To Ophthalmology    Secondary hyperparathyroidism [facility])    Relevant Orders    Comprehensive Metabolic Panel       Genitourinary    Chronic kidney disease, stage 3    Relevant Orders    Comprehensive Metabolic Panel       Other    Personal history of renal cancer      Monitored by specialist- no acute findings meriting change in the plan   S/P R nephrectomy              No flowsheet data found.     [facility] 36 Scores 2019 PHQ2 Score 0 0 1 0 0   PHQ9 Score 0 0 1 0 0       Results for orders placed or performed in visit on 01/23/23   Lipid Panel   Result Value Ref Range    Cholesterol, Total 121 (L) 160 - 199 mg/dL    Triglycerides 60 0 - 149 mg/dL    HDL 45 (L) 55 - 121 mg/dL    LDL Calculated 64 <100 mg/dL       Return in about 4 months (around 5/26/2023). HPI  New to provider  68-year-old male who is presenting as a new patient  He has a history of abnormal stress test leading to CABG and stent placement followed by cardiology doing well no chest pain  Blood pressure in good control he has nephrosclerosis of his existing kidney  Patient has chronic kidney disease followed by nephrology  Status post nephrectomy due to renal cell CA and follows Dr. Liliam Doran  History of impaired glucose tolerance on Ozempic with an A1c of 6.9    Review of Systems   Constitutional:  Negative for activity change, appetite change, fatigue, fever and unexpected weight change. HENT:  Negative for congestion, ear pain, nosebleeds, rhinorrhea, sore throat, trouble swallowing and voice change. Eyes:  Negative for redness and visual disturbance. Respiratory:  Negative for cough, chest tightness, shortness of breath and wheezing. Cardiovascular:  Negative for chest pain, palpitations and leg swelling. Gastrointestinal:  Negative for abdominal pain, blood in stool, constipation, diarrhea, nausea and vomiting. Endocrine: Negative for polydipsia, polyphagia and polyuria. Genitourinary:  Negative for dysuria, frequency and urgency. Musculoskeletal:  Positive for back pain and myalgias. Skin:  Negative for rash and wound. Neurological:  Negative for dizziness, speech difficulty, light-headedness and headaches. Psychiatric/Behavioral:  Negative for agitation, confusion, self-injury and suicidal ideas. The patient is not nervous/anxious. Physical Exam  Vitals and nursing note reviewed.    Constitutional:       General: He is not in acute distress. Appearance: He is well-developed. He is obese. He is not diaphoretic. HENT:      Head: Normocephalic and atraumatic. Right Ear: External ear normal.      Left Ear: External ear normal.      Nose: Nose normal.      Mouth/Throat:      Pharynx: No oropharyngeal exudate. Eyes:      General:         Right eye: No discharge. Left eye: No discharge. Conjunctiva/sclera: Conjunctivae normal.      Pupils: Pupils are equal, round, and reactive to light. Cardiovascular:      Rate and Rhythm: Normal rate and regular rhythm. Heart sounds: Normal heart sounds. No murmur heard. Pulmonary:      Effort: Pulmonary effort is normal. No respiratory distress. Breath sounds: Normal breath sounds. No stridor. No wheezing or rales. Chest:      Chest wall: No tenderness. Breasts:     Right: No inverted nipple, mass, nipple discharge, skin change or tenderness. Left: No inverted nipple, mass, nipple discharge, skin change or tenderness. Abdominal:      General: Bowel sounds are normal. There is no distension. Palpations: Abdomen is soft. Tenderness: There is no abdominal tenderness. Musculoskeletal:         General: No tenderness or deformity. Normal range of motion. Cervical back: Normal range of motion and neck supple. Skin:     General: Skin is warm and dry. Findings: No erythema or rash. Neurological:      Mental Status: He is alert and oriented to person, place, and time. Cranial Nerves: No cranial nerve deficit. Coordination: Coordination normal.      Deep Tendon Reflexes: Reflexes are normal and symmetric. Psychiatric:         Behavior: Behavior normal.         Thought Content:  Thought content normal.     Visual inspection:  Deformity/amputation: absent  Skin lesions/pre-ulcerative calluses: absent  Edema: right- negative, left- negative    Sensory exam:  Monofilament sensation: normal  (minimum of 5 random plantar locations tested, avoiding callused areas - > 1 area with absence of sensation is + for neuropathy)    Plus at least one of the following:  Pulses: normal,   Pinprick: Intact  Proprioception: Intact  Vibration (128 Hz):  Intact     (Time Documentation Optional [id number])    An electronic signaturewaas used to authenticate this note  -Martell Perez MD on 1/26/2023 at 12:32 PM

## 2023-01-30 DIAGNOSIS — E79.0 ELEVATED BLOOD URIC ACID LEVEL: ICD-10-CM

## 2023-01-30 DIAGNOSIS — M10.9 GOUT, UNSPECIFIED CAUSE, UNSPECIFIED CHRONICITY, UNSPECIFIED SITE: ICD-10-CM

## 2023-01-31 RX ORDER — ALLOPURINOL 100 MG/1
TABLET ORAL
Qty: 180 TABLET | Refills: 2 | Status: SHIPPED | OUTPATIENT
Start: 2023-01-31

## 2023-01-31 NOTE — TELEPHONE ENCOUNTER
Pablito Leos called to request a refill on his medication.       Last office visit : 1/26/2023   Next office visit : 6/1/2023     Requested Prescriptions     Pending Prescriptions Disp Refills    allopurinol (ZYLOPRIM) 100 MG tablet [Pharmacy Med Name: ALLOPURINOL 100 MG TABS 100 Tablet] 180 tablet 2     Sig: TAKE ONE TABLET BY MOUTH TWO TIMES A DAY            Paulina Mckinnon LPN

## 2023-02-09 ENCOUNTER — OFFICE VISIT (OUTPATIENT)
Age: 73
End: 2023-02-09

## 2023-02-09 VITALS
SYSTOLIC BLOOD PRESSURE: 118 MMHG | BODY MASS INDEX: 35.36 KG/M2 | DIASTOLIC BLOOD PRESSURE: 87 MMHG | WEIGHT: 247 LBS | HEIGHT: 70 IN | RESPIRATION RATE: 18 BRPM | HEART RATE: 74 BPM | OXYGEN SATURATION: 96 % | TEMPERATURE: 98.6 F

## 2023-02-09 DIAGNOSIS — J06.9 UPPER RESPIRATORY TRACT INFECTION, UNSPECIFIED TYPE: Primary | ICD-10-CM

## 2023-02-09 DIAGNOSIS — J02.9 SORE THROAT: ICD-10-CM

## 2023-02-09 LAB — S PYO AG THROAT QL: NORMAL

## 2023-02-09 RX ORDER — AMOXICILLIN AND CLAVULANATE POTASSIUM 875; 125 MG/1; MG/1
1 TABLET, FILM COATED ORAL 2 TIMES DAILY
Qty: 20 TABLET | Refills: 0 | Status: SHIPPED | OUTPATIENT
Start: 2023-02-09 | End: 2023-02-19

## 2023-02-09 ASSESSMENT — ENCOUNTER SYMPTOMS
SORE THROAT: 1
COUGH: 1
SINUS PRESSURE: 0
STRIDOR: 0
COLOR CHANGE: 0
ABDOMINAL PAIN: 0
TROUBLE SWALLOWING: 0
WHEEZING: 0
EYE PAIN: 0
SHORTNESS OF BREATH: 0
ABDOMINAL DISTENTION: 0
EYE DISCHARGE: 0
CHEST TIGHTNESS: 0

## 2023-02-09 NOTE — PATIENT INSTRUCTIONS
Encourage fluids, Tylenol/Ibuprofen, OTC decongestants   Strep negative  Antibiotic sent to pharmacy.   If symptoms worsen or fail to improve follow-up with office or PCP  If SOB, chest pain, or high persistent fevers occur, go to ER    Patient verbalized understanding and agrees to plan

## 2023-02-09 NOTE — PROGRESS NOTES
[id number]  [address] [address] [place] [address]  Dept: [phone]  Dept Fax: [phone]  Loc: [phone]    Frnech Hagan is a 68 y.o. male who presents today for his medical conditions/complaints as noted below. French Hagan is complaining of Cough and Pharyngitis        HPI:   Cough  Associated symptoms include a sore throat. Pertinent negatives include no chest pain, chills, fever, rash, shortness of breath or wheezing. Pharyngitis  Associated symptoms include congestion, coughing and a sore throat. Pertinent negatives include no abdominal pain, arthralgias, chest pain, chills, fatigue, fever, neck pain, numbness, rash or weakness. Harriet Bernardo presents to the office complaining of sore throat, cough, congestion, and nasal drainage. Symptoms started about ten days ago and seem to be worsening. He has been taking mucinex. He denies shortness of breath.    Past Medical History:   Diagnosis Date    Acute bilateral low back pain with bilateral sciatica 11/26/2019    Acute bilateral low back pain with bilateral sciatica     Acute respiratory failure with hypoxia ([address] 11/27/2020    Blockage of coronary artery bypass vein graft ([address] 5/29/2020    Body mass index (bmi) 33.0-33.9, adult     Body mass index (bmi) 33.0-33.9, adult     Bradycardia 1/13/2019    CAD (coronary artery disease), native coronary artery     Chronic systolic (congestive) heart failure 9/6/2022    Class 2 obesity due to excess calories in adult 2/1/2018    Decreased GFR 4/23/2018    Diabetes mellitus ([address]     Elevated blood uric acid level 4/23/2018    GERD (gastroesophageal reflux disease)     Hypercalcemia 5/11/2018    Hypercholesteremia     Hypertension     Malignant neoplasm of right kidney, except renal pelvis (HCC)     Malignant neoplasm of right kidney, except renal pelvis ([address]     Pulmonary nodule 1/23/2020    Renal mass 10/15/2018    Stage 3 chronic kidney disease ([facility] 2022       Past Surgical History:   Procedure Laterality Date    CARDIAC CATHETERIZATION  07, [facility]    Left heart cath    CARDIAC CATHETERIZATION  07, [facility]    Left heart cath    CARDIOVASCULAR STRESS TEST  2009, [facility]    Stress Echo    CARDIOVASCULAR STRESS TEST  07, [facility]    Stress Echo    CORONARY ARTERY BYPASS GRAFT      X4 utilizing the left MOLINA to the LAD, right MOLINA to the second diagonal, and vein grafts to the obtuse marinal and PLOM. CORONARY ARTERY BYPASS GRAFT  08, [facility] CABG placing the LIMA to the LAD, MIGUELANGEL to the second diagonal branch and SVBG to the OMB and PMB.     DIAGNOSTIC CARDIAC CATH LAB PROCEDURE      2021    KIDNEY REMOVAL Right 2019    KIDNEY SURGERY      Right Kidney removed    DE COLONOSCOPY FLX DX W/COLLJ SPEC WHEN PFRMD N/A 10/4/2018    Dr KASSY Solorio-Diverticular disease, 10 yr recall    SKIN BIOPSY         Family History   Problem Relation Age of Onset    Cancer Mother         lung    Coronary Art Dis Father     Stroke Father     Hypertension Father     High Blood Pressure Brother     Coronary Art Dis Paternal Grandmother     Coronary Art Dis Paternal Grandfather        Social History     Tobacco Use    Smoking status: Former     Packs/day: 2.00     Years: 32.00     Pack years: 64.00     Types: Cigarettes     Start date:      Quit date: 10/5/1998     Years since quittin.3    Smokeless tobacco: Former   Substance Use Topics    Alcohol use: Yes     Comment: Very Little        Current Outpatient Medications   Medication Sig Dispense Refill    amoxicillin-clavulanate (AUGMENTIN) 875-125 MG per tablet Take 1 tablet by mouth 2 times daily for 10 days 20 tablet 0    allopurinol (ZYLOPRIM) 100 MG tablet TAKE ONE TABLET BY MOUTH TWO TIMES A  tablet 2    aspirin 81 MG EC tablet Take 81 mg by mouth daily      clopidogrel (PLAVIX) 75 MG tablet TAKE ONE TABLET BY MOUTH ONCE DAILY 90 tablet 1    nitroGLYCERIN (NITROSTAT) 0.4 MG SL tablet Place 1 tablet under the tongue every 5 minutes as needed for Chest pain 25 tablet 3    carvedilol (COREG) 3.125 MG tablet Take 3.125 mg by mouth 2 times daily (with meals)      famotidine (PEPCID) 20 MG tablet Take 1 tablet by mouth 2 times daily 90 tablet 2    amLODIPine (NORVASC) 5 MG tablet Take 1 tablet by mouth daily 90 tablet 1    lisinopril (PRINIVIL;ZESTRIL) 40 MG tablet Take 1 tablet by mouth daily 90 tablet 1    isosorbide dinitrate (ISORDIL) 30 MG tablet Take 1 tablet by mouth in the morning and at bedtime (Patient taking differently: Take 30 mg by mouth daily Takes 2 once a day) 180 tablet 1    atorvastatin (LIPITOR) 40 MG tablet Take 1 tablet by mouth daily 90 tablet 1    vitamin C (VITAMIN C) 1000 MG tablet Take 1 tablet by mouth daily (Patient not taking: Reported on 11/9/2022) 30 tablet 0     No current facility-administered medications for this visit. No Known Allergies    Health Maintenance   Topic Date Due    Diabetic retinal exam  Never done    Flu vaccine (1) 01/26/2024 (Originally 8/1/2022)    COVID-19 Vaccine (2 - Booster for Opal series) 01/26/2024 (Originally 5/24/2021)    Shingles vaccine (3 of 3) 01/16/2060 (Originally 4/2/2019)    Diabetic Alb to Cr ratio (uACR) test  05/04/2023    A1C test (Diabetic or Prediabetic)  09/06/2023    Lipids  01/23/2024    GFR test (Diabetes, CKD 3-4, OR last GFR 15-59)  01/23/2024    Diabetic foot exam  01/26/2024    Depression Screen  01/26/2024    Annual Wellness Visit (AWV)  01/27/2024    DTaP/Tdap/Td vaccine (2 - Td or Tdap) 05/11/2028    Colorectal Cancer Screen  10/04/2028    Pneumococcal 65+ years Vaccine  Completed    AAA screen  Completed    Hepatitis C screen  Completed    Hepatitis A vaccine  Aged Out    Hib vaccine  Aged Out    Meningococcal (ACWY) vaccine  Aged Out       Subjective:   Review of Systems   Constitutional:  Negative for chills, fatigue and fever. HENT:  Positive for congestion and sore throat.  Negative for sinus pressure and trouble swallowing. Eyes:  Negative for pain and discharge. Respiratory:  Positive for cough. Negative for chest tightness, shortness of breath, wheezing and stridor. Cardiovascular:  Negative for chest pain and palpitations. Gastrointestinal:  Negative for abdominal distention and abdominal pain. Genitourinary:  Negative for difficulty urinating, dysuria and hematuria. Musculoskeletal:  Negative for arthralgias, neck pain and neck stiffness. Skin:  Negative for color change and rash. Neurological:  Negative for dizziness, syncope, speech difficulty, weakness and numbness. Psychiatric/Behavioral:  Negative for confusion and suicidal ideas. Objective    Physical Exam  Vitals and nursing note reviewed. Constitutional:       General: He is not in acute distress. Appearance: Normal appearance. HENT:      Head: Normocephalic. Right Ear: Tympanic membrane, ear canal and external ear normal.      Left Ear: Tympanic membrane, ear canal and external ear normal.      Nose: Rhinorrhea present. No congestion. Mouth/Throat:      Mouth: Mucous membranes are moist.      Pharynx: Oropharynx is clear. Posterior oropharyngeal erythema (mild) present. Eyes:      Conjunctiva/sclera: Conjunctivae normal.      Pupils: Pupils are equal, round, and reactive to light. Cardiovascular:      Rate and Rhythm: Normal rate and regular rhythm. Pulses: Normal pulses. Heart sounds: Normal heart sounds. No murmur heard. Pulmonary:      Effort: Pulmonary effort is normal. No respiratory distress. Breath sounds: Normal breath sounds. No stridor. No wheezing. Abdominal:      General: Abdomen is flat. Bowel sounds are normal. There is no distension. Tenderness: There is no abdominal tenderness. Musculoskeletal:         General: No swelling or deformity. Normal range of motion. Cervical back: Normal range of motion. No rigidity or tenderness.    Skin: General: Skin is warm and dry. Findings: No rash. Neurological:      General: No focal deficit present. Mental Status: He is alert and oriented to person, place, and time. Sensory: No sensory deficit. /87   Pulse 74   Temp 98.6 °F (37 °C)   Resp 18   Ht 5' 10\" (1.778 m)   Wt 247 lb (112 kg)   SpO2 96%   BMI 35.44 kg/m²     Assessment         Diagnosis Orders   1. Upper respiratory tract infection, unspecified type  amoxicillin-clavulanate (AUGMENTIN) 875-125 MG per tablet      2. Sore throat  POCT rapid strep A          Plan   Encourage fluids, Tylenol/Ibuprofen, OTC decongestants   Strep negative  Antibiotic sent to pharmacy. If symptoms worsen or fail to improve follow-up with office or PCP  If SOB, chest pain, or high persistent fevers occur, go to ER    Patient verbalized understanding and agrees to plan    Orders Placed This Encounter   Procedures    POCT rapid strep A       Results for orders placed or performed in visit on 02/09/23   POCT rapid strep A   Result Value Ref Range    Strep A Ag None Detected None Detected       Orders Placed This Encounter   Medications    amoxicillin-clavulanate (AUGMENTIN) 875-125 MG per tablet     Sig: Take 1 tablet by mouth 2 times daily for 10 days     Dispense:  20 tablet     Refill:  0      New Prescriptions    AMOXICILLIN-CLAVULANATE (AUGMENTIN) 875-125 MG PER TABLET    Take 1 tablet by mouth 2 times daily for 10 days        No follow-ups on file. Discussed use, benefits, and side effects of any prescribed medications. All patient questions were answered. Patient voiced understanding of care plan. Patient was given educational materials - see patient instructions below. Patient Instructions   Encourage fluids, Tylenol/Ibuprofen, OTC decongestants   Strep negative  Antibiotic sent to pharmacy.   If symptoms worsen or fail to improve follow-up with office or PCP  If SOB, chest pain, or high persistent fevers occur, go to ER    Patient verbalized understanding and agrees to plan      Electronically signed by MISSY Bailey CNP on 2/9/2023 at 2:45 PM

## 2023-03-23 ENCOUNTER — OFFICE VISIT (OUTPATIENT)
Dept: CARDIOLOGY CLINIC | Age: 73
End: 2023-03-23
Payer: MEDICARE

## 2023-03-23 VITALS
HEART RATE: 67 BPM | DIASTOLIC BLOOD PRESSURE: 68 MMHG | HEIGHT: 70 IN | SYSTOLIC BLOOD PRESSURE: 132 MMHG | WEIGHT: 244 LBS | BODY MASS INDEX: 34.93 KG/M2

## 2023-03-23 DIAGNOSIS — I25.10 CORONARY ARTERY DISEASE INVOLVING NATIVE CORONARY ARTERY OF NATIVE HEART WITHOUT ANGINA PECTORIS: Primary | ICD-10-CM

## 2023-03-23 DIAGNOSIS — I10 ESSENTIAL HYPERTENSION: ICD-10-CM

## 2023-03-23 DIAGNOSIS — I10 HYPERTENSION, ESSENTIAL, BENIGN: ICD-10-CM

## 2023-03-23 DIAGNOSIS — E78.00 HYPERCHOLESTEREMIA: ICD-10-CM

## 2023-03-23 PROCEDURE — 1123F ACP DISCUSS/DSCN MKR DOCD: CPT | Performed by: CLINICAL NURSE SPECIALIST

## 2023-03-23 PROCEDURE — 3078F DIAST BP <80 MM HG: CPT | Performed by: CLINICAL NURSE SPECIALIST

## 2023-03-23 PROCEDURE — 93000 ELECTROCARDIOGRAM COMPLETE: CPT | Performed by: CLINICAL NURSE SPECIALIST

## 2023-03-23 PROCEDURE — G8484 FLU IMMUNIZE NO ADMIN: HCPCS | Performed by: CLINICAL NURSE SPECIALIST

## 2023-03-23 PROCEDURE — G8427 DOCREV CUR MEDS BY ELIG CLIN: HCPCS | Performed by: CLINICAL NURSE SPECIALIST

## 2023-03-23 PROCEDURE — 99214 OFFICE O/P EST MOD 30 MIN: CPT | Performed by: CLINICAL NURSE SPECIALIST

## 2023-03-23 PROCEDURE — 3017F COLORECTAL CA SCREEN DOC REV: CPT | Performed by: CLINICAL NURSE SPECIALIST

## 2023-03-23 PROCEDURE — 1036F TOBACCO NON-USER: CPT | Performed by: CLINICAL NURSE SPECIALIST

## 2023-03-23 PROCEDURE — 3075F SYST BP GE 130 - 139MM HG: CPT | Performed by: CLINICAL NURSE SPECIALIST

## 2023-03-23 PROCEDURE — G8417 CALC BMI ABV UP PARAM F/U: HCPCS | Performed by: CLINICAL NURSE SPECIALIST

## 2023-03-23 RX ORDER — NITROGLYCERIN 0.4 MG/1
0.4 TABLET SUBLINGUAL EVERY 5 MIN PRN
Qty: 25 TABLET | Refills: 3 | Status: SHIPPED | OUTPATIENT
Start: 2023-03-23

## 2023-03-23 NOTE — PATIENT INSTRUCTIONS
Maintain good blood pressure control-goal<130/80 at rest  Maintain good cholesterol control LDL goal<70 with arterial disease  If you are diabetic work to keep/obtain hemoglobin A1c< 7    Follow up in 6-9 mos    Call with any questions or concerns  Follow up with Zackery Boykin MD for non cardiac problems  Report any new problems  Cardiovascular Fitness-Exercise as tolerated. Strive for 30 minutes of exercise most days of the week. Cardiac / Healthy Diet- Avoid processed high fat foods, maintain low sodium/salt   Continue current medications as directed  Continue plan of treatment  It is always recommended that you bring your medications bottles with you to each visit - this is for your safety!

## 2023-03-23 NOTE — PROGRESS NOTES
35.01 kg/m²   General - Tuyet Dorsey is alert, cooperative, and pleasant. Well groomed. No acute distress. Body habitus is overweight. HEENT - The head is normocephalic. No circumoral cyanosis. Dentition is normal.   EYES -  No Xanthelasma, no arcus senilis, no conjunctival hemorrhages or discharge. Neck - Supple, without increased jugular venous pressures. No carotid bruits. No mass. Respiratory - Lungs are clear bilaterally. No wheezes or rales. Normal effort without use of accessory muscles. Cardiovascular - Heart has regular rhythm and rate. No murmurs, rubs or gallops. + pedal pulses and no varicosities. Abdominal -  Soft, nontender, nondistended. Bowel sounds are intact. Extremities - No clubbing, cyanosis, or  edema. Musculoskeletal -  No clubbing . No Osler's nodes. Gait normal .  No kyphosis or scoliosis. Skin -  no statis ulcers or dermatitis. Neurological - No focal signs are identified. Oriented to person, place and time. Psychiatric -  Appropriate affect and mood. Assessment:     Diagnosis Orders   1. Coronary artery disease involving native coronary artery of native heart without angina pectoris        2. Essential hypertension  EKG 12 lead      3. Hypercholesteremia        4. Hypertension, essential, benign          Data:  BP Readings from Last 3 Encounters:   03/23/23 132/68   02/09/23 118/87   01/26/23 130/64    Pulse Readings from Last 3 Encounters:   03/23/23 67   02/09/23 74   01/26/23 70        Wt Readings from Last 3 Encounters:   03/23/23 244 lb (110.7 kg)   02/09/23 247 lb (112 kg)   01/26/23 242 lb (109.8 kg)   EKG today shows normal sinus rhythm with rate of 67. PACs noted. Right bundle branch block. Left axis deviation. No change from previous EKG    Blood pressure and heart rate well controlled. Medical management includes beta-blocker, ACE inhibitor, CCB and long-acting nitrate.   Remains on DAPT with aspirin Plavix along with statin    Patient

## 2023-05-03 DIAGNOSIS — N18.30 STAGE 3 CHRONIC KIDNEY DISEASE, UNSPECIFIED WHETHER STAGE 3A OR 3B CKD (HCC): ICD-10-CM

## 2023-05-03 DIAGNOSIS — Z13.29 SCREENING FOR THYROID DISORDER: ICD-10-CM

## 2023-05-03 DIAGNOSIS — N25.81 SECONDARY HYPERPARATHYROIDISM (HCC): ICD-10-CM

## 2023-05-03 DIAGNOSIS — Z13.0 SCREENING FOR DEFICIENCY ANEMIA: ICD-10-CM

## 2023-05-03 DIAGNOSIS — I25.118 CORONARY ARTERY DISEASE OF NATIVE ARTERY OF NATIVE HEART WITH STABLE ANGINA PECTORIS (HCC): ICD-10-CM

## 2023-05-03 DIAGNOSIS — Z12.5 PROSTATE CANCER SCREENING: ICD-10-CM

## 2023-05-03 DIAGNOSIS — I25.10 CORONARY ARTERY DISEASE INVOLVING LEFT MAIN CORONARY ARTERY: ICD-10-CM

## 2023-05-03 DIAGNOSIS — E11.8 TYPE 2 DIABETES MELLITUS WITH COMPLICATION, WITHOUT LONG-TERM CURRENT USE OF INSULIN (HCC): ICD-10-CM

## 2023-05-03 LAB
25(OH)D3 SERPL-MCNC: 59.7 NG/ML
ALBUMIN SERPL-MCNC: 4.5 G/DL (ref 3.5–5.2)
ALP SERPL-CCNC: 86 U/L (ref 40–130)
ALT SERPL-CCNC: 14 U/L (ref 5–41)
ANION GAP SERPL CALCULATED.3IONS-SCNC: 12 MMOL/L (ref 7–19)
AST SERPL-CCNC: 16 U/L (ref 5–40)
BASOPHILS # BLD: 0.1 K/UL (ref 0–0.2)
BASOPHILS NFR BLD: 0.7 % (ref 0–1)
BILIRUB SERPL-MCNC: 0.5 MG/DL (ref 0.2–1.2)
BILIRUB UR QL STRIP: NEGATIVE
BUN SERPL-MCNC: 31 MG/DL (ref 8–23)
CALCIUM SERPL-MCNC: 9.4 MG/DL (ref 8.8–10.2)
CHLORIDE SERPL-SCNC: 106 MMOL/L (ref 98–111)
CHOLEST SERPL-MCNC: 119 MG/DL (ref 160–199)
CLARITY UR: CLEAR
CO2 SERPL-SCNC: 24 MMOL/L (ref 22–29)
COLOR UR: YELLOW
CREAT SERPL-MCNC: 2 MG/DL (ref 0.5–1.2)
CREAT UR-MCNC: 158.9 MG/DL (ref 4.2–622)
EOSINOPHIL # BLD: 0.9 K/UL (ref 0–0.6)
EOSINOPHIL NFR BLD: 11.7 % (ref 0–5)
ERYTHROCYTE [DISTWIDTH] IN BLOOD BY AUTOMATED COUNT: 12.9 % (ref 11.5–14.5)
GLUCOSE SERPL-MCNC: 138 MG/DL (ref 74–109)
GLUCOSE UR STRIP.AUTO-MCNC: NEGATIVE MG/DL
HBA1C MFR BLD: 6.3 % (ref 4–6)
HCT VFR BLD AUTO: 40.5 % (ref 42–52)
HDLC SERPL-MCNC: 46 MG/DL (ref 55–121)
HGB BLD-MCNC: 13 G/DL (ref 14–18)
HGB UR STRIP.AUTO-MCNC: NEGATIVE MG/L
IMM GRANULOCYTES # BLD: 0 K/UL
KETONES UR STRIP.AUTO-MCNC: NEGATIVE MG/DL
LDLC SERPL CALC-MCNC: 62 MG/DL
LEUKOCYTE ESTERASE UR QL STRIP.AUTO: NEGATIVE
LYMPHOCYTES # BLD: 1 K/UL (ref 1.1–4.5)
LYMPHOCYTES NFR BLD: 13.3 % (ref 20–40)
MAGNESIUM SERPL-MCNC: 2.1 MG/DL (ref 1.6–2.4)
MCH RBC QN AUTO: 30.7 PG (ref 27–31)
MCHC RBC AUTO-ENTMCNC: 32.1 G/DL (ref 33–37)
MCV RBC AUTO: 95.5 FL (ref 80–94)
MICROALBUMIN UR-MCNC: 5.6 MG/DL (ref 0–19)
MICROALBUMIN/CREAT UR-RTO: 35.2 MG/G
MONOCYTES # BLD: 0.9 K/UL (ref 0–0.9)
MONOCYTES NFR BLD: 11.5 % (ref 0–10)
NEUTROPHILS # BLD: 4.7 K/UL (ref 1.5–7.5)
NEUTS SEG NFR BLD: 62.5 % (ref 50–65)
NITRITE UR QL STRIP.AUTO: NEGATIVE
PH UR STRIP.AUTO: 5.5 [PH] (ref 5–8)
PHOSPHATE SERPL-MCNC: 4 MG/DL (ref 2.5–4.5)
PLATELET # BLD AUTO: 154 K/UL (ref 130–400)
PMV BLD AUTO: 12.2 FL (ref 9.4–12.4)
POTASSIUM SERPL-SCNC: 4.5 MMOL/L (ref 3.5–5)
PROT SERPL-MCNC: 7 G/DL (ref 6.6–8.7)
PROT UR STRIP.AUTO-MCNC: ABNORMAL MG/DL
PROT UR-MCNC: 20 MG/DL (ref 15–45)
PSA SERPL-MCNC: 3.14 NG/ML (ref 0–4)
PTH-INTACT SERPL-MCNC: 77.4 PG/ML (ref 15–65)
RBC # BLD AUTO: 4.24 M/UL (ref 4.7–6.1)
SODIUM SERPL-SCNC: 142 MMOL/L (ref 136–145)
SP GR UR STRIP.AUTO: 1.02 (ref 1–1.03)
TRIGL SERPL-MCNC: 57 MG/DL (ref 0–149)
TSH SERPL DL<=0.005 MIU/L-ACNC: 1.16 UIU/ML (ref 0.27–4.2)
URATE SERPL-MCNC: 5.8 MG/DL (ref 3.4–7)
UROBILINOGEN UR STRIP.AUTO-MCNC: 0.2 E.U./DL
WBC # BLD AUTO: 7.5 K/UL (ref 4.8–10.8)

## 2023-05-31 ASSESSMENT — ENCOUNTER SYMPTOMS
VOMITING: 0
COUGH: 0
SORE THROAT: 0
CHEST TIGHTNESS: 0
VOICE CHANGE: 0
TROUBLE SWALLOWING: 0
CONSTIPATION: 0
SHORTNESS OF BREATH: 0
WHEEZING: 0
RHINORRHEA: 0
ABDOMINAL PAIN: 0
EYE REDNESS: 0
DIARRHEA: 0
NAUSEA: 0
BACK PAIN: 1
BLOOD IN STOOL: 0

## 2023-06-01 ENCOUNTER — OFFICE VISIT (OUTPATIENT)
Dept: PRIMARY CARE CLINIC | Age: 73
End: 2023-06-01
Payer: MEDICARE

## 2023-06-01 VITALS
WEIGHT: 245 LBS | HEIGHT: 70 IN | DIASTOLIC BLOOD PRESSURE: 70 MMHG | BODY MASS INDEX: 35.07 KG/M2 | OXYGEN SATURATION: 96 % | TEMPERATURE: 98 F | HEART RATE: 54 BPM | SYSTOLIC BLOOD PRESSURE: 128 MMHG | RESPIRATION RATE: 18 BRPM

## 2023-06-01 DIAGNOSIS — I25.810 ARTERIOSCLEROSIS OF ARTERIAL CORONARY ARTERY BYPASS GRAFT: ICD-10-CM

## 2023-06-01 DIAGNOSIS — E11.8 TYPE 2 DIABETES MELLITUS WITH COMPLICATION, WITHOUT LONG-TERM CURRENT USE OF INSULIN (HCC): ICD-10-CM

## 2023-06-01 DIAGNOSIS — N18.32 STAGE 3B CHRONIC KIDNEY DISEASE (HCC): ICD-10-CM

## 2023-06-01 DIAGNOSIS — E66.01 SEVERE OBESITY (BMI 35.0-39.9) WITH COMORBIDITY (HCC): Primary | ICD-10-CM

## 2023-06-01 DIAGNOSIS — E66.01 MORBIDLY OBESE (HCC): ICD-10-CM

## 2023-06-01 DIAGNOSIS — I10 ESSENTIAL HYPERTENSION: ICD-10-CM

## 2023-06-01 DIAGNOSIS — K21.9 GASTROESOPHAGEAL REFLUX DISEASE WITHOUT ESOPHAGITIS: ICD-10-CM

## 2023-06-01 DIAGNOSIS — I25.10 CORONARY ARTERY DISEASE INVOLVING NATIVE CORONARY ARTERY OF NATIVE HEART WITHOUT ANGINA PECTORIS: ICD-10-CM

## 2023-06-01 PROCEDURE — 99214 OFFICE O/P EST MOD 30 MIN: CPT | Performed by: INTERNAL MEDICINE

## 2023-06-01 PROCEDURE — G8427 DOCREV CUR MEDS BY ELIG CLIN: HCPCS | Performed by: INTERNAL MEDICINE

## 2023-06-01 PROCEDURE — G8417 CALC BMI ABV UP PARAM F/U: HCPCS | Performed by: INTERNAL MEDICINE

## 2023-06-01 PROCEDURE — 1036F TOBACCO NON-USER: CPT | Performed by: INTERNAL MEDICINE

## 2023-06-01 PROCEDURE — 3044F HG A1C LEVEL LT 7.0%: CPT | Performed by: INTERNAL MEDICINE

## 2023-06-01 PROCEDURE — 3078F DIAST BP <80 MM HG: CPT | Performed by: INTERNAL MEDICINE

## 2023-06-01 PROCEDURE — 2022F DILAT RTA XM EVC RTNOPTHY: CPT | Performed by: INTERNAL MEDICINE

## 2023-06-01 PROCEDURE — 3074F SYST BP LT 130 MM HG: CPT | Performed by: INTERNAL MEDICINE

## 2023-06-01 PROCEDURE — 1123F ACP DISCUSS/DSCN MKR DOCD: CPT | Performed by: INTERNAL MEDICINE

## 2023-06-01 PROCEDURE — 3017F COLORECTAL CA SCREEN DOC REV: CPT | Performed by: INTERNAL MEDICINE

## 2023-06-01 RX ORDER — AMLODIPINE BESYLATE 5 MG/1
5 TABLET ORAL NIGHTLY
Qty: 90 TABLET | Refills: 1 | Status: SHIPPED | OUTPATIENT
Start: 2023-06-01 | End: 2023-08-30

## 2023-06-01 RX ORDER — AMOXICILLIN 500 MG
1200 CAPSULE ORAL DAILY
COMMUNITY

## 2023-06-01 RX ORDER — CARVEDILOL 3.12 MG/1
3.12 TABLET ORAL 2 TIMES DAILY WITH MEALS
Qty: 180 TABLET | Refills: 3 | Status: SHIPPED | OUTPATIENT
Start: 2023-06-01 | End: 2023-08-30

## 2023-06-01 RX ORDER — BLACK COHOSH ROOT EXTRACT 80 MG
1 CAPSULE ORAL DAILY
COMMUNITY

## 2023-06-01 RX ORDER — FAMOTIDINE 20 MG/1
20 TABLET, FILM COATED ORAL 2 TIMES DAILY
Qty: 90 TABLET | Refills: 2 | Status: SHIPPED | OUTPATIENT
Start: 2023-06-01 | End: 2023-08-30

## 2023-06-01 RX ORDER — ATORVASTATIN CALCIUM 40 MG/1
40 TABLET, FILM COATED ORAL DAILY
Qty: 90 TABLET | Refills: 1 | Status: SHIPPED | OUTPATIENT
Start: 2023-06-01 | End: 2023-08-30

## 2023-06-01 RX ORDER — LISINOPRIL 40 MG/1
40 TABLET ORAL DAILY
Qty: 90 TABLET | Refills: 1 | Status: SHIPPED | OUTPATIENT
Start: 2023-06-01 | End: 2023-08-30

## 2023-06-01 SDOH — ECONOMIC STABILITY: FOOD INSECURITY: WITHIN THE PAST 12 MONTHS, THE FOOD YOU BOUGHT JUST DIDN'T LAST AND YOU DIDN'T HAVE MONEY TO GET MORE.: NEVER TRUE

## 2023-06-01 SDOH — ECONOMIC STABILITY: FOOD INSECURITY: WITHIN THE PAST 12 MONTHS, YOU WORRIED THAT YOUR FOOD WOULD RUN OUT BEFORE YOU GOT MONEY TO BUY MORE.: NEVER TRUE

## 2023-06-01 SDOH — ECONOMIC STABILITY: INCOME INSECURITY: HOW HARD IS IT FOR YOU TO PAY FOR THE VERY BASICS LIKE FOOD, HOUSING, MEDICAL CARE, AND HEATING?: NOT HARD AT ALL

## 2023-06-01 SDOH — ECONOMIC STABILITY: HOUSING INSECURITY
IN THE LAST 12 MONTHS, WAS THERE A TIME WHEN YOU DID NOT HAVE A STEADY PLACE TO SLEEP OR SLEPT IN A SHELTER (INCLUDING NOW)?: NO

## 2023-06-01 ASSESSMENT — ENCOUNTER SYMPTOMS
VOICE CHANGE: 0
ABDOMINAL PAIN: 0
RHINORRHEA: 0
SHORTNESS OF BREATH: 0
SORE THROAT: 0
DIARRHEA: 0
CONSTIPATION: 0
NAUSEA: 0
CHEST TIGHTNESS: 0
BACK PAIN: 1
TROUBLE SWALLOWING: 0
EYE REDNESS: 0
VOMITING: 0
BLOOD IN STOOL: 0
WHEEZING: 0
COUGH: 0

## 2023-06-01 NOTE — ASSESSMENT & PLAN NOTE
Monitored by specialist- no acute findings meriting change in the plan   Status post PCI LAD says that he has another culprit vessel but will be monitored by his cardiologist

## 2023-06-01 NOTE — ASSESSMENT & PLAN NOTE
Borderline controlled, continue current medications   Patient offered to increase amlodipine to 5 twice daily he will think about it

## 2023-06-01 NOTE — ASSESSMENT & PLAN NOTE
Uncontrolled, lifestyle modifications recommended   Previously on Ozempic currently off hemoglobin A1c will discuss during next visit

## 2023-06-01 NOTE — PROGRESS NOTES
Bettye Noble ( 1950) is a 68 y.o. male, ESTABLISHED  here for evaluation of the following chief complaint(s). No chief complaint on file. Patient was encouraged and advised to be compliant with all  medications leads an active lifestyle and promote maintaining a healthy weight, encouraged not to use cigarettes, laboratory results discussed and reviewed with patient's during this visit   ASSESSMENT/PLAN:  Problem List    None      No flowsheet data found. PHQ Scores 2019   PHQ2 Score 0 0 1 0 0   PHQ9 Score 0 0 1 0 0         Results for orders placed or performed in visit on 23   Protein, urine, random   Result Value Ref Range    Protein, Ur 20 15 - 45 mg/dL       No follow-ups on file. HPI  59-year-old male [id number]  He has a history of abnormal stress test leading to CABG and stent placement followed by cardiology doing well no chest pain  Blood pressure in good control he has nephrosclerosis of his existing kidney states that he takes all his medications in the morning and by afternoon his blood pressure is rising  Patient has chronic kidney disease followed by nephrology  Status post nephrectomy due to renal cell CA and follows Dr. Diane Wyatt  Type 2 diabetes mellitus in good control when he is on Ozempic and    Review of Systems   Constitutional:  Negative for activity change, appetite change, fatigue, fever and unexpected weight change. HENT:  Negative for congestion, ear pain, nosebleeds, rhinorrhea, sore throat, trouble swallowing and voice change. Eyes:  Negative for redness and visual disturbance. Respiratory:  Negative for cough, chest tightness, shortness of breath and wheezing. Cardiovascular:  Positive for chest pain. Negative for palpitations and leg swelling. Gastrointestinal:  Negative for abdominal pain, blood in stool, constipation, diarrhea, nausea and vomiting. Endocrine: Negative for polydipsia, polyphagia and polyuria.
current medications            Genitourinary    Chronic kidney disease, stage 3      Monitored by specialist- no acute findings meriting change in the plan            Other    Severe obesity (BMI 35.0-39. 9) with comorbidity ([address] - Primary    Morbidly obese (HCC)      Uncontrolled, lifestyle modifications recommended   Previously on Ozempic currently off hemoglobin A1c will discuss during next visit              No flowsheet data found. PHQ Scores 1/26/2023 1/5/2022 7/2/2020 1/22/2020 11/22/2019   PHQ2 Score 0 0 1 0 0   PHQ9 Score 0 0 1 0 0         Results for orders placed or performed in visit on 05/03/23   Protein, urine, random   Result Value Ref Range    Protein, Ur 20 15 - 45 mg/dL       Return in about 8 months (around 1/29/2024), or schedule AWV at the same time. HPI  70-year-old male [address]  He has a history of abnormal stress test leading to CABG and stent placement followed by cardiology doing well no chest pain, exertional angina, limits activity based on symptoms, s/P LAD, has other culprit vessesl, he ff Cardio at 's  Blood pressure in borderline uncontrol lledhe has nephrosclerosis of his existing kidney  Patient has chronic kidney disease followed by nephrology  Status post nephrectomy due to renal cell CA and follows Dr. Anthony Mackay  History of impaired glucose tolerance on Ozempic with an A1c of 6.9  Gaining weight      Review of Systems   Constitutional:  Negative for activity change, appetite change, fatigue, fever and unexpected weight change. HENT:  Positive for hearing loss. Negative for congestion, ear pain, nosebleeds, rhinorrhea, sore throat, trouble swallowing and voice change. Eyes:  Negative for redness and visual disturbance. Respiratory:  Negative for cough, chest tightness, shortness of breath and wheezing. Cardiovascular:  Positive for chest pain. Negative for palpitations and leg swelling.    Gastrointestinal:  Negative for abdominal pain, blood in stool,

## 2023-06-27 ENCOUNTER — HOSPITAL ENCOUNTER (OUTPATIENT)
Dept: GENERAL RADIOLOGY | Age: 73
Discharge: HOME | End: 2023-06-27
Payer: MEDICARE

## 2023-06-27 DIAGNOSIS — R91.1 PULMONARY NODULE: ICD-10-CM

## 2023-06-27 DIAGNOSIS — C64.1 MALIGNANT NEOPLASM OF RIGHT KIDNEY, EXCEPT RENAL PELVIS (HCC): ICD-10-CM

## 2023-06-27 PROCEDURE — 71046 X-RAY EXAM CHEST 2 VIEWS: CPT | Performed by: GENERAL PRACTICE

## 2023-06-27 PROCEDURE — 71046 X-RAY EXAM CHEST 2 VIEWS: CPT

## 2023-07-20 NOTE — PROGRESS NOTES
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 He is currently undergoing clinical/radiologic surveillance. The patient denies any new urinary symptoms. Denies any respiratory symptoms. Overall, weight is stable. He is followed by nephrology for CKD post nephrectomy. He had a chest x-ray performed before this visit. Cancer history  Mr Negra Smith was referred for a diagnosis of daily mass concerning for RCC. He is currently being seen by Dr. Lynette Nye at [facility] with a planned surgery. He presented initially with right flank pain. 8/11/2018-CT abdomen without contrast showed a 6.6 cm hyperdense right renal lesion. An exophytic left renal lesion noted and measured 1.9 cm. 10/9/2018-ultrasound showed a 6 cm heterogeneous solid appearing lesion at the lower pole of the right kidney. 10/17/2018-CT abdomen and pelvis with contrast showed a 6 x 6 x 6.5 cm cystic mass arising from the lower right renal pole. 3 small cysts of the left kidney, largest measuring 2.1 cm.  12/18/2018-MRI of the abdomen with contrast at [facility] showed a right lower pole renal mass extending to the hilar line measuring 6.7 x 6.4 x 7.2 cm, suspicious for cystic renal cell carcinoma.   12/19/2018-Tisha Nye recommended surgery

## 2023-08-09 ENCOUNTER — HOSPITAL ENCOUNTER (OUTPATIENT)
Dept: INFUSION THERAPY | Age: 73
Discharge: HOME | End: 2023-08-09
Payer: MEDICARE

## 2023-08-09 ENCOUNTER — OFFICE VISIT (OUTPATIENT)
Dept: HEMATOLOGY | Age: 73
End: 2023-08-09
Payer: MEDICARE

## 2023-08-09 VITALS
TEMPERATURE: 98.6 F | OXYGEN SATURATION: 96 % | HEART RATE: 66 BPM | BODY MASS INDEX: 36.04 KG/M2 | SYSTOLIC BLOOD PRESSURE: 118 MMHG | WEIGHT: 251.2 LBS | DIASTOLIC BLOOD PRESSURE: 76 MMHG

## 2023-08-09 DIAGNOSIS — C64.1 MALIGNANT NEOPLASM OF RIGHT KIDNEY, EXCEPT RENAL PELVIS (HCC): ICD-10-CM

## 2023-08-09 DIAGNOSIS — Z85.528 ENCOUNTER FOR FOLLOW-UP SURVEILLANCE OF KIDNEY CANCER: ICD-10-CM

## 2023-08-09 DIAGNOSIS — N18.30 CHRONIC RENAL FAILURE, STAGE 3 (MODERATE), UNSPECIFIED WHETHER STAGE 3A OR 3B CKD (HCC): ICD-10-CM

## 2023-08-09 DIAGNOSIS — C64.1 MALIGNANT NEOPLASM OF RIGHT KIDNEY, EXCEPT RENAL PELVIS (HCC): Primary | ICD-10-CM

## 2023-08-09 DIAGNOSIS — D64.9 NORMOCYTIC ANEMIA: ICD-10-CM

## 2023-08-09 DIAGNOSIS — Z00.00 HEALTHCARE MAINTENANCE: ICD-10-CM

## 2023-08-09 DIAGNOSIS — Z08 ENCOUNTER FOR FOLLOW-UP SURVEILLANCE OF KIDNEY CANCER: ICD-10-CM

## 2023-08-09 DIAGNOSIS — R91.1 PULMONARY NODULE: ICD-10-CM

## 2023-08-09 LAB
ERYTHROCYTE [DISTWIDTH] IN BLOOD BY AUTOMATED COUNT: 13.2 % (ref 11.6–14.4)
HCT VFR BLD AUTO: 37.7 % (ref 40.1–51)
HGB BLD-MCNC: 12.8 G/DL (ref 13.7–17.5)
LYMPHOCYTES # BLD: 1.03 K/UL (ref 1.18–3.74)
LYMPHOCYTES NFR BLD: 14.7 % (ref 19.3–53.1)
MCH RBC QN AUTO: 30.6 PG (ref 25.7–32.2)
MCHC RBC AUTO-ENTMCNC: 34 G/DL (ref 32.3–36.5)
MCV RBC AUTO: 90.2 FL (ref 79–92.2)
MONOCYTES # BLD: 0.68 K/UL (ref 0.24–0.82)
MONOCYTES NFR BLD: 9.7 % (ref 4.7–12.5)
NEUTROPHILS # BLD: 4.75 K/UL (ref 1.56–6.13)
NEUTS SEG NFR BLD: 67.4 % (ref 34–71.1)
PLATELET # BLD AUTO: 137 K/UL (ref 163–337)
PMV BLD AUTO: 12 FL (ref 7.4–10.4)
RBC # BLD AUTO: 4.18 M/UL (ref 4.63–6.08)
WBC # BLD AUTO: 7.03 K/UL (ref 4.23–9.07)

## 2023-08-09 PROCEDURE — 99212 OFFICE O/P EST SF 10 MIN: CPT

## 2023-08-09 PROCEDURE — G8427 DOCREV CUR MEDS BY ELIG CLIN: HCPCS | Performed by: INTERNAL MEDICINE

## 2023-08-09 PROCEDURE — 3017F COLORECTAL CA SCREEN DOC REV: CPT | Performed by: INTERNAL MEDICINE

## 2023-08-09 PROCEDURE — 1036F TOBACCO NON-USER: CPT | Performed by: INTERNAL MEDICINE

## 2023-08-09 PROCEDURE — 3078F DIAST BP <80 MM HG: CPT | Performed by: INTERNAL MEDICINE

## 2023-08-09 PROCEDURE — 36415 COLL VENOUS BLD VENIPUNCTURE: CPT

## 2023-08-09 PROCEDURE — 1123F ACP DISCUSS/DSCN MKR DOCD: CPT | Performed by: INTERNAL MEDICINE

## 2023-08-09 PROCEDURE — 85025 COMPLETE CBC W/AUTO DIFF WBC: CPT

## 2023-08-09 PROCEDURE — G8417 CALC BMI ABV UP PARAM F/U: HCPCS | Performed by: INTERNAL MEDICINE

## 2023-08-09 PROCEDURE — 99213 OFFICE O/P EST LOW 20 MIN: CPT | Performed by: INTERNAL MEDICINE

## 2023-08-09 PROCEDURE — 3074F SYST BP LT 130 MM HG: CPT | Performed by: INTERNAL MEDICINE

## 2023-10-05 ENCOUNTER — OFFICE VISIT (OUTPATIENT)
Dept: CARDIOLOGY CLINIC | Age: 73
End: 2023-10-05
Payer: MEDICARE

## 2023-10-05 VITALS
HEART RATE: 75 BPM | SYSTOLIC BLOOD PRESSURE: 135 MMHG | BODY MASS INDEX: 35.65 KG/M2 | OXYGEN SATURATION: 98 % | WEIGHT: 249 LBS | DIASTOLIC BLOOD PRESSURE: 75 MMHG | HEIGHT: 70 IN

## 2023-10-05 DIAGNOSIS — I10 ESSENTIAL HYPERTENSION: ICD-10-CM

## 2023-10-05 DIAGNOSIS — E78.00 HYPERCHOLESTEREMIA: ICD-10-CM

## 2023-10-05 DIAGNOSIS — I25.10 CORONARY ARTERY DISEASE INVOLVING NATIVE CORONARY ARTERY OF NATIVE HEART WITHOUT ANGINA PECTORIS: Primary | ICD-10-CM

## 2023-10-05 PROCEDURE — G8417 CALC BMI ABV UP PARAM F/U: HCPCS | Performed by: CLINICAL NURSE SPECIALIST

## 2023-10-05 PROCEDURE — 3078F DIAST BP <80 MM HG: CPT | Performed by: CLINICAL NURSE SPECIALIST

## 2023-10-05 PROCEDURE — 3075F SYST BP GE 130 - 139MM HG: CPT | Performed by: CLINICAL NURSE SPECIALIST

## 2023-10-05 PROCEDURE — 3017F COLORECTAL CA SCREEN DOC REV: CPT | Performed by: CLINICAL NURSE SPECIALIST

## 2023-10-05 PROCEDURE — G8484 FLU IMMUNIZE NO ADMIN: HCPCS | Performed by: CLINICAL NURSE SPECIALIST

## 2023-10-05 PROCEDURE — G8427 DOCREV CUR MEDS BY ELIG CLIN: HCPCS | Performed by: CLINICAL NURSE SPECIALIST

## 2023-10-05 PROCEDURE — 99214 OFFICE O/P EST MOD 30 MIN: CPT | Performed by: CLINICAL NURSE SPECIALIST

## 2023-10-05 PROCEDURE — 1036F TOBACCO NON-USER: CPT | Performed by: CLINICAL NURSE SPECIALIST

## 2023-10-05 PROCEDURE — 1123F ACP DISCUSS/DSCN MKR DOCD: CPT | Performed by: CLINICAL NURSE SPECIALIST

## 2023-10-05 NOTE — PROGRESS NOTES
atorvastatin (LIPITOR) 40 MG tablet Take 1 tablet by mouth daily 90 tablet 1    carvedilol (COREG) 3.125 MG tablet Take 1 tablet by mouth 2 times daily (with meals) 180 tablet 3    amLODIPine (NORVASC) 5 MG tablet Take 1 tablet by mouth at bedtime 90 tablet 1    nitroGLYCERIN (NITROSTAT) 0.4 MG SL tablet Place 1 tablet under the tongue every 5 minutes as needed for Chest pain 25 tablet 3    allopurinol (ZYLOPRIM) 100 MG tablet TAKE ONE TABLET BY MOUTH TWO TIMES A  tablet 2    aspirin 81 MG EC tablet Take 1 tablet by mouth daily      clopidogrel (PLAVIX) 75 MG tablet TAKE ONE TABLET BY MOUTH ONCE DAILY 90 tablet 1    isosorbide dinitrate (ISORDIL) 30 MG tablet Take 1 tablet by mouth in the morning and at bedtime (Patient taking differently: Take 1 tablet by mouth daily Takes 1 once a day) 180 tablet 1    lisinopril (PRINIVIL;ZESTRIL) 40 MG tablet Take 1 tablet by mouth daily 90 tablet 1     No current facility-administered medications for this visit. Allergies: Patient has no known allergies. Review of Systems  Constitutional - no significant activity change, appetite change, or unexpected weight change. No fever, chills or diaphoresis. No fatigue. HEENT - no significant rhinorrhea or epistaxis. No tinnitus or significant hearing loss. Eyes - no sudden vision change or amaurosis. Respiratory - no significant wheezing, stridor, apnea or cough. No dyspnea on exertion or shortness of breath. Cardiovascular - no exertional chest pain, orthopnea or PND. No sensation of arrhythmia or slow heart rate. No claudication or leg edema. Gastrointestinal - no abdominal swelling or pain. No blood in stool. No severe constipation, diarrhea, nausea, or vomiting. Genitourinary - no difficulty urinating, dysuria, frequency, or urgency. No flank pain or hematuria. Musculoskeletal - no back pain, gait disturbance, or myalgia. Skin - no color change or rash. No pallor.   No new surgical

## 2023-10-05 NOTE — PATIENT INSTRUCTIONS
Maintain good blood pressure control-goal<130/80 at rest  Maintain good cholesterol control LDL goal<70 with arterial disease  If you are diabetic work to keep/obtain hemoglobin A1c< 7    Follow up in 6-9 mos    Call with any questions or concerns  Follow up with Radha Dee MD for non cardiac problems  Report any new problems  Cardiovascular Fitness-Exercise as tolerated. Strive for 30 minutes of exercise most days of the week. Cardiac / Healthy Diet- Avoid processed high fat foods, maintain low sodium/salt   Continue current medications as directed  Continue plan of treatment  It is always recommended that you bring your medications bottles with you to each visit - this is for your safety!

## 2023-10-27 LAB
25(OH)D3 SERPL-MCNC: 57.8 NG/ML
ALBUMIN SERPL-MCNC: 4.6 G/DL (ref 3.5–5.2)
ALP SERPL-CCNC: 89 U/L (ref 40–130)
ALT SERPL-CCNC: 16 U/L (ref 5–41)
ANION GAP SERPL CALCULATED.3IONS-SCNC: 13 MMOL/L (ref 7–19)
AST SERPL-CCNC: 17 U/L (ref 5–40)
BASOPHILS # BLD: 0.1 K/UL (ref 0–0.2)
BASOPHILS NFR BLD: 0.7 % (ref 0–1)
BILIRUB SERPL-MCNC: 0.5 MG/DL (ref 0.2–1.2)
BILIRUB UR QL STRIP: NEGATIVE
BUN SERPL-MCNC: 30 MG/DL (ref 8–23)
CALCIUM SERPL-MCNC: 9.4 MG/DL (ref 8.8–10.2)
CHLORIDE SERPL-SCNC: 105 MMOL/L (ref 98–111)
CLARITY UR: CLEAR
CO2 SERPL-SCNC: 25 MMOL/L (ref 22–29)
COLOR UR: YELLOW
CREAT SERPL-MCNC: 1.9 MG/DL (ref 0.5–1.2)
CREAT UR-MCNC: 149.1 MG/DL (ref 39–259)
EOSINOPHIL # BLD: 0.4 K/UL (ref 0–0.6)
EOSINOPHIL NFR BLD: 5.8 % (ref 0–5)
ERYTHROCYTE [DISTWIDTH] IN BLOOD BY AUTOMATED COUNT: 13.1 % (ref 11.5–14.5)
GLUCOSE SERPL-MCNC: 121 MG/DL (ref 74–109)
GLUCOSE UR STRIP.AUTO-MCNC: NEGATIVE MG/DL
HCT VFR BLD AUTO: 41.9 % (ref 42–52)
HGB BLD-MCNC: 13.5 G/DL (ref 14–18)
HGB UR STRIP.AUTO-MCNC: NEGATIVE MG/L
IMM GRANULOCYTES # BLD: 0 K/UL
KETONES UR STRIP.AUTO-MCNC: NEGATIVE MG/DL
LEUKOCYTE ESTERASE UR QL STRIP.AUTO: NEGATIVE
LYMPHOCYTES # BLD: 1.1 K/UL (ref 1.1–4.5)
LYMPHOCYTES NFR BLD: 15 % (ref 20–40)
MAGNESIUM SERPL-MCNC: 2.2 MG/DL (ref 1.6–2.4)
MCH RBC QN AUTO: 30.3 PG (ref 27–31)
MCHC RBC AUTO-ENTMCNC: 32.2 G/DL (ref 33–37)
MCV RBC AUTO: 93.9 FL (ref 80–94)
MONOCYTES # BLD: 0.9 K/UL (ref 0–0.9)
MONOCYTES NFR BLD: 11.7 % (ref 0–10)
NEUTROPHILS # BLD: 4.8 K/UL (ref 1.5–7.5)
NEUTS SEG NFR BLD: 66.5 % (ref 50–65)
NITRITE UR QL STRIP.AUTO: NEGATIVE
PH UR STRIP.AUTO: 5.5 [PH] (ref 5–8)
PHOSPHATE SERPL-MCNC: 3.2 MG/DL (ref 2.5–4.5)
PLATELET # BLD AUTO: 162 K/UL (ref 130–400)
PMV BLD AUTO: 11.8 FL (ref 9.4–12.4)
POTASSIUM SERPL-SCNC: 4.9 MMOL/L (ref 3.5–5)
PROT SERPL-MCNC: 7.1 G/DL (ref 6.6–8.7)
PROT UR STRIP.AUTO-MCNC: ABNORMAL MG/DL
PROT UR-MCNC: 20 MG/DL (ref 15–45)
PTH-INTACT SERPL-MCNC: 91.3 PG/ML (ref 15–65)
RBC # BLD AUTO: 4.46 M/UL (ref 4.7–6.1)
SODIUM SERPL-SCNC: 143 MMOL/L (ref 136–145)
SP GR UR STRIP.AUTO: 1.02 (ref 1–1.03)
URATE SERPL-MCNC: 6.4 MG/DL (ref 3.4–7)
UROBILINOGEN UR STRIP.AUTO-MCNC: 0.2 E.U./DL
WBC # BLD AUTO: 7.3 K/UL (ref 4.8–10.8)

## 2023-11-17 DIAGNOSIS — M10.9 GOUT, UNSPECIFIED CAUSE, UNSPECIFIED CHRONICITY, UNSPECIFIED SITE: ICD-10-CM

## 2023-11-17 DIAGNOSIS — E79.0 ELEVATED BLOOD URIC ACID LEVEL: ICD-10-CM

## 2023-11-17 RX ORDER — ALLOPURINOL 100 MG/1
TABLET ORAL
Qty: 180 TABLET | Refills: 1 | Status: SHIPPED | OUTPATIENT
Start: 2023-11-17

## 2023-11-17 NOTE — TELEPHONE ENCOUNTER
Vaughn Montenegro called to request a refill on his medication.       Last office visit : 6/1/2023   Next office visit : 1/29/2024     Requested Prescriptions     Pending Prescriptions Disp Refills    allopurinol (ZYLOPRIM) 100 MG tablet [Pharmacy Med Name: ALLOPURINOL 100 MG TABS 100 Tablet] 180 tablet 2     Sig: TAKE ONE TABLET BY MOUTH TWO TIMES A DAY            Sameer Miller LPN

## 2024-01-28 PROBLEM — U07.1 PNEUMONIA DUE TO COVID-19 VIRUS: Status: ACTIVE | Noted: 2020-11-27

## 2024-01-28 PROBLEM — N18.32 STAGE 3B CHRONIC KIDNEY DISEASE (HCC): Status: ACTIVE | Noted: 2018-05-11

## 2024-01-28 PROBLEM — E66.9 OBESITY WITH BODY MASS INDEX 30 OR GREATER: Status: ACTIVE | Noted: 2019-11-26

## 2024-01-28 PROBLEM — R93.1 ABNORMAL FINDINGS ON CARDIAC CATHETERIZATION: Status: ACTIVE | Noted: 2020-10-23

## 2024-01-28 PROBLEM — C64.1 MALIGNANT NEOPLASM OF RIGHT KIDNEY, EXCEPT RENAL PELVIS (HCC): Status: ACTIVE | Noted: 2024-01-28

## 2024-01-28 PROBLEM — J12.82 PNEUMONIA DUE TO COVID-19 VIRUS: Status: ACTIVE | Noted: 2020-11-27

## 2024-01-28 PROBLEM — Z85.528: Status: ACTIVE | Noted: 2024-01-28

## 2024-01-28 PROBLEM — N28.89 VASCULAR DISORDER OF KIDNEY: Status: ACTIVE | Noted: 2019-02-21

## 2024-01-28 NOTE — PROGRESS NOTES
Rochelle Mao MD   Semaglutide, 1 MG/DOSE, 4 MG/3ML SOPN Inject 1 mg into the skin once a week Yes Carmen Bajwa MD   allopurinol (ZYLOPRIM) 100 MG tablet TAKE ONE TABLET BY MOUTH TWO TIMES A DAY Yes Carmen Bajwa MD   famotidine (PEPCID) 20 MG tablet Take 1 tablet by mouth 2 times daily  Patient taking differently: Take 1 tablet by mouth 2 times daily Takes 1 a day Yes Carmen Bajwa MD   Omega-3 Fatty Acids (FISH OIL) 1200 MG CAPS Take 1,200 mg by mouth Daily Yes Rochelle Mao MD   Bioflavonoid Products (QUERCETIN COMPLEX IMMUNE) CAPS Take 1 capsule by mouth Daily Yes Rochelle Mao MD   Multiple Vitamin (MULTIVITAMIN ADULT PO) Take 1 tablet by mouth Daily Yes Rochelle Mao MD   lisinopril (PRINIVIL;ZESTRIL) 40 MG tablet Take 1 tablet by mouth daily Yes Carmen Bajwa MD   atorvastatin (LIPITOR) 40 MG tablet Take 1 tablet by mouth daily Yes Carmen Bajwa MD   carvedilol (COREG) 3.125 MG tablet Take 1 tablet by mouth 2 times daily (with meals) Yes Carmen Bajwa MD   nitroGLYCERIN (NITROSTAT) 0.4 MG SL tablet Place 1 tablet under the tongue every 5 minutes as needed for Chest pain Yes Catherine Trotter APRN   aspirin 81 MG EC tablet Take 1 tablet by mouth daily Yes Rochelle Mao MD   clopidogrel (PLAVIX) 75 MG tablet TAKE ONE TABLET BY MOUTH ONCE DAILY Yes Arabella Geronimo APRN - NP   isosorbide dinitrate (ISORDIL) 30 MG tablet Take 1 tablet by mouth in the morning and at bedtime  Patient taking differently: Take 1 tablet by mouth daily Takes 1 once a day Yes Arabella Geronimo APRN - NP   PSYLLIUM HUSK PO Take 2 capsules by mouth daily  Patient not taking: Reported on 1/29/2024  Rochelle Mao MD CareTeam (Including outside providers/suppliers regularly involved in providing care):   Patient Care Team:  Carmen Bajwa MD as PCP - General (Internal Medicine)  Carmen Bajwa MD as PCP - [facility]

## 2024-01-29 ENCOUNTER — OFFICE VISIT (OUTPATIENT)
Dept: PRIMARY CARE CLINIC | Age: 74
End: 2024-01-29

## 2024-01-29 VITALS
OXYGEN SATURATION: 97 % | HEART RATE: 73 BPM | HEIGHT: 70 IN | TEMPERATURE: 97.2 F | WEIGHT: 241.8 LBS | DIASTOLIC BLOOD PRESSURE: 60 MMHG | SYSTOLIC BLOOD PRESSURE: 130 MMHG | BODY MASS INDEX: 34.62 KG/M2

## 2024-01-29 DIAGNOSIS — I25.118 CORONARY ARTERY DISEASE OF NATIVE ARTERY OF NATIVE HEART WITH STABLE ANGINA PECTORIS (HCC): ICD-10-CM

## 2024-01-29 DIAGNOSIS — E78.00 HYPERCHOLESTEREMIA: Primary | ICD-10-CM

## 2024-01-29 DIAGNOSIS — I25.810 ARTERIOSCLEROSIS OF ARTERIAL CORONARY ARTERY BYPASS GRAFT: ICD-10-CM

## 2024-01-29 DIAGNOSIS — I10 ESSENTIAL HYPERTENSION: ICD-10-CM

## 2024-01-29 DIAGNOSIS — N18.32 STAGE 3B CHRONIC KIDNEY DISEASE (HCC): ICD-10-CM

## 2024-01-29 DIAGNOSIS — Z00.00 MEDICARE ANNUAL WELLNESS VISIT, SUBSEQUENT: ICD-10-CM

## 2024-01-29 DIAGNOSIS — E66.01 MORBIDLY OBESE (HCC): ICD-10-CM

## 2024-01-29 DIAGNOSIS — I50.22 CHRONIC SYSTOLIC (CONGESTIVE) HEART FAILURE (HCC): ICD-10-CM

## 2024-01-29 DIAGNOSIS — K21.9 GASTROESOPHAGEAL REFLUX DISEASE WITHOUT ESOPHAGITIS: ICD-10-CM

## 2024-01-29 DIAGNOSIS — I25.10 CORONARY ARTERY DISEASE INVOLVING NATIVE CORONARY ARTERY OF NATIVE HEART WITHOUT ANGINA PECTORIS: ICD-10-CM

## 2024-01-29 DIAGNOSIS — E11.8 TYPE 2 DIABETES MELLITUS WITH COMPLICATION, WITHOUT LONG-TERM CURRENT USE OF INSULIN (HCC): ICD-10-CM

## 2024-01-29 DIAGNOSIS — Z85.528 HISTORY OF PRIMARY MALIGNANT NEOPLASM OF LEFT KIDNEY: ICD-10-CM

## 2024-01-29 DIAGNOSIS — N18.30 CHRONIC RENAL FAILURE, STAGE 3 (MODERATE), UNSPECIFIED WHETHER STAGE 3A OR 3B CKD (HCC): ICD-10-CM

## 2024-01-29 DIAGNOSIS — Z13.0 SCREENING FOR DEFICIENCY ANEMIA: ICD-10-CM

## 2024-01-29 DIAGNOSIS — N25.81 SECONDARY HYPERPARATHYROIDISM (HCC): ICD-10-CM

## 2024-01-29 LAB — HBA1C MFR BLD: 6.8 %

## 2024-01-29 PROCEDURE — G8484 FLU IMMUNIZE NO ADMIN: HCPCS | Performed by: INTERNAL MEDICINE

## 2024-01-29 PROCEDURE — 3017F COLORECTAL CA SCREEN DOC REV: CPT | Performed by: INTERNAL MEDICINE

## 2024-01-29 PROCEDURE — 1123F ACP DISCUSS/DSCN MKR DOCD: CPT | Performed by: INTERNAL MEDICINE

## 2024-01-29 PROCEDURE — 3078F DIAST BP <80 MM HG: CPT | Performed by: INTERNAL MEDICINE

## 2024-01-29 PROCEDURE — G0439 PPPS, SUBSEQ VISIT: HCPCS | Performed by: INTERNAL MEDICINE

## 2024-01-29 PROCEDURE — 3075F SYST BP GE 130 - 139MM HG: CPT | Performed by: INTERNAL MEDICINE

## 2024-01-29 PROCEDURE — 3044F HG A1C LEVEL LT 7.0%: CPT | Performed by: INTERNAL MEDICINE

## 2024-01-29 RX ORDER — AMLODIPINE BESYLATE 10 MG/1
10 TABLET ORAL DAILY
COMMUNITY
Start: 2023-10-31

## 2024-01-29 ASSESSMENT — PATIENT HEALTH QUESTIONNAIRE - PHQ9
1. LITTLE INTEREST OR PLEASURE IN DOING THINGS: 0
SUM OF ALL RESPONSES TO PHQ QUESTIONS 1-9: 0
SUM OF ALL RESPONSES TO PHQ9 QUESTIONS 1 & 2: 0
2. FEELING DOWN, DEPRESSED OR HOPELESS: 0
SUM OF ALL RESPONSES TO PHQ QUESTIONS 1-9: 0

## 2024-01-29 ASSESSMENT — LIFESTYLE VARIABLES
HOW OFTEN DO YOU HAVE A DRINK CONTAINING ALCOHOL: 2-4 TIMES A MONTH
HOW MANY STANDARD DRINKS CONTAINING ALCOHOL DO YOU HAVE ON A TYPICAL DAY: 1 OR 2

## 2024-01-29 NOTE — PATIENT INSTRUCTIONS
process of getting there. Everyone starts somewhere.  How can you find safe ways to stay active?  Talk with your doctor about any physical challenges you're facing. Make a plan with your doctor if you have a health problem or aren't sure how to get started with activity.  If you're already active, ask your doctor if there is anything you should change to stay safe as your body and health change.  If you tend to feel dizzy after you take medicine, avoid activity at that time. Try being active before you take your medicine. This will reduce your risk of falls.  If you plan to be active at home, make sure to clear your space before you get started. Remove things like TV cords, coffee tables, and throw rugs. It's safest to have plenty of space to move freely.  The key to getting more active is to take it slow and steady. Try to improve only a little bit at a time. Pick just one area to improve on at first. And if an activity hurts, stop and talk to your doctor.  Where can you learn more?  Go to https://www.payworks.net/patientEd and enter P600 to learn more about \"Learning About Being Active as an Older Adult.\"  Current as of: June 6, 2023               Content Version: 13.9  © 3188-5990 [employer].   Care instructions adapted under license by [employer]. If you have questions about a medical condition or this instruction, always ask your healthcare professional. [employer] disclaims any warranty or liability for your use of this information.           Starting a Weight Loss Plan: Care Instructions  Overview     If you're thinking about losing weight, it can be hard to know where to start. Your doctor can help you set up a weight loss plan that best meets your needs. You may want to take a class on nutrition or exercise, or you could join a weight loss support group. If you have questions about how to make changes to your eating or exercise habits, ask your doctor about seeing a

## 2024-02-09 ENCOUNTER — OFFICE VISIT (OUTPATIENT)
Age: 74
End: 2024-02-09
Payer: MEDICARE

## 2024-02-09 VITALS
WEIGHT: 237.4 LBS | BODY MASS INDEX: 34.06 KG/M2 | SYSTOLIC BLOOD PRESSURE: 136 MMHG | TEMPERATURE: 98.6 F | HEART RATE: 91 BPM | OXYGEN SATURATION: 96 % | RESPIRATION RATE: 20 BRPM | DIASTOLIC BLOOD PRESSURE: 72 MMHG

## 2024-02-09 DIAGNOSIS — J06.9 URI WITH COUGH AND CONGESTION: ICD-10-CM

## 2024-02-09 DIAGNOSIS — J02.9 PHARYNGITIS, UNSPECIFIED ETIOLOGY: Primary | ICD-10-CM

## 2024-02-09 LAB — S PYO AG THROAT QL: NORMAL

## 2024-02-09 PROCEDURE — G8428 CUR MEDS NOT DOCUMENT: HCPCS | Performed by: NURSE PRACTITIONER

## 2024-02-09 PROCEDURE — 3017F COLORECTAL CA SCREEN DOC REV: CPT | Performed by: NURSE PRACTITIONER

## 2024-02-09 PROCEDURE — 1123F ACP DISCUSS/DSCN MKR DOCD: CPT | Performed by: NURSE PRACTITIONER

## 2024-02-09 PROCEDURE — 3078F DIAST BP <80 MM HG: CPT | Performed by: NURSE PRACTITIONER

## 2024-02-09 PROCEDURE — G8484 FLU IMMUNIZE NO ADMIN: HCPCS | Performed by: NURSE PRACTITIONER

## 2024-02-09 PROCEDURE — 1036F TOBACCO NON-USER: CPT | Performed by: NURSE PRACTITIONER

## 2024-02-09 PROCEDURE — 3075F SYST BP GE 130 - 139MM HG: CPT | Performed by: NURSE PRACTITIONER

## 2024-02-09 PROCEDURE — G8417 CALC BMI ABV UP PARAM F/U: HCPCS | Performed by: NURSE PRACTITIONER

## 2024-02-09 PROCEDURE — 99213 OFFICE O/P EST LOW 20 MIN: CPT | Performed by: NURSE PRACTITIONER

## 2024-02-09 RX ORDER — METHYLPREDNISOLONE 4 MG/1
TABLET ORAL
Qty: 1 KIT | Refills: 0 | Status: SHIPPED | OUTPATIENT
Start: 2024-02-09

## 2024-02-09 ASSESSMENT — ENCOUNTER SYMPTOMS
EYES NEGATIVE: 1
ALLERGIC/IMMUNOLOGIC NEGATIVE: 1
DIARRHEA: 0
SINUS PRESSURE: 0
ABDOMINAL PAIN: 0
SORE THROAT: 1
RHINORRHEA: 1
NAUSEA: 0
COUGH: 1
VOMITING: 0
SHORTNESS OF BREATH: 0

## 2024-02-09 ASSESSMENT — VISUAL ACUITY: OU: 1

## 2024-02-09 NOTE — PATIENT INSTRUCTIONS
Plenty of fluids  Rest  OTC Tylenol as needed  Medrol dose pack as directed  May continue Mucinex as needed for cough  Monitor glucose at home 1-2 times daily for the next week since you are diabetic

## 2024-02-09 NOTE — PROGRESS NOTES
Encounter   Procedures    POCT rapid strep A     Results for orders placed or performed in visit on 02/09/24   POCT rapid strep A   Result Value Ref Range    Strep A Ag None Detected None Detected   Spoke with patient and he does not need an antibiotic today. He does not think he has COVID. He says he was just told last week he was diabetic. I have agreed to a medrol dose pack last A!C was 6.5. He has agreed to check his glucose 1-2 times daily at home wile taking the Medrol dose pack as I explained steroids cause elevation in blood glucose. He agrees to plan of care and voiced understanding.     No follow-ups on file.    Orders Placed This Encounter   Medications    methylPREDNISolone (MEDROL DOSEPACK) 4 MG tablet     Sig: Take by mouth.     Dispense:  1 kit     Refill:  0        Patient Instructions   Plenty of fluids  Rest  OTC Tylenol as needed  Medrol dose pack as directed  May continue Mucinex as needed for cough  Monitor glucose at home 1-2 times daily for the next week since you are diabetic       Patient given educational materials- see patient instructions.  Discussed use, benefit, and side effects of prescribedmedications.  All patient questions answered.  Pt voiced understanding.       Electronically signed by MISSY Robertson CNP on 2/9/2024 at 10:18 AM

## 2024-02-19 RX ORDER — ATORVASTATIN CALCIUM 40 MG/1
40 TABLET, FILM COATED ORAL DAILY
Qty: 90 TABLET | Refills: 1 | Status: SHIPPED | OUTPATIENT
Start: 2024-02-19

## 2024-02-19 NOTE — TELEPHONE ENCOUNTER
Sony Rodriguez called to request a refill on his medication.      Last office visit : 1/29/2024   Next office visit : 7/30/2024     Requested Prescriptions     Pending Prescriptions Disp Refills    atorvastatin (LIPITOR) 40 MG tablet [Pharmacy Med Name: ATORVASTATIN CALCIUM 40 MG 40 Tablet] 90 tablet 1     Sig: TAKE 1 TABLET BY MOUTH DAILY            Elsi Chicas LPN

## 2024-04-11 LAB
25(OH)D3 SERPL-MCNC: 75.5 NG/ML
ALBUMIN SERPL-MCNC: 4.4 G/DL (ref 3.5–5.2)
ALP SERPL-CCNC: 80 U/L (ref 40–130)
ALT SERPL-CCNC: 16 U/L (ref 5–41)
ANION GAP SERPL CALCULATED.3IONS-SCNC: 11 MMOL/L (ref 7–19)
AST SERPL-CCNC: 15 U/L (ref 5–40)
BASOPHILS # BLD: 0.1 K/UL (ref 0–0.2)
BASOPHILS NFR BLD: 0.6 % (ref 0–1)
BILIRUB SERPL-MCNC: 0.5 MG/DL (ref 0.2–1.2)
BILIRUB UR QL STRIP: NEGATIVE
BUN SERPL-MCNC: 26 MG/DL (ref 8–23)
CALCIUM SERPL-MCNC: 9.6 MG/DL (ref 8.8–10.2)
CHLORIDE SERPL-SCNC: 107 MMOL/L (ref 98–111)
CLARITY UR: CLEAR
CO2 SERPL-SCNC: 25 MMOL/L (ref 22–29)
COLOR UR: YELLOW
CREAT SERPL-MCNC: 1.7 MG/DL (ref 0.5–1.2)
CREAT UR-MCNC: 238.5 MG/DL (ref 39–259)
EOSINOPHIL # BLD: 0.4 K/UL (ref 0–0.6)
EOSINOPHIL NFR BLD: 4.7 % (ref 0–5)
ERYTHROCYTE [DISTWIDTH] IN BLOOD BY AUTOMATED COUNT: 13.3 % (ref 11.5–14.5)
GLUCOSE SERPL-MCNC: 111 MG/DL (ref 74–109)
GLUCOSE UR STRIP.AUTO-MCNC: NEGATIVE MG/DL
HCT VFR BLD AUTO: 40.1 % (ref 42–52)
HGB BLD-MCNC: 12.8 G/DL (ref 14–18)
HGB UR STRIP.AUTO-MCNC: NEGATIVE MG/L
IMM GRANULOCYTES # BLD: 0 K/UL
KETONES UR STRIP.AUTO-MCNC: NEGATIVE MG/DL
LEUKOCYTE ESTERASE UR QL STRIP.AUTO: NEGATIVE
LYMPHOCYTES # BLD: 0.8 K/UL (ref 1.1–4.5)
LYMPHOCYTES NFR BLD: 10.6 % (ref 20–40)
MAGNESIUM SERPL-MCNC: 2.2 MG/DL (ref 1.6–2.4)
MCH RBC QN AUTO: 30.5 PG (ref 27–31)
MCHC RBC AUTO-ENTMCNC: 31.9 G/DL (ref 33–37)
MCV RBC AUTO: 95.7 FL (ref 80–94)
MONOCYTES # BLD: 0.8 K/UL (ref 0–0.9)
MONOCYTES NFR BLD: 10.5 % (ref 0–10)
NEUTROPHILS # BLD: 5.8 K/UL (ref 1.5–7.5)
NEUTS SEG NFR BLD: 73.2 % (ref 50–65)
NITRITE UR QL STRIP.AUTO: NEGATIVE
PH UR STRIP.AUTO: 5.5 [PH] (ref 5–8)
PHOSPHATE SERPL-MCNC: 3.6 MG/DL (ref 2.5–4.5)
PLATELET # BLD AUTO: 165 K/UL (ref 130–400)
PMV BLD AUTO: 11.6 FL (ref 9.4–12.4)
POTASSIUM SERPL-SCNC: 4.5 MMOL/L (ref 3.5–5)
PROT SERPL-MCNC: 6.7 G/DL (ref 6.6–8.7)
PROT UR STRIP.AUTO-MCNC: ABNORMAL MG/DL
PROT UR-MCNC: 21 MG/DL (ref 15–45)
PTH-INTACT SERPL-MCNC: 65.8 PG/ML (ref 15–65)
RBC # BLD AUTO: 4.19 M/UL (ref 4.7–6.1)
SODIUM SERPL-SCNC: 143 MMOL/L (ref 136–145)
SP GR UR STRIP.AUTO: 1.02 (ref 1–1.03)
URATE SERPL-MCNC: 5 MG/DL (ref 3.4–7)
UROBILINOGEN UR STRIP.AUTO-MCNC: 0.2 E.U./DL
WBC # BLD AUTO: 7.9 K/UL (ref 4.8–10.8)

## 2024-05-01 NOTE — PATIENT INSTRUCTIONS
Followup With Dr. Cade Tomas in 6mo  Refer to Dr. Francia Danielson for PCP  Work on weight loss    Call with any questions or concerns  Follow up with Tito Lawson MD for non cardiac problems  Report any new problems  Cardiovascular Fitness-Exercise as tolerated. Strive for 15 minutes of exercise most days of the week. Cardiac / Healthy Diet  Continue current medications as directed  Continue plan of treatment  It is always recommended that you bring your medications bottles with you to each visit - this is for your safety! Patient Education        Learning About Coronary Artery Disease (CAD)  What is coronary artery disease? Coronary artery disease (CAD) occurs when plaque builds up in the arteries that bring oxygen-rich blood to your heart. Plaque is a fatty substance made of cholesterol, calcium, and other substances in the blood. This process is called hardening of the arteries, or atherosclerosis. What happens when you have coronary artery disease? · Plaque may narrow the coronary arteries. Narrowed arteries cause poor blood flow. This can lead to angina symptoms such as chest pain or discomfort. If blood flow is completely blocked, you could have a heart attack. · You can slow CAD and reduce the risk of future problems by making changes in your lifestyle. These include quitting smoking and eating heart-healthy foods. · Treatments for CAD, along with changes in your lifestyle, can help you live a longer and healthier life. How can you prevent coronary artery disease? · Do not smoke. It may be the best thing you can do to prevent heart disease. If you need help quitting, talk to your doctor about stop-smoking programs and medicines. These can increase your chances of quitting for good. · Be active. Get at least 30 minutes of exercise on most days of the week. Walking is a good choice. You also may want to do other activities, such as running, swimming, cycling, or playing tennis or team sports.   · Eat
yesterday

## 2024-05-08 ENCOUNTER — PATIENT ROUNDING (BHMG ONLY) (OUTPATIENT)
Dept: FAMILY MEDICINE CLINIC | Facility: CLINIC | Age: 74
End: 2024-05-08
Payer: MEDICARE

## 2024-05-08 ENCOUNTER — OFFICE VISIT (OUTPATIENT)
Dept: FAMILY MEDICINE CLINIC | Facility: CLINIC | Age: 74
End: 2024-05-08
Payer: MEDICARE

## 2024-05-08 VITALS
BODY MASS INDEX: 32.21 KG/M2 | OXYGEN SATURATION: 96 % | HEIGHT: 70 IN | RESPIRATION RATE: 20 BRPM | WEIGHT: 225 LBS | SYSTOLIC BLOOD PRESSURE: 125 MMHG | HEART RATE: 69 BPM | TEMPERATURE: 97.9 F | DIASTOLIC BLOOD PRESSURE: 60 MMHG

## 2024-05-08 DIAGNOSIS — Z12.5 PROSTATE CANCER SCREENING: ICD-10-CM

## 2024-05-08 DIAGNOSIS — Z90.5 H/O RIGHT NEPHRECTOMY: ICD-10-CM

## 2024-05-08 DIAGNOSIS — M10.9 URIC ACID ARTHROPATHY: Chronic | ICD-10-CM

## 2024-05-08 DIAGNOSIS — N18.32 ANEMIA DUE TO STAGE 3B CHRONIC KIDNEY DISEASE: Chronic | ICD-10-CM

## 2024-05-08 DIAGNOSIS — E11.22 TYPE 2 DIABETES MELLITUS WITH CHRONIC KIDNEY DISEASE, WITHOUT LONG-TERM CURRENT USE OF INSULIN, UNSPECIFIED CKD STAGE: Primary | ICD-10-CM

## 2024-05-08 DIAGNOSIS — E78.00 HYPERCHOLESTEROLEMIA: Chronic | ICD-10-CM

## 2024-05-08 DIAGNOSIS — K21.00 GASTROESOPHAGEAL REFLUX DISEASE WITH ESOPHAGITIS WITHOUT HEMORRHAGE: Chronic | ICD-10-CM

## 2024-05-08 DIAGNOSIS — D63.1 ANEMIA DUE TO STAGE 3B CHRONIC KIDNEY DISEASE: Chronic | ICD-10-CM

## 2024-05-08 DIAGNOSIS — I10 PRIMARY HYPERTENSION: Chronic | ICD-10-CM

## 2024-05-08 DIAGNOSIS — I25.118 CORONARY ARTERY DISEASE OF NATIVE ARTERY OF NATIVE HEART WITH STABLE ANGINA PECTORIS: Chronic | ICD-10-CM

## 2024-05-08 DIAGNOSIS — Z85.528: ICD-10-CM

## 2024-05-08 DIAGNOSIS — E66.9 OBESITY (BMI 30-39.9): ICD-10-CM

## 2024-05-08 DIAGNOSIS — Z87.891 HX OF SMOKING: ICD-10-CM

## 2024-05-08 DIAGNOSIS — E83.52 HYPERCALCEMIA: ICD-10-CM

## 2024-05-08 DIAGNOSIS — R79.89 ELEVATED PARATHYROID HORMONE: Chronic | ICD-10-CM

## 2024-05-08 DIAGNOSIS — M19.90 ARTHRITIS: Chronic | ICD-10-CM

## 2024-05-08 RX ORDER — ASPIRIN 81 MG/1
1 TABLET ORAL DAILY
COMMUNITY

## 2024-05-08 RX ORDER — ISOSORBIDE DINITRATE 30 MG/1
30 TABLET ORAL 2 TIMES DAILY
Qty: 60 TABLET | Refills: 1 | Status: SHIPPED | OUTPATIENT
Start: 2024-05-08

## 2024-05-08 RX ORDER — LISINOPRIL 40 MG/1
TABLET ORAL
COMMUNITY
Start: 2024-02-26

## 2024-05-08 RX ORDER — FAMOTIDINE 20 MG/1
20 TABLET, FILM COATED ORAL 2 TIMES DAILY
COMMUNITY

## 2024-05-08 RX ORDER — ATORVASTATIN CALCIUM 40 MG/1
1 TABLET, FILM COATED ORAL DAILY
COMMUNITY
Start: 2024-02-19

## 2024-05-08 RX ORDER — CLOPIDOGREL BISULFATE 75 MG/1
TABLET ORAL
COMMUNITY
Start: 2024-03-11

## 2024-05-08 RX ORDER — CARVEDILOL 3.12 MG/1
TABLET ORAL
COMMUNITY
Start: 2024-03-11

## 2024-05-08 RX ORDER — BLACK COHOSH ROOT EXTRACT 80 MG
CAPSULE ORAL
COMMUNITY

## 2024-05-08 RX ORDER — ISOSORBIDE DINITRATE 30 MG/1
30 TABLET ORAL 4 TIMES DAILY
COMMUNITY
End: 2024-05-08 | Stop reason: SDUPTHER

## 2024-05-08 RX ORDER — NITROGLYCERIN 0.4 MG/1
0.4 TABLET SUBLINGUAL
COMMUNITY

## 2024-05-08 RX ORDER — ALLOPURINOL 100 MG/1
100 TABLET ORAL 2 TIMES DAILY
COMMUNITY

## 2024-05-10 PROBLEM — M19.90 ARTHRITIS: Status: ACTIVE | Noted: 2024-05-10

## 2024-05-10 PROBLEM — I10 PRIMARY HYPERTENSION: Chronic | Status: ACTIVE | Noted: 2024-05-10

## 2024-05-10 PROBLEM — R79.89 ELEVATED PARATHYROID HORMONE: Chronic | Status: ACTIVE | Noted: 2024-05-10

## 2024-05-10 PROBLEM — N18.32 ANEMIA DUE TO STAGE 3B CHRONIC KIDNEY DISEASE: Chronic | Status: ACTIVE | Noted: 2024-05-10

## 2024-05-10 PROBLEM — Z85.528: Status: ACTIVE | Noted: 2024-05-10

## 2024-05-10 PROBLEM — E83.52 HYPERCALCEMIA: Status: ACTIVE | Noted: 2024-05-10

## 2024-05-10 PROBLEM — K21.00 GASTROESOPHAGEAL REFLUX DISEASE WITH ESOPHAGITIS WITHOUT HEMORRHAGE: Chronic | Status: ACTIVE | Noted: 2024-05-10

## 2024-05-10 PROBLEM — I25.118 CORONARY ARTERY DISEASE OF NATIVE ARTERY OF NATIVE HEART WITH STABLE ANGINA PECTORIS: Chronic | Status: ACTIVE | Noted: 2024-05-10

## 2024-05-10 PROBLEM — D63.1 ANEMIA DUE TO STAGE 3B CHRONIC KIDNEY DISEASE: Chronic | Status: ACTIVE | Noted: 2024-05-10

## 2024-05-10 PROBLEM — Z87.891 HX OF SMOKING: Status: ACTIVE | Noted: 2024-05-10

## 2024-05-10 PROBLEM — E66.9 OBESITY (BMI 30-39.9): Status: ACTIVE | Noted: 2024-05-10

## 2024-05-10 PROBLEM — E78.00 HYPERCHOLESTEROLEMIA: Chronic | Status: ACTIVE | Noted: 2024-05-10

## 2024-05-10 PROBLEM — Z90.5 H/O RIGHT NEPHRECTOMY: Status: ACTIVE | Noted: 2024-05-10

## 2024-05-20 DIAGNOSIS — E79.0 ELEVATED BLOOD URIC ACID LEVEL: ICD-10-CM

## 2024-05-20 DIAGNOSIS — M10.9 GOUT, UNSPECIFIED CAUSE, UNSPECIFIED CHRONICITY, UNSPECIFIED SITE: ICD-10-CM

## 2024-05-20 RX ORDER — ALLOPURINOL 100 MG/1
TABLET ORAL
Qty: 180 TABLET | Refills: 1 | Status: SHIPPED | OUTPATIENT
Start: 2024-05-20

## 2024-05-20 NOTE — TELEPHONE ENCOUNTER
Sony Rodriguez called to request a refill on his medication.      Last office visit : 1/29/2024   Next office visit : 7/30/2024     Requested Prescriptions     Pending Prescriptions Disp Refills    allopurinol (ZYLOPRIM) 100 MG tablet [Pharmacy Med Name: ALLOPURINOL 100 MG TABS 100 Tablet] 180 tablet 1     Sig: TAKE ONE TABLET BY MOUTH TWO TIMES A DAY            Elsi Chicas LPN

## 2024-05-28 RX ORDER — FAMOTIDINE 20 MG/1
20 TABLET, FILM COATED ORAL 2 TIMES DAILY
Qty: 90 TABLET | Refills: 1 | Status: SHIPPED | OUTPATIENT
Start: 2024-05-28 | End: 2024-08-26

## 2024-05-28 NOTE — TELEPHONE ENCOUNTER
Sony Rodriguez called to request a refill on his medication.      Last office visit : 1/29/2024   Next office visit : 7/30/2024     Requested Prescriptions     Pending Prescriptions Disp Refills    famotidine (PEPCID) 20 MG tablet [Pharmacy Med Name: FAMOTIDINE 20 MG TABS 20 Tablet] 90 tablet 1     Sig: TAKE 1 TABLET BY MOUTH 2 TIMES DAILY            Sumaya Cordon LPN

## 2024-06-19 RX ORDER — CARVEDILOL 3.12 MG/1
3.12 TABLET ORAL 2 TIMES DAILY WITH MEALS
Qty: 180 TABLET | Refills: 3 | Status: SHIPPED | OUTPATIENT
Start: 2024-06-19 | End: 2024-09-17

## 2024-06-19 NOTE — TELEPHONE ENCOUNTER
Sony F Lizbetnathaly called to request a refill on his medication.      Last office visit : 1/29/2024   Next office visit : 7/30/2024     Requested Prescriptions     Pending Prescriptions Disp Refills    carvedilol (COREG) 3.125 MG tablet [Pharmacy Med Name: CARVEDILOL 3.125 MG TABS 3.125 Tablet] 180 tablet 3     Sig: TAKE 1 TABLET BY MOUTH 2 TIMES DAILY (WITH MEALS)            Elsi Chicas LPN

## 2024-07-15 ENCOUNTER — HOSPITAL ENCOUNTER (OUTPATIENT)
Dept: GENERAL RADIOLOGY | Age: 74
Discharge: HOME | End: 2024-07-15
Payer: MEDICARE

## 2024-07-15 ENCOUNTER — HOSPITAL ENCOUNTER (OUTPATIENT)
Dept: CT IMAGING | Age: 74
Discharge: HOME | End: 2024-07-15
Payer: MEDICARE

## 2024-07-15 DIAGNOSIS — R91.1 PULMONARY NODULE: ICD-10-CM

## 2024-07-15 DIAGNOSIS — C64.1 MALIGNANT NEOPLASM OF RIGHT KIDNEY, EXCEPT RENAL PELVIS (HCC): ICD-10-CM

## 2024-07-15 PROCEDURE — 71046 X-RAY EXAM CHEST 2 VIEWS: CPT

## 2024-07-15 PROCEDURE — 74176 CT ABD & PELVIS W/O CONTRAST: CPT

## 2024-07-22 ENCOUNTER — TELEPHONE (OUTPATIENT)
Dept: HEMATOLOGY | Age: 74
End: 2024-07-22

## 2024-07-22 DIAGNOSIS — Z85.528 ENCOUNTER FOR FOLLOW-UP SURVEILLANCE OF KIDNEY CANCER: ICD-10-CM

## 2024-07-22 DIAGNOSIS — C64.1 MALIGNANT NEOPLASM OF RIGHT KIDNEY, EXCEPT RENAL PELVIS (HCC): Primary | ICD-10-CM

## 2024-07-22 DIAGNOSIS — Z08 ENCOUNTER FOR FOLLOW-UP SURVEILLANCE OF KIDNEY CANCER: ICD-10-CM

## 2024-07-22 DIAGNOSIS — R91.1 PULMONARY NODULE: ICD-10-CM

## 2024-07-22 NOTE — TELEPHONE ENCOUNTER
Scheduled CT Chest per provider request.  Called pt to inform but had to LVM.  If pt calls back, please inform him of the following appt:    CT Chest: August 1, 2024 arrive at 11:00am [address]    *NPO 4hrs prior

## 2024-07-29 ENCOUNTER — LAB (OUTPATIENT)
Dept: LAB | Facility: HOSPITAL | Age: 74
End: 2024-07-29
Payer: MEDICARE

## 2024-07-29 DIAGNOSIS — E11.22 TYPE 2 DIABETES MELLITUS WITH CHRONIC KIDNEY DISEASE, WITHOUT LONG-TERM CURRENT USE OF INSULIN, UNSPECIFIED CKD STAGE: ICD-10-CM

## 2024-07-29 DIAGNOSIS — E83.52 HYPERCALCEMIA: ICD-10-CM

## 2024-07-29 DIAGNOSIS — Z12.5 PROSTATE CANCER SCREENING: ICD-10-CM

## 2024-07-29 LAB
ALBUMIN SERPL-MCNC: 4.5 G/DL (ref 3.5–5)
ALBUMIN UR-MCNC: <1.2 MG/DL
ALBUMIN/GLOB SERPL: 1.7 G/DL (ref 1.1–2.5)
ALP SERPL-CCNC: 88 U/L (ref 24–120)
ALT SERPL W P-5'-P-CCNC: 24 U/L (ref 0–50)
ANION GAP SERPL CALCULATED.3IONS-SCNC: 9 MMOL/L (ref 4–13)
AST SERPL-CCNC: 24 U/L (ref 7–45)
AUTO MIXED CELLS #: 0.8 10*3/MM3 (ref 0.1–2.6)
AUTO MIXED CELLS %: 11.1 % (ref 0.1–24)
BILIRUB SERPL-MCNC: 0.5 MG/DL (ref 0.1–1)
BUN SERPL-MCNC: 25 MG/DL (ref 5–21)
BUN/CREAT SERPL: 15.2
CALCIUM SPEC-SCNC: 9.5 MG/DL (ref 8.6–10.5)
CALCIUM SPEC-SCNC: 9.7 MG/DL (ref 8.6–10.5)
CHLORIDE SERPL-SCNC: 107 MMOL/L (ref 98–110)
CHOLEST SERPL-MCNC: 102 MG/DL (ref 130–200)
CO2 SERPL-SCNC: 28 MMOL/L (ref 24–31)
CREAT SERPL-MCNC: 1.65 MG/DL (ref 0.5–1.4)
EGFRCR SERPLBLD CKD-EPI 2021: 43.3 ML/MIN/1.73
ERYTHROCYTE [DISTWIDTH] IN BLOOD BY AUTOMATED COUNT: 13.1 % (ref 12.3–15.4)
GLOBULIN UR ELPH-MCNC: 2.6 GM/DL
GLUCOSE SERPL-MCNC: 101 MG/DL (ref 70–100)
HBA1C MFR BLD: 5.7 % (ref 4.8–5.9)
HCT VFR BLD AUTO: 41.7 % (ref 37.5–51)
HDLC SERPL-MCNC: 44 MG/DL
HGB BLD-MCNC: 13.5 G/DL (ref 13–17.7)
LDLC SERPL CALC-MCNC: 46 MG/DL (ref 0–99)
LDLC/HDLC SERPL: 1.1 {RATIO}
LYMPHOCYTES # BLD AUTO: 1 10*3/MM3 (ref 0.7–3.1)
LYMPHOCYTES NFR BLD AUTO: 13.9 % (ref 19.6–45.3)
MCH RBC QN AUTO: 30.6 PG (ref 26.6–33)
MCHC RBC AUTO-ENTMCNC: 32.4 G/DL (ref 31.5–35.7)
MCV RBC AUTO: 94.6 FL (ref 79–97)
NEUTROPHILS NFR BLD AUTO: 5.5 10*3/MM3 (ref 1.7–7)
NEUTROPHILS NFR BLD AUTO: 75 % (ref 42.7–76)
PLATELET # BLD AUTO: 161 10*3/MM3 (ref 140–450)
PMV BLD AUTO: 10.6 FL (ref 6–12)
POTASSIUM SERPL-SCNC: 4.7 MMOL/L (ref 3.5–5.3)
PROT SERPL-MCNC: 7.1 G/DL (ref 6.3–8.7)
PSA SERPL-MCNC: 3.04 NG/ML (ref 0–4)
PTH-INTACT SERPL-MCNC: 39.5 PG/ML (ref 15–65)
RBC # BLD AUTO: 4.41 10*6/MM3 (ref 4.14–5.8)
SODIUM SERPL-SCNC: 144 MMOL/L (ref 135–145)
TRIGL SERPL-MCNC: 47 MG/DL (ref 0–149)
TSH SERPL DL<=0.05 MIU/L-ACNC: 1.05 UIU/ML (ref 0.27–4.2)
VLDLC SERPL-MCNC: 12 MG/DL (ref 5–40)
WBC NRBC COR # BLD AUTO: 7.3 10*3/MM3 (ref 3.4–10.8)

## 2024-07-29 PROCEDURE — 36415 COLL VENOUS BLD VENIPUNCTURE: CPT

## 2024-07-29 PROCEDURE — 80053 COMPREHEN METABOLIC PANEL: CPT

## 2024-07-29 PROCEDURE — 83036 HEMOGLOBIN GLYCOSYLATED A1C: CPT

## 2024-07-29 PROCEDURE — 83970 ASSAY OF PARATHORMONE: CPT

## 2024-07-29 PROCEDURE — 84443 ASSAY THYROID STIM HORMONE: CPT

## 2024-07-29 PROCEDURE — G0103 PSA SCREENING: HCPCS

## 2024-07-29 PROCEDURE — 82043 UR ALBUMIN QUANTITATIVE: CPT

## 2024-07-29 PROCEDURE — 85025 COMPLETE CBC W/AUTO DIFF WBC: CPT

## 2024-07-29 PROCEDURE — 80061 LIPID PANEL: CPT

## 2024-08-01 ENCOUNTER — HOSPITAL ENCOUNTER (OUTPATIENT)
Dept: CT IMAGING | Age: 74
Discharge: HOME | End: 2024-08-01
Payer: MEDICARE

## 2024-08-01 DIAGNOSIS — Z85.528 ENCOUNTER FOR FOLLOW-UP SURVEILLANCE OF KIDNEY CANCER: ICD-10-CM

## 2024-08-01 DIAGNOSIS — C64.1 MALIGNANT NEOPLASM OF RIGHT KIDNEY, EXCEPT RENAL PELVIS (HCC): ICD-10-CM

## 2024-08-01 DIAGNOSIS — R91.1 PULMONARY NODULE: ICD-10-CM

## 2024-08-01 DIAGNOSIS — Z08 ENCOUNTER FOR FOLLOW-UP SURVEILLANCE OF KIDNEY CANCER: ICD-10-CM

## 2024-08-01 PROCEDURE — 71250 CT THORAX DX C-: CPT

## 2024-08-06 ENCOUNTER — OFFICE VISIT (OUTPATIENT)
Dept: CARDIOLOGY CLINIC | Age: 74
End: 2024-08-06

## 2024-08-06 VITALS
WEIGHT: 222 LBS | BODY MASS INDEX: 31.78 KG/M2 | HEIGHT: 70 IN | DIASTOLIC BLOOD PRESSURE: 68 MMHG | HEART RATE: 68 BPM | SYSTOLIC BLOOD PRESSURE: 128 MMHG

## 2024-08-06 DIAGNOSIS — I10 ESSENTIAL HYPERTENSION: Primary | ICD-10-CM

## 2024-08-06 DIAGNOSIS — E78.00 HYPERCHOLESTEREMIA: ICD-10-CM

## 2024-08-06 DIAGNOSIS — I25.10 CORONARY ARTERY DISEASE INVOLVING NATIVE CORONARY ARTERY OF NATIVE HEART WITHOUT ANGINA PECTORIS: ICD-10-CM

## 2024-08-06 RX ORDER — SEMAGLUTIDE 1.34 MG/ML
INJECTION, SOLUTION SUBCUTANEOUS
COMMUNITY

## 2024-08-06 RX ORDER — ISOSORBIDE MONONITRATE 60 MG/1
60 TABLET, EXTENDED RELEASE ORAL DAILY
COMMUNITY
Start: 2024-05-14 | End: 2025-05-15

## 2024-08-06 NOTE — PROGRESS NOTES
hemoptysis, no wheeze,  no shortness of breath;  CARDIOVASCULAR: no palpitation, no chest pain, no shortness of breath;  GI: no abdominal pain, no nausea, no vomiting, no diarrhea, no constipation;  RAY: no dysuria, no hematuria, no frequency or urgency, no nephrolithiasis;  MUSCULOSKELETAL: no joint pain, no swelling, no stiffness;  ENDOCRINE: no polyuria, no polydipsia, no cold or heat intolerance;  HEMATOLOGY: no easy bruising or bleeding, no history of clotting disorder;  DERMATOLOGY: no skin rash, no eczema, no pruritus;  NEUROLOGY: no syncope, no seizures, no numbness or tingling of hands, no numbness or tingling of feet, no paresis;     Vitals signs:  /80 (Site: Left Upper Arm, Position: Sitting)   Pulse 73   Temp 98.6 °F (37 °C) (Temporal)   Ht 1.778 m (5' 10\")   Wt 102 kg (224 lb 14.4 oz)   SpO2 93%   BMI 32.27 kg/m²    Pain scale:     PHYSICAL EXAM:  CONSTITUTIONAL: Alert, appropriate, no acute distress  EYES: Non icteric, EOM intact, pupils equal round   ENT: Mucus membranes moist, no oral pharyngeal lesions, external inspection of ears and nose are normal.  NECK: Supple, no masses.  No palpable thyroid mass  CHEST/LUNGS: CTA bilaterally, normal respiratory effort   CARDIOVASCULAR: RRR, no murmurs.  No lower extremity edema  ABDOMEN: soft non-tender, active bowel sounds, no HSM.  No palpable masses  EXTREMITIES: warm, full ROM in all 4 extremities, no focal weakness.  SKIN: warm, dry with no rashes or lesions  LYMPH: No cervical, clavicular, axillary, or inguinal lymphadenopathy  NEUROLOGIC: follows commands, non focal     Relevant Lab findings/reviewed by me:  7/29/24 Labs ([facility]): PTH 39.5, Calcium 9.5, PSA 3.040  7/29/24 CMP ([facility]):     7/29/24 CBC ([facility]):        Relevant Imaging studies/reviewed by me:  7/19/24 R Chest (2 view): No acute cardiopulmonary process. No definite nodules or masses. A somewhat nodular area on the lateral views becomes more ill-defined on the repeat lateral view

## 2024-08-06 NOTE — PATIENT INSTRUCTIONS
Maintain good blood pressure control-goal<130/80 at rest  Maintain good cholesterol control LDL goal<70 with arterial disease  If you are diabetic work to keep/obtain hemoglobin A1c< 7    Follow up in 6-9 mos    Call with any questions or concerns  Follow up with Arabella Geronimo APRN - NP for non cardiac problems  Report any new problems  Cardiovascular Fitness-Exercise as tolerated.  Strive for 30 minutes of exercise most days of the week.    Cardiac / Healthy Diet- Avoid processed high fat foods, maintain low sodium/salt   Continue current medications as directed  Continue plan of treatment  It is always recommended that you bring your medications bottles with you to each visit - this is for your safety!    PCP: Dr. Anirudh Prabhakar  Heme/Onc: Dr. Drew Ramos

## 2024-08-06 NOTE — PROGRESS NOTES
[facility]  [address] [address]  Phone: [phone]  Fax: [phone]    OFFICE VISIT:  2024    Sony Rodriguez - : 1950    Reason For Visit:  Sony is a 74 y.o. male who is here for 6 Month Follow-Up (No symptoms) and Coronary Artery Disease  History of coronary disease with CABG in .  2021 had complex PCI to the distal main and LAD.  Also has hypertension, hyperlipidemia, CKD, renal cell cancer with right nephrectomy, diabetes and previous smoker.    He is here in follow up  Hhe is active and doing well.    He  saw cardiologist in [place] in May of this year.  Isosorbide was switched to long-acting.  Nuclear stress test negative for ischemia and 2023      Subjective  Sony denies exertional chest pain, shortness of breath, orthopnea, paroxysmal nocturnal dyspnea, syncope, presyncope, arrhythmia, edema and fatigue.  The patient denies numbness or weakness to suggest cerebrovascular accident or transient ischemic attack.    Arabella Geronimo, MISSY - NP is PCP and follows labs including lipids.  Follows with Dr. Abbasi for nephrology.  States all his labs have been very stable  Sony Rodriguez has the following history as recorded in [facility]:    Patient Active Problem List    Diagnosis Date Noted    Abnormal nuclear cardiac imaging test     Secondary hyperparathyroidism (HCC) 2023    Chronic systolic (congestive) heart failure 2022    Chronic low back pain 2022    History of primary malignant neoplasm of left kidney 2024    Arteriosclerosis of arterial coronary artery bypass graft 2023    Close exposure to COVID-19 virus 2021    History of 2019 novel coronavirus disease (COVID-19) 2020    Pneumonia due to COVID-19 virus 2020    Abnormal findings on cardiac catheterization 10/23/2020    Angina pectoris (HCC) 2020    Coronary artery disease involving left main coronary artery 2020

## 2024-08-07 ENCOUNTER — OFFICE VISIT (OUTPATIENT)
Dept: HEMATOLOGY | Age: 74
End: 2024-08-07
Payer: MEDICARE

## 2024-08-07 ENCOUNTER — HOSPITAL ENCOUNTER (OUTPATIENT)
Dept: INFUSION THERAPY | Age: 74
Discharge: HOME | End: 2024-08-07
Payer: MEDICARE

## 2024-08-07 VITALS
BODY MASS INDEX: 32.2 KG/M2 | OXYGEN SATURATION: 93 % | TEMPERATURE: 98.6 F | HEART RATE: 73 BPM | WEIGHT: 224.9 LBS | HEIGHT: 70 IN | SYSTOLIC BLOOD PRESSURE: 112 MMHG | DIASTOLIC BLOOD PRESSURE: 80 MMHG

## 2024-08-07 DIAGNOSIS — Z71.89 CARE PLAN DISCUSSED WITH PATIENT: ICD-10-CM

## 2024-08-07 DIAGNOSIS — N18.30 CHRONIC RENAL FAILURE, STAGE 3 (MODERATE), UNSPECIFIED WHETHER STAGE 3A OR 3B CKD (HCC): ICD-10-CM

## 2024-08-07 DIAGNOSIS — C64.1 MALIGNANT NEOPLASM OF RIGHT KIDNEY, EXCEPT RENAL PELVIS (HCC): Primary | ICD-10-CM

## 2024-08-07 DIAGNOSIS — Z85.528 ENCOUNTER FOR FOLLOW-UP SURVEILLANCE OF KIDNEY CANCER: ICD-10-CM

## 2024-08-07 DIAGNOSIS — Z08 ENCOUNTER FOR FOLLOW-UP SURVEILLANCE OF KIDNEY CANCER: ICD-10-CM

## 2024-08-07 PROCEDURE — 3074F SYST BP LT 130 MM HG: CPT | Performed by: INTERNAL MEDICINE

## 2024-08-07 PROCEDURE — 99212 OFFICE O/P EST SF 10 MIN: CPT

## 2024-08-07 PROCEDURE — G8427 DOCREV CUR MEDS BY ELIG CLIN: HCPCS | Performed by: INTERNAL MEDICINE

## 2024-08-07 PROCEDURE — 1123F ACP DISCUSS/DSCN MKR DOCD: CPT | Performed by: INTERNAL MEDICINE

## 2024-08-07 PROCEDURE — 1036F TOBACCO NON-USER: CPT | Performed by: INTERNAL MEDICINE

## 2024-08-07 PROCEDURE — G8417 CALC BMI ABV UP PARAM F/U: HCPCS | Performed by: INTERNAL MEDICINE

## 2024-08-07 PROCEDURE — 99212 OFFICE O/P EST SF 10 MIN: CPT | Performed by: INTERNAL MEDICINE

## 2024-08-07 PROCEDURE — 3079F DIAST BP 80-89 MM HG: CPT | Performed by: INTERNAL MEDICINE

## 2024-08-07 PROCEDURE — 3017F COLORECTAL CA SCREEN DOC REV: CPT | Performed by: INTERNAL MEDICINE

## 2024-08-08 ENCOUNTER — OFFICE VISIT (OUTPATIENT)
Dept: FAMILY MEDICINE CLINIC | Facility: CLINIC | Age: 74
End: 2024-08-08
Payer: MEDICARE

## 2024-08-08 VITALS
OXYGEN SATURATION: 97 % | HEART RATE: 67 BPM | SYSTOLIC BLOOD PRESSURE: 130 MMHG | BODY MASS INDEX: 31.64 KG/M2 | TEMPERATURE: 97.7 F | HEIGHT: 70 IN | DIASTOLIC BLOOD PRESSURE: 70 MMHG | RESPIRATION RATE: 20 BRPM | WEIGHT: 221 LBS

## 2024-08-08 DIAGNOSIS — Z85.528: Primary | ICD-10-CM

## 2024-08-08 DIAGNOSIS — E86.0 DEHYDRATION: ICD-10-CM

## 2024-08-08 DIAGNOSIS — Z12.11 COLON CANCER SCREENING: ICD-10-CM

## 2024-08-08 DIAGNOSIS — Z90.5 H/O RIGHT NEPHRECTOMY: ICD-10-CM

## 2024-08-08 DIAGNOSIS — D63.1 ANEMIA DUE TO STAGE 3B CHRONIC KIDNEY DISEASE: ICD-10-CM

## 2024-08-08 DIAGNOSIS — N18.32 ANEMIA DUE TO STAGE 3B CHRONIC KIDNEY DISEASE: ICD-10-CM

## 2024-08-08 NOTE — PROGRESS NOTES
NOHEMI Dumont  [facility]   Family Medicine  [address]  [place] [address]  Phone: [phone]  Fax: [phone]         Chief Complaint:  Chief Complaint   Patient presents with    3-month follow up     Follow up on lab work from 7/29/24. Patient stated that he is stable on current medications.        History:  Reinaldo Barth is a 74 y.o. male.  History of Present Illness  The patient presents for evaluation of multiple medical concerns.    Chronic kidney disease: He reports overall good health but is concerned about his creatinine levels. His nephrologist, Dr. Perea, has reassured him that his condition is stable. He has been making efforts to stay hydrated and has been consuming more water than before. His favorite beverage is vitamin water, which he also consumes when on the road. He has also been taking an iron supplement daily.    Need for colonoscopy: He is due for a colonoscopy, as he has not had one for several years.  Patient's oncologist Dr. Padilla at [facility] oncology discussed with patient his likelihood of kidney cancer recurrence.  He reports that his recurrence rate is low considering the fact he is 5 years out.  Plan for them to discontinue his frequent screenings.  He did recommend the patient continue having PSA performed.  He also recommends that patient had an up-to-date colonoscopy.  Patient request referral for colonoscopy.      Supplemental Information  He had kidney cancer in 02/2019.       ROS:  Review of Systems   Constitutional:  Negative for fatigue, fever and unexpected weight change.   HENT:  Negative for congestion, ear pain, rhinorrhea, sinus pressure, sinus pain and voice change.    Eyes:  Negative for visual disturbance.   Respiratory:  Negative for shortness of breath and wheezing.    Cardiovascular:  Negative for chest pain and palpitations.   Gastrointestinal:  Negative for abdominal pain, nausea and vomiting.   Genitourinary:   "Negative for dysuria and flank pain.   Musculoskeletal:  Negative for back pain, myalgias and neck pain.   Skin:  Negative for color change and rash.   Neurological:  Negative for dizziness, weakness, numbness and headaches.   Psychiatric/Behavioral:  Negative for behavioral problems, dysphoric mood, self-injury and sleep disturbance.         reports that he has never smoked. He has never used smokeless tobacco. He reports that he does not drink alcohol and does not use drugs.    Current Outpatient Medications   Medication Instructions    allopurinol (ZYLOPRIM) 100 mg, Oral, 2 Times Daily    aspirin 81 MG EC tablet 1 tablet, Oral, Daily    atorvastatin (LIPITOR) 40 MG tablet 1 tablet, Oral, Daily    Bioflavonoid Products (Quercetin Complex Immune) capsule Oral    brompheniramine-pseudoephedrine-DM 30-2-10 MG/5ML syrup 5 mL, Oral, 4 Times Daily PRN    carvedilol (COREG) 3.125 MG tablet TAKE 1 TABLET BY MOUTH 2 TIMES DAILY (WITH MEALS)    clopidogrel (PLAVIX) 75 MG tablet TAKE 1 TABLET (75 MG TOTAL) BY MOUTH DAILY    famotidine (PEPCID) 20 mg, Oral, 2 Times Daily    isosorbide dinitrate (ISORDIL) 30 mg, Oral, 2 Times Daily    lisinopril (PRINIVIL,ZESTRIL) 40 MG tablet TAKE 1 TABLET (40 MG TOTAL) BY MOUTH DAILY    multivitamin with minerals (MULTIVITAMIN ADULT PO) 1 tablet, Oral, Daily    nitroglycerin (NITROSTAT) 0.4 mg, Every 5 Minutes PRN    Semaglutide (1 MG/DOSE) (OZEMPIC) 1 mg, Subcutaneous, Weekly       OBJECTIVE:  /70 (BP Location: Left arm, Patient Position: Sitting, Cuff Size: Adult)   Pulse 67   Temp 97.7 °F (36.5 °C) (Infrared)   Resp 20   Ht 177.8 cm (70\")   Wt 100 kg (221 lb)   SpO2 97%   BMI 31.71 kg/m²    Physical Exam  Vitals and nursing note reviewed.   Constitutional:       Appearance: Normal appearance. He is well-developed.   HENT:      Head: Normocephalic and atraumatic.      Right Ear: Tympanic membrane, ear canal and external ear normal.      Left Ear: Tympanic membrane, ear canal " and external ear normal.      Nose: Nose normal. No septal deviation, nasal tenderness or congestion.      Mouth/Throat:      Lips: Pink. No lesions.      Mouth: Mucous membranes are moist. No oral lesions.      Dentition: Normal dentition.      Pharynx: Oropharynx is clear. No pharyngeal swelling, oropharyngeal exudate or posterior oropharyngeal erythema.   Eyes:      General: Lids are normal. Vision grossly intact. No scleral icterus.        Right eye: No discharge.         Left eye: No discharge.      Extraocular Movements: Extraocular movements intact.      Conjunctiva/sclera: Conjunctivae normal.      Right eye: Right conjunctiva is not injected.      Left eye: Left conjunctiva is not injected.      Pupils: Pupils are equal, round, and reactive to light.   Neck:      Thyroid: No thyroid mass.      Trachea: Trachea normal.   Cardiovascular:      Rate and Rhythm: Normal rate and regular rhythm.      Heart sounds: Normal heart sounds. No murmur heard.     No gallop.   Pulmonary:      Effort: Pulmonary effort is normal.      Breath sounds: Normal breath sounds and air entry. No wheezing, rhonchi or rales.   Musculoskeletal:         General: No tenderness or deformity. Normal range of motion.      Cervical back: Full passive range of motion without pain, normal range of motion and neck supple.      Thoracic back: Normal.      Right lower leg: No edema.      Left lower leg: No edema.   Skin:     General: Skin is warm and dry.      Coloration: Skin is not jaundiced.      Findings: No rash.   Neurological:      Mental Status: He is alert and oriented to person, place, and time.      Sensory: Sensation is intact.      Motor: Motor function is intact.      Coordination: Coordination is intact.      Gait: Gait is intact.      Deep Tendon Reflexes: Reflexes are normal and symmetric.   Psychiatric:         Mood and Affect: Mood and affect normal.         Behavior: Behavior normal.         Judgment: Judgment normal.        Physical Exam           Procedures    Results  Laboratory Studies  Creatinine levels at 1.65. GFR at 43.3.    Assessment/Plan:     Diagnoses and all orders for this visit:    1. History of cancer of kidney in adulthood (Primary)    2. H/O right nephrectomy    3. Colon cancer screening  -     Cancel: Ambulatory Referral to Gastroenterology  -     Ambulatory Referral to Gastroenterology    4. Anemia due to stage 3b chronic kidney disease    5. Dehydration          An After Visit Summary was printed and given to the patient at discharge.  Return in about 3 months (around 11/8/2024).       Assessment & Plan      I spent 25 minutes caring for Reinaldo on this date of service. This time includes time spent by me in the following activities: preparing for the visit, reviewing tests, performing a medically appropriate examination and/or evaluation, counseling and educating the patient/family/caregiver, documenting information in the medical record, independently interpreting results and communicating that information with the patient/family/caregiver, and care coordination     : I have been treating this patient over a continuum addressing both chronic and acute care concerns.     Razia SONG 8/9/2024   Electronically signed.    Patient or patient representative verbalized consent for the use of Ambient Listening during the visit with  NOHEMI Dumont for chart documentation. 8/9/2024  18:55 CDT

## 2024-08-19 RX ORDER — ATORVASTATIN CALCIUM 40 MG/1
40 TABLET, FILM COATED ORAL DAILY
Qty: 90 TABLET | Refills: 1 | OUTPATIENT
Start: 2024-08-19

## 2024-08-23 RX ORDER — ATORVASTATIN CALCIUM 40 MG/1
40 TABLET, FILM COATED ORAL DAILY
Qty: 90 TABLET | Refills: 1 | OUTPATIENT
Start: 2024-08-23

## 2024-08-26 RX ORDER — ATORVASTATIN CALCIUM 40 MG/1
40 TABLET, FILM COATED ORAL DAILY
Qty: 90 TABLET | Refills: 1 | OUTPATIENT
Start: 2024-08-26

## 2024-08-28 DIAGNOSIS — E78.00 HYPERCHOLESTEROLEMIA: Chronic | ICD-10-CM

## 2024-08-28 RX ORDER — ATORVASTATIN CALCIUM 40 MG/1
40 TABLET, FILM COATED ORAL DAILY
Qty: 90 TABLET | Refills: 1 | Status: SHIPPED | OUTPATIENT
Start: 2024-08-28

## 2024-09-25 ENCOUNTER — OFFICE VISIT (OUTPATIENT)
Dept: GASTROENTEROLOGY | Facility: CLINIC | Age: 74
End: 2024-09-25
Payer: MEDICARE

## 2024-09-25 VITALS
TEMPERATURE: 98 F | SYSTOLIC BLOOD PRESSURE: 132 MMHG | WEIGHT: 219 LBS | HEART RATE: 83 BPM | OXYGEN SATURATION: 98 % | DIASTOLIC BLOOD PRESSURE: 68 MMHG | BODY MASS INDEX: 31.35 KG/M2 | HEIGHT: 70 IN

## 2024-09-25 DIAGNOSIS — Z12.11 ENCOUNTER FOR SCREENING FOR MALIGNANT NEOPLASM OF COLON: Primary | ICD-10-CM

## 2024-09-25 DIAGNOSIS — D68.9 COAGULOPATHY: ICD-10-CM

## 2024-09-25 RX ORDER — POLYETHYLENE GLYCOL 3350, SODIUM SULFATE ANHYDROUS, SODIUM BICARBONATE, SODIUM CHLORIDE, POTASSIUM CHLORIDE 236; 22.74; 6.74; 5.86; 2.97 G/4L; G/4L; G/4L; G/4L; G/4L
4 POWDER, FOR SOLUTION ORAL ONCE
Qty: 4000 ML | Refills: 0 | Status: SHIPPED | OUTPATIENT
Start: 2024-09-25 | End: 2024-09-25

## 2024-09-25 RX ORDER — AMLODIPINE BESYLATE 10 MG/1
10 TABLET ORAL DAILY
COMMUNITY

## 2024-10-13 ENCOUNTER — PREP FOR SURGERY (OUTPATIENT)
Dept: OTHER | Facility: HOSPITAL | Age: 74
End: 2024-10-13
Payer: MEDICARE

## 2024-10-13 ENCOUNTER — TELEPHONE (OUTPATIENT)
Dept: GASTROENTEROLOGY | Facility: CLINIC | Age: 74
End: 2024-10-13
Payer: MEDICARE

## 2024-10-13 DIAGNOSIS — Z12.11 ENCOUNTER FOR SCREENING FOR MALIGNANT NEOPLASM OF COLON: Primary | ICD-10-CM

## 2024-10-13 RX ORDER — POLYETHYLENE GLYCOL 3350, SODIUM SULFATE ANHYDROUS, SODIUM BICARBONATE, SODIUM CHLORIDE, POTASSIUM CHLORIDE 236; 22.74; 6.74; 5.86; 2.97 G/4L; G/4L; G/4L; G/4L; G/4L
4 POWDER, FOR SOLUTION ORAL ONCE
Qty: 4000 ML | Refills: 0 | Status: SHIPPED | OUTPATIENT
Start: 2024-10-13 | End: 2024-10-13

## 2024-10-13 NOTE — TELEPHONE ENCOUNTER
Clare, please let patient know that  has approved him to hold Plavix 5 days prior to a colonoscopy.  Make sure that you write on Dr. Baltazar scope schedule for that day that the patient was off Plavix for 5 days.  Schedule colonoscopy.  He will have to take GoLytely prep due to elevated creatinine.

## 2024-10-15 PROBLEM — Z12.11 ENCOUNTER FOR SCREENING FOR MALIGNANT NEOPLASM OF COLON: Status: ACTIVE | Noted: 2024-10-13

## 2024-10-15 LAB
25(OH)D3 SERPL-MCNC: 114.1 NG/ML
ALBUMIN SERPL-MCNC: 4.7 G/DL (ref 3.5–5.2)
ALP SERPL-CCNC: 92 U/L (ref 40–129)
ALT SERPL-CCNC: 15 U/L (ref 5–41)
ANION GAP SERPL CALCULATED.3IONS-SCNC: 11 MMOL/L (ref 7–19)
AST SERPL-CCNC: 17 U/L (ref 5–40)
BACTERIA URNS QL MICRO: NEGATIVE /HPF
BASOPHILS # BLD: 0.1 K/UL (ref 0–0.2)
BASOPHILS NFR BLD: 0.7 % (ref 0–1)
BILIRUB SERPL-MCNC: 0.4 MG/DL (ref 0.2–1.2)
BILIRUB UR QL STRIP: NEGATIVE
BUN SERPL-MCNC: 33 MG/DL (ref 8–23)
CALCIUM SERPL-MCNC: 10 MG/DL (ref 8.8–10.2)
CHLORIDE SERPL-SCNC: 105 MMOL/L (ref 98–111)
CLARITY UR: CLEAR
CO2 SERPL-SCNC: 27 MMOL/L (ref 22–29)
COLOR UR: YELLOW
CREAT SERPL-MCNC: 1.6 MG/DL (ref 0.7–1.2)
CREAT UR-MCNC: 27 MG/DL (ref 39–259)
CRYSTALS URNS MICRO: NORMAL /HPF
EOSINOPHIL # BLD: 0.3 K/UL (ref 0–0.6)
EOSINOPHIL NFR BLD: 4.6 % (ref 0–5)
EPI CELLS #/AREA URNS AUTO: 0 /HPF (ref 0–5)
ERYTHROCYTE [DISTWIDTH] IN BLOOD BY AUTOMATED COUNT: 12.6 % (ref 11.5–14.5)
GLUCOSE SERPL-MCNC: 102 MG/DL (ref 70–99)
GLUCOSE UR STRIP.AUTO-MCNC: NEGATIVE MG/DL
HCT VFR BLD AUTO: 42.6 % (ref 42–52)
HGB BLD-MCNC: 13.3 G/DL (ref 14–18)
HGB UR STRIP.AUTO-MCNC: NEGATIVE MG/L
HYALINE CASTS #/AREA URNS AUTO: 0 /HPF (ref 0–8)
IMM GRANULOCYTES # BLD: 0 K/UL
KETONES UR STRIP.AUTO-MCNC: NEGATIVE MG/DL
LEUKOCYTE ESTERASE UR QL STRIP.AUTO: ABNORMAL
LYMPHOCYTES # BLD: 1 K/UL (ref 1.1–4.5)
LYMPHOCYTES NFR BLD: 13.8 % (ref 20–40)
MAGNESIUM SERPL-MCNC: 2.4 MG/DL (ref 1.6–2.4)
MCH RBC QN AUTO: 29.8 PG (ref 27–31)
MCHC RBC AUTO-ENTMCNC: 31.2 G/DL (ref 33–37)
MCV RBC AUTO: 95.5 FL (ref 80–94)
MONOCYTES # BLD: 0.8 K/UL (ref 0–0.9)
MONOCYTES NFR BLD: 11.2 % (ref 0–10)
NEUTROPHILS # BLD: 5 K/UL (ref 1.5–7.5)
NEUTS SEG NFR BLD: 69.3 % (ref 50–65)
NITRITE UR QL STRIP.AUTO: NEGATIVE
PH UR STRIP.AUTO: 6 [PH] (ref 5–8)
PHOSPHATE SERPL-MCNC: 3.5 MG/DL (ref 2.5–4.5)
PLATELET # BLD AUTO: 162 K/UL (ref 130–400)
PMV BLD AUTO: 11.9 FL (ref 9.4–12.4)
POTASSIUM SERPL-SCNC: 5.3 MMOL/L (ref 3.5–5)
PROT SERPL-MCNC: 7.2 G/DL (ref 6.4–8.3)
PROT UR STRIP.AUTO-MCNC: NEGATIVE MG/DL
PROT UR-MCNC: <4 MG/DL (ref 0–12)
PTH-INTACT SERPL-MCNC: 42.6 PG/ML (ref 15–65)
RBC # BLD AUTO: 4.46 M/UL (ref 4.7–6.1)
RBC #/AREA URNS AUTO: 0 /HPF (ref 0–4)
SODIUM SERPL-SCNC: 143 MMOL/L (ref 136–145)
SP GR UR STRIP.AUTO: 1.01 (ref 1–1.03)
URATE SERPL-MCNC: 5.2 MG/DL (ref 3.4–7)
UROBILINOGEN UR STRIP.AUTO-MCNC: 0.2 E.U./DL
WBC # BLD AUTO: 7.3 K/UL (ref 4.8–10.8)
WBC #/AREA URNS AUTO: 1 /HPF (ref 0–5)

## 2024-10-29 ENCOUNTER — OFFICE VISIT (OUTPATIENT)
Age: 74
End: 2024-10-29

## 2024-10-29 VITALS
TEMPERATURE: 97.7 F | RESPIRATION RATE: 18 BRPM | OXYGEN SATURATION: 97 % | BODY MASS INDEX: 32.28 KG/M2 | HEART RATE: 70 BPM | SYSTOLIC BLOOD PRESSURE: 124 MMHG | DIASTOLIC BLOOD PRESSURE: 70 MMHG | WEIGHT: 225 LBS

## 2024-10-29 DIAGNOSIS — J02.9 SORE THROAT: ICD-10-CM

## 2024-10-29 DIAGNOSIS — J02.0 ACUTE STREPTOCOCCAL PHARYNGITIS: Primary | ICD-10-CM

## 2024-10-29 LAB — S PYO AG THROAT QL: POSITIVE

## 2024-10-29 RX ORDER — CEFDINIR 300 MG/1
300 CAPSULE ORAL DAILY
Qty: 7 CAPSULE | Refills: 0 | Status: SHIPPED | OUTPATIENT
Start: 2024-10-29 | End: 2024-11-05

## 2024-10-29 ASSESSMENT — ENCOUNTER SYMPTOMS
DIARRHEA: 0
ABDOMINAL PAIN: 0
FACIAL SWELLING: 0
APNEA: 0
VOMITING: 0
CHOKING: 0
COLOR CHANGE: 0
STRIDOR: 0
EYE PAIN: 0
EYE DISCHARGE: 0
ABDOMINAL DISTENTION: 0
COUGH: 0
WHEEZING: 0
SINUS PRESSURE: 0
CONSTIPATION: 0
NAUSEA: 0
SHORTNESS OF BREATH: 0
SINUS PAIN: 0
CHEST TIGHTNESS: 0
TROUBLE SWALLOWING: 0
SORE THROAT: 1
EYE REDNESS: 0

## 2024-10-29 NOTE — PROGRESS NOTES
Left Ear: Tympanic membrane, ear canal and external ear normal.      Nose: Nose normal. No congestion or rhinorrhea.      Mouth/Throat:      Mouth: Mucous membranes are moist.      Pharynx: Oropharynx is clear. Posterior oropharyngeal erythema present. No oropharyngeal exudate.      Tonsils: No tonsillar exudate. 1+ on the right. 1+ on the left.   Eyes:      Extraocular Movements: Extraocular movements intact.      Conjunctiva/sclera: Conjunctivae normal.      Pupils: Pupils are equal, round, and reactive to light.   Cardiovascular:      Rate and Rhythm: Normal rate and regular rhythm.      Pulses: Normal pulses.      Heart sounds: Normal heart sounds. No murmur heard.     No friction rub. No gallop.   Pulmonary:      Effort: Pulmonary effort is normal. No respiratory distress.      Breath sounds: Normal breath sounds. No stridor. No wheezing, rhonchi or rales.   Abdominal:      General: Bowel sounds are normal. There is no distension.      Palpations: Abdomen is soft.      Tenderness: There is no abdominal tenderness. There is no guarding.   Musculoskeletal:         General: Normal range of motion.      Cervical back: Normal range of motion.   Skin:     General: Skin is warm.      Capillary Refill: Capillary refill takes less than 2 seconds.      Coloration: Skin is not jaundiced or pale.      Findings: No erythema or rash.   Neurological:      General: No focal deficit present.      Mental Status: He is alert and oriented to person, place, and time.      Deep Tendon Reflexes: Reflexes are normal and symmetric.   Psychiatric:         Attention and Perception: Attention normal.         Mood and Affect: Mood normal.         Behavior: Behavior normal. Behavior is cooperative.         Thought Content: Thought content normal.         /70   Pulse 70   Temp 97.7 °F (36.5 °C)   Resp 18   Wt 102.1 kg (225 lb)   SpO2 97%   BMI 32.28 kg/m²     Assessment   Assessment & Plan      Diagnosis Orders   1. Acute

## 2024-10-29 NOTE — PATIENT INSTRUCTIONS
Strep test is positive    Cefdinir prescribed. Take full course of antibiotics    Monitor for fever and treat as needed with tylenol or ibuprofen    Recommended throat lozenges as needed and salt water gargles three times daily    Replace toothbrush in 24-48 hours after antibiotics are started    The patient is to follow up with PCP or return to clinic if symptoms worsen/fail to improve.    Any condition can change, despite proper treatment. Therefore, if symptoms still persist or worsen after treatment plan intitated today, either go to the nearest ER, or call PCP, or return to UC for further evaluation.    Urgent Care evaluation today is not a substitute for PCP visit. Follow up care is your responsibility to discuss and review this UC visit.

## 2024-10-30 ENCOUNTER — TELEPHONE (OUTPATIENT)
Dept: GASTROENTEROLOGY | Facility: CLINIC | Age: 74
End: 2024-10-30
Payer: MEDICARE

## 2024-10-30 NOTE — TELEPHONE ENCOUNTER
PT called needing to know if he needs to reschedule his procedure.  PT has been to urgent care.  He has strep throat, head cold, low grade temp and is on antibiotics.  He is scheduled for 11-4-24 for a colon.  PT is on Plavix.   Reschedule him?

## 2024-10-31 ENCOUNTER — TELEPHONE (OUTPATIENT)
Dept: FAMILY MEDICINE CLINIC | Facility: CLINIC | Age: 74
End: 2024-10-31
Payer: MEDICARE

## 2024-10-31 ENCOUNTER — OFFICE VISIT (OUTPATIENT)
Dept: FAMILY MEDICINE CLINIC | Facility: CLINIC | Age: 74
End: 2024-10-31
Payer: MEDICARE

## 2024-10-31 VITALS
TEMPERATURE: 99.4 F | HEIGHT: 70 IN | OXYGEN SATURATION: 96 % | WEIGHT: 223 LBS | HEART RATE: 79 BPM | SYSTOLIC BLOOD PRESSURE: 138 MMHG | DIASTOLIC BLOOD PRESSURE: 72 MMHG | BODY MASS INDEX: 31.92 KG/M2

## 2024-10-31 DIAGNOSIS — J06.9 UPPER RESPIRATORY TRACT INFECTION, UNSPECIFIED TYPE: Primary | ICD-10-CM

## 2024-10-31 DIAGNOSIS — J02.0 STREP THROAT: ICD-10-CM

## 2024-10-31 PROCEDURE — 3078F DIAST BP <80 MM HG: CPT | Performed by: NURSE PRACTITIONER

## 2024-10-31 PROCEDURE — 3044F HG A1C LEVEL LT 7.0%: CPT | Performed by: NURSE PRACTITIONER

## 2024-10-31 PROCEDURE — 3075F SYST BP GE 130 - 139MM HG: CPT | Performed by: NURSE PRACTITIONER

## 2024-10-31 PROCEDURE — 99213 OFFICE O/P EST LOW 20 MIN: CPT | Performed by: NURSE PRACTITIONER

## 2024-10-31 RX ORDER — METHYLPREDNISOLONE 4 MG/1
TABLET ORAL
Qty: 21 TABLET | Refills: 0 | Status: SHIPPED | OUTPATIENT
Start: 2024-10-31

## 2024-10-31 NOTE — PROGRESS NOTES
NOHEMI Quinteros  [facility]   Family Medicine  [address]  [place] [address]  Phone: [phone]  Fax: [phone]         Chief Complaint:  Chief Complaint   Patient presents with    Cough     Productive with green phlegm    Sinusitis        History:  Reinaldo Barth is a 74 y.o. male that is an established patient. He  is here for evaluation of the above complaint.    Cough  Associated symptoms include postnasal drip and rhinorrhea. Pertinent negatives include no fever.   Sinusitis  Associated symptoms include congestion and coughing.      History of Present Illness  The patient is a 74-year-old male who presents for evaluation of a sore throat.    He reports waking up with a sore throat on Monday night, which led him to seek care at an urgent care facility on Tuesday morning. There, he was diagnosed with strep throat and prescribed antibiotics. Currently, his throat feels fine, but he experiences fatigue and a slight mental fog. He will continue his antibiotic treatment for the next 4 days.    He also mentions a runny nose that started yesterday, for which he has been taking Mucinex. He had to wake up multiple times last night due to the need to clear his nose. This morning, he had a minor cough that produced some green phlegm, but the coughing has since subsided. He reports no sinus pressure but has a persistent runny nose. He occasionally experiences a sensation of fullness or popping in his right ear but reports no ear pain.    He suspects he may have contracted the illness from his granddaughter.    He has a history of kidney cancer, which was treated at [facility]. He also had a severe case of pneumonia and was admitted to the hospital, where he tested positive for COVID-19.      Results  Laboratory Studies  Strep throat test was positive.       ROS:  Review of Systems   Constitutional: Negative.  Negative for fatigue and fever.   HENT:  Positive for congestion, postnasal  "drip and rhinorrhea.    Respiratory:  Positive for cough.    Cardiovascular: Negative.          reports that he has never smoked. He has never used smokeless tobacco. He reports current alcohol use. He reports that he does not use drugs.    Current Outpatient Medications   Medication Instructions    allopurinol (ZYLOPRIM) 100 mg, 2 Times Daily    amLODIPine (NORVASC) 10 mg, Daily    aspirin 81 MG EC tablet 1 tablet, Daily    atorvastatin (LIPITOR) 40 mg, Oral, Daily    Bioflavonoid Products (Quercetin Complex Immune) capsule Take  by mouth.    brompheniramine-pseudoephedrine-DM 30-2-10 MG/5ML syrup 5 mL, Oral, 4 Times Daily PRN    carvedilol (COREG) 3.125 MG tablet TAKE 1 TABLET BY MOUTH 2 TIMES DAILY (WITH MEALS)    clopidogrel (PLAVIX) 75 MG tablet TAKE 1 TABLET (75 MG TOTAL) BY MOUTH DAILY    famotidine (PEPCID) 20 mg, 2 Times Daily    Ferrous Sulfate (IRON PO) Daily    isosorbide dinitrate (ISORDIL) 30 mg, Oral, 2 Times Daily    lisinopril (PRINIVIL,ZESTRIL) 40 MG tablet TAKE 1 TABLET (40 MG TOTAL) BY MOUTH DAILY    methylPREDNISolone (MEDROL) 4 MG dose pack Take as directed on package instructions.    Multiple Vitamins-Minerals (OCUVITE ADULT FORMULA PO) Daily    multivitamin with minerals (MULTIVITAMIN ADULT PO) 1 tablet, Daily    nitroglycerin (NITROSTAT) 0.4 mg, Every 5 Minutes PRN    Semaglutide (1 MG/DOSE) (OZEMPIC) 1 mg, Subcutaneous, Weekly       OBJECTIVE:  /72   Pulse 79   Temp 99.4 °F (37.4 °C)   Ht 177.8 cm (70\")   Wt 101 kg (223 lb)   SpO2 96%   BMI 32.00 kg/m²    Physical Exam  Vitals and nursing note reviewed.   Constitutional:       Appearance: Normal appearance.   HENT:      Head: Normocephalic and atraumatic.      Right Ear: Tympanic membrane, ear canal and external ear normal. There is no impacted cerumen.      Left Ear: Tympanic membrane, ear canal and external ear normal. There is no impacted cerumen.      Ears:      Comments: Bilateral hearing aids     Nose: Nose normal.   Eyes: "      Conjunctiva/sclera: Conjunctivae normal.   Cardiovascular:      Rate and Rhythm: Normal rate and regular rhythm.   Pulmonary:      Effort: Pulmonary effort is normal. No respiratory distress.      Breath sounds: Normal breath sounds. No wheezing or rales.   Neurological:      General: No focal deficit present.      Mental Status: He is alert and oriented to person, place, and time.   Psychiatric:         Mood and Affect: Mood normal.         Behavior: Behavior normal.       Physical Exam  Throat appears red.    Procedures    Assessment/Plan:     Diagnoses and all orders for this visit:    1. Upper respiratory tract infection, unspecified type (Primary)  -     methylPREDNISolone (MEDROL) 4 MG dose pack; Take as directed on package instructions.  Dispense: 21 tablet; Refill: 0    2. Strep throat           Assessment & Plan  1. Strep Throat.  He tested positive for strep throat at urgent care and was prescribed an antibiotic. He will continue the antibiotic for the next 4 days. His throat is no longer sore, and he reports no ear pain. His throat remains red upon examination.    2. Runny Nose and Congestion.  He has been experiencing a runny nose and congestion, with occasional coughing and green sputum. There is no sinus pressure. Oral steroids are recommended to reduce inflammation and drainage. He will continue taking Mucinex.        An After Visit Summary was printed and given to the patient at discharge.  Return for wants to reschedule 11/2024 visit to January for AWV.       There are no Patient Instructions on file for this visit.      Discussion:     I spent 28 minutes caring for Reinaldo on this date of service. This time includes time spent by me in the following activities: preparing for the visit, reviewing tests, performing a medically appropriate examination and/or evaluation, counseling and educating the patient/family/caregiver, documenting information in the medical record, independently interpreting  results and communicating that information with the patient/family/caregiver, care coordination, ordering medications, obtaining a separately obtained history, and reviewing a separately obtained history   Patient or patient representative verbalized consent for the use of Ambient Listening during the visit with  NOHEMI Quinteros for chart documentation. 10/31/2024  14:27 CDT    Wilma SONG 10/31/2024   Electronically signed.

## 2024-10-31 NOTE — TELEPHONE ENCOUNTER
Caller: Reinaldo Barth    Relationship: Self    Best call back number:     [phone]       What medication are you requesting: HEAD STUFFY, COUGHING UP GREEN STUFF    What are your current symptoms: NOT FEELING WELL    How long have you been experiencing symptoms: 3 DAYS    Have you had these symptoms before:    [x] Yes  [] No    Have you been treated for these symptoms before:   [x] Yes  [] No    If a prescription is needed, what is your preferred pharmacy and phone number: [facility] - [place] [address] [address] - [phone] [facility] [phone] FX         PLEASE CALL PATIENT BACK WITH ADVISEMENT

## 2024-11-19 DIAGNOSIS — M10.9 GOUT, UNSPECIFIED CAUSE, UNSPECIFIED CHRONICITY, UNSPECIFIED SITE: ICD-10-CM

## 2024-11-19 DIAGNOSIS — E79.0 ELEVATED BLOOD URIC ACID LEVEL: ICD-10-CM

## 2024-11-19 RX ORDER — ALLOPURINOL 100 MG/1
TABLET ORAL
Qty: 180 TABLET | Refills: 1 | OUTPATIENT
Start: 2024-11-19

## 2024-12-10 ENCOUNTER — ANESTHESIA EVENT (OUTPATIENT)
Dept: GASTROENTEROLOGY | Facility: HOSPITAL | Age: 74
End: 2024-12-10
Payer: MEDICARE

## 2024-12-10 ENCOUNTER — ANESTHESIA (OUTPATIENT)
Dept: GASTROENTEROLOGY | Facility: HOSPITAL | Age: 74
End: 2024-12-10
Payer: MEDICARE

## 2024-12-10 ENCOUNTER — HOSPITAL ENCOUNTER (OUTPATIENT)
Facility: HOSPITAL | Age: 74
Setting detail: HOSPITAL OUTPATIENT SURGERY
Discharge: HOME | End: 2024-12-10
Attending: INTERNAL MEDICINE | Admitting: INTERNAL MEDICINE
Payer: MEDICARE

## 2024-12-10 VITALS
SYSTOLIC BLOOD PRESSURE: 112 MMHG | HEIGHT: 70 IN | RESPIRATION RATE: 14 BRPM | TEMPERATURE: 96.8 F | WEIGHT: 215.8 LBS | OXYGEN SATURATION: 97 % | HEART RATE: 75 BPM | DIASTOLIC BLOOD PRESSURE: 70 MMHG | BODY MASS INDEX: 30.9 KG/M2

## 2024-12-10 DIAGNOSIS — Z12.11 ENCOUNTER FOR SCREENING FOR MALIGNANT NEOPLASM OF COLON: ICD-10-CM

## 2024-12-10 PROCEDURE — 88305 TISSUE EXAM BY PATHOLOGIST: CPT | Performed by: INTERNAL MEDICINE

## 2024-12-10 PROCEDURE — 25010000002 LIDOCAINE PF 2% 2 % SOLUTION

## 2024-12-10 PROCEDURE — 45385 COLONOSCOPY W/LESION REMOVAL: CPT | Performed by: INTERNAL MEDICINE

## 2024-12-10 PROCEDURE — 25010000002 PROPOFOL 10 MG/ML EMULSION

## 2024-12-10 RX ORDER — ONDANSETRON 2 MG/ML
4 INJECTION INTRAMUSCULAR; INTRAVENOUS ONCE AS NEEDED
Status: DISCONTINUED | OUTPATIENT
Start: 2024-12-10 | End: 2024-12-10 | Stop reason: HOSPADM

## 2024-12-10 RX ORDER — LIDOCAINE HYDROCHLORIDE 10 MG/ML
0.5 INJECTION, SOLUTION EPIDURAL; INFILTRATION; INTRACAUDAL; PERINEURAL ONCE AS NEEDED
Status: DISCONTINUED | OUTPATIENT
Start: 2024-12-10 | End: 2024-12-10 | Stop reason: HOSPADM

## 2024-12-10 RX ORDER — SODIUM CHLORIDE 9 MG/ML
500 INJECTION, SOLUTION INTRAVENOUS CONTINUOUS PRN
Status: DISCONTINUED | OUTPATIENT
Start: 2024-12-10 | End: 2024-12-10 | Stop reason: HOSPADM

## 2024-12-10 RX ORDER — SODIUM CHLORIDE 0.9 % (FLUSH) 0.9 %
10 SYRINGE (ML) INJECTION EVERY 12 HOURS SCHEDULED
Status: DISCONTINUED | OUTPATIENT
Start: 2024-12-10 | End: 2024-12-10 | Stop reason: HOSPADM

## 2024-12-10 RX ORDER — LIDOCAINE HYDROCHLORIDE 20 MG/ML
INJECTION, SOLUTION EPIDURAL; INFILTRATION; INTRACAUDAL; PERINEURAL AS NEEDED
Status: DISCONTINUED | OUTPATIENT
Start: 2024-12-10 | End: 2024-12-10 | Stop reason: SURG

## 2024-12-10 RX ORDER — SODIUM CHLORIDE 0.9 % (FLUSH) 0.9 %
10 SYRINGE (ML) INJECTION AS NEEDED
Status: DISCONTINUED | OUTPATIENT
Start: 2024-12-10 | End: 2024-12-10 | Stop reason: HOSPADM

## 2024-12-10 RX ORDER — SODIUM CHLORIDE 9 MG/ML
40 INJECTION, SOLUTION INTRAVENOUS AS NEEDED
Status: DISCONTINUED | OUTPATIENT
Start: 2024-12-10 | End: 2024-12-10 | Stop reason: HOSPADM

## 2024-12-10 RX ORDER — PROPOFOL 10 MG/ML
VIAL (ML) INTRAVENOUS AS NEEDED
Status: DISCONTINUED | OUTPATIENT
Start: 2024-12-10 | End: 2024-12-10 | Stop reason: SURG

## 2024-12-10 RX ADMIN — LIDOCAINE HYDROCHLORIDE 50 MG: 20 INJECTION, SOLUTION EPIDURAL; INFILTRATION; INTRACAUDAL; PERINEURAL at 10:08

## 2024-12-10 RX ADMIN — Medication 10 ML: at 08:42

## 2024-12-10 RX ADMIN — PROPOFOL 250 MG: 10 INJECTION, EMULSION INTRAVENOUS at 10:08

## 2024-12-10 RX ADMIN — PROPOFOL 30 MG: 10 INJECTION, EMULSION INTRAVENOUS at 10:25

## 2024-12-10 NOTE — H&P
[facility] Gastroenterology  Pre Procedure History & Physical    Chief Complaint:   Screening    Subjective     HPI:   Screening    Past Medical History:   Past Medical History:   Diagnosis Date    Cancer of kidney     Hypertension     Skin cancer        Past Surgical History:  Past Surgical History:   Procedure Laterality Date    CARDIAC SURGERY      COLONOSCOPY  07/01/2009    diverticulosis    KIDNEY STONE SURGERY      NEPHRECTOMY      right , 2019       Family History:  Family History   Problem Relation Age of Onset    Colon cancer Neg Hx        Social History:   reports that he has never smoked. He has never used smokeless tobacco. He reports current alcohol use. He reports that he does not use drugs.    Medications:   Prior to Admission medications    Medication Sig Start Date End Date Taking? Authorizing Provider   allopurinol (ZYLOPRIM) 100 MG tablet Take 1 tablet by mouth 2 (Two) Times a Day.   Yes Kimberly Valdovinos MD   amLODIPine (NORVASC) 10 MG tablet Take 1 tablet by mouth Daily.   Yes ProviderKimberly MD   aspirin 81 MG EC tablet Take 1 tablet by mouth Daily.   Yes ProviderKimberly MD   atorvastatin (LIPITOR) 40 MG tablet Take 1 tablet by mouth Daily. 8/28/24  Yes Razia Reyes APRN   carvedilol (COREG) 3.125 MG tablet TAKE 1 TABLET BY MOUTH 2 TIMES DAILY (WITH MEALS) 3/11/24  Yes Kimberly Valdovinos MD   isosorbide dinitrate (ISORDIL) 30 MG tablet Take 1 tablet by mouth 2 (Two) Times a Day. 5/8/24  Yes Razia Reyes APRN   lisinopril (PRINIVIL,ZESTRIL) 40 MG tablet TAKE 1 TABLET (40 MG TOTAL) BY MOUTH DAILY 2/26/24  Yes Kimberly Valdovinos MD   Bioflavonoid Products (Quercetin Complex Immune) capsule Take  by mouth.    ProviderKimberly MD   brompheniramine-pseudoephedrine-DM 30-2-10 MG/5ML syrup Take 5 mL by mouth 4 (Four) Times a Day As Needed for Congestion or Allergies.  Patient not taking: Reported on 10/31/2024 8/25/21   Chela Adams APRN  "  clopidogrel (PLAVIX) 75 MG tablet TAKE 1 TABLET (75 MG TOTAL) BY MOUTH DAILY 3/11/24   Kimberly Valdovinos MD   famotidine (PEPCID) 20 MG tablet Take 1 tablet by mouth 2 (Two) Times a Day.    Kimberly Valdovinos MD   Ferrous Sulfate (IRON PO) Take  by mouth Daily.    Kimberly Valdovinos MD   methylPREDNISolone (MEDROL) 4 MG dose pack Take as directed on package instructions. 10/31/24   Wilma Dean APRN   Multiple Vitamins-Minerals (OCUVITE ADULT FORMULA PO) Take  by mouth Daily.    Kimberly Valdovinos MD   multivitamin with minerals (MULTIVITAMIN ADULT PO) Take 1 tablet by mouth Daily.    Kimberly Valdovinos MD   nitroglycerin (NITROSTAT) 0.4 MG SL tablet Place 1 tablet under the tongue Every 5 (Five) Minutes As Needed for Chest Pain. Take no more than 3 doses in 15 minutes.    Kimberly Valdovinos MD   Semaglutide, 1 MG/DOSE, (OZEMPIC) 4 MG/3ML solution pen-injector Inject 1 mg under the skin into the appropriate area as directed 1 (One) Time Per Week. 5/8/24   Razia Reyes APRN       Allergies:  Patient has no known allergies.    ROS:    General: Weight stable  Resp: No SOA  Cardiovascular: No CP    Objective     Blood pressure 129/74, pulse 69, temperature 96.8 °F (36 °C), temperature source Temporal, resp. rate 18, height 177.8 cm (70\"), weight 97.9 kg (215 lb 12.8 oz), SpO2 97%.    Physical Exam   Constitutional: Pt is oriented to person, place, and in no distress.   Cardiovascular: Normal rate, regular rhythm.    Pulmonary/Chest: Effort normal. No respiratory distress.   Abdominal: Non-distended.  Psychiatric: Mood, memory, affect and judgment appear normal.     Assessment & Plan     Diagnosis:  Screening    Anticipated Surgical Procedure:  Colonoscopy    The risks, benefits, and alternatives of this procedure have been discussed with the patient or the responsible party- the patient understands and agrees to proceed.    EMR Dragon/transcription disclaimer:  Much of this " encounter note is electronic transcription/translation of spoken language to printed text.  The electronic translation of spoken language may be erroneous, or at times, nonsensical words or phrases may be inadvertently transcribed.  Although I have reviewed the note for such errors, some may still exist.

## 2024-12-10 NOTE — ANESTHESIA POSTPROCEDURE EVALUATION
"Patient: Reinaldo Barth    Procedure Summary       Date: 12/10/24 Room / Location:  [facility]    Anesthesia Start: 0956 Anesthesia Stop: 1034    Procedure: COLONOSCOPY WITH ANESTHESIA Diagnosis:       Encounter for screening for malignant neoplasm of colon      (Encounter for screening for malignant neoplasm of colon [Z12.11])    Surgeons: Ubaldo Baltazar MD Provider: Kirt Higgins CRNA    Anesthesia Type: MAC ASA Status: 3            Anesthesia Type: MAC    Vitals  Vitals Value Taken Time   /63 12/10/24 1036   Temp     Pulse 64 12/10/24 1043   Resp 15 12/10/24 1035   SpO2 98 % 12/10/24 1043   Vitals shown include unfiled device data.        Post Anesthesia Care and Evaluation    Patient location during evaluation: PHASE II  Patient participation: complete - patient participated  Level of consciousness: awake  Pain management: adequate    Airway patency: patent  Anesthetic complications: No anesthetic complications    Cardiovascular status: acceptable  Respiratory status: acceptable  Hydration status: acceptable    Comments: /63   Pulse 68   Temp 96.8 °F (36 °C) (Temporal)   Resp 15   Ht 177.8 cm (70\")   Wt 97.9 kg (215 lb 12.8 oz)   SpO2 99%   BMI 30.96 kg/m²       "

## 2024-12-10 NOTE — ANESTHESIA PREPROCEDURE EVALUATION
Anesthesia Evaluation     Patient summary reviewed   no history of anesthetic complications:   NPO Solid Status: > 8 hours             Airway   Mallampati: II  Dental      Pulmonary    Cardiovascular   Exercise tolerance: good (4-7 METS)    (+) hypertension, past MI , CAD, CABG, cardiac stents , CHF Systolic <55%      Neuro/Psych  GI/Hepatic/Renal/Endo    (+) obesity, renal disease- CRI    Musculoskeletal     Abdominal    Substance History      OB/GYN          Other      history of cancer                      Anesthesia Plan    ASA 3     MAC     (Aspirin )  intravenous induction     Anesthetic plan, risks, benefits, and alternatives have been provided, discussed and informed consent has been obtained with: patient.        CODE STATUS:

## 2024-12-11 LAB
CYTO UR: NORMAL
LAB AP CASE REPORT: NORMAL
Lab: NORMAL
PATH REPORT.FINAL DX SPEC: NORMAL
PATH REPORT.GROSS SPEC: NORMAL

## 2025-01-30 ENCOUNTER — OFFICE VISIT (OUTPATIENT)
Dept: FAMILY MEDICINE CLINIC | Facility: CLINIC | Age: 75
End: 2025-01-30
Payer: MEDICARE

## 2025-01-30 VITALS
HEIGHT: 70 IN | BODY MASS INDEX: 32.21 KG/M2 | SYSTOLIC BLOOD PRESSURE: 131 MMHG | TEMPERATURE: 97.6 F | DIASTOLIC BLOOD PRESSURE: 70 MMHG | RESPIRATION RATE: 20 BRPM | WEIGHT: 225 LBS | OXYGEN SATURATION: 97 % | HEART RATE: 70 BPM

## 2025-01-30 DIAGNOSIS — E55.9 VITAMIN D DEFICIENCY: ICD-10-CM

## 2025-01-30 DIAGNOSIS — M10.9 URIC ACID ARTHROPATHY: Chronic | ICD-10-CM

## 2025-01-30 DIAGNOSIS — Z00.00 MEDICARE ANNUAL WELLNESS VISIT, SUBSEQUENT: ICD-10-CM

## 2025-01-30 DIAGNOSIS — E11.22 TYPE 2 DIABETES MELLITUS WITH CHRONIC KIDNEY DISEASE, WITHOUT LONG-TERM CURRENT USE OF INSULIN, UNSPECIFIED CKD STAGE: ICD-10-CM

## 2025-01-30 DIAGNOSIS — Z00.00 ENCOUNTER FOR ANNUAL WELLNESS VISIT (AWV) IN MEDICARE PATIENT: Primary | ICD-10-CM

## 2025-01-30 DIAGNOSIS — I10 PRIMARY HYPERTENSION: Chronic | ICD-10-CM

## 2025-01-30 DIAGNOSIS — K21.00 GASTROESOPHAGEAL REFLUX DISEASE WITH ESOPHAGITIS WITHOUT HEMORRHAGE: Chronic | ICD-10-CM

## 2025-01-30 DIAGNOSIS — R09.89 DECREASED PEDAL PULSES: ICD-10-CM

## 2025-01-30 PROCEDURE — 3075F SYST BP GE 130 - 139MM HG: CPT | Performed by: NURSE PRACTITIONER

## 2025-01-30 PROCEDURE — 99214 OFFICE O/P EST MOD 30 MIN: CPT | Performed by: NURSE PRACTITIONER

## 2025-01-30 PROCEDURE — 1126F AMNT PAIN NOTED NONE PRSNT: CPT | Performed by: NURSE PRACTITIONER

## 2025-01-30 PROCEDURE — G0439 PPPS, SUBSEQ VISIT: HCPCS | Performed by: NURSE PRACTITIONER

## 2025-01-30 PROCEDURE — 1170F FXNL STATUS ASSESSED: CPT | Performed by: NURSE PRACTITIONER

## 2025-01-30 PROCEDURE — 3078F DIAST BP <80 MM HG: CPT | Performed by: NURSE PRACTITIONER

## 2025-01-30 RX ORDER — ALLOPURINOL 100 MG/1
100 TABLET ORAL 2 TIMES DAILY
Qty: 180 TABLET | Refills: 4 | Status: SHIPPED | OUTPATIENT
Start: 2025-01-30

## 2025-01-30 RX ORDER — FAMOTIDINE 20 MG/1
20 TABLET, FILM COATED ORAL 2 TIMES DAILY
Qty: 180 TABLET | Refills: 4 | Status: SHIPPED | OUTPATIENT
Start: 2025-01-30

## 2025-01-30 RX ORDER — CARVEDILOL 3.12 MG/1
3.12 TABLET ORAL EVERY 12 HOURS SCHEDULED
Qty: 180 TABLET | Refills: 4 | Status: SHIPPED | OUTPATIENT
Start: 2025-01-30

## 2025-01-30 NOTE — PROGRESS NOTES
The ABCs of the Annual Wellness Visit  Subsequent Medicare Wellness Visit    Subjective    Reinaldo Barth is a 74 y.o. male who presents for a Subsequent Medicare Wellness Visit.    The following portions of the patient's history were reviewed and   updated as appropriate: allergies, current medications, past family history, past medical history, past social history, past surgical history, and problem list.    Compared to one year ago, the patient feels his physical   health is the same.    Compared to one year ago, the patient feels his mental   health is the same.    Recent Hospitalizations:  He was not admitted to the hospital during the last year.       Current Medical Providers:  Patient Care Team:  Razia Reyes APRN as PCP - General (Family Medicine)    Outpatient Medications Prior to Visit   Medication Sig Dispense Refill   • amLODIPine (NORVASC) 10 MG tablet Take 1 tablet by mouth Daily.     • aspirin 81 MG EC tablet Take 1 tablet by mouth Daily.     • atorvastatin (LIPITOR) 40 MG tablet Take 1 tablet by mouth Daily. 90 tablet 1   • Bioflavonoid Products (Quercetin Complex Immune) capsule Take  by mouth.     • clopidogrel (PLAVIX) 75 MG tablet TAKE 1 TABLET (75 MG TOTAL) BY MOUTH DAILY     • Ferrous Sulfate (IRON PO) Take  by mouth Daily.     • isosorbide dinitrate (ISORDIL) 30 MG tablet Take 1 tablet by mouth 2 (Two) Times a Day. 60 tablet 1   • lisinopril (PRINIVIL,ZESTRIL) 40 MG tablet TAKE 1 TABLET (40 MG TOTAL) BY MOUTH DAILY     • Multiple Vitamins-Minerals (OCUVITE ADULT FORMULA PO) Take  by mouth Daily.     • multivitamin with minerals (MULTIVITAMIN ADULT PO) Take 1 tablet by mouth Daily.     • Semaglutide, 1 MG/DOSE, (OZEMPIC) 4 MG/3ML solution pen-injector Inject 1 mg under the skin into the appropriate area as directed 1 (One) Time Per Week. 4 mL 1   • allopurinol (ZYLOPRIM) 100 MG tablet Take 1 tablet by mouth 2 (Two) Times a Day.     • carvedilol (COREG) 3.125 MG tablet TAKE 1  TABLET BY MOUTH 2 TIMES DAILY (WITH MEALS)     • famotidine (PEPCID) 20 MG tablet Take 1 tablet by mouth 2 (Two) Times a Day.     • brompheniramine-pseudoephedrine-DM 30-2-10 MG/5ML syrup Take 5 mL by mouth 4 (Four) Times a Day As Needed for Congestion or Allergies. (Patient not taking: Reported on 5/8/2024) 118 mL 0   • methylPREDNISolone (MEDROL) 4 MG dose pack Take as directed on package instructions. (Patient not taking: Reported on 1/30/2025) 21 tablet 0   • nitroglycerin (NITROSTAT) 0.4 MG SL tablet Place 1 tablet under the tongue Every 5 (Five) Minutes As Needed for Chest Pain. Take no more than 3 doses in 15 minutes. (Patient not taking: Reported on 1/30/2025)       No facility-administered medications prior to visit.       No opioid medication identified on active medication list. I have reviewed chart for other potential  high risk medication/s and harmful drug interactions in the elderly.        Aspirin is on active medication list. Aspirin use is indicated based on review of current medical condition/s. Pros and cons of this therapy have been discussed today. Benefits of this medication outweigh potential harm.  Patient has been encouraged to continue taking this medication.  .      Patient Active Problem List   Diagnosis   • Hx of smoking   • Arthritis   • Hypercalcemia   • Anemia due to stage 3b chronic kidney disease   • Obesity (BMI 30-39.9)   • Elevated parathyroid hormone   • Gastroesophageal reflux disease with esophagitis without hemorrhage   • Primary hypertension   • Hypercholesterolemia   • Coronary artery disease of native artery of native heart with stable angina pectoris   • H/O right nephrectomy   • History of cancer of kidney in adulthood   • Encounter for screening for malignant neoplasm of colon     Advance Care Planning   Advance Care Planning     Advance Directive is not on file.  ACP discussion was held with the patient during this visit. Patient does not have an advance directive,  "information provided.     Objective    Vitals:    25 0809   BP: 131/70   BP Location: Left arm   Patient Position: Sitting   Cuff Size: Adult   Pulse: 70   Resp: 20   Temp: 97.6 °F (36.4 °C)   TempSrc: Infrared   SpO2: 97%   Weight: 102 kg (225 lb)   Height: 177.8 cm (70\")   PainSc: 0-No pain     Estimated body mass index is 32.28 kg/m² as calculated from the following:    Height as of this encounter: 177.8 cm (70\").    Weight as of this encounter: 102 kg (225 lb).           Does the patient have evidence of cognitive impairment? No          HEALTH RISK ASSESSMENT    Smoking Status:  Social History     Tobacco Use   Smoking Status Never   Smokeless Tobacco Never     Alcohol Consumption:  Social History     Substance and Sexual Activity   Alcohol Use Yes    Comment: occ     Fall Risk Screen:    RK Fall Risk Assessment was completed, and patient is at LOW risk for falls.Assessment completed on:2025    Depression Screenin/30/2025     8:12 AM   PHQ-2/PHQ-9 Depression Screening   Little interest or pleasure in doing things Not at all   Feeling down, depressed, or hopeless Not at all   How difficult have these problems made it for you to do your work, take care of things at home, or get along with other people? Not difficult at all       Health Habits and Functional and Cognitive Screenin/30/2025     8:12 AM   Functional & Cognitive Status   Do you have difficulty preparing food and eating? No   Do you have difficulty bathing yourself, getting dressed or grooming yourself? No   Do you have difficulty using the toilet? No   Do you have difficulty moving around from place to place? No   Do you have trouble with steps or getting out of a bed or a chair? No   Current Diet Limited Junk Food   Dental Exam Up to date   Eye Exam Up to date   Exercise (times per week) 6 times per week   Current Exercises Include Walking;Yard Work   Do you need help using the phone?  No   Are you deaf or do you have " serious difficulty hearing?  No   Do you need help to go to places out of walking distance? No   Do you need help shopping? No   Do you need help preparing meals?  No   Do you need help with housework?  No   Do you need help with laundry? No   Do you need help taking your medications? No   Do you need help managing money? No   Do you ever drive or ride in a car without wearing a seat belt? No   Have you felt unusual stress, anger or loneliness in the last month? No   Who do you live with? Spouse   If you need help, do you have trouble finding someone available to you? No   Have you been bothered in the last four weeks by sexual problems? No   Do you have difficulty concentrating, remembering or making decisions? No       Age-appropriate Screening Schedule:  Refer to the list below for future screening recommendations based on patient's age, sex and/or medical conditions. Orders for these recommended tests are listed in the plan section. The patient has been provided with a written plan.    Health Maintenance   Topic Date Due   • ZOSTER VACCINE (3 of 3) 04/02/2019   • INFLUENZA VACCINE  07/01/2024   • COVID-19 Vaccine (2 - 2024-25 season) 09/01/2024   • HEMOGLOBIN A1C  01/29/2025   • BMI FOLLOWUP  05/08/2025   • LIPID PANEL  07/29/2025   • DIABETIC EYE EXAM  08/01/2025   • ANNUAL WELLNESS VISIT  01/30/2026   • DIABETIC FOOT EXAM  01/30/2026   • COLORECTAL CANCER SCREENING  12/10/2027   • TDAP/TD VACCINES (2 - Td or Tdap) 05/11/2028   • HEPATITIS C SCREENING  Completed   • Pneumococcal Vaccine 65+  Completed   • URINE MICROALBUMIN  Discontinued                  CMS Preventative Services Quick Reference  Risk Factors Identified During Encounter  Immunizations Discussed/Encouraged: Influenza and COVID19  The above risks/problems have been discussed with the patient.  Pertinent information has been shared with the patient in the After Visit Summary.  An After Visit Summary and PPPS were made available to the  "patient.    Follow Up:   Next Medicare Wellness visit to be scheduled in 1 year.       Additional E&M Note during same encounter follows:  Patient has multiple medical problems which are significant and separately identifiable that require additional work above and beyond the Medicare Wellness Visit.      Chief Complaint  Medicare Wellness-subsequent    Subjective        HPI  Reinaldo Barth is also being seen today for chronic care follow up  History of Present Illness       Elevated uric acid level: Pt reports that he has been off of allopurinol. Didn't remember why he was taking this medication. Discussed with pt that his elevated uric acid level does adversely affect his kidney health and due to pt having one kidney due to kidney cancer.    CKD: last creat 1.6, GFR 45. K+ at 5.3.     DM2: stable on ozempic 1mg weekly. Denies any issues. Last HA1C 7/29/2024 5.7.     Review of Systems   Constitutional:  Negative for activity change and fatigue.   HENT:  Negative for congestion, rhinorrhea, sinus pressure, sore throat and trouble swallowing.    Eyes:  Negative for visual disturbance.   Respiratory:  Negative for chest tightness and shortness of breath.    Cardiovascular:  Negative for chest pain and palpitations.   Gastrointestinal:  Negative for abdominal pain, constipation, diarrhea and nausea.   Genitourinary:  Negative for dysuria and frequency.   Musculoskeletal:  Negative for back pain and neck pain.   Skin:  Negative for rash and wound.   Neurological:  Negative for weakness and confusion.   Psychiatric/Behavioral:  Negative for agitation, self-injury and sleep disturbance. The patient is not nervous/anxious.        Objective   Vital Signs:  /70 (BP Location: Left arm, Patient Position: Sitting, Cuff Size: Adult)   Pulse 70   Temp 97.6 °F (36.4 °C) (Infrared)   Resp 20   Ht 177.8 cm (70\")   Wt 102 kg (225 lb)   SpO2 97%   BMI 32.28 kg/m²     Physical Exam  Vitals and nursing note reviewed. "   Constitutional:       Appearance: Normal appearance. He is well-developed. He is obese.   HENT:      Head: Normocephalic and atraumatic.      Right Ear: Tympanic membrane, ear canal and external ear normal.      Left Ear: Tympanic membrane, ear canal and external ear normal.      Nose: Nose normal. No septal deviation, nasal tenderness or congestion.      Mouth/Throat:      Lips: Pink. No lesions.      Mouth: Mucous membranes are moist. No oral lesions.      Dentition: Normal dentition.      Pharynx: Oropharynx is clear. No pharyngeal swelling, oropharyngeal exudate or posterior oropharyngeal erythema.   Eyes:      General: Lids are normal. Vision grossly intact. No scleral icterus.        Right eye: No discharge.         Left eye: No discharge.      Extraocular Movements: Extraocular movements intact.      Conjunctiva/sclera: Conjunctivae normal.      Right eye: Right conjunctiva is not injected.      Left eye: Left conjunctiva is not injected.      Pupils: Pupils are equal, round, and reactive to light.   Neck:      Thyroid: No thyroid mass.      Trachea: Trachea normal.   Cardiovascular:      Rate and Rhythm: Normal rate and regular rhythm.      Heart sounds: Normal heart sounds. No murmur heard.     No gallop.   Pulmonary:      Effort: Pulmonary effort is normal.      Breath sounds: Normal breath sounds and air entry. No wheezing, rhonchi or rales.   Musculoskeletal:         General: No tenderness or deformity. Normal range of motion.      Cervical back: Full passive range of motion without pain, normal range of motion and neck supple.      Thoracic back: Normal.      Right lower leg: No edema.      Left lower leg: No edema.   Skin:     General: Skin is warm and dry.      Coloration: Skin is not jaundiced.      Findings: No rash.   Neurological:      Mental Status: He is alert and oriented to person, place, and time.      Sensory: Sensation is intact.      Motor: Motor function is intact.      Coordination:  Coordination is intact.      Gait: Gait is intact.      Deep Tendon Reflexes: Reflexes are normal and symmetric.   Psychiatric:         Mood and Affect: Mood and affect normal.         Behavior: Behavior normal.         Judgment: Judgment normal.      Physical Exam       The following data was reviewed by: NOHEMI Dumont on 01/30/2025:  Common labs          4/11/2024    07:50 7/29/2024    09:36 10/15/2024    09:27   Common Labs   Glucose  101  102       BUN  25  33       Creatinine  1.65  1.6       Sodium  144  143       Potassium  4.7  5.3       Chloride  107  105       Calcium  9.5     9.7  10.0       Albumin  4.5  4.7       Total Bilirubin  0.5  0.4       Alkaline Phosphatase  88  92       AST (SGOT)  24  17       ALT (SGPT)  24  15       WBC 7.9     7.30  7.3       Hemoglobin 12.8     13.5  13.3       Hematocrit 40.1     41.7  42.6       Platelets 165     161  162       Total Cholesterol  102     Triglycerides  47     HDL Cholesterol  44     LDL Cholesterol   46     Hemoglobin A1C  5.7     Microalbumin, Urine  <1.2     PSA  3.040     Uric Acid 5.0      5.2          Details          This result is from an external source.             Data reviewed :    Results      Diabetic foot exam:   Left: Filament test present   Pulses Dorsalis Pedis:  decreased   Reflexes 2+   Vibratory sensation normal   Proprioception normal   Sharp/dull discrimination normal       Right: Filament test present   Pulses Dorsalis Pedis:  decreased   Reflexes 2+    Vibratory sensation normal   Proprioception normal   Sharp/dull discrimination normal          Assessment and Plan   Diagnoses and all orders for this visit:    1. Encounter for annual wellness visit (AWV) in Medicare patient (Primary)    2. Uric acid arthropathy  Comments:  Stable on allopurinol.  Orders:  -     allopurinol (ZYLOPRIM) 100 MG tablet; Take 1 tablet by mouth 2 (Two) Times a Day.  Dispense: 180 tablet; Refill: 4  -     Uric acid; Future    3. Primary  hypertension  Comments:  Stable on hypertension regimen.  Orders:  -     carvedilol (COREG) 3.125 MG tablet; Take 1 tablet by mouth Every 12 (Twelve) Hours.  Dispense: 180 tablet; Refill: 4    4. Gastroesophageal reflux disease with esophagitis without hemorrhage  Comments:  Stable on Pepcid 20 mg twice daily.  Orders:  -     famotidine (PEPCID) 20 MG tablet; Take 1 tablet by mouth 2 (Two) Times a Day.  Dispense: 180 tablet; Refill: 4    5. Decreased pedal pulses  -     US Ankle / Brachial Indices Extremity Complete; Future    6. Medicare annual wellness visit, subsequent    7. Type 2 diabetes mellitus with chronic kidney disease, without long-term current use of insulin, unspecified CKD stage  -     CBC Auto Differential; Standing  -     Comprehensive Metabolic Panel; Standing  -     Hemoglobin A1c; Standing  -     Lipid Panel; Standing  -     MicroAlbumin, Urine, Random - Urine, Clean Catch; Standing  -     Urinalysis With Culture If Indicated -; Standing  -     TSH; Standing    8. Vitamin D deficiency  -     Vitamin D 25 hydroxy; Future      Assessment & Plan           I spent 10 min on wellness, 31 minutes caring for Reinaldo on this date of service. This time includes time spent by me in the following activities:preparing for the visit, reviewing tests, obtaining and/or reviewing a separately obtained history, performing a medically appropriate examination and/or evaluation , counseling and educating the patient/family/caregiver, ordering medications, tests, or procedures, referring and communicating with other health care professionals , documenting information in the medical record, independently interpreting results and communicating that information with the patient/family/caregiver, and care coordination  Follow Up   No follow-ups on file.  Patient was given instructions and counseling regarding his condition or for health maintenance advice. Please see specific information pulled into the AVS if appropriate.      Patient or patient representative verbalized consent for the use of Ambient Listening during the visit with  NOHEMI Dumont for chart documentation. 1/31/2025  08:49 CST

## 2025-03-08 DIAGNOSIS — E78.00 HYPERCHOLESTEROLEMIA: Chronic | ICD-10-CM

## 2025-03-10 RX ORDER — ATORVASTATIN CALCIUM 40 MG/1
40 TABLET, FILM COATED ORAL DAILY
Qty: 90 TABLET | Refills: 1 | Status: SHIPPED | OUTPATIENT
Start: 2025-03-10

## 2025-03-11 ENCOUNTER — HOSPITAL ENCOUNTER (OUTPATIENT)
Dept: ULTRASOUND IMAGING | Facility: HOSPITAL | Age: 75
Discharge: HOME | End: 2025-03-11
Admitting: NURSE PRACTITIONER
Payer: MEDICARE

## 2025-03-11 DIAGNOSIS — R09.89 DECREASED PEDAL PULSES: ICD-10-CM

## 2025-03-11 PROCEDURE — 93923 UPR/LXTR ART STDY 3+ LVLS: CPT | Performed by: STUDENT IN AN ORGANIZED HEALTH CARE EDUCATION/TRAINING PROGRAM

## 2025-03-11 PROCEDURE — 93923 UPR/LXTR ART STDY 3+ LVLS: CPT

## 2025-04-14 LAB
25(OH)D3 SERPL-MCNC: 143 NG/ML
ALBUMIN SERPL-MCNC: 4.5 G/DL (ref 3.5–5.2)
ALP SERPL-CCNC: 83 U/L (ref 40–129)
ALT SERPL-CCNC: 21 U/L (ref 10–50)
ANION GAP SERPL CALCULATED.3IONS-SCNC: 11 MMOL/L (ref 8–16)
AST SERPL-CCNC: 23 U/L (ref 10–50)
BACTERIA URNS QL MICRO: NEGATIVE /HPF
BASOPHILS # BLD: 0 K/UL (ref 0–0.2)
BASOPHILS NFR BLD: 0.6 % (ref 0–1)
BILIRUB SERPL-MCNC: 0.4 MG/DL (ref 0.2–1.2)
BILIRUB UR QL STRIP: NEGATIVE
BUN SERPL-MCNC: 25 MG/DL (ref 8–23)
CALCIUM SERPL-MCNC: 9.9 MG/DL (ref 8.8–10.2)
CHLORIDE SERPL-SCNC: 109 MMOL/L (ref 98–107)
CLARITY UR: CLEAR
CO2 SERPL-SCNC: 27 MMOL/L (ref 22–29)
COLOR UR: YELLOW
CREAT SERPL-MCNC: 1.7 MG/DL (ref 0.7–1.2)
CREAT UR-MCNC: 101 MG/DL (ref 39–259)
CRYSTALS URNS MICRO: NORMAL /HPF
EOSINOPHIL # BLD: 0.2 K/UL (ref 0–0.6)
EOSINOPHIL NFR BLD: 3.6 % (ref 0–5)
EPI CELLS #/AREA URNS AUTO: 0 /HPF (ref 0–5)
ERYTHROCYTE [DISTWIDTH] IN BLOOD BY AUTOMATED COUNT: 12.9 % (ref 11.5–14.5)
GLUCOSE SERPL-MCNC: 104 MG/DL (ref 70–99)
GLUCOSE UR STRIP.AUTO-MCNC: NEGATIVE MG/DL
HCT VFR BLD AUTO: 39.7 % (ref 42–52)
HGB BLD-MCNC: 13 G/DL (ref 14–18)
HGB UR STRIP.AUTO-MCNC: NEGATIVE MG/L
HYALINE CASTS #/AREA URNS AUTO: 0 /HPF (ref 0–8)
IMM GRANULOCYTES # BLD: 0 K/UL
KETONES UR STRIP.AUTO-MCNC: NEGATIVE MG/DL
LEUKOCYTE ESTERASE UR QL STRIP.AUTO: ABNORMAL
LYMPHOCYTES # BLD: 1 K/UL (ref 1.1–4.5)
LYMPHOCYTES NFR BLD: 14.8 % (ref 20–40)
MAGNESIUM SERPL-MCNC: 2.1 MG/DL (ref 1.6–2.4)
MCH RBC QN AUTO: 30.3 PG (ref 27–31)
MCHC RBC AUTO-ENTMCNC: 32.7 G/DL (ref 33–37)
MCV RBC AUTO: 92.5 FL (ref 80–94)
MONOCYTES # BLD: 0.8 K/UL (ref 0–0.9)
MONOCYTES NFR BLD: 11.7 % (ref 0–10)
NEUTROPHILS # BLD: 4.4 K/UL (ref 1.5–7.5)
NEUTS SEG NFR BLD: 69 % (ref 50–65)
NITRITE UR QL STRIP.AUTO: NEGATIVE
PH UR STRIP.AUTO: 6 [PH] (ref 5–8)
PHOSPHATE SERPL-MCNC: 3.1 MG/DL (ref 2.5–4.5)
PLATELET # BLD AUTO: 155 K/UL (ref 130–400)
PMV BLD AUTO: 11.9 FL (ref 9.4–12.4)
POTASSIUM SERPL-SCNC: 5 MMOL/L (ref 3.5–5.1)
PROT SERPL-MCNC: 6.7 G/DL (ref 6.4–8.3)
PROT UR STRIP.AUTO-MCNC: NEGATIVE MG/DL
PROT UR-MCNC: 15 MG/DL (ref 0–12)
PTH-INTACT SERPL-MCNC: 67.2 PG/ML (ref 15–65)
RBC # BLD AUTO: 4.29 M/UL (ref 4.7–6.1)
RBC #/AREA URNS AUTO: 0 /HPF (ref 0–4)
SODIUM SERPL-SCNC: 147 MMOL/L (ref 136–145)
SP GR UR STRIP.AUTO: 1.02 (ref 1–1.03)
URATE SERPL-MCNC: 5.4 MG/DL (ref 3.4–7)
UROBILINOGEN UR STRIP.AUTO-MCNC: 0.2 E.U./DL
WBC # BLD AUTO: 6.4 K/UL (ref 4.8–10.8)
WBC #/AREA URNS AUTO: 1 /HPF (ref 0–5)

## 2025-05-06 ENCOUNTER — OFFICE VISIT (OUTPATIENT)
Dept: CARDIOLOGY CLINIC | Age: 75
End: 2025-05-06
Payer: MEDICARE

## 2025-05-06 VITALS
HEIGHT: 70 IN | DIASTOLIC BLOOD PRESSURE: 72 MMHG | HEART RATE: 65 BPM | BODY MASS INDEX: 33.93 KG/M2 | SYSTOLIC BLOOD PRESSURE: 134 MMHG | OXYGEN SATURATION: 95 % | WEIGHT: 237 LBS

## 2025-05-06 DIAGNOSIS — I25.10 CORONARY ARTERY DISEASE INVOLVING NATIVE CORONARY ARTERY OF NATIVE HEART WITHOUT ANGINA PECTORIS: Primary | ICD-10-CM

## 2025-05-06 DIAGNOSIS — E78.00 HYPERCHOLESTEREMIA: ICD-10-CM

## 2025-05-06 DIAGNOSIS — I10 ESSENTIAL HYPERTENSION: ICD-10-CM

## 2025-05-06 PROCEDURE — 1123F ACP DISCUSS/DSCN MKR DOCD: CPT | Performed by: CLINICAL NURSE SPECIALIST

## 2025-05-06 PROCEDURE — 99214 OFFICE O/P EST MOD 30 MIN: CPT | Performed by: CLINICAL NURSE SPECIALIST

## 2025-05-06 PROCEDURE — G8417 CALC BMI ABV UP PARAM F/U: HCPCS | Performed by: CLINICAL NURSE SPECIALIST

## 2025-05-06 PROCEDURE — 1159F MED LIST DOCD IN RCRD: CPT | Performed by: CLINICAL NURSE SPECIALIST

## 2025-05-06 PROCEDURE — 3017F COLORECTAL CA SCREEN DOC REV: CPT | Performed by: CLINICAL NURSE SPECIALIST

## 2025-05-06 PROCEDURE — 3078F DIAST BP <80 MM HG: CPT | Performed by: CLINICAL NURSE SPECIALIST

## 2025-05-06 PROCEDURE — 1160F RVW MEDS BY RX/DR IN RCRD: CPT | Performed by: CLINICAL NURSE SPECIALIST

## 2025-05-06 PROCEDURE — 1036F TOBACCO NON-USER: CPT | Performed by: CLINICAL NURSE SPECIALIST

## 2025-05-06 PROCEDURE — 3075F SYST BP GE 130 - 139MM HG: CPT | Performed by: CLINICAL NURSE SPECIALIST

## 2025-05-06 PROCEDURE — G8427 DOCREV CUR MEDS BY ELIG CLIN: HCPCS | Performed by: CLINICAL NURSE SPECIALIST

## 2025-05-06 NOTE — PATIENT INSTRUCTIONS
Maintain good blood pressure control-goal<130/80 at rest  Maintain good cholesterol control LDL goal<70 with arterial disease  If you are diabetic work to keep/obtain hemoglobin A1c< 7    Follow up in 6-9 mos    Call with any questions or concerns  Follow up with Arabella Geronimo APRN - NP for non cardiac problems  Report any new problems  Cardiovascular Fitness-Exercise as tolerated.  Strive for 30 minutes of exercise most days of the week.    Cardiac / Healthy Diet- Avoid processed high fat foods, maintain low sodium/salt   Continue current medications as directed  Continue plan of treatment  It is always recommended that you bring your medications bottles with you to each visit - this is for your safety!

## 2025-05-06 NOTE — PROGRESS NOTES
[facility]  [address] [place] [address]  Phone: [phone]  Fax: [phone]    OFFICE VISIT:  2025    Sony Rodriguez - : 1950    Reason For Visit:  Sony is a 75 y.o. male who is here for Follow-up (6-9 mo f/u)  History of coronary disease with CABG in .  2021 had complex PCI to the distal main and LAD.  Also has hypertension, hyperlipidemia, CKD, renal cell cancer with right nephrectomy, diabetes and previous smoker.    He is here in follow up  He is active and doing well.    He  saw cardiologist in [place] in November.  Nuclear stress test negative for ischemia and 2023    He reports he is staying active.  He does notice in the cold weather months if he pushes himself at the end of the day he may have a little angina.  Resolved with rest.  Has not taken any nitroglycerin    He discussed with his cardiologist in [place] about possible additional stent however with risk of CKD and 1 kidney he is managing medically.  Feels he is doing well      Subjective  Sony denies exertional chest pain on a regular basis.  No, shortness of breath, orthopnea, paroxysmal nocturnal dyspnea, syncope, presyncope, arrhythmia, edema and fatigue.  The patient denies numbness or weakness to suggest cerebrovascular accident or transient ischemic attack.    Arabella Geronimo APRN - NP is PCP and follows labs including lipids.  Follows with Dr. Abbasi for nephrology.  States all his labs have been very stable  Sony Rodriguez has the following history as recorded in [facility]:    Patient Active Problem List    Diagnosis Date Noted    Abnormal nuclear cardiac imaging test     Secondary hyperparathyroidism 2023    Chronic systolic (congestive) heart failure 2022    Chronic low back pain 2022    History of primary malignant neoplasm of left kidney 2024    Arteriosclerosis of arterial coronary artery bypass graft 2023    Close exposure to

## 2025-07-30 ENCOUNTER — LAB (OUTPATIENT)
Dept: LAB | Facility: HOSPITAL | Age: 75
End: 2025-07-30
Payer: MEDICARE

## 2025-07-30 ENCOUNTER — OFFICE VISIT (OUTPATIENT)
Dept: FAMILY MEDICINE CLINIC | Facility: CLINIC | Age: 75
End: 2025-07-30
Payer: MEDICARE

## 2025-07-30 VITALS
TEMPERATURE: 97.2 F | SYSTOLIC BLOOD PRESSURE: 150 MMHG | RESPIRATION RATE: 19 BRPM | HEIGHT: 70 IN | HEART RATE: 58 BPM | OXYGEN SATURATION: 98 % | BODY MASS INDEX: 35.07 KG/M2 | DIASTOLIC BLOOD PRESSURE: 82 MMHG | WEIGHT: 245 LBS

## 2025-07-30 DIAGNOSIS — I10 PRIMARY HYPERTENSION: ICD-10-CM

## 2025-07-30 DIAGNOSIS — R79.89 ELEVATED PARATHYROID HORMONE: ICD-10-CM

## 2025-07-30 DIAGNOSIS — M10.9 URIC ACID ARTHROPATHY: ICD-10-CM

## 2025-07-30 DIAGNOSIS — E78.00 HYPERCHOLESTEROLEMIA: ICD-10-CM

## 2025-07-30 DIAGNOSIS — E11.22 TYPE 2 DIABETES MELLITUS WITH CHRONIC KIDNEY DISEASE, WITHOUT LONG-TERM CURRENT USE OF INSULIN, UNSPECIFIED CKD STAGE: ICD-10-CM

## 2025-07-30 DIAGNOSIS — Z12.5 PROSTATE CANCER SCREENING: ICD-10-CM

## 2025-07-30 DIAGNOSIS — E11.22 TYPE 2 DIABETES MELLITUS WITH CHRONIC KIDNEY DISEASE, WITHOUT LONG-TERM CURRENT USE OF INSULIN, UNSPECIFIED CKD STAGE: Primary | ICD-10-CM

## 2025-07-30 DIAGNOSIS — E55.9 VITAMIN D DEFICIENCY: ICD-10-CM

## 2025-07-30 DIAGNOSIS — E83.52 HYPERCALCEMIA: ICD-10-CM

## 2025-07-30 DIAGNOSIS — T14.8XXA BRUISING: ICD-10-CM

## 2025-07-30 LAB
25(OH)D3 SERPL-MCNC: 93.6 NG/ML (ref 30–100)
ALBUMIN SERPL-MCNC: 4.4 G/DL (ref 3.5–5)
ALBUMIN UR-MCNC: 2.7 MG/DL
ALBUMIN/GLOB SERPL: 1.5 G/DL (ref 1.1–2.5)
ALP SERPL-CCNC: 86 U/L (ref 24–120)
ALT SERPL W P-5'-P-CCNC: 22 U/L (ref 0–50)
ANION GAP SERPL CALCULATED.3IONS-SCNC: 11 MMOL/L (ref 4–13)
AST SERPL-CCNC: 25 U/L (ref 7–45)
AUTO MIXED CELLS #: 0.9 10*3/MM3 (ref 0.1–2.6)
AUTO MIXED CELLS %: 12.1 % (ref 0.1–24)
BILIRUB SERPL-MCNC: 0.8 MG/DL (ref 0.1–1)
BILIRUB UR QL STRIP: NEGATIVE
BILIRUB UR QL STRIP: NEGATIVE
BUN SERPL-MCNC: 33 MG/DL (ref 5–21)
BUN/CREAT SERPL: 17.4
CALCIUM SPEC-SCNC: 9.8 MG/DL (ref 8.6–10.5)
CHLORIDE SERPL-SCNC: 109 MMOL/L (ref 98–110)
CHOLEST SERPL-MCNC: 108 MG/DL (ref 130–200)
CLARITY UR: CLEAR
CLARITY UR: CLEAR
CO2 SERPL-SCNC: 23 MMOL/L (ref 24–31)
COLOR UR: YELLOW
COLOR UR: YELLOW
CREAT SERPL-MCNC: 1.9 MG/DL (ref 0.5–1.4)
EGFRCR SERPLBLD CKD-EPI 2021: 36.3 ML/MIN/1.73
ERYTHROCYTE [DISTWIDTH] IN BLOOD BY AUTOMATED COUNT: 13.6 % (ref 12.3–15.4)
GLOBULIN UR ELPH-MCNC: 2.9 GM/DL
GLUCOSE SERPL-MCNC: 119 MG/DL (ref 65–99)
GLUCOSE UR STRIP-MCNC: NEGATIVE MG/DL
GLUCOSE UR STRIP-MCNC: NEGATIVE MG/DL
HBA1C MFR BLD: 6.4 % (ref 4.8–5.9)
HCT VFR BLD AUTO: 39.8 % (ref 37.5–51)
HDLC SERPL-MCNC: 42 MG/DL
HGB BLD-MCNC: 13.1 G/DL (ref 13–17.7)
HGB UR QL STRIP.AUTO: NEGATIVE
HGB UR QL STRIP.AUTO: NEGATIVE
KETONES UR QL STRIP: NEGATIVE
KETONES UR QL STRIP: NEGATIVE
LDLC SERPL CALC-MCNC: 52 MG/DL (ref 0–99)
LDLC/HDLC SERPL: 1.27 {RATIO}
LEUKOCYTE ESTERASE UR QL STRIP.AUTO: NEGATIVE
LEUKOCYTE ESTERASE UR QL STRIP.AUTO: NEGATIVE
LYMPHOCYTES # BLD AUTO: 1.4 10*3/MM3 (ref 0.7–3.1)
LYMPHOCYTES NFR BLD AUTO: 19.6 % (ref 19.6–45.3)
MCH RBC QN AUTO: 30.5 PG (ref 26.6–33)
MCHC RBC AUTO-ENTMCNC: 32.9 G/DL (ref 31.5–35.7)
MCV RBC AUTO: 92.6 FL (ref 79–97)
NEUTROPHILS NFR BLD AUTO: 4.8 10*3/MM3 (ref 1.7–7)
NEUTROPHILS NFR BLD AUTO: 68.3 % (ref 42.7–76)
NITRITE UR QL STRIP: NEGATIVE
NITRITE UR QL STRIP: NEGATIVE
PH UR STRIP.AUTO: 5.5 [PH] (ref 5–8)
PH UR STRIP.AUTO: 5.5 [PH] (ref 5–8)
PLATELET # BLD AUTO: 168 10*3/MM3 (ref 140–450)
PMV BLD AUTO: 10.3 FL (ref 6–12)
POTASSIUM SERPL-SCNC: 4.7 MMOL/L (ref 3.5–5.3)
PROT SERPL-MCNC: 7.3 G/DL (ref 6.3–8.7)
PROT UR QL STRIP: NEGATIVE
PROT UR QL STRIP: NEGATIVE
PSA SERPL-MCNC: 2.78 NG/ML (ref 0–4)
RBC # BLD AUTO: 4.3 10*6/MM3 (ref 4.14–5.8)
SODIUM SERPL-SCNC: 143 MMOL/L (ref 135–145)
SP GR UR STRIP: 1.01 (ref 1–1.03)
SP GR UR STRIP: 1.01 (ref 1–1.03)
TRIGL SERPL-MCNC: 64 MG/DL (ref 0–149)
TSH SERPL DL<=0.05 MIU/L-ACNC: 1.11 UIU/ML (ref 0.27–4.2)
URATE SERPL-MCNC: 5.8 MG/DL (ref 3.4–7)
UROBILINOGEN UR QL STRIP: NORMAL
UROBILINOGEN UR QL STRIP: NORMAL
VLDLC SERPL-MCNC: 14 MG/DL (ref 5–40)
WBC NRBC COR # BLD AUTO: 7.1 10*3/MM3 (ref 3.4–10.8)

## 2025-07-30 PROCEDURE — 82306 VITAMIN D 25 HYDROXY: CPT

## 2025-07-30 PROCEDURE — 82043 UR ALBUMIN QUANTITATIVE: CPT | Performed by: NURSE PRACTITIONER

## 2025-07-30 PROCEDURE — 80061 LIPID PANEL: CPT

## 2025-07-30 PROCEDURE — 84443 ASSAY THYROID STIM HORMONE: CPT

## 2025-07-30 PROCEDURE — 83036 HEMOGLOBIN GLYCOSYLATED A1C: CPT

## 2025-07-30 PROCEDURE — 85025 COMPLETE CBC W/AUTO DIFF WBC: CPT

## 2025-07-30 PROCEDURE — 80053 COMPREHEN METABOLIC PANEL: CPT

## 2025-07-30 PROCEDURE — 84550 ASSAY OF BLOOD/URIC ACID: CPT

## 2025-07-30 PROCEDURE — 36415 COLL VENOUS BLD VENIPUNCTURE: CPT

## 2025-07-30 PROCEDURE — 81003 URINALYSIS AUTO W/O SCOPE: CPT | Performed by: NURSE PRACTITIONER

## 2025-07-30 PROCEDURE — 81003 URINALYSIS AUTO W/O SCOPE: CPT

## 2025-07-30 PROCEDURE — G0103 PSA SCREENING: HCPCS

## 2025-07-30 RX ORDER — DOXAZOSIN 1 MG/1
1 TABLET ORAL NIGHTLY
Qty: 30 TABLET | Refills: 1 | Status: SHIPPED | OUTPATIENT
Start: 2025-07-30

## 2025-08-04 ENCOUNTER — TELEPHONE (OUTPATIENT)
Dept: HEMATOLOGY | Age: 75
End: 2025-08-04

## 2025-08-07 ENCOUNTER — OFFICE VISIT (OUTPATIENT)
Dept: HEMATOLOGY | Age: 75
End: 2025-08-07
Payer: MEDICARE

## 2025-08-07 ENCOUNTER — HOSPITAL ENCOUNTER (OUTPATIENT)
Dept: INFUSION THERAPY | Age: 75
Discharge: HOME | End: 2025-08-07
Payer: MEDICARE

## 2025-08-07 VITALS
TEMPERATURE: 98 F | HEIGHT: 70 IN | OXYGEN SATURATION: 97 % | WEIGHT: 247 LBS | SYSTOLIC BLOOD PRESSURE: 124 MMHG | HEART RATE: 62 BPM | BODY MASS INDEX: 35.36 KG/M2 | DIASTOLIC BLOOD PRESSURE: 68 MMHG

## 2025-08-07 DIAGNOSIS — Z85.528 ENCOUNTER FOR FOLLOW-UP SURVEILLANCE OF KIDNEY CANCER: ICD-10-CM

## 2025-08-07 DIAGNOSIS — Z71.89 CARE PLAN DISCUSSED WITH PATIENT: ICD-10-CM

## 2025-08-07 DIAGNOSIS — C64.1 MALIGNANT NEOPLASM OF RIGHT KIDNEY, EXCEPT RENAL PELVIS (HCC): Primary | ICD-10-CM

## 2025-08-07 DIAGNOSIS — Z08 ENCOUNTER FOR FOLLOW-UP SURVEILLANCE OF KIDNEY CANCER: ICD-10-CM

## 2025-08-07 PROCEDURE — 3074F SYST BP LT 130 MM HG: CPT | Performed by: INTERNAL MEDICINE

## 2025-08-07 PROCEDURE — G8417 CALC BMI ABV UP PARAM F/U: HCPCS | Performed by: INTERNAL MEDICINE

## 2025-08-07 PROCEDURE — 1123F ACP DISCUSS/DSCN MKR DOCD: CPT | Performed by: INTERNAL MEDICINE

## 2025-08-07 PROCEDURE — G8427 DOCREV CUR MEDS BY ELIG CLIN: HCPCS | Performed by: INTERNAL MEDICINE

## 2025-08-07 PROCEDURE — 3017F COLORECTAL CA SCREEN DOC REV: CPT | Performed by: INTERNAL MEDICINE

## 2025-08-07 PROCEDURE — 1126F AMNT PAIN NOTED NONE PRSNT: CPT | Performed by: INTERNAL MEDICINE

## 2025-08-07 PROCEDURE — 1036F TOBACCO NON-USER: CPT | Performed by: INTERNAL MEDICINE

## 2025-08-07 PROCEDURE — 1159F MED LIST DOCD IN RCRD: CPT | Performed by: INTERNAL MEDICINE

## 2025-08-07 PROCEDURE — 3078F DIAST BP <80 MM HG: CPT | Performed by: INTERNAL MEDICINE

## 2025-08-07 PROCEDURE — 99213 OFFICE O/P EST LOW 20 MIN: CPT | Performed by: INTERNAL MEDICINE

## 2025-08-07 PROCEDURE — 99212 OFFICE O/P EST SF 10 MIN: CPT

## (undated) DEVICE — Device: Brand: DEFENDO AIR/WATER/SUCTION AND BIOPSY VALVE

## (undated) DEVICE — SINGLE-USE POLYPECTOMY SNARE: Brand: CAPTIVATOR II

## (undated) DEVICE — CUFF,BP,DISP,1 TUBE,ADULT,HP: Brand: MEDLINE

## (undated) DEVICE — ENDO KIT,LOURDES HOSPITAL: Brand: MEDLINE INDUSTRIES, INC.

## (undated) DEVICE — THE CHANNEL CLEANING BRUSH IS A NYLON FLEXI BRUSH ATTACHED TO A FLEXIBLE PLASTIC SHEATH DESIGNED TO SAFELY REMOVE DEBRIS FROM FLEXIBLE ENDOSCOPES.

## (undated) DEVICE — SENSR O2 OXIMAX FNGR A/ 18IN NONSTR

## (undated) DEVICE — THE SINGLE USE ETRAP – POLYP TRAP IS USED FOR SUCTION RETRIEVAL OF ENDOSCOPICALLY REMOVED POLYPS.: Brand: ETRAP

## (undated) DEVICE — MASK,OXYGEN,MED CONC,ADLT,7' TUB, UC: Brand: PENDING

## (undated) DEVICE — YANKAUER,BULB TIP WITH VENT: Brand: ARGYLE